# Patient Record
Sex: MALE | Race: OTHER | NOT HISPANIC OR LATINO | URBAN - METROPOLITAN AREA
[De-identification: names, ages, dates, MRNs, and addresses within clinical notes are randomized per-mention and may not be internally consistent; named-entity substitution may affect disease eponyms.]

---

## 2017-10-04 ENCOUNTER — INPATIENT (INPATIENT)
Facility: HOSPITAL | Age: 72
LOS: 29 days | Discharge: NURSING, REHAB OR LONG-TERM CARE | DRG: 853 | End: 2017-11-03
Attending: INTERNAL MEDICINE | Admitting: INTERNAL MEDICINE
Payer: COMMERCIAL

## 2017-10-04 VITALS
RESPIRATION RATE: 24 BRPM | OXYGEN SATURATION: 96 % | TEMPERATURE: 99 F | SYSTOLIC BLOOD PRESSURE: 99 MMHG | HEART RATE: 73 BPM | DIASTOLIC BLOOD PRESSURE: 63 MMHG

## 2017-10-04 LAB
ALBUMIN SERPL ELPH-MCNC: 3.6 G/DL — SIGNIFICANT CHANGE UP (ref 3.3–5)
ALP SERPL-CCNC: 231 U/L — HIGH (ref 40–120)
ALT FLD-CCNC: 11 U/L — SIGNIFICANT CHANGE UP (ref 10–45)
ANION GAP SERPL CALC-SCNC: 16 MMOL/L — SIGNIFICANT CHANGE UP (ref 5–17)
ANISOCYTOSIS BLD QL: SLIGHT — SIGNIFICANT CHANGE UP
APPEARANCE UR: SIGNIFICANT CHANGE UP
APTT BLD: 28.5 SEC — SIGNIFICANT CHANGE UP (ref 27.5–37.4)
AST SERPL-CCNC: 36 U/L — SIGNIFICANT CHANGE UP (ref 10–40)
BACTERIA # UR AUTO: PRESENT /HPF
BASE EXCESS BLDV CALC-SCNC: 1.7 MMOL/L — SIGNIFICANT CHANGE UP
BASO STIPL BLD QL SMEAR: SLIGHT — SIGNIFICANT CHANGE UP
BILIRUB SERPL-MCNC: 1 MG/DL — SIGNIFICANT CHANGE UP (ref 0.2–1.2)
BILIRUB UR-MCNC: (no result)
BUN SERPL-MCNC: 30 MG/DL — HIGH (ref 7–23)
CA-I SERPL-SCNC: 1.15 MMOL/L — SIGNIFICANT CHANGE UP (ref 1.12–1.3)
CALCIUM SERPL-MCNC: 9.3 MG/DL — SIGNIFICANT CHANGE UP (ref 8.4–10.5)
CHLORIDE SERPL-SCNC: 90 MMOL/L — LOW (ref 96–108)
CK MB CFR SERPL CALC: 23.8 NG/ML — HIGH (ref 0–6.7)
CK SERPL-CCNC: 678 U/L — HIGH (ref 30–200)
CO2 SERPL-SCNC: 25 MMOL/L — SIGNIFICANT CHANGE UP (ref 22–31)
COLOR SPEC: YELLOW — SIGNIFICANT CHANGE UP
CREAT SERPL-MCNC: 1.97 MG/DL — HIGH (ref 0.5–1.3)
DIFF PNL FLD: (no result)
EPI CELLS # UR: (no result) /HPF
GAS PNL BLDV: 131 MMOL/L — LOW (ref 138–146)
GAS PNL BLDV: SIGNIFICANT CHANGE UP
GAS PNL BLDV: SIGNIFICANT CHANGE UP
GLUCOSE SERPL-MCNC: 130 MG/DL — HIGH (ref 70–99)
GLUCOSE UR QL: NEGATIVE — SIGNIFICANT CHANGE UP
GRAN CASTS # UR COMP ASSIST: (no result) /LPF
HAPTOGLOB SERPL-MCNC: 212 MG/DL — HIGH (ref 34–200)
HCO3 BLDV-SCNC: 28 MMOL/L — HIGH (ref 20–27)
HCT VFR BLD CALC: 27.2 % — LOW (ref 39–50)
HGB BLD-MCNC: 8.7 G/DL — LOW (ref 13–17)
HYPOCHROMIA BLD QL: SLIGHT — SIGNIFICANT CHANGE UP
INR BLD: 1.34 — HIGH (ref 0.88–1.16)
KETONES UR-MCNC: (no result) MG/DL
LACTATE SERPL-SCNC: 1.4 MMOL/L — SIGNIFICANT CHANGE UP (ref 0.5–2)
LDH SERPL L TO P-CCNC: 365 U/L — HIGH (ref 50–242)
LEUKOCYTE ESTERASE UR-ACNC: NEGATIVE — SIGNIFICANT CHANGE UP
LG PLATELETS BLD QL AUTO: PRESENT — SIGNIFICANT CHANGE UP
MCHC RBC-ENTMCNC: 31.3 PG — SIGNIFICANT CHANGE UP (ref 27–34)
MCHC RBC-ENTMCNC: 32 G/DL — SIGNIFICANT CHANGE UP (ref 32–36)
MCV RBC AUTO: 97.8 FL — SIGNIFICANT CHANGE UP (ref 80–100)
MONOCYTES NFR BLD AUTO: 24 % — HIGH (ref 2–14)
NEUTROPHILS NFR BLD AUTO: 70 % — SIGNIFICANT CHANGE UP (ref 43–77)
NEUTS BAND # BLD: 6 % — SIGNIFICANT CHANGE UP
NITRITE UR-MCNC: NEGATIVE — SIGNIFICANT CHANGE UP
NT-PROBNP SERPL-SCNC: 6341 PG/ML — HIGH (ref 0–300)
PCO2 BLDV: 50 MMHG — SIGNIFICANT CHANGE UP (ref 41–51)
PH BLDV: 7.37 — SIGNIFICANT CHANGE UP (ref 7.32–7.43)
PH UR: 5 — SIGNIFICANT CHANGE UP (ref 5–8)
PLAT MORPH BLD: (no result)
PLATELET # BLD AUTO: 87 K/UL — LOW (ref 150–400)
PO2 BLDV: 24 MMHG — SIGNIFICANT CHANGE UP
POTASSIUM BLDV-SCNC: 4.5 MMOL/L — SIGNIFICANT CHANGE UP (ref 3.5–4.9)
POTASSIUM SERPL-MCNC: 4.4 MMOL/L — SIGNIFICANT CHANGE UP (ref 3.5–5.3)
POTASSIUM SERPL-SCNC: 4.4 MMOL/L — SIGNIFICANT CHANGE UP (ref 3.5–5.3)
PROT SERPL-MCNC: 8.4 G/DL — HIGH (ref 6–8.3)
PROT UR-MCNC: 30 MG/DL
PROTHROM AB SERPL-ACNC: 15 SEC — HIGH (ref 9.8–12.7)
RBC # BLD: 2.78 M/UL — LOW (ref 4.2–5.8)
RBC # FLD: 15.4 % — SIGNIFICANT CHANGE UP (ref 10.3–16.9)
RBC BLD AUTO: (no result)
RBC CASTS # UR COMP ASSIST: > 10 /HPF
RETICS/RBC NFR: 2.5 % — SIGNIFICANT CHANGE UP (ref 0.5–2.5)
SAO2 % BLDV: 39 % — SIGNIFICANT CHANGE UP
SODIUM SERPL-SCNC: 131 MMOL/L — LOW (ref 135–145)
SP GR SPEC: 1.02 — SIGNIFICANT CHANGE UP (ref 1–1.03)
TROPONIN T SERPL-MCNC: 0.06 NG/ML — CRITICAL HIGH (ref 0–0.01)
UROBILINOGEN FLD QL: 1 E.U./DL — SIGNIFICANT CHANGE UP
WBC # BLD: 46.3 K/UL — CRITICAL HIGH (ref 3.8–10.5)
WBC # FLD AUTO: 46.3 K/UL — CRITICAL HIGH (ref 3.8–10.5)
WBC UR QL: (no result) /HPF

## 2017-10-04 PROCEDURE — 71010: CPT | Mod: 26

## 2017-10-04 PROCEDURE — 93010 ELECTROCARDIOGRAM REPORT: CPT

## 2017-10-04 PROCEDURE — 99291 CRITICAL CARE FIRST HOUR: CPT | Mod: 25

## 2017-10-04 PROCEDURE — 70450 CT HEAD/BRAIN W/O DYE: CPT | Mod: 26

## 2017-10-04 PROCEDURE — 71250 CT THORAX DX C-: CPT | Mod: 26

## 2017-10-04 PROCEDURE — 99233 SBSQ HOSP IP/OBS HIGH 50: CPT | Mod: GC

## 2017-10-04 RX ORDER — PIPERACILLIN AND TAZOBACTAM 4; .5 G/20ML; G/20ML
3.38 INJECTION, POWDER, LYOPHILIZED, FOR SOLUTION INTRAVENOUS ONCE
Qty: 0 | Refills: 0 | Status: COMPLETED | OUTPATIENT
Start: 2017-10-04 | End: 2017-10-04

## 2017-10-04 RX ORDER — SODIUM CHLORIDE 9 MG/ML
3 INJECTION INTRAMUSCULAR; INTRAVENOUS; SUBCUTANEOUS ONCE
Qty: 0 | Refills: 0 | Status: COMPLETED | OUTPATIENT
Start: 2017-10-04 | End: 2017-10-04

## 2017-10-04 RX ORDER — IPRATROPIUM/ALBUTEROL SULFATE 18-103MCG
3 AEROSOL WITH ADAPTER (GRAM) INHALATION ONCE
Qty: 0 | Refills: 0 | Status: COMPLETED | OUTPATIENT
Start: 2017-10-04 | End: 2017-10-04

## 2017-10-04 RX ORDER — SODIUM CHLORIDE 9 MG/ML
1000 INJECTION INTRAMUSCULAR; INTRAVENOUS; SUBCUTANEOUS
Qty: 0 | Refills: 0 | Status: DISCONTINUED | OUTPATIENT
Start: 2017-10-04 | End: 2017-10-05

## 2017-10-04 RX ORDER — ACETAMINOPHEN 500 MG
650 TABLET ORAL ONCE
Qty: 0 | Refills: 0 | Status: COMPLETED | OUTPATIENT
Start: 2017-10-04 | End: 2017-10-04

## 2017-10-04 RX ORDER — SODIUM CHLORIDE 9 MG/ML
1000 INJECTION INTRAMUSCULAR; INTRAVENOUS; SUBCUTANEOUS ONCE
Qty: 0 | Refills: 0 | Status: COMPLETED | OUTPATIENT
Start: 2017-10-04 | End: 2017-10-04

## 2017-10-04 RX ORDER — VANCOMYCIN HCL 1 G
1250 VIAL (EA) INTRAVENOUS EVERY 24 HOURS
Qty: 0 | Refills: 0 | Status: DISCONTINUED | OUTPATIENT
Start: 2017-10-05 | End: 2017-10-05

## 2017-10-04 RX ORDER — SODIUM CHLORIDE 9 MG/ML
500 INJECTION INTRAMUSCULAR; INTRAVENOUS; SUBCUTANEOUS ONCE
Qty: 0 | Refills: 0 | Status: COMPLETED | OUTPATIENT
Start: 2017-10-04 | End: 2017-10-04

## 2017-10-04 RX ORDER — VANCOMYCIN HCL 1 G
1500 VIAL (EA) INTRAVENOUS ONCE
Qty: 0 | Refills: 0 | Status: COMPLETED | OUTPATIENT
Start: 2017-10-04 | End: 2017-10-04

## 2017-10-04 RX ORDER — ASPIRIN/CALCIUM CARB/MAGNESIUM 324 MG
81 TABLET ORAL DAILY
Qty: 0 | Refills: 0 | Status: DISCONTINUED | OUTPATIENT
Start: 2017-10-04 | End: 2017-10-06

## 2017-10-04 RX ORDER — IPRATROPIUM/ALBUTEROL SULFATE 18-103MCG
3 AEROSOL WITH ADAPTER (GRAM) INHALATION EVERY 6 HOURS
Qty: 0 | Refills: 0 | Status: COMPLETED | OUTPATIENT
Start: 2017-10-04 | End: 2017-10-05

## 2017-10-04 RX ORDER — AZITHROMYCIN 500 MG/1
500 TABLET, FILM COATED ORAL EVERY 24 HOURS
Qty: 0 | Refills: 0 | Status: DISCONTINUED | OUTPATIENT
Start: 2017-10-05 | End: 2017-10-05

## 2017-10-04 RX ORDER — ENOXAPARIN SODIUM 100 MG/ML
40 INJECTION SUBCUTANEOUS DAILY
Qty: 0 | Refills: 0 | Status: DISCONTINUED | OUTPATIENT
Start: 2017-10-04 | End: 2017-10-05

## 2017-10-04 RX ORDER — CEFTRIAXONE 500 MG/1
1 INJECTION, POWDER, FOR SOLUTION INTRAMUSCULAR; INTRAVENOUS EVERY 24 HOURS
Qty: 0 | Refills: 0 | Status: DISCONTINUED | OUTPATIENT
Start: 2017-10-05 | End: 2017-10-05

## 2017-10-04 RX ADMIN — SODIUM CHLORIDE 1000 MILLILITER(S): 9 INJECTION INTRAMUSCULAR; INTRAVENOUS; SUBCUTANEOUS at 18:38

## 2017-10-04 RX ADMIN — SODIUM CHLORIDE 3 MILLILITER(S): 9 INJECTION INTRAMUSCULAR; INTRAVENOUS; SUBCUTANEOUS at 17:41

## 2017-10-04 RX ADMIN — PIPERACILLIN AND TAZOBACTAM 25 GRAM(S): 4; .5 INJECTION, POWDER, LYOPHILIZED, FOR SOLUTION INTRAVENOUS at 18:38

## 2017-10-04 RX ADMIN — SODIUM CHLORIDE 2000 MILLILITER(S): 9 INJECTION INTRAMUSCULAR; INTRAVENOUS; SUBCUTANEOUS at 17:41

## 2017-10-04 RX ADMIN — SODIUM CHLORIDE 1000 MILLILITER(S): 9 INJECTION INTRAMUSCULAR; INTRAVENOUS; SUBCUTANEOUS at 23:09

## 2017-10-04 RX ADMIN — Medication 3 MILLILITER(S): at 17:41

## 2017-10-04 RX ADMIN — Medication 300 MILLIGRAM(S): at 19:17

## 2017-10-04 RX ADMIN — Medication 650 MILLIGRAM(S): at 23:11

## 2017-10-04 NOTE — ED PROVIDER NOTE - MUSCULOSKELETAL, MLM
Spine appears normal, range of motion is not limited, no muscle or joint tenderness.  2+ edema both legs

## 2017-10-04 NOTE — ED PROVIDER NOTE - CONSTITUTIONAL, MLM
normal... weak appearing, well nourished, awake, alert, oriented to person, place, time/situation and in no apparent distress.

## 2017-10-04 NOTE — ED PROVIDER NOTE - MEDICAL DECISION MAKING DETAILS
presents with cough/sob/syncope.  hypotensive/ hypoxic on arrival.  initial presentation concerning for pneumonia/sepsis from respiratory source.  started vanc/zosyn/ivf resuscitations.  labs with leukocytosis, but also elevated troponin / bnp.  started bipap due to respiratory distress.  icu consulted, admit to [facility]

## 2017-10-04 NOTE — CONSULT NOTE ADULT - SUBJECTIVE AND OBJECTIVE BOX
ICU Consult Medicine Resident Note    Patient is a _____ _____ with past medical history of _____ presenting with       Past Medical History:  Past Surgical History:  Medications:  Allergies:  Social History:  Family History:    Physical Examination:   Vital Signs Last 24 Hrs  T(C): 37.1 (04 Oct 2017 19:18), Max: 37.2 (04 Oct 2017 16:50)  T(F): 98.8 (04 Oct 2017 19:18), Max: 98.9 (04 Oct 2017 16:50)  HR: 74 (04 Oct 2017 20:06) (70 - 85)  BP: 122/57 (04 Oct 2017 19:18) (80/45 - 122/57)  BP(mean): --  RR: 26 (04 Oct 2017 20:06) (22 - 26)  SpO2: 98% (04 Oct 2017 20:06) (94% - 100%)  I&O's Detail    CAPILLARY BLOOD GLUCOSE        General:  HEENT:  Neck:  Heart:  Lungs:  Abdomen:  Rectal:  Back:  Genitourinary:  Extremities:  Neurological:  Skin:     Labs/Imaging:       CARDIAC MARKERS ( 04 Oct 2017 17:38 )  x     / 0.06 ng/mL / 678 U/L / x     / 23.8 ng/mL      CBC Full  -  ( 04 Oct 2017 17:38 )  WBC Count : 46.3 K/uL  Hemoglobin : 8.7 g/dL  Hematocrit : 27.2 %  Platelet Count - Automated : 87 K/uL  Mean Cell Volume : 97.8 fL  Mean Cell Hemoglobin : 31.3 pg  Mean Cell Hemoglobin Concentration : 32.0 g/dL  Auto Neutrophil # : x  Auto Lymphocyte # : x  Auto Monocyte # : x  Auto Eosinophil # : x  Auto Basophil # : x  Auto Neutrophil % : 70.0 %  Auto Lymphocyte % : x  Auto Monocyte % : 24.0 %  Auto Eosinophil % : x  Auto Basophil % : x    10-04    131<L>  |  90<L>  |  30<H>  ----------------------------<  130<H>  4.4   |  25  |  1.97<H>    Ca    9.3      04 Oct 2017 17:38    TPro  8.4<H>  /  Alb  3.6  /  TBili  1.0  /  DBili  x   /  AST  36  /  ALT  11  /  AlkPhos  231<H>  10-04    LIVER FUNCTIONS - ( 04 Oct 2017 17:38 )  Alb: 3.6 g/dL / Pro: 8.4 g/dL / ALK PHOS: 231 U/L / ALT: 11 U/L / AST: 36 U/L / GGT: x           PT/INR - ( 04 Oct 2017 17:38 )   PT: 15.0 sec;   INR: 1.34          PTT - ( 04 Oct 2017 17:38 )  PTT:28.5 sec ICU Consult Medicine Resident Note    Patient is a 72 year old male with past medical history of Hypertension, COPD, Prostate Cancer (Diagnosed 2013, S/P Radiation and Seed Placement, Also on Leuprolide, with Recent Elevated in PSA Level and Outpatient Imaging Demonstrating Bone Metastasis) presenting from Hematology/Oncology Office with Worsening Respiratory Status and Unwitnessed Syncopal Episode Earlier in Day.     Patient reports in Early Afternoon, Awoke from Nap, and On Standing, Became "Dizzy" "Lightheaded", and reports Losing Consciousness. Patient reports Down For "Few Seconds", and Report Hitting Head. However, was Able To Stand Up, and Got Ready for Doctor's Appointment and Took Cab. Besides Dizziness/Lightheadedness, Patient denies Associated Chest Pain, Palpitations, Nausea/Vomiting, Diaphoresis Prior to Fall. Patient denies Associated Tongue Biting, Urinary/Fecal Incontinence Associated with Fall. Patient reports Similar Episodes "Few Times", reports One Episode Passed Out in Past. However, Usually When Gets Up From Bed, has Dizziness/Lightheadedness, but if Goes Slowly Is Okay.    Patient reports Went to Hematology/Oncology Office, as had been told about Elevated PSA and had Imaging Outpatient Concerning for Bone Metastasis. However, on Arrival, Oncology noticed Difficulty Breathing, and Sent to ED. Patient reports For Last 1 Month has had "Bronchitis". Patient reports he was seen by PCP, Dr Glass, who prescribed 1 Week of Antibiotics in September, which Did Not Help, Patient denies Associated Fever, Chills, however, reports Cough, Productive of Yellow Sputum. Patient reports Feels Like Breathing and Cough have Improved Since September, and Did Not Notice Worsening in Respiratory Status Today. Patient reports had Fall in June 2017, and Since Has Not Been As Active, and has Noted Overall Decompensation in Functional Status, but Denies Any Acute Changes Today or Over Last Few Days.     Patient denies Abdominal Pain, Diarrhea, Constipation, Melena, Hematochezia, Dysuria, Hematuria, Change In Urinary Frequency, Urgency. Patient reports Nocturia, however, Unchanged. Patient reports Worsening Lower Extremity Edema Over Last 1 Month. Patient denies Rashes, Joint Pains.     Past Medical History: Hypertension, COPD, Prostate Cancer   Past Surgical History: Prostate Seed Placement  Medications: Losartan, HCTZ, Triamterene, Flomax, and Aspirin   Allergies: None  Social History: Former Smoker, Denies ETOH, Drugs. Lives Alone.   Family History: Non-Contributory     Physical Examination:   Vital Signs Last 24 Hrs  T(C): 37.1 (04 Oct 2017 19:18), Max: 37.2 (04 Oct 2017 16:50)  T(F): 98.8 (04 Oct 2017 19:18), Max: 98.9 (04 Oct 2017 16:50)  HR: 74 (04 Oct 2017 20:06) (70 - 85)  BP: 122/57 (04 Oct 2017 19:18) (80/45 - 122/57)  BP(mean): --  RR: 26 (04 Oct 2017 20:06) (22 - 26)  SpO2: 98% (04 Oct 2017 20:06) (94% - 100%)  I&O's Detail    CAPILLARY BLOOD GLUCOSE    General: Mild Respiratory Distress, on BIPAP, However, Speaking Full Sentences   HEENT: NCAT, PERRLA B/L, EOMI B/L, Dry MM  Neck: Supple, No JVD Appreciated, However, Given Body Habitus Difficult to Assess  Heart: Normal S1+S2, RRR, No M/R/G  Lungs: + Coarse Rales Lung Bases Bilaterally, No Egophony, No Wheezes, No Rhonchi, No Accessory Muscle Use Noted  Chest: Crusted Lesions on Left Chest Wall  Abdomen: Soft, Moderately Distended, Tympanic to Percussion, Non-Tender, No Guarding, No Rebound Tenderness, No Rigidity  Rectal: Deferred  Back: No CVA Tenderness  Genitourinary: Normal External Male Genitalia, Saldana Catheter in Place  Extremities: Warm, Well Perfused, 2+ Radial and DP Pulses Bilaterally, 1+ Pitting Edema Knees Bilaterally, + Hyperpigmentation, Skin Thickening of Shins Bilaterally  Neurological: AAOx3  Skin: Warm, Dry     Labs/Imaging:       CARDIAC MARKERS ( 04 Oct 2017 17:38 )  x     / 0.06 ng/mL / 678 U/L / x     / 23.8 ng/mL      CBC Full  -  ( 04 Oct 2017 17:38 )  WBC Count : 46.3 K/uL  Hemoglobin : 8.7 g/dL  Hematocrit : 27.2 %  Platelet Count - Automated : 87 K/uL  Mean Cell Volume : 97.8 fL  Mean Cell Hemoglobin : 31.3 pg  Mean Cell Hemoglobin Concentration : 32.0 g/dL  Auto Neutrophil # : x  Auto Lymphocyte # : x  Auto Monocyte # : x  Auto Eosinophil # : x  Auto Basophil # : x  Auto Neutrophil % : 70.0 %  Auto Lymphocyte % : x  Auto Monocyte % : 24.0 %  Auto Eosinophil % : x  Auto Basophil % : x    10-04    131<L>  |  90<L>  |  30<H>  ----------------------------<  130<H>  4.4   |  25  |  1.97<H>    Ca    9.3      04 Oct 2017 17:38    TPro  8.4<H>  /  Alb  3.6  /  TBili  1.0  /  DBili  x   /  AST  36  /  ALT  11  /  AlkPhos  231<H>  10-04    LIVER FUNCTIONS - ( 04 Oct 2017 17:38 )  Alb: 3.6 g/dL / Pro: 8.4 g/dL / ALK PHOS: 231 U/L / ALT: 11 U/L / AST: 36 U/L / GGT: x           PT/INR - ( 04 Oct 2017 17:38 )   PT: 15.0 sec;   INR: 1.34          PTT - ( 04 Oct 2017 17:38 )  PTT:28.5 sec    Lactic Acid 1.4   BNP 6341     Blood Gas Profile - Venous (10.04.17 @ 20:42)    pH, Venous: 7.37    pCO2, Venous: 50 mmHg    pO2, Venous: 24 mmHg    HCO3, Venous: 28 mmol/L    Base Excess, Venous: 1.7 mmol/L    Oxygen Saturation, Venous: 39 %    Blood Gas Source Venous: BLDV      EXAM:  CT BRAIN                          PROCEDURE DATE:  10/04/2017            INTERPRETATION:  CT BRAIN WITHOUT CONTRAST    INDICATIONS: Shortness of breath. Trauma.    TECHNIQUE:  Serial axial images were obtained from the skull base tothe   vertex without the use of intravenous contrast. Sagittal and coronal   reformatted images were also obtained.    COMPARISON EXAMINATION: None.    FINDINGS:    VENTRICLES AND SULCI: No hydrocephalus.  INTRA-AXIAL: Possible small lacunar infarct in the left thalamus. No   intracranial mass effect, acute hemorrhage, or midline shift is present.   Gray-white differentiation is maintained.  EXTRA-AXIAL: No extra-axial fluid collection is present.   VISUALIZED SINUSES: No air-fluid levels are identified.   VISUALIZED MASTOIDS:  Clear.  CALVARIUM:  No fracture.  MISCELLANEOUS:  The right ocular lens is absent consistent with prior   cataract surgery. There is slight rightward deviation of the nasal septum.    IMPRESSION:    No acute intracranial hemorrhage or CT evidence of acute transcortical   infarction. No displaced calvarial fracture.    "Thank you for the opportunity to participate in the care of this   patient."    ALFREDO COLBY M.D., RADIOLOGY RESIDENT  This documenthas been electronically signed.  TEDDY BAILEY M.D., ATTENDING RADIOLOGIST  This document has been electronically signed. Oct  4 2017  8:43PM

## 2017-10-04 NOTE — ED CLERICAL - NS ED CLERK NOTE PRE-ARRIVAL INFORMATION; ADDITIONAL PRE-ARRIVAL INFORMATION
72/M/TEDDY GUZMAN PROSTATIC CANCER SOB DIFFICULT BREATHING COUGHING YELLOW SPUTUM FELL HIT HEAD CONCUSSION ON LEFT HEAD AND EYE

## 2017-10-04 NOTE — CONSULT NOTE ADULT - ASSESSMENT
Patient is a 72 year old male with past medical history of Hypertension, COPD, Prostate Cancer (Diagnosed 2013, S/P Radiation and Seed Placement, Also on Leuprolide, with Recent Elevated in PSA Level and Outpatient Imaging Demonstrating Bone Metastasis) presenting from Hematology/Oncology Office with Worsening Respiratory Status and Unwitnessed Syncopal Episode Earlier in Day.     1. Sepsis Likely Secondary to Community Acquired Pneumonia: Patient Meeting SIRS Criteria, patient with Leukocytosis and Tachypnea, and Based on History and Examination, Concerning for Possible Pneumonia. CXR Unable to Determine if Infiltrate Present. In Addition, patient with Hypotension.   -Continue BIPAP for Now, Consider Weaning Off to High Flow Nasal Canula.   -Would Obtain CT Chest for Further Evaluation.   -Would Continue Vancomycin, Based on GFR and Weight, Can Likely Dose Vancomycin 1,250mg IV Q24 Hours, or Dose By Level.   -Would Dose for Ceftriaxone and Azithromycin for Community Acquired Pneumonia Coverage.   -If Possible, Attempt to Obtain Sputum Culture.   -Check RVP.   -Check VBG.   -Follow-Up Blood Cultures.   -Would Repeat CXR in AM after IV Hydration for Interval Change.     2. Acute Kidney Injury: Baseline Creatinine Unknown, Will Need to Obtain Collateral for PCP and Hematologist/Oncologist in AM. However, Denies Kidney Problems.   -Patient Received Approximately 1L Normal Saline in ED.   -Saldana Catheter Placed, However, Unlikely Urinary Retention.   -Would Check Urine Sodium, Creatinine, Urea Nitrogen, Osmolality.   -Would Check Renal Ultrasound.     3. Leukocytosis: Etiology Unclear, Possibly Infection, Concern for Possible Pneumonia. Also, Possibly Reactive. WBC 46.3 with 24% Monocytes.   -Continue Antibiotics As Above.   -Trend CBC with Differential.   -Follow-Up with Hematology/Oncology in AM (Dr. Morgan).     3. Anemia: Etiology Unclear. Possibly Related to Chronic Disease, or Underlying Malignancy. No Signs or Symptoms of Active Bleeding. No Melena, Hematochezia. Baseline Hemoglobin Unknown. Obtain Collateral in AM.   -Trend CBC.   -Would Check LDH, Haptoglobin, Reticulocyte Count.   -Maintain 2 Active Type and Screens.     4. Thrombocytopenia: Etiology Unclear. Possibly Related to Chronic Disease or Underlying Malignancy.   -Trend CBC. Would Transfuse for <10 if Not Bleeding and <50 if Actively Bleeding.     5. Elevated Troponin: Patient with SOB, Lower Extremity Edema, However, No Other Signs of ACS. Troponin 0.06, EKG with ST Depressions in V4-V5. Likely Demand Ischemia in Setting of Hypotension, Possible Infection.   -Trend Troponin to Peak.   -Repeat EKG in AM.   -Check Echocardiogram.     6. Possible Herpetic Lesion Left Chest Wall: Etiology Unclear, However, Appears Like Crusted Over Old Herpetic Lesions.   -Would Consider Topical Acyclovir for Symptomatic Treatment.     Disposition: Discussed with Critical Care Attending, patient to be Admitted to [facility] for Telemetry Monitoring and Continuous Oxygen Saturation Monitoring in Setting of Respiratory Distress requiring BIPAP. Endorsed to [facility] Night Team.

## 2017-10-04 NOTE — ED PROVIDER NOTE - CRITICAL CARE PROVIDED
documentation/direct patient care (not related to procedure)/interpretation of diagnostic studies/additional history taking/consultation with other physicians/consult w/ pt's family directly relating to pts condition/telephone consultation with the patient's family

## 2017-10-04 NOTE — ED ADULT NURSE NOTE - CHPI ED SYMPTOMS NEG
no numbness/no vomiting/no chills/no nausea/no decreased eating/drinking/no tingling/no fever/no pain

## 2017-10-04 NOTE — ED ADULT TRIAGE NOTE - CHIEF COMPLAINT QUOTE
pt feeling SOBOE with productive cough x a few weeks, hx prostate CA ,was at oncologist today and referred here ,also having some edema of legs pt feeling SOBOE with productive cough x a few weeks, hx prostate CA ,was at oncologist today and referred here ,also having some edema of legs, pt also had a syncopal episode today and fell and hit left forehead

## 2017-10-04 NOTE — ED ADULT NURSE NOTE - OBJECTIVE STATEMENT
Patient w/ syncope episode and weakness while at MD office, fell and hit head.  Also c/o of SOB , no chest pain, no neuro deficits.

## 2017-10-04 NOTE — ED PROVIDER NOTE - OBJECTIVE STATEMENT
here with syncopal episode and shortness of breath.  Reports about 6 weeks of cough and trouble breathing that he calls bronchitis.  Says it has gotten a little better but not resolved.  Today he was taking a nap before his appointment with his oncologist and got up pretty quickly, felt lightheaded, then passed out.  Hit his head.  Denies current headache, dizziness, chest pain, fever/chills.  History of prostate cancer with mets to spine.

## 2017-10-04 NOTE — ED PROVIDER NOTE - CARE PLAN
Principal Discharge DX:	Pneumonia  Secondary Diagnosis:	Leukocytosis  Secondary Diagnosis:	Acute CHF

## 2017-10-04 NOTE — ED PROVIDER NOTE - PROGRESS NOTE DETAILS
labs resulted with significant wbc.  given low bp, concern for sepsis.  afebrile.  lactate and cultures ordered along with broad spectrum antibiotics, 2L fluid additional labs resulted with mild troponin elevation, marked bnp elevation.  concern for acute chf.  lactate normal.  will stop aggressive fluid resuscitation as bp trending up and lactate normal out of concern for volume overload/ worsening respiratory status. case discussed with son Camilo in the ED.  phone [phone]

## 2017-10-05 DIAGNOSIS — D69.6 THROMBOCYTOPENIA, UNSPECIFIED: ICD-10-CM

## 2017-10-05 DIAGNOSIS — R63.8 OTHER SYMPTOMS AND SIGNS CONCERNING FOOD AND FLUID INTAKE: ICD-10-CM

## 2017-10-05 DIAGNOSIS — J18.9 PNEUMONIA, UNSPECIFIED ORGANISM: ICD-10-CM

## 2017-10-05 DIAGNOSIS — J44.9 CHRONIC OBSTRUCTIVE PULMONARY DISEASE, UNSPECIFIED: ICD-10-CM

## 2017-10-05 DIAGNOSIS — C61 MALIGNANT NEOPLASM OF PROSTATE: ICD-10-CM

## 2017-10-05 DIAGNOSIS — R74.8 ABNORMAL LEVELS OF OTHER SERUM ENZYMES: ICD-10-CM

## 2017-10-05 DIAGNOSIS — C61 MALIGNANT NEOPLASM OF PROSTATE: Chronic | ICD-10-CM

## 2017-10-05 DIAGNOSIS — I10 ESSENTIAL (PRIMARY) HYPERTENSION: ICD-10-CM

## 2017-10-05 DIAGNOSIS — N17.9 ACUTE KIDNEY FAILURE, UNSPECIFIED: ICD-10-CM

## 2017-10-05 DIAGNOSIS — D72.829 ELEVATED WHITE BLOOD CELL COUNT, UNSPECIFIED: ICD-10-CM

## 2017-10-05 DIAGNOSIS — Z29.9 ENCOUNTER FOR PROPHYLACTIC MEASURES, UNSPECIFIED: ICD-10-CM

## 2017-10-05 DIAGNOSIS — G47.33 OBSTRUCTIVE SLEEP APNEA (ADULT) (PEDIATRIC): ICD-10-CM

## 2017-10-05 DIAGNOSIS — D64.9 ANEMIA, UNSPECIFIED: ICD-10-CM

## 2017-10-05 LAB
ALBUMIN SERPL ELPH-MCNC: 3.3 G/DL — SIGNIFICANT CHANGE UP (ref 3.3–5)
ALP SERPL-CCNC: 198 U/L — HIGH (ref 40–120)
ALT FLD-CCNC: 13 U/L — SIGNIFICANT CHANGE UP (ref 10–45)
ANION GAP SERPL CALC-SCNC: 17 MMOL/L — SIGNIFICANT CHANGE UP (ref 5–17)
ANION GAP SERPL CALC-SCNC: 21 MMOL/L — HIGH (ref 5–17)
AST SERPL-CCNC: 54 U/L — HIGH (ref 10–40)
BASE EXCESS BLDA CALC-SCNC: -0.5 MMOL/L — SIGNIFICANT CHANGE UP (ref -2–3)
BASE EXCESS BLDA CALC-SCNC: -3.7 MMOL/L — LOW (ref -2–3)
BASE EXCESS BLDA CALC-SCNC: -3.9 MMOL/L — LOW (ref -2–3)
BILIRUB SERPL-MCNC: 0.4 MG/DL — SIGNIFICANT CHANGE UP (ref 0.2–1.2)
BLD GP AB SCN SERPL QL: NEGATIVE — SIGNIFICANT CHANGE UP
BUN SERPL-MCNC: 35 MG/DL — HIGH (ref 7–23)
BUN SERPL-MCNC: 41 MG/DL — HIGH (ref 7–23)
CALCIUM SERPL-MCNC: 8.9 MG/DL — SIGNIFICANT CHANGE UP (ref 8.4–10.5)
CALCIUM SERPL-MCNC: 9.1 MG/DL — SIGNIFICANT CHANGE UP (ref 8.4–10.5)
CHLORIDE SERPL-SCNC: 91 MMOL/L — LOW (ref 96–108)
CHLORIDE SERPL-SCNC: 94 MMOL/L — LOW (ref 96–108)
CK MB CFR SERPL CALC: 10.3 NG/ML — HIGH (ref 0–6.7)
CK SERPL-CCNC: 848 U/L — HIGH (ref 30–200)
CO2 SERPL-SCNC: 19 MMOL/L — LOW (ref 22–31)
CO2 SERPL-SCNC: 23 MMOL/L — SIGNIFICANT CHANGE UP (ref 22–31)
CREAT ?TM UR-MCNC: 253 MG/DL — SIGNIFICANT CHANGE UP
CREAT SERPL-MCNC: 2.07 MG/DL — HIGH (ref 0.5–1.3)
CREAT SERPL-MCNC: 2.33 MG/DL — HIGH (ref 0.5–1.3)
GAS PNL BLDA: SIGNIFICANT CHANGE UP
GLUCOSE SERPL-MCNC: 111 MG/DL — HIGH (ref 70–99)
GLUCOSE SERPL-MCNC: 136 MG/DL — HIGH (ref 70–99)
GRAM STN FLD: SIGNIFICANT CHANGE UP
GRAM STN FLD: SIGNIFICANT CHANGE UP
HCO3 BLDA-SCNC: 23 MMOL/L — SIGNIFICANT CHANGE UP (ref 21–28)
HCO3 BLDA-SCNC: 23 MMOL/L — SIGNIFICANT CHANGE UP (ref 21–28)
HCO3 BLDA-SCNC: 26 MMOL/L — SIGNIFICANT CHANGE UP (ref 21–28)
HCT VFR BLD CALC: 24.3 % — LOW (ref 39–50)
HCT VFR BLD CALC: 26.1 % — LOW (ref 39–50)
HCT VFR BLD CALC: 26.9 % — LOW (ref 39–50)
HGB BLD-MCNC: 8 G/DL — LOW (ref 13–17)
HGB BLD-MCNC: 8.4 G/DL — LOW (ref 13–17)
HGB BLD-MCNC: 8.5 G/DL — LOW (ref 13–17)
LACTATE SERPL-SCNC: 0.7 MMOL/L — SIGNIFICANT CHANGE UP (ref 0.5–2)
LACTATE SERPL-SCNC: 2.6 MMOL/L — HIGH (ref 0.5–2)
LYMPHOCYTES # BLD AUTO: 1 % — LOW (ref 13–44)
MCHC RBC-ENTMCNC: 30.7 PG — SIGNIFICANT CHANGE UP (ref 27–34)
MCHC RBC-ENTMCNC: 31.2 PG — SIGNIFICANT CHANGE UP (ref 27–34)
MCHC RBC-ENTMCNC: 31.6 G/DL — LOW (ref 32–36)
MCHC RBC-ENTMCNC: 31.6 PG — SIGNIFICANT CHANGE UP (ref 27–34)
MCHC RBC-ENTMCNC: 32.2 G/DL — SIGNIFICANT CHANGE UP (ref 32–36)
MCHC RBC-ENTMCNC: 32.9 G/DL — SIGNIFICANT CHANGE UP (ref 32–36)
MCV RBC AUTO: 96 FL — SIGNIFICANT CHANGE UP (ref 80–100)
MCV RBC AUTO: 97 FL — SIGNIFICANT CHANGE UP (ref 80–100)
MCV RBC AUTO: 97.1 FL — SIGNIFICANT CHANGE UP (ref 80–100)
METHOD TYPE: SIGNIFICANT CHANGE UP
MONOCYTES NFR BLD AUTO: 16 % — HIGH (ref 2–14)
MSSA DNA SPEC QL NAA+PROBE: SIGNIFICANT CHANGE UP
NEUTROPHILS NFR BLD AUTO: 75 % — SIGNIFICANT CHANGE UP (ref 43–77)
OSMOLALITY UR: 378 MOSMOL/KG — SIGNIFICANT CHANGE UP (ref 100–650)
PCO2 BLDA: 49 MMHG — HIGH (ref 35–48)
PCO2 BLDA: 49 MMHG — HIGH (ref 35–48)
PCO2 BLDA: 55 MMHG — HIGH (ref 35–48)
PH BLDA: 7.25 — LOW (ref 7.35–7.45)
PH BLDA: 7.3 — LOW (ref 7.35–7.45)
PH BLDA: 7.33 — LOW (ref 7.35–7.45)
PLATELET # BLD AUTO: 85 K/UL — LOW (ref 150–400)
PLATELET # BLD AUTO: 90 K/UL — LOW (ref 150–400)
PLATELET # BLD AUTO: 98 K/UL — LOW (ref 150–400)
PO2 BLDA: 104 MMHG — SIGNIFICANT CHANGE UP (ref 83–108)
PO2 BLDA: 237 MMHG — HIGH (ref 83–108)
PO2 BLDA: 334 MMHG — HIGH (ref 83–108)
POTASSIUM SERPL-MCNC: 4 MMOL/L — SIGNIFICANT CHANGE UP (ref 3.5–5.3)
POTASSIUM SERPL-MCNC: 4.3 MMOL/L — SIGNIFICANT CHANGE UP (ref 3.5–5.3)
POTASSIUM SERPL-SCNC: 4 MMOL/L — SIGNIFICANT CHANGE UP (ref 3.5–5.3)
POTASSIUM SERPL-SCNC: 4.3 MMOL/L — SIGNIFICANT CHANGE UP (ref 3.5–5.3)
PROT SERPL-MCNC: 8.3 G/DL — SIGNIFICANT CHANGE UP (ref 6–8.3)
RAPID RVP RESULT: SIGNIFICANT CHANGE UP
RBC # BLD: 2.53 M/UL — LOW (ref 4.2–5.8)
RBC # BLD: 2.69 M/UL — LOW (ref 4.2–5.8)
RBC # BLD: 2.77 M/UL — LOW (ref 4.2–5.8)
RBC # FLD: 16 % — SIGNIFICANT CHANGE UP (ref 10.3–16.9)
RBC # FLD: 16.1 % — SIGNIFICANT CHANGE UP (ref 10.3–16.9)
RBC # FLD: 16.2 % — SIGNIFICANT CHANGE UP (ref 10.3–16.9)
RH IG SCN BLD-IMP: POSITIVE — SIGNIFICANT CHANGE UP
SAO2 % BLDA: 100 % — SIGNIFICANT CHANGE UP (ref 95–100)
SAO2 % BLDA: 98 % — SIGNIFICANT CHANGE UP (ref 95–100)
SAO2 % BLDA: 99 % — SIGNIFICANT CHANGE UP (ref 95–100)
SODIUM SERPL-SCNC: 131 MMOL/L — LOW (ref 135–145)
SODIUM SERPL-SCNC: 134 MMOL/L — LOW (ref 135–145)
SODIUM UR-SCNC: <20 MMOL/L — SIGNIFICANT CHANGE UP
TROPONIN T SERPL-MCNC: 0.06 NG/ML — CRITICAL HIGH (ref 0–0.01)
TROPONIN T SERPL-MCNC: 0.1 NG/ML — CRITICAL HIGH (ref 0–0.01)
UUN UR-MCNC: 325 MG/DL — SIGNIFICANT CHANGE UP
VANCOMYCIN TROUGH SERPL-MCNC: 9.3 UG/ML — LOW (ref 10–20)
WBC # BLD: 41 K/UL — CRITICAL HIGH (ref 3.8–10.5)
WBC # BLD: 41.3 K/UL — CRITICAL HIGH (ref 3.8–10.5)
WBC # BLD: 42.4 K/UL — CRITICAL HIGH (ref 3.8–10.5)
WBC # FLD AUTO: 41 K/UL — CRITICAL HIGH (ref 3.8–10.5)
WBC # FLD AUTO: 41.3 K/UL — CRITICAL HIGH (ref 3.8–10.5)
WBC # FLD AUTO: 42.4 K/UL — CRITICAL HIGH (ref 3.8–10.5)

## 2017-10-05 PROCEDURE — 71010: CPT | Mod: 26

## 2017-10-05 PROCEDURE — 99291 CRITICAL CARE FIRST HOUR: CPT

## 2017-10-05 PROCEDURE — 93010 ELECTROCARDIOGRAM REPORT: CPT

## 2017-10-05 PROCEDURE — 74000: CPT | Mod: 26,76

## 2017-10-05 PROCEDURE — 36620 INSERTION CATHETER ARTERY: CPT

## 2017-10-05 PROCEDURE — 93010 ELECTROCARDIOGRAM REPORT: CPT | Mod: 77

## 2017-10-05 PROCEDURE — 71010: CPT | Mod: 26,77

## 2017-10-05 PROCEDURE — 99233 SBSQ HOSP IP/OBS HIGH 50: CPT | Mod: GC

## 2017-10-05 PROCEDURE — 93306 TTE W/DOPPLER COMPLETE: CPT | Mod: 26

## 2017-10-05 RX ORDER — ASPIRIN/CALCIUM CARB/MAGNESIUM 324 MG
1 TABLET ORAL
Qty: 0 | Refills: 0 | COMMUNITY

## 2017-10-05 RX ORDER — SODIUM CHLORIDE 9 MG/ML
1000 INJECTION INTRAMUSCULAR; INTRAVENOUS; SUBCUTANEOUS ONCE
Qty: 0 | Refills: 0 | Status: COMPLETED | OUTPATIENT
Start: 2017-10-05 | End: 2017-10-05

## 2017-10-05 RX ORDER — VANCOMYCIN HCL 1 G
1250 VIAL (EA) INTRAVENOUS ONCE
Qty: 0 | Refills: 0 | Status: COMPLETED | OUTPATIENT
Start: 2017-10-05 | End: 2017-10-05

## 2017-10-05 RX ORDER — PIPERACILLIN AND TAZOBACTAM 4; .5 G/20ML; G/20ML
INJECTION, POWDER, LYOPHILIZED, FOR SOLUTION INTRAVENOUS
Qty: 0 | Refills: 0 | Status: DISCONTINUED | OUTPATIENT
Start: 2017-10-05 | End: 2017-10-06

## 2017-10-05 RX ORDER — NOREPINEPHRINE BITARTRATE/D5W 8 MG/250ML
0.01 PLASTIC BAG, INJECTION (ML) INTRAVENOUS
Qty: 8 | Refills: 0 | Status: DISCONTINUED | OUTPATIENT
Start: 2017-10-05 | End: 2017-10-06

## 2017-10-05 RX ORDER — PIPERACILLIN AND TAZOBACTAM 4; .5 G/20ML; G/20ML
3.38 INJECTION, POWDER, LYOPHILIZED, FOR SOLUTION INTRAVENOUS ONCE
Qty: 0 | Refills: 0 | Status: COMPLETED | OUTPATIENT
Start: 2017-10-05 | End: 2017-10-05

## 2017-10-05 RX ORDER — ACETAMINOPHEN 500 MG
650 TABLET ORAL EVERY 6 HOURS
Qty: 0 | Refills: 0 | Status: DISCONTINUED | OUTPATIENT
Start: 2017-10-05 | End: 2017-10-20

## 2017-10-05 RX ORDER — HEPARIN SODIUM 5000 [USP'U]/ML
7500 INJECTION INTRAVENOUS; SUBCUTANEOUS EVERY 8 HOURS
Qty: 0 | Refills: 0 | Status: DISCONTINUED | OUTPATIENT
Start: 2017-10-05 | End: 2017-10-13

## 2017-10-05 RX ORDER — PIPERACILLIN AND TAZOBACTAM 4; .5 G/20ML; G/20ML
3.38 INJECTION, POWDER, LYOPHILIZED, FOR SOLUTION INTRAVENOUS EVERY 6 HOURS
Qty: 0 | Refills: 0 | Status: DISCONTINUED | OUTPATIENT
Start: 2017-10-06 | End: 2017-10-06

## 2017-10-05 RX ORDER — PROPOFOL 10 MG/ML
5 INJECTION, EMULSION INTRAVENOUS
Qty: 1000 | Refills: 0 | Status: DISCONTINUED | OUTPATIENT
Start: 2017-10-05 | End: 2017-10-06

## 2017-10-05 RX ORDER — ATORVASTATIN CALCIUM 80 MG/1
20 TABLET, FILM COATED ORAL AT BEDTIME
Qty: 0 | Refills: 0 | Status: DISCONTINUED | OUTPATIENT
Start: 2017-10-05 | End: 2017-10-20

## 2017-10-05 RX ORDER — SODIUM CHLORIDE 9 MG/ML
500 INJECTION INTRAMUSCULAR; INTRAVENOUS; SUBCUTANEOUS ONCE
Qty: 0 | Refills: 0 | Status: COMPLETED | OUTPATIENT
Start: 2017-10-05 | End: 2017-10-05

## 2017-10-05 RX ORDER — PROPOFOL 10 MG/ML
5 INJECTION, EMULSION INTRAVENOUS
Qty: 1000 | Refills: 0 | Status: DISCONTINUED | OUTPATIENT
Start: 2017-10-05 | End: 2017-10-05

## 2017-10-05 RX ADMIN — ATORVASTATIN CALCIUM 20 MILLIGRAM(S): 80 TABLET, FILM COATED ORAL at 21:55

## 2017-10-05 RX ADMIN — Medication 3 MILLILITER(S): at 22:05

## 2017-10-05 RX ADMIN — Medication 166.67 MILLIGRAM(S): at 21:28

## 2017-10-05 RX ADMIN — SODIUM CHLORIDE 60 MILLILITER(S): 9 INJECTION INTRAMUSCULAR; INTRAVENOUS; SUBCUTANEOUS at 05:18

## 2017-10-05 RX ADMIN — PROPOFOL 3.27 MICROGRAM(S)/KG/MIN: 10 INJECTION, EMULSION INTRAVENOUS at 20:48

## 2017-10-05 RX ADMIN — Medication 2.04 MICROGRAM(S)/KG/MIN: at 16:55

## 2017-10-05 RX ADMIN — SODIUM CHLORIDE 4000 MILLILITER(S): 9 INJECTION INTRAMUSCULAR; INTRAVENOUS; SUBCUTANEOUS at 20:20

## 2017-10-05 RX ADMIN — PIPERACILLIN AND TAZOBACTAM 200 GRAM(S): 4; .5 INJECTION, POWDER, LYOPHILIZED, FOR SOLUTION INTRAVENOUS at 23:52

## 2017-10-05 RX ADMIN — SODIUM CHLORIDE 2000 MILLILITER(S): 9 INJECTION INTRAMUSCULAR; INTRAVENOUS; SUBCUTANEOUS at 16:51

## 2017-10-05 RX ADMIN — Medication 3 MILLILITER(S): at 09:51

## 2017-10-05 RX ADMIN — HEPARIN SODIUM 7500 UNIT(S): 5000 INJECTION INTRAVENOUS; SUBCUTANEOUS at 18:06

## 2017-10-05 RX ADMIN — Medication 81 MILLIGRAM(S): at 21:55

## 2017-10-05 RX ADMIN — SODIUM CHLORIDE 2000 MILLILITER(S): 9 INJECTION INTRAMUSCULAR; INTRAVENOUS; SUBCUTANEOUS at 03:30

## 2017-10-05 RX ADMIN — Medication 3 MILLILITER(S): at 17:39

## 2017-10-05 RX ADMIN — Medication 3 MILLILITER(S): at 04:15

## 2017-10-05 RX ADMIN — AZITHROMYCIN 255 MILLIGRAM(S): 500 TABLET, FILM COATED ORAL at 06:54

## 2017-10-05 RX ADMIN — Medication 650 MILLIGRAM(S): at 06:15

## 2017-10-05 RX ADMIN — CEFTRIAXONE 100 GRAM(S): 500 INJECTION, POWDER, FOR SOLUTION INTRAMUSCULAR; INTRAVENOUS at 05:18

## 2017-10-05 RX ADMIN — SODIUM CHLORIDE 60 MILLILITER(S): 9 INJECTION INTRAMUSCULAR; INTRAVENOUS; SUBCUTANEOUS at 01:32

## 2017-10-05 RX ADMIN — SODIUM CHLORIDE 4000 MILLILITER(S): 9 INJECTION INTRAMUSCULAR; INTRAVENOUS; SUBCUTANEOUS at 04:31

## 2017-10-05 NOTE — PROGRESS NOTE ADULT - SUBJECTIVE AND OBJECTIVE BOX
INTERVAL HPI/OVERNIGHT EVENTS:    OVERNIGHT: No overnight events.  SUBJECTIVE: Patient seen and examined at bedside.     ROS:  CV: Denies chest pain  Resp: Denies SOB  GI: Denies abdominal pain, constipation, diarrhea, nausea, vomiting  : Denies dysuria  ID: Denies fevers, chills  MSK: Denies joint pain     OBJECTIVE:    VITAL SIGNS:  ICU Vital Signs Last 24 Hrs  T(C): 36.8 (05 Oct 2017 16:19), Max: 38.3 (04 Oct 2017 22:26)  T(F): 98.2 (05 Oct 2017 16:19), Max: 101 (04 Oct 2017 22:26)  HR: 80 (05 Oct 2017 19:00) (64 - 116)  BP: 82/42 (05 Oct 2017 19:00) (67/42 - 133/75)  BP(mean): 54 (05 Oct 2017 19:00) (46 - 80)  ABP: --  ABP(mean): --  RR: 23 (05 Oct 2017 19:00) (17 - 27)  SpO2: 100% (05 Oct 2017 19:00) (93% - 100%)        10-04 @ :01  -  10-05 @ 07:00  --------------------------------------------------------  IN: 2660 mL / OUT: 375 mL / NET: 2285 mL    10-05 @ 07:01  -  10-05 @ 19:42  --------------------------------------------------------  IN: 1237 mL / OUT: 350 mL / NET: 887 mL      CAPILLARY BLOOD GLUCOSE  124 (05 Oct 2017 15:50)          PHYSICAL EXAM:    General: NAD, comfortable  HEENT: NCAT, PERRL, clear conjunctiva, no scleral icterus  Neck: supple, no JVD  Respiratory: CTA b/l, no wheezing, rhonchi, rales  Cardiovascular: RRR, normal S1S2, no M/R/G  Abdomen: soft, NT/ND, bowel sounds in all four quadrants, no palpable masses  Extremities: WWP, no clubbing, cyanosis, or edema  Neuro:     MEDICATIONS:  MEDICATIONS  (STANDING):  ALBUTerol/ipratropium for Nebulization 3 milliLiter(s) Nebulizer every 6 hours  aspirin enteric coated 81 milliGRAM(s) Oral daily  atorvastatin 20 milliGRAM(s) Oral at bedtime  azithromycin  IVPB 500 milliGRAM(s) IV Intermittent every 24 hours  cefTRIAXone   IVPB 1 Gram(s) IV Intermittent every 24 hours  heparin  Injectable 7500 Unit(s) SubCutaneous every 8 hours  norepinephrine Infusion 0.01 MICROgram(s)/kG/Min (2.044 mL/Hr) IV Continuous <Continuous>  sodium chloride 0.9%. 1000 milliLiter(s) (60 mL/Hr) IV Continuous <Continuous>  vancomycin  IVPB 1250 milliGRAM(s) IV Intermittent once    MEDICATIONS  (PRN):  acetaminophen  Suppository 650 milliGRAM(s) Rectal every 6 hours PRN For Temp greater than 38 C (100.4 F)      ALLERGIES:  Allergies    No Known Allergies    Intolerances    LABS:                        8.4    41.0  )-----------( 98       ( 05 Oct 2017 15:53 )             26.1     10-05    134<L>  |  94<L>  |  41<H>  ----------------------------<  136<H>  4.0   |  19<L>  |  2.07<H>    Ca    9.1      05 Oct 2017 15:53    TPro  8.3  /  Alb  3.3  /  TBili  0.4  /  DBili  x   /  AST  54<H>  /  ALT  13  /  AlkPhos  198<H>  10-05    PT/INR - ( 04 Oct 2017 17:38 )   PT: 15.0 sec;   INR: 1.34          PTT - ( 04 Oct 2017 17:38 )  PTT:28.5 sec  Urinalysis Basic - ( 04 Oct 2017 20:46 )    Color: Yellow / Appearance: SL CLOUDY / S.025 / pH: x  Gluc: x / Ketone: Trace mg/dL  / Bili: Small / Urobili: 1.0 E.U./dL   Blood: x / Protein: 30 mg/dL / Nitrite: NEGATIVE   Leuk Esterase: NEGATIVE / RBC: > 10 /HPF / WBC 5-10 /HPF   Sq Epi: x / Non Sq Epi: Moderate /HPF / Bacteria: Present /HPF        RADIOLOGY & ADDITIONAL TESTS: Reviewed. TRANSFER NOTE  HOSPITAL COURSE  Patient is a 72 year old male with a PMHx of HTN, COPD, Prostate CA (2013, s/p radiation and seed placement, on leuprolide with a recent elevation of PSA and imaging showing bone mets) presents from his heme/on office with worsening of SOB and unwitnessed episode of syncope in the morning. The patient states that he was waking up from a nap and upon getting up from his bed, he felt unwell, dizzy, and lightheaded and then woke up on the ground. He states that he lost consciousness for about 5 seconds. He hit the left side of his head on the ground. He denies any chest pain, palpitations, diaphoresis or changes in vision prior or after the event. Non contrast CT head was unremarkable for any acute bleed or masses. Patient arrived to ED with severe sepsis unknown source and received 2.75 L bolus and vanc/zosyn. Patient found to have CATALINA with granular casts on UA concerning for ATN. Upon arrival to [facility] patient was placed on CPAP and maintenance fluid 60 cc/hr. Blood cultures drawn in ED significant for S. aureus. Patient had good urine output and MAPs>60. TTE done showed septal motion abnormalities, hypokinesis of L ventricle, R atrial dilatation and EF 45%. Rapid response was called after patient was found to have agonal breathing, was hypoxic and hypotensive. ABG done at the time showed respiratory acidosis. Patient was placed on CPAP with improvement in his oxygen saturation. He received a 500 cc bolus and started on peripheral levophed. Patient transferred to MICU service for closer monitoring.     SUBJECTIVE: Patient seen and examined at bedside, patient on BIPAP and complaining of mild discomfort associated with BIPAP mask. Reports he is breathing well. Reports his abdomen is distended and has mild abdominal discomfort. Reports passing gas. No other complaints.    ROS:  CV: Denies chest pain  Resp: Breathing comfortably with BIPAP  GI: Denies abdominal pain, constipation, diarrhea, nausea, vomiting  : Denies dysuria  ID: Denies fevers, chills  MSK: Denies joint pain     OBJECTIVE:    VITAL SIGNS:  ICU Vital Signs Last 24 Hrs  T(C): 36.8 (05 Oct 2017 16:19), Max: 38.3 (04 Oct 2017 22:26)  T(F): 98.2 (05 Oct 2017 16:19), Max: 101 (04 Oct 2017 22:26)  HR: 80 (05 Oct 2017 19:00) (64 - 116)  BP: 82/42 (05 Oct 2017 19:00) (67/42 - 133/75)  BP(mean): 54 (05 Oct 2017 19:00) (46 - 80)  ABP: --  ABP(mean): --  RR: 23 (05 Oct 2017 19:00) (17 - 27)  SpO2: 100% (05 Oct 2017 19:00) (93% - 100%)    10-04 @ :  -  10-05 @ 07:00  --------------------------------------------------------  IN: 2660 mL / OUT: 375 mL / NET: 2285 mL    10-05 @ 07:  -  10-05 @ 19:42  --------------------------------------------------------  IN: 1237 mL / OUT: 350 mL / NET: 887 mL    CAPILLARY BLOOD GLUCOSE  124 (05 Oct 2017 15:50)    PHYSICAL EXAM:  General: NAD, tachypneic on BIPAP  HEENT: NCAT, PERRL, clear conjunctiva, no scleral icterus, contusion over L orbit from fall prior to admission   Neck: supple, JVD difficult to assess d/t body habitus  Respiratory: tachypneic on BIPAP, no accessory muscle use, decreased breath sounds at bases b/l, expiratory wheezes present  Cardiovascular: RRR, normal S1S2, no M/R/G  Abdomen: soft, distended, non-tender to palpation, bowel sounds +, tympanic to palpation, no palpable masses  Extremities: WWP, 2+ pitting edema to thighs, 2 + radial pulses b/l, 2+ PT/DP b/l  Neuro: AAOx3, no cranial nerve deficits, strength and sensation intact thoughout    MEDICATIONS:  MEDICATIONS  (STANDING):  ALBUTerol/ipratropium for Nebulization 3 milliLiter(s) Nebulizer every 6 hours  aspirin enteric coated 81 milliGRAM(s) Oral daily  atorvastatin 20 milliGRAM(s) Oral at bedtime  azithromycin  IVPB 500 milliGRAM(s) IV Intermittent every 24 hours  cefTRIAXone   IVPB 1 Gram(s) IV Intermittent every 24 hours  heparin  Injectable 7500 Unit(s) SubCutaneous every 8 hours  norepinephrine Infusion 0.01 MICROgram(s)/kG/Min (2.044 mL/Hr) IV Continuous <Continuous>  sodium chloride 0.9%. 1000 milliLiter(s) (60 mL/Hr) IV Continuous <Continuous>  vancomycin  IVPB 1250 milliGRAM(s) IV Intermittent once    MEDICATIONS  (PRN):  acetaminophen  Suppository 650 milliGRAM(s) Rectal every 6 hours PRN For Temp greater than 38 C (100.4 F)      ALLERGIES:  Allergies    No Known Allergies    Intolerances    LABS:                        8.4    41.0  )-----------( 98       ( 05 Oct 2017 15:53 )             26.1     10-05    134<L>  |  94<L>  |  41<H>  ----------------------------<  136<H>  4.0   |  19<L>  |  2.07<H>    Ca    9.1      05 Oct 2017 15:53    TPro  8.3  /  Alb  3.3  /  TBili  0.4  /  DBili  x   /  AST  54<H>  /  ALT  13  /  AlkPhos  198<H>  10-05  PT/INR - ( 04 Oct 2017 17:38 )   PT: 15.0 sec;   INR: 1.34     PTT - ( 04 Oct 2017 17:38 )  PTT:28.5 sec    Urinalysis Basic - ( 04 Oct 2017 20:46 )  Color: Yellow / Appearance: SL CLOUDY / S.025 / pH: x  Gluc: x / Ketone: Trace mg/dL  / Bili: Small / Urobili: 1.0 E.U./dL   Blood: x / Protein: 30 mg/dL / Nitrite: NEGATIVE   Leuk Esterase: NEGATIVE / RBC: > 10 /HPF / WBC 5-10 /HPF   Sq Epi: x / Non Sq Epi: Moderate /HPF / Bacteria: Present /HPF    RADIOLOGY & ADDITIONAL TESTS: Reviewed.

## 2017-10-05 NOTE — H&P ADULT - PROBLEM SELECTOR PLAN 4
WBC 46.3 with 24% monocytes  Unclear of etiology and baseline   May be 2/2 to infection, PNA however follow-Up with Hematology/Oncology in AM (Dr. Morgan).   -f/u AM CBC with differential   -c/w CTX, vanc, and azithromycin    #prostate CA  -obtain collateral from heme/onc attending

## 2017-10-05 NOTE — PROGRESS NOTE ADULT - ASSESSMENT
73 yo male with a Hx. of metastatic prostate cancer now admitted for evaluation of fainting episodes, productive cough, SOB and COPD.

## 2017-10-05 NOTE — PROGRESS NOTE ADULT - PROBLEM SELECTOR PLAN 1
The patient has progressive disease in the thoracic spine. He remains on lupron therapy. Bicalutimide has recently been stopped by his urologist.

## 2017-10-05 NOTE — PROGRESS NOTE ADULT - PROBLEM SELECTOR PLAN 2
In the setting of COPD. Now on a CPAP mask. Antibiotic therapy is being give. Must be careful with vancomycin given his renal status.

## 2017-10-05 NOTE — H&P ADULT - NSHPPHYSICALEXAM_GEN_ALL_CORE
.  VITAL SIGNS:  T(F): 100.4 (10-05-17 @ 01:27), Max: 101 (10-04-17 @ 22:26)  HR: 84 (10-05-17 @ 01:27) (70 - 85)  BP: 93/53 (10-05-17 @ 01:27) (80/45 - 122/57)  BP(mean): 63 (10-05-17 @ 01:27) (63 - 63)  RR: 20 (10-05-17 @ 01:27) (20 - 26)  SpO2: 96% (10-05-17 @ 01:27) (94% - 100%)    PHYSICAL EXAM:    Constitutional: WDWN resting comfortably in bed; on BiPAP  HEENT: NC/AT, PERRL, EOMI, anicteric sclera, no nasal discharge; uvula midline, no oropharyngeal erythema or exudates; dry MM  Neck: supple; no JVD or thyromegaly  Respiratory: Difficult to assess due to body habitus however rales in b/l bases; no wheezing or crackles appreciated, no retractions  Cardiac: +S1/S2; RRR; no M/R/G, crusted lesions on L chest  Gastrointestinal: obese, soft, nontender, mildly distended; no rebound or guarding; +BSx4  Extremities: WWP, no clubbing or cyanosis; no peripheral edema  Musculoskeletal: NROM x4; no joint swelling, tenderness or erythema  Vascular: 2+ radial, femoral, DP/PT pulses B/L  Dermatologic: skin warm, dry and intact; no rashes, wounds, or scars  Lymphatic: no submandibular or cervical LAD  Neurologic: AAOx3; CNII-XII grossly intact; no focal deficits  Psychiatric: affect and characteristics of appearance, verbalizations, behaviors are appropriate, denies SI/HI/AH/VH .  VITAL SIGNS:  T(F): 100.4 (10-05-17 @ 01:27), Max: 101 (10-04-17 @ 22:26)  HR: 84 (10-05-17 @ 01:27) (70 - 85)  BP: 93/53 (10-05-17 @ 01:27) (80/45 - 122/57)  BP(mean): 63 (10-05-17 @ 01:27) (63 - 63)  RR: 20 (10-05-17 @ 01:27) (20 - 26)  SpO2: 96% (10-05-17 @ 01:27) (94% - 100%)    PHYSICAL EXAM:    Constitutional: WDWN resting comfortably in bed; on BiPAP  HEENT: NC/AT, PERRL, EOMI, anicteric sclera, no nasal discharge; uvula midline, no oropharyngeal erythema or exudates; dry MM  Neck: supple; no JVD or thyromegaly  Respiratory: Difficult to assess due to body habitus however rales in b/l bases; no wheezing or crackles appreciated, no retractions  Cardiac: +S1/S2; RRR; no M/R/G, crusted lesions on L chest  Gastrointestinal: obese, soft, nontender, mildly distended; no rebound or guarding; +BSx4  Back: No lesions noted, no CVA tenderness  : glaser in place  Extremities: WWP, no clubbing or cyanosis; 1+ pitting edema up to knees, hyperpigmentation of b/l shins  Musculoskeletal: NROM x4; no joint swelling, tenderness or erythema  Vascular: 2+ radial, DP/PT pulses B/L  Neurologic: AAOx3  Psychiatric: affect and characteristics of appearance, verbalizations, behaviors are appropriate .  VITAL SIGNS:  T(F): 100.4 (10-05-17 @ 01:27), Max: 101 (10-04-17 @ 22:26)  HR: 84 (10-05-17 @ 01:27) (70 - 85)  BP: 93/53 (10-05-17 @ 01:27) (80/45 - 122/57)  BP(mean): 63 (10-05-17 @ 01:27) (63 - 63)  RR: 20 (10-05-17 @ 01:27) (20 - 26)  SpO2: 96% (10-05-17 @ 01:27) (94% - 100%)    PHYSICAL EXAM:    Constitutional: WDWN resting comfortably in bed; on BiPAP  HEENT: NC/AT, PERRL, EOMI, anicteric sclera, no nasal discharge; uvula midline, no oropharyngeal erythema or exudates; dry MM; bruise above L eye  Neck: supple; no JVD or thyromegaly  Respiratory: Difficult to assess due to body habitus however rales in b/l bases; no wheezing or crackles appreciated, no retractions  Cardiac: +S1/S2; RRR; no M/R/G, crusted lesions on L chest  Gastrointestinal: obese, soft, nontender, mildly distended; no rebound or guarding; +BSx4  Back: No lesions noted, no CVA tenderness  : glaser in place  Extremities: WWP, no clubbing or cyanosis; 1+ pitting edema up to knees, hyperpigmentation of b/l shins  Musculoskeletal: NROM x4; no joint swelling, tenderness or erythema  Vascular: 2+ radial, DP/PT pulses B/L  Neurologic: AAOx3  Psychiatric: affect and characteristics of appearance, verbalizations, behaviors are appropriate

## 2017-10-05 NOTE — H&P ADULT - PROBLEM SELECTOR PLAN 6
Unsure of etiology  Obtain Collateral  -Trend CBC. Transfuse if platelets <10 and no active bleeding or <50 with active bleeding

## 2017-10-05 NOTE — CHART NOTE - NSCHARTNOTEFT_GEN_A_CORE
PGY-1 paged by nurse at 3:30AM to inform about low blood pressures. Patient seen and evaluated by PGY-2. Patient without complaints, breathing comfortably on BIPAP. VSS: T 99.3F, HR 62, BP 78/52 (MAP 61), RR 16, SpO2 97% on BIPAP (FiO2 40%, 12/5). On exam, patient mentating appropriately, answering questions and compliant with physical exam. Lung exam notable for bibasilar crackles. Lactate drawn 0.7. Troponins 0.06 (lateral compared to previous, likely demand and poor excretion due to CATALINA). BMP drawn, pending. Patient resuscitated with 1L NS bolus. In the meantime, BMP returned and creatinine elevated to 2.33 (previously 1.97). FeUrea calculated at 8.9%, indicated pre-renal etiology. Patient given additional 1L NS bolus. BPs improved to 90s/60s.     Dr. Lima made aware. PGY-1 paged by nurse at 3:30AM to inform about low blood pressures. Patient seen and evaluated by PGY-2. Patient without complaints, breathing comfortably on BIPAP. VSS: T 99.3F, HR 62, BP 78/52 (MAP 61), RR 16, SpO2 97% on BIPAP (FiO2 40%, 12/5). On exam, patient mentating appropriately, answering questions and compliant with physical exam. Lung exam notable for bibasilar crackles. U/O ~100cc/hr. Lactate 0.7. Troponins 0.06 (lateral compared to previous, likely demand and poor excretion due to CATALINA). BMP drawn, pending. Patient resuscitated with 1L NS bolus. In the meantime, BMP returned and creatinine elevated to 2.33 (previously 1.97). FeUrea calculated at 8.9%, indicated pre-renal etiology. Patient given additional 1L NS bolus. BPs improved to 90s/60s.     Dr. Lima made aware. PGY-1 paged by nurse at 3:30AM to inform about low blood pressures. Patient seen and evaluated by PGY-2. Patient without complaints, breathing comfortably on BIPAP. VSS: T 99.3F, HR 62, BP 78/52 (MAP 61), RR 16, SpO2 97% on BIPAP (FiO2 40%, 12/5). On exam, patient mentating appropriately, answering questions and compliant with physical exam. Lung exam notable for bibasilar crackles. U/O ~100cc/hr. Lactate 0.7. Troponins 0.06 (lateral compared to previous, likely demand and poor excretion due to CATALINA). BMP drawn, pending. Patient resuscitated with 1L NS bolus. In the meantime, BMP returned and creatinine elevated to 2.33 (previously 1.97). FeUrea calculated at 8.9%, indicated pre-renal etiology. Patient given additional 1L NS bolus. BPs improved to 90s/60s.     Dr. Lima made aware.    attending: pt with bronchitis/pna, ho COPD, low BP but improved respiratory status and much less dyspnea that when seen in ED. will treat with further IVF crystalloid in view of acute/chronic renal dysfunction and clinically volume depletion

## 2017-10-05 NOTE — CHART NOTE - NSCHARTNOTEFT_GEN_A_CORE
PGY 3 event note      Around 6:30pm, nurse called to say patient was having new abdominal pain.  Intern went bedside to see the patient.  Exam at that time showed distended, soft, tympanic abdomen with some suprapubic tenderness.  Xray was ordered to evaluate for ileus vs. abdominal distention from air after wearing Bipap.  Saldana in place draining urine, concern he may have been having bladder spasm.  Abd xray showed a nonspecific gas pattern more distended than previous. Per intern pt was awake and alert.  Around 7pm recalled by nurse for AMS and hypotension.  I went to bedside to evaluate the patient and found him to be minimally responsive event to sternal rub.  He was on Bipap.   HR 74  BP 74/48  RR 21 on Bipap 100% sat 60% Fio2  Concern for Co2 retention vs. mucus plugging vs. aspiration.  Called fellow and attending and anesthesia/respiratory overhead to intubate.  He was intubated without complication.  ABG at that time was drawn 7.3/49/237/23/  CXR showed worsening atelectasis vs. aspiration in lower lobe.  Dosed Vancomycin for Staph bacteremia and broadened to zosyn to cover for possible aspiration and epimeric gram negative PNA coverage given antibiotic use within last 3 months.     Pt now intubated, sedated on levophed 20mcgs/hr  OG below diaphragm, appropriate placement  ET tube 5.2cm above allyssa, Resp to move in 1 cm and night team follow up xray.

## 2017-10-05 NOTE — CONSULT NOTE ADULT - SUBJECTIVE AND OBJECTIVE BOX
Medicine requested a central venous catheter to accommodate therapy for the septic shock. The history of staph bacteremia and sepsis, COPD, respiratory failure and prostate carcinoma is noted along   with the cxr and data. On ultrasound exam, the right int jugular vein is normal. Consent was obtained through medicine.

## 2017-10-05 NOTE — H&P ADULT - PROBLEM SELECTOR PLAN 1
Sepsis is likely 2/2 to pneumonia   Pt met SIRS criteria with leukocytosis and tachypnea. CXR was unclear for an infiltrate present and cannot r/o PNA.   -c/w BiPAP for SOB and wean to HFNC when tolerated  -f/u chest CT for further evaluation   -c/w vancomycin, dose Vancomycin 1,250mg IV Q24 or by level  -also dose with CTX and azithromycin for CAP coverage  -obtain RVP  -obtain sputum culture if possible  -f/u blood cx  -f/u AM XR

## 2017-10-05 NOTE — H&P ADULT - NSHPLABSRESULTS_GEN_ALL_CORE
.  LABS:                         8.7    46.3  )-----------( 87       ( 04 Oct 2017 17:38 )             27.2     10-    131<L>  |  90<L>  |  30<H>  ----------------------------<  130<H>  4.4   |  25  |  1.97<H>    Ca    9.3      04 Oct 2017 17:38    TPro  8.4<H>  /  Alb  3.6  /  TBili  1.0  /  DBili  x   /  AST  36  /  ALT  11  /  AlkPhos  231<H>  10-04    PT/INR - ( 04 Oct 2017 17:38 )   PT: 15.0 sec;   INR: 1.34          PTT - ( 04 Oct 2017 17:38 )  PTT:28.5 sec  Urinalysis Basic - ( 04 Oct 2017 20:46 )    Color: Yellow / Appearance: SL CLOUDY / S.025 / pH: x  Gluc: x / Ketone: Trace mg/dL  / Bili: Small / Urobili: 1.0 E.U./dL   Blood: x / Protein: 30 mg/dL / Nitrite: NEGATIVE   Leuk Esterase: NEGATIVE / RBC: > 10 /HPF / WBC 5-10 /HPF   Sq Epi: x / Non Sq Epi: Moderate /HPF / Bacteria: Present /HPF      CARDIAC MARKERS ( 04 Oct 2017 17:38 )  x     / 0.06 ng/mL / 678 U/L / x     / 23.8 ng/mL      Serum Pro-Brain Natriuretic Peptide: 6341 pg/mL (10-04 @ 17:38)    Lactate, Blood: 1.4 mmoL/L (10-04 @ 18:32)      RADIOLOGY, EKG & ADDITIONAL TESTS: Reviewed.   PROCEDURE DATE:  10/04/2017            INTERPRETATION:  CT BRAIN WITHOUT CONTRAST    INDICATIONS: Shortness of breath. Trauma.    TECHNIQUE:  Serial axial images were obtained from the skull base tothe   vertex without the use of intravenous contrast. Sagittal and coronal   reformatted images were also obtained.    COMPARISON EXAMINATION: None.    FINDINGS:    VENTRICLES AND SULCI: No hydrocephalus.  INTRA-AXIAL: Possible small lacunar infarct in the left thalamus. No   intracranial mass effect, acute hemorrhage, or midline shift is present.   Gray-white differentiation is maintained.  EXTRA-AXIAL: No extra-axial fluid collection is present.   VISUALIZED SINUSES: No air-fluid levels are identified.   VISUALIZED MASTOIDS:  Clear.  CALVARIUM:  No fracture.  MISCELLANEOUS:  The right ocular lens is absent consistent with prior   cataract surgery. There is slight rightward deviation of the nasal septum.    IMPRESSION:    No acute intracranial hemorrhage or CT evidence of acute transcortical   infarction. No displaced calvarial fracture. .  LABS:                         8.7    46.3  )-----------( 87       ( 04 Oct 2017 17:38 )             27.2     10    131<L>  |  90<L>  |  30<H>  ----------------------------<  130<H>  4.4   |  25  |  1.97<H>    Ca    9.3      04 Oct 2017 17:38    TPro  8.4<H>  /  Alb  3.6  /  TBili  1.0  /  DBili  x   /  AST  36  /  ALT  11  /  AlkPhos  231<H>  10-    PT/INR - ( 04 Oct 2017 17:38 )   PT: 15.0 sec;   INR: 1.34          PTT - ( 04 Oct 2017 17:38 )  PTT:28.5 sec  Urinalysis Basic - ( 04 Oct 2017 20:46 )    Color: Yellow / Appearance: SL CLOUDY / S.025 / pH: x  Gluc: x / Ketone: Trace mg/dL  / Bili: Small / Urobili: 1.0 E.U./dL   Blood: x / Protein: 30 mg/dL / Nitrite: NEGATIVE   Leuk Esterase: NEGATIVE / RBC: > 10 /HPF / WBC 5-10 /HPF   Sq Epi: x / Non Sq Epi: Moderate /HPF / Bacteria: Present /HPF      CARDIAC MARKERS ( 04 Oct 2017 17:38 )  x     / 0.06 ng/mL / 678 U/L / x     / 23.8 ng/mL      Serum Pro-Brain Natriuretic Peptide: 6341 pg/mL (10-04 @ 17:38)    Lactate, Blood: 1.4 mmoL/L (10-04 @ 18:32)      RADIOLOGY, EKG & ADDITIONAL TESTS: Reviewed.   PROCEDURE DATE:  10/04/2017            INTERPRETATION:  CT BRAIN WITHOUT CONTRAST    INDICATIONS: Shortness of breath. Trauma.    IMPRESSION:    No acute intracranial hemorrhage or CT evidence of acute transcortical   infarction. No displaced calvarial fracture.

## 2017-10-05 NOTE — H&P ADULT - ASSESSMENT
This is a 72 year old male with a PMHx of HTN, COPD, Prostate CA (2013, s/p radiation and seed placement, on leuprolide with a recent elevation of PSA and imaging showing bone mets) presents from his heme/on office with worsening of SOB and and episode syncope in the morning. Admitted for

## 2017-10-05 NOTE — PROGRESS NOTE ADULT - PROBLEM SELECTOR PLAN 4
May be due to infection or marrow involvement by his metastatic prostate cancer. Would review a peripheral blood smear to look for evidence of marrow invasion by tumor.

## 2017-10-05 NOTE — PROGRESS NOTE ADULT - ATTENDING COMMENTS
Continue 02 therapy. Await culture results and adjust antibiotic therapy accordingly. Monitor labs re. blood counts and chems.

## 2017-10-05 NOTE — H&P ADULT - HISTORY OF PRESENT ILLNESS
This is a 72 year old male with a PMHx of HTN, COPD, Prostate CA (2013, s/p radiation and seed placement, on leuprolide with a recent elevation of PSA and imaging showing bone mets) presents from his heme/on office with worsening of SOB and and episode syncope in the morning. The patient states that this morning, he was waking up from a nap and upon getting up from his bed, he felt unwell, dizzy, and lightheaded and then woke up on the ground. He states that he lost consciousness for about 5 seconds before he came too. He hit the left side of his head on the ground, but did not feel dizzy, lightheaded, nauseous after the event. He denies any chest pain, palpitations, diaphoresis or changes in vision prior or after the event as well. He got up and went to his doctors office. He notes that he has had several episodes like this in the past and they occur when he is getting up from his bed or up from a chair. He notes that he usually feels dizzy or lightheaded, but has never had any other symptoms. At his doctors office, he was told about the elevated PSA and the bone mets on imaging. It was at the office that his doctor noticed his increased SOB and sent him to the ED.     The pt notes that he has had SOB since early September with associated productive cough with yellow sputum. The pt states that he went to go see his PCP, Dr. Glass who gave him amoxicillin for bronchitis. The pt states that he felt better and that his breathing and cough seemed improved to him over the past month and did not realize the worsening of his respiratory status until today at his doctors office. The patient denies any fever, chills, n/v/d/c, abdominal pain, hematochezia, melena, hematuria, dysuria, urinary frequency, urinary urgency, numbness or tingling in his extremities. The pt notes that he has nocturia, however this has been chronic in nature as well as worsening of LE edema since the SOB began, This is a 72 year old male with a PMHx of HTN, COPD, Prostate CA (2013, s/p radiation and seed placement, on leuprolide with a recent elevation of PSA and imaging showing bone mets) presents from his heme/on office with worsening of SOB and and episode syncope in the morning. The patient states that this morning, he was waking up from a nap and upon getting up from his bed, he felt unwell, dizzy, and lightheaded and then woke up on the ground. He states that he lost consciousness for about 5 seconds before he came too. He hit the left side of his head on the ground, but did not feel dizzy, lightheaded, nauseous after the event. He denies any chest pain, palpitations, diaphoresis or changes in vision prior or after the event as well. He got up and went to his doctors office. He notes that he has had several episodes like this in the past and they occur when he is getting up from his bed or up from a chair. He notes that he usually feels dizzy or lightheaded, but has never had any other symptoms. At his doctors office, he was told about the elevated PSA and the bone mets on imaging. It was at the office that his doctor noticed his increased SOB and sent him to the ED.     The pt notes that he has had SOB since early September with associated productive cough with yellow sputum. The pt states that he went to go see his PCP, Dr. Glass who gave him amoxicillin for bronchitis. The pt states that he felt better and that his breathing and cough seemed improved to him over the past month and did not realize the worsening of his respiratory status until today at his doctors office. The patient denies any fever, chills, n/v/d/c, abdominal pain, hematochezia, melena, hematuria, dysuria, urinary frequency, urinary urgency, numbness or tingling in his extremities. The pt notes that he has nocturia, however this has been chronic in nature as well as worsening of LE edema since the SOB began.    In ED: Vitals: 98.9, HR 73, BP 99/63, RR 24, 96%RA. Presented with leukocytosis and given vanc/zosyn in ED. Pt received about 1L ns( ordered for 2 but second was not given). Pt desaturated in the ED and was placed on BiPAP

## 2017-10-05 NOTE — H&P ADULT - PROBLEM SELECTOR PLAN 3
Pt presenting with hypotension and has SBP in 90s since admission  -continue to hold BP medication in the setting of hypotension Pt presenting with hypotension and has SBP in 90s since admission  -continue to hold BP medication in the setting of hypotension  -Pt SBP in 90s, received 500CC bolus  -Pt SBP remained low, received another 1L bolus  -continue to monitor BP carefully

## 2017-10-05 NOTE — H&P ADULT - PROBLEM SELECTOR PLAN 5
Hgb 8.7, unclear of baseline  Can be related to chronic disease, underlying malignancy, but etiology unclear  no active signs of bleeding at this time  -Obtain collateral in AM  -trend CBC  -f/u LDH, Haptoglobin, Reticulocyte Count.   -maintain active type and screen

## 2017-10-05 NOTE — H&P ADULT - PROBLEM SELECTOR PLAN 2
Unsure of baseline, obtain Collateral from PMD  -s/p 1L NS in ED  -glaser placed in ED, unlikely urinary retention, consider TOV  -f/u urine lytes   -consider renal US due to renal failure

## 2017-10-05 NOTE — H&P ADULT - PROBLEM SELECTOR PLAN 7
Pt presented with troponin 0.06  EKG shows ST depressions in V4-V5, which likely 2/2 demand ischemia   -trend troponin to peak   -repeat EKG in AM  -f/u ECHO    #HLD  -c/w atorvastatin 20 daily

## 2017-10-05 NOTE — PROGRESS NOTE ADULT - SUBJECTIVE AND OBJECTIVE BOX
The patient is a 71yo male known to me because of a diagnosis of metastatic prostate cancer who was admitted from my office yesterday because of a syncopal episode earlier in the day and complaints of SOB, difficulty breathing and a cough productive of yellow sputum.  The patient was found to have prostate cancer in 2013. Biopsy revealed a Odalys score 8 lesion. The patient was treated with EBRT and radioactive seed implants. He was well until 2015 when his PSA increased. Imaging studies revealed RP and pelvic lymphadenopathy and multiple bone metastases. He was treated with ADT consisting of lupron injections and po bicalutamide. He was recently found to have progressive bony disease and a rising PSA level. He had new lesions in T2, T5, T7, and T8. He came to me yesterday to discuss additional treatment options. His urologist recently stopped the bicalutamide. When he arrived at my office in the above state after I evaluated him I had the paramedics bring him to the ER.  CHEMOTHERAPY REGIMEN:        Day:                          Diet:  Protocol:                                    IVF:      MEDICATIONS  (STANDING):  ALBUTerol/ipratropium for Nebulization 3 milliLiter(s) Nebulizer every 6 hours  aspirin enteric coated 81 milliGRAM(s) Oral daily  atorvastatin 20 milliGRAM(s) Oral at bedtime  azithromycin  IVPB 500 milliGRAM(s) IV Intermittent every 24 hours  cefTRIAXone   IVPB 1 Gram(s) IV Intermittent every 24 hours  enoxaparin Injectable 40 milliGRAM(s) SubCutaneous daily  sodium chloride 0.9%. 1000 milliLiter(s) (60 mL/Hr) IV Continuous <Continuous>  vancomycin  IVPB 1250 milliGRAM(s) IV Intermittent every 24 hours    MEDICATIONS  (PRN):  acetaminophen  Suppository 650 milliGRAM(s) Rectal every 6 hours PRN For Temp greater than 38 C (100.4 F)      Allergies    No Known Allergies    Intolerances        DVT Prophylaxis: [x ] YES [ ] NO      Antibiotics: [x ] YES [ ] NO    Pain Scale (1-10):       Location:    Vital Signs Last 24 Hrs  T(C): 38.1 (05 Oct 2017 06:11), Max: 38.3 (04 Oct 2017 22:26)  T(F): 100.5 (05 Oct 2017 06:11), Max: 101 (04 Oct 2017 22:26)  HR: 80 (05 Oct 2017 05:53) (64 - 85)  BP: 118/51 (05 Oct 2017 05:53) (67/42 - 122/57)  BP(mean): 75 (05 Oct 2017 05:53) (46 - 80)  RR: 18 (05 Oct 2017 05:53) (18 - 27)  SpO2: 100% (05 Oct 2017 05:53) (93% - 100%)    Drug Dosing Weight  Height (cm): 172.72 (05 Oct 2017 01:27)  Weight (kg): 109 (05 Oct 2017 01:27)  BMI (kg/m2): 36.5 (05 Oct 2017 01:27)  BSA (m2): 2.21 (05 Oct 2017 01:27)    PHYSICAL EXAM:      Constitutional: feeling better this am.  Eyes: contusions present on his left forehead and above his left eye. Conjunctiva pink.  ENMT: CPAP mask in place.  Neck: no masses.  Breasts: no masses palp.  Back: no SPT on exam here yesterday.  Respiratory: clear.  Cardiovascular: S1>S2 at apex. Regular rhythm. Leg edema is much less this am.  Gastrointestinal: distended, soft, nontender. Bowel sounds present. No palp liver, spleen or masses.  Genitourinary: cath in place.  Extremities: leg edema is less.  Neurological: no gross focal deficits.  Skin: warm and dry.  Lymph Nodes: none palp.  Musculoskeletal: full ROM.  Psychiatric: affect normal.        URINARY CATHETER: [ ] YES [ ] NO     LABS:  CBC Full  -  ( 05 Oct 2017 06:15 )  WBC Count : 42.4 K/uL  Hemoglobin : 8.5 g/dL  Hematocrit : 26.9 %  Platelet Count - Automated : 90 K/uL  Mean Cell Volume : 97.1 fL  Mean Cell Hemoglobin : 30.7 pg  Mean Cell Hemoglobin Concentration : 31.6 g/dL  Auto Neutrophil # : x  Auto Lymphocyte # : x  Auto Monocyte # : x  Auto Eosinophil # : x  Auto Basophil # : x  Auto Neutrophil % : x  Auto Lymphocyte % : x  Auto Monocyte % : x  Auto Eosinophil % : x  Auto Basophil % : x    10-05    131<L>  |  91<L>  |  35<H>  ----------------------------<  111<H>  4.3   |  23  |  2.33<H>    Ca    8.9      05 Oct 2017 03:12    TPro  8.4<H>  /  Alb  3.6  /  TBili  1.0  /  DBili  x   /  AST  36  /  ALT  11  /  AlkPhos  231<H>  10-04    PT/INR - ( 04 Oct 2017 17:38 )   PT: 15.0 sec;   INR: 1.34          PTT - ( 04 Oct 2017 17:38 )  PTT:28.5 sec  Urinalysis Basic - ( 04 Oct 2017 20:46 )    Color: Yellow / Appearance: SL CLOUDY / S.025 / pH: x  Gluc: x / Ketone: Trace mg/dL  / Bili: Small / Urobili: 1.0 E.U./dL   Blood: x / Protein: 30 mg/dL / Nitrite: NEGATIVE   Leuk Esterase: NEGATIVE / RBC: > 10 /HPF / WBC 5-10 /HPF   Sq Epi: x / Non Sq Epi: Moderate /HPF / Bacteria: Present /HPF        CULTURES:    RADIOLOGY & ADDITIONAL STUDIES:

## 2017-10-05 NOTE — H&P ADULT - PMH
COPD (chronic obstructive pulmonary disease)    Hypertension    Obstructive sleep apnea syndrome    Prostate cancer metastatic to bone

## 2017-10-05 NOTE — PROGRESS NOTE ADULT - SUBJECTIVE AND OBJECTIVE BOX
INTERVAL HPI/OVERNIGHT EVENTS: NILDA. Patient admitted from ED with severe sepsis. Received fluid bolus in ED and on floors and started on vancomycin, ceftriaxone and azithromycin for suspected CAP w/ sepsis. Placed on Bipap for increased work of breathing which he tolerated well. Positive troponin w/ EKG showing ST depression in anterior leads.      SUBJECTIVE: Patient seen and examined at bedside. He reports some shortness of breath and difficulty breathing. He remains on CPAP and has increased SOB off BiPaP. Speaks in short sentences. He denies CP, sore throat, rhinorrhea, abdominal pain, N/V, diarrhea, dysuria, frequency, urgency.     OBJECTIVE:    VITAL SIGNS:  ICU Vital Signs Last 24 Hrs  T(C): 36.5 (05 Oct 2017 13:08), Max: 38.3 (04 Oct 2017 22:26)  T(F): 97.7 (05 Oct 2017 13:08), Max: 101 (04 Oct 2017 22:26)  HR: 86 (05 Oct 2017 13:46) (64 - 86)  BP: 94/54 (05 Oct 2017 13:46) (67/42 - 122/57)  BP(mean): 75 (05 Oct 2017 05:53) (46 - 80)  ABP: --  ABP(mean): --  RR: 24 (05 Oct 2017 13:46) (18 - 27)  SpO2: 94% (05 Oct 2017 13:46) (93% - 100%)        10-04 @ :01  -  10-05 @ 07:00  --------------------------------------------------------  IN: 2660 mL / OUT: 375 mL / NET: 2285 mL    10-05 @ :01  -  10-05 @ 14:59  --------------------------------------------------------  IN: 480 mL / OUT: 0 mL / NET: 480 mL      CAPILLARY BLOOD GLUCOSE          PHYSICAL EXAM:    General: tachypneic pt, no accessory muscle use. Able to speak in short sentences.    HEENT: NC/AT; PERRL, pale conjunctiva, dry mucous membranes, contusion over L orbit from fall prior to admission   Neck: supple, no tracheal deviation, no lymphadenopathy, no JVD  Respiratory: decreased breath sounds at bases, wheezing w/ prolonged expiratory phase. No egophony. Exam minimally limited by body habitus.  Cardiovascular: +S1/S2; RRR, no murmurs/rubs/gallops. Crusted over skin lesions on left chest.   Abdomen: BS+, abdomen distended, non tender, tympanic, no rebound or guarding. No palpable masses. Obese.   Extremities: WWP, 2+ peripheral pulses b/l; 3+ pitting edema to mid thigh  Vascular: 2 + radial pulses b/l, 2+ PT/DP b/l  Skin: normal color and turgor  Neurological: A & O x3. Strength 5/5 b/l in UE and 5/5 b/l LE. CN 2-12 grossly intact.     MEDICATIONS:  MEDICATIONS  (STANDING):  ALBUTerol/ipratropium for Nebulization 3 milliLiter(s) Nebulizer every 6 hours  aspirin enteric coated 81 milliGRAM(s) Oral daily  atorvastatin 20 milliGRAM(s) Oral at bedtime  azithromycin  IVPB 500 milliGRAM(s) IV Intermittent every 24 hours  cefTRIAXone   IVPB 1 Gram(s) IV Intermittent every 24 hours  heparin  Injectable 7500 Unit(s) SubCutaneous every 8 hours  sodium chloride 0.9%. 1000 milliLiter(s) (60 mL/Hr) IV Continuous <Continuous>    MEDICATIONS  (PRN):  acetaminophen  Suppository 650 milliGRAM(s) Rectal every 6 hours PRN For Temp greater than 38 C (100.4 F)      ALLERGIES:  Allergies    No Known Allergies    Intolerances        LABS:                        8.5    42.4  )-----------( 90       ( 05 Oct 2017 06:15 )             26.9     10-05    131<L>  |  91<L>  |  35<H>  ----------------------------<  111<H>  4.3   |  23  |  2.33<H>    Ca    8.9      05 Oct 2017 03:12    TPro  8.4<H>  /  Alb  3.6  /  TBili  1.0  /  DBili  x   /  AST  36  /  ALT  11  /  AlkPhos  231<H>  10-04    PT/INR - ( 04 Oct 2017 17:38 )   PT: 15.0 sec;   INR: 1.34          PTT - ( 04 Oct 2017 17:38 )  PTT:28.5 sec  Urinalysis Basic - ( 04 Oct 2017 20:46 )    Color: Yellow / Appearance: SL CLOUDY / S.025 / pH: x  Gluc: x / Ketone: Trace mg/dL  / Bili: Small / Urobili: 1.0 E.U./dL   Blood: x / Protein: 30 mg/dL / Nitrite: NEGATIVE   Leuk Esterase: NEGATIVE / RBC: > 10 /HPF / WBC 5-10 /HPF   Sq Epi: x / Non Sq Epi: Moderate /HPF / Bacteria: Present /HPF        RADIOLOGY & ADDITIONAL TESTS: Reviewed.    ASSESSMENT:     71 yo male with hx of HTN, COPD, Prostate CA (s/p radiation and seed  on leuprolide outpatient) who presents from heme onc with worsening of SOB and episode of unwitnessed syncope at home found to have severe sepsis with gram + cocci in clusters and acute kidney injury.     Respiratory     Dyspnea  No obvious pathology on xray or CT apart from extensive COPD known prior to admission. Patient to continue with CPAP to be weaned as tolerated.   Echo to assess cardiovascular fx and possible pulmonary HTN.   - LE u/s to r/o DVT  - CT chest w/o contrast consistent with known COPD     COPD  Patient with hx of COPD outpatient w/o home O2 requirements. Patient presented w/ difficulty breathing in ED and subsequently placed on CPAP w/ improvement in symptoms. Attempt to wean off CPAP was poorly tolerated by patient.   c/w duonebs Q 4     ID    Gram + cocci bacteremia  Patient blood cx grew gram + cocci in both sets of cultures w/o obvious source of infection. Patient given broad spectrum coverage with vancomycin+ceftriaxone+azithromycin.   - repeat blood cx. Await speciation and sensitivities of original cultures.   - TTE to assess for possible vegetations  - RVP negative    Renal    CATALINA  Patient had significant increase in Cr from baseline. UA significant for casts concerning for ATN possibly 2/2 excessive diuresis coupled with poor PO intake at home vs due to severe sepsis on admission. FeUrea 8.9% suggesting possible pre renal contribution to CATALINA.   - glaser placed w/ good UOP. Continue to monitor UOP.   - Renal U/s w/ doppler to r/o renal a thrombosis    Heme/onc    Prostate ca w/ mets to spine  Being followed outpatient by heme onc. Extensive involvement of spine on outpatient lupron therapy.     Leukocytosis  Possibly secondary to acute infection w/ underlying component related to malignant infiltration of bone marrow.        PLAN:    FEN - no IVF; replete lytes prn; NPO    PPx - HSQ    CODE - FULL.    DISPO - continue telemetry monitoring in ICU-step down level of care on [facility]. INTERVAL HPI/OVERNIGHT EVENTS: NILDA. Patient admitted from ED with severe sepsis. Received fluid bolus in ED and on floors and started on vancomycin, ceftriaxone and azithromycin for suspected CAP w/ sepsis. Placed on Bipap for increased work of breathing which he tolerated well. Positive troponin w/ EKG showing ST depression in anterior leads.      SUBJECTIVE: Patient seen and examined at bedside. He reports some shortness of breath and difficulty breathing. He remains on CPAP and has increased SOB off BiPaP. Speaks in short sentences. He denies CP, sore throat, rhinorrhea, abdominal pain, N/V, diarrhea, dysuria, frequency, urgency.     OBJECTIVE:    VITAL SIGNS:  ICU Vital Signs Last 24 Hrs  T(C): 36.5 (05 Oct 2017 13:08), Max: 38.3 (04 Oct 2017 22:26)  T(F): 97.7 (05 Oct 2017 13:08), Max: 101 (04 Oct 2017 22:26)  HR: 86 (05 Oct 2017 13:46) (64 - 86)  BP: 94/54 (05 Oct 2017 13:46) (67/42 - 122/57)  BP(mean): 75 (05 Oct 2017 05:53) (46 - 80)  ABP: --  ABP(mean): --  RR: 24 (05 Oct 2017 13:46) (18 - 27)  SpO2: 94% (05 Oct 2017 13:46) (93% - 100%)        10-04 @ :01  -  10-05 @ 07:00  --------------------------------------------------------  IN: 2660 mL / OUT: 375 mL / NET: 2285 mL    10-05 @ :01  -  10-05 @ 14:59  --------------------------------------------------------  IN: 480 mL / OUT: 0 mL / NET: 480 mL      CAPILLARY BLOOD GLUCOSE          PHYSICAL EXAM:    General: tachypneic pt, no accessory muscle use. Able to speak in short sentences.    HEENT: NC/AT; PERRL, pale conjunctiva, dry mucous membranes, contusion over L orbit from fall prior to admission   Neck: supple, no tracheal deviation, no lymphadenopathy, no JVD  Respiratory: decreased breath sounds at bases, wheezing w/ prolonged expiratory phase. No egophony. Exam minimally limited by body habitus.  Cardiovascular: +S1/S2; RRR, no murmurs/rubs/gallops. Crusted over skin lesions on left chest.   Abdomen: BS+, abdomen distended, non tender, tympanic, no rebound or guarding. No palpable masses. Obese.   Extremities: WWP, 2+ peripheral pulses b/l; 3+ pitting edema to mid thigh  Vascular: 2 + radial pulses b/l, 2+ PT/DP b/l  Skin: normal color and turgor  Neurological: A & O x3. Strength 5/5 b/l in UE and 5/5 b/l LE. CN 2-12 grossly intact.     MEDICATIONS:  MEDICATIONS  (STANDING):  ALBUTerol/ipratropium for Nebulization 3 milliLiter(s) Nebulizer every 6 hours  aspirin enteric coated 81 milliGRAM(s) Oral daily  atorvastatin 20 milliGRAM(s) Oral at bedtime  azithromycin  IVPB 500 milliGRAM(s) IV Intermittent every 24 hours  cefTRIAXone   IVPB 1 Gram(s) IV Intermittent every 24 hours  heparin  Injectable 7500 Unit(s) SubCutaneous every 8 hours  sodium chloride 0.9%. 1000 milliLiter(s) (60 mL/Hr) IV Continuous <Continuous>    MEDICATIONS  (PRN):  acetaminophen  Suppository 650 milliGRAM(s) Rectal every 6 hours PRN For Temp greater than 38 C (100.4 F)      ALLERGIES:  Allergies    No Known Allergies    Intolerances        LABS:                        8.5    42.4  )-----------( 90       ( 05 Oct 2017 06:15 )             26.9     10-05    131<L>  |  91<L>  |  35<H>  ----------------------------<  111<H>  4.3   |  23  |  2.33<H>    Ca    8.9      05 Oct 2017 03:12    TPro  8.4<H>  /  Alb  3.6  /  TBili  1.0  /  DBili  x   /  AST  36  /  ALT  11  /  AlkPhos  231<H>  10-04    PT/INR - ( 04 Oct 2017 17:38 )   PT: 15.0 sec;   INR: 1.34          PTT - ( 04 Oct 2017 17:38 )  PTT:28.5 sec  Urinalysis Basic - ( 04 Oct 2017 20:46 )    Color: Yellow / Appearance: SL CLOUDY / S.025 / pH: x  Gluc: x / Ketone: Trace mg/dL  / Bili: Small / Urobili: 1.0 E.U./dL   Blood: x / Protein: 30 mg/dL / Nitrite: NEGATIVE   Leuk Esterase: NEGATIVE / RBC: > 10 /HPF / WBC 5-10 /HPF   Sq Epi: x / Non Sq Epi: Moderate /HPF / Bacteria: Present /HPF        RADIOLOGY & ADDITIONAL TESTS: Reviewed.    ASSESSMENT:     71 yo male with hx of HTN, COPD, Prostate CA (s/p radiation and seed  on leuprolide outpatient) who presents from [facility] onc with worsening of SOB and episode of unwitnessed syncope at home found to have severe sepsis with gram + cocci in clusters and acute kidney injury. Hospital course complicated by rapid response after patient removed his CPAP now being stepped up to MICU service for closer monitoring.      Respiratory     Dyspnea  No obvious pathology on xray or CT apart from extensive COPD known prior to admission. Dyspnea likely due to underlying lung disease coupled with severe sepsis.   Echo to assess cardiovascular fx and possible pulmonary HTN.   - LE u/s to r/o DVT  - CT chest w/o contrast consistent with known COPD.    COPD  Patient with hx of COPD outpatient w/o home O2 requirements. Patient presented w/ difficulty breathing in ED and subsequently placed on CPAP w/ improvement in symptoms. Attempt to wean off CPAP was poorly tolerated by patient. Patient had rapid response after removing his CPAP mask with ABG showing 7.25/55/334/23 off CPAP.   Repeat ABG on CPAP to assess response.   c/w duonebs Q 4     ID    Gram + cocci bacteremia  Patient blood cx grew gram + cocci in both sets of cultures w/o obvious source of infection. Patient given broad spectrum coverage with vancomycin+ceftriaxone+azithromycin.   - repeat blood cx. Await speciation and sensitivities of original cultures.   - TTE to assess for possible vegetations  - RVP negative    Renal    CATALINA  Patient had significant increase in Cr from baseline. UA significant for casts concerning for ATN possibly 2/2 excessive diuresis coupled with poor PO intake at home vs due to severe sepsis on admission. FeUrea 8.9% suggesting possible pre renal contribution to CATALINA.   - UOP following rapid response was 0 ml output over one hour.  - glaser placed w/ good UOP. Continue to monitor UOP.   - Renal U/s w/ doppler to r/o renal a thrombosis    Cardiovascular    Severe sepsis  Patient admitted with severe sepsis requiring fluid bolus. Patient on maintenance fluid of 60 cc/hr NS. Patient hypotensive during rapid response requiring 500 cc bolus and peripheral levophed to be titrated to MAP of 60 mmHg. Patient w/o good urine output following rapid response.     HFrEF  TTE today shows HFrEF with EF 45%,     Heme/onc    Prostate ca w/ mets to spine  Being followed outpatient by heme onc. Extensive involvement of spine on outpatient lupron therapy.     Leukocytosis  Possibly secondary to acute infection w/ underlying component related to malignant infiltration of bone marrow.        PLAN:    FEN - no IVF; replete lytes prn; NPO    PPx - HSQ    CODE - FULL.    DISPO - continue telemetry monitoring in ICU-step down level of care on [facility]. INTERVAL HPI/OVERNIGHT EVENTS: NILDA. Patient admitted from ED with severe sepsis. Received fluid bolus in ED and on floors and started on vancomycin, ceftriaxone and azithromycin for suspected CAP w/ sepsis. Placed on Bipap for increased work of breathing which he tolerated well. Positive troponin w/ EKG showing ST depression in anterior leads.      SUBJECTIVE: Patient seen and examined at bedside. He reports some shortness of breath and difficulty breathing. He remains on CPAP and has increased SOB off BiPaP. Speaks in short sentences. He denies CP, sore throat, rhinorrhea, abdominal pain, N/V, diarrhea, dysuria, frequency, urgency.     OBJECTIVE:    VITAL SIGNS:  ICU Vital Signs Last 24 Hrs  T(C): 36.5 (05 Oct 2017 13:08), Max: 38.3 (04 Oct 2017 22:26)  T(F): 97.7 (05 Oct 2017 13:08), Max: 101 (04 Oct 2017 22:26)  HR: 86 (05 Oct 2017 13:46) (64 - 86)  BP: 94/54 (05 Oct 2017 13:46) (67/42 - 122/57)  BP(mean): 75 (05 Oct 2017 05:53) (46 - 80)  ABP: --  ABP(mean): --  RR: 24 (05 Oct 2017 13:46) (18 - 27)  SpO2: 94% (05 Oct 2017 13:46) (93% - 100%)        10-04 @ :01  -  10-05 @ 07:00  --------------------------------------------------------  IN: 2660 mL / OUT: 375 mL / NET: 2285 mL    10-05 @ :01  -  10-05 @ 14:59  --------------------------------------------------------  IN: 480 mL / OUT: 0 mL / NET: 480 mL      CAPILLARY BLOOD GLUCOSE          PHYSICAL EXAM:    General: tachypneic pt, no accessory muscle use. Able to speak in short sentences.    HEENT: NC/AT; PERRL, pale conjunctiva, dry mucous membranes, contusion over L orbit from fall prior to admission   Neck: supple, no tracheal deviation, no lymphadenopathy, no JVD  Respiratory: decreased breath sounds at bases, wheezing w/ prolonged expiratory phase. No egophony. Exam minimally limited by body habitus.  Cardiovascular: +S1/S2; RRR, no murmurs/rubs/gallops. Crusted over skin lesions on left chest.   Abdomen: BS+, abdomen distended, non tender, tympanic, no rebound or guarding. No palpable masses. Obese.   Extremities: WWP, 2+ peripheral pulses b/l; 3+ pitting edema to mid thigh  Vascular: 2 + radial pulses b/l, 2+ PT/DP b/l  Skin: normal color and turgor. Crusted zoster lesions left chest  MSK: no joint swelling  Neurological: A & O x3. Strength 5/5 b/l in UE and 5/5 b/l LE. CN 2-12 grossly intact.     MEDICATIONS:  MEDICATIONS  (STANDING):  ALBUTerol/ipratropium for Nebulization 3 milliLiter(s) Nebulizer every 6 hours  aspirin enteric coated 81 milliGRAM(s) Oral daily  atorvastatin 20 milliGRAM(s) Oral at bedtime  azithromycin  IVPB 500 milliGRAM(s) IV Intermittent every 24 hours  cefTRIAXone   IVPB 1 Gram(s) IV Intermittent every 24 hours  heparin  Injectable 7500 Unit(s) SubCutaneous every 8 hours  sodium chloride 0.9%. 1000 milliLiter(s) (60 mL/Hr) IV Continuous <Continuous>    MEDICATIONS  (PRN):  acetaminophen  Suppository 650 milliGRAM(s) Rectal every 6 hours PRN For Temp greater than 38 C (100.4 F)      ALLERGIES:  Allergies    No Known Allergies    Intolerances        LABS:                        8.5    42.4  )-----------( 90       ( 05 Oct 2017 06:15 )             26.9     10-05    131<L>  |  91<L>  |  35<H>  ----------------------------<  111<H>  4.3   |  23  |  2.33<H>    Ca    8.9      05 Oct 2017 03:12    TPro  8.4<H>  /  Alb  3.6  /  TBili  1.0  /  DBili  x   /  AST  36  /  ALT  11  /  AlkPhos  231<H>  10-04    PT/INR - ( 04 Oct 2017 17:38 )   PT: 15.0 sec;   INR: 1.34          PTT - ( 04 Oct 2017 17:38 )  PTT:28.5 sec  Urinalysis Basic - ( 04 Oct 2017 20:46 )    Color: Yellow / Appearance: SL CLOUDY / S.025 / pH: x  Gluc: x / Ketone: Trace mg/dL  / Bili: Small / Urobili: 1.0 E.U./dL   Blood: x / Protein: 30 mg/dL / Nitrite: NEGATIVE   Leuk Esterase: NEGATIVE / RBC: > 10 /HPF / WBC 5-10 /HPF   Sq Epi: x / Non Sq Epi: Moderate /HPF / Bacteria: Present /HPF        RADIOLOGY & ADDITIONAL TESTS: Reviewed.    ASSESSMENT:     73 yo male with hx of HTN, COPD, Prostate CA (s/p radiation and seed  on leuprolide outpatient) who presents from [facility] onc with worsening of SOB and episode of unwitnessed syncope at home found to have severe sepsis with gram + cocci in clusters identified as S. aureus and ATN. Hospital course complicated by rapid response after patient removed his CPAP now being stepped up to MICU service for closer monitoring with severe sepsis and possible impending septic shock.    Respiratory     Dyspnea  No obvious pathology on xray or CT apart from extensive COPD known prior to admission. Dyspnea likely due to underlying lung disease coupled with severe sepsis. Follow for need for intubation.  Echo to assess cardiovascular fx and possible pulmonary HTN.   - LE u/s to r/o DVT  - CT chest w/o contrast consistent with known COPD.    COPD  Patient with hx of COPD outpatient w/o home O2 requirements. Patient presented w/ difficulty breathing in ED and subsequently placed on CPAP w/ improvement in symptoms. Attempt to wean off CPAP was poorly tolerated by patient. Patient had rapid response after removing his CPAP mask with ABG showing 7.25/55/334/23 off CPAP.   Repeat ABG on CPAP to assess response.   c/w duonebs Q 4     ID    Gram + cocci bacteremia  Patient blood cx grew gram + cocci in both sets of cultures w/o obvious source of infection. Patient given broad spectrum coverage with vancomycin+ceftriaxone+azithromycin.   - repeat blood cx. Await speciation and sensitivities of original cultures.   - TTE to assess for possible vegetations  - RVP negative    Renal    ATN  Patient had significant increase in Cr from baseline. UA significant for casts concerning for ATN possibly 2/2 excessive diuresis coupled with poor PO intake at home vs due to severe sepsis on admission. FeUrea 8.9% suggesting possible pre renal contribution to CATALINA.   - UOP following rapid response was 0 ml output over one hour.  - glaser placed w/ good UOP. Continue to monitor UOP.   - Renal U/s w/ doppler to r/o renal a thrombosis    Cardiovascular    Severe sepsis  Patient admitted with severe sepsis requiring fluid bolus. Patient on maintenance fluid of 60 cc/hr NS. Patient hypotensive during rapid response requiring 500 cc bolus and peripheral levophed to be titrated to MAP of 60 mmHg. Patient w/o good urine output following rapid response.     HFrEF  TTE today shows HFrEF with EF 45%,     Heme/onc    Prostate ca w/ mets to spine  Being followed outpatient by heme onc. Extensive involvement of spine on outpatient lupron therapy.     Leukocytosis  Possibly secondary to acute infection w/ underlying component related to malignant infiltration of bone marrow.     GI     AXR with air filled stomach and likely ileus from aerophagia and infection. To place NGT.      FEN - no IVF; replete lytes prn; NPO    PPx - HSQ    CODE - FULL.    Critical care time rendered 70 minutes. Reviewed with patient's son. This is a 72 year old male with a PMHx of HTN, COPD, Prostate CA (2013, s/p radiation and seed placement, on leuprolide with a recent elevation of PSA and imaging showing bone mets) presents from his heme/on office with worsening of SOB and unwitnessed episode of syncope in the morning. The patient states that he was waking up from a nap and upon getting up from his bed, he felt unwell, dizzy, and lightheaded and then woke up on the ground. He states that he lost consciousness for about 5 seconds. He hit the left side of his head on the ground. He denies any chest pain, palpitations, diaphoresis or changes in vision prior or after the event. Non contrast CT head was unremarkable for any acute bleed or masses. Patient arrived to ED with severe sepsis unknown source and received 2.75 L bolus and vanc/zosyn. Patient found to have CATALINA with granular casts on UA concerning for ATN. Upon arrival to [facility] patient was placed on CPAP and maintenance fluid 60 cc/hr. Blood cultures drawn in ED significant for S. aureus. Patient had good urine output and MAPs>60. TTE done showed septal motion abnormalities, hypokinesis of L ventricle, R atrial dilatation and EF 45%. Rapid response was called after patient was found to have agonal breathing, was hypoxic and hypotensive. ABG done at the time showed respiratory acidosis. Patient was placed on CPAP with improvement in his oxygen saturation. He received a 500 cc bolus and started on peripheral levophed. Patient transferred to MICU service for closer monitoring.     INTERVAL HPI/OVERNIGHT EVENTS: NILDA. Patient admitted from ED with severe sepsis. Received fluid bolus in ED and on floors and started on vancomycin, ceftriaxone and azithromycin for suspected CAP w/ sepsis. Placed on Bipap for increased work of breathing which he tolerated well. Positive troponin w/ EKG showing ST depression in anterior leads.      SUBJECTIVE: Patient seen and examined at bedside. He reports some shortness of breath and difficulty breathing. He remains on CPAP and has increased SOB off BiPaP. Speaks in short sentences. He denies CP, sore throat, rhinorrhea, abdominal pain, N/V, diarrhea, dysuria, frequency, urgency.     OBJECTIVE:    VITAL SIGNS:  ICU Vital Signs Last 24 Hrs  T(C): 36.5 (05 Oct 2017 13:08), Max: 38.3 (04 Oct 2017 22:26)  T(F): 97.7 (05 Oct 2017 13:08), Max: 101 (04 Oct 2017 22:26)  HR: 86 (05 Oct 2017 13:46) (64 - 86)  BP: 94/54 (05 Oct 2017 13:46) (67/42 - 122/57)  BP(mean): 75 (05 Oct 2017 05:53) (46 - 80)  ABP: --  ABP(mean): --  RR: 24 (05 Oct 2017 13:46) (18 - 27)  SpO2: 94% (05 Oct 2017 13:46) (93% - 100%)        10-04 @ 07:  -  10-05 @ 07:00  --------------------------------------------------------  IN: 2660 mL / OUT: 375 mL / NET: 2285 mL    10-05 @ 07:  -  10-05 @ 14:59  --------------------------------------------------------  IN: 480 mL / OUT: 0 mL / NET: 480 mL      CAPILLARY BLOOD GLUCOSE          PHYSICAL EXAM:    General: tachypneic pt, no accessory muscle use. Able to speak in short sentences.    HEENT: NC/AT; PERRL, pale conjunctiva, dry mucous membranes, contusion over L orbit from fall prior to admission   Neck: supple, no tracheal deviation, no lymphadenopathy, no JVD  Respiratory: decreased breath sounds at bases, wheezing w/ prolonged expiratory phase. No egophony. Exam minimally limited by body habitus.  Cardiovascular: +S1/S2; RRR, no murmurs/rubs/gallops. Crusted over skin lesions on left chest.   Abdomen: BS+, abdomen distended, non tender, tympanic, no rebound or guarding. No palpable masses. Obese.   Extremities: WWP, 2+ peripheral pulses b/l; 3+ pitting edema to mid thigh  Vascular: 2 + radial pulses b/l, 2+ PT/DP b/l  Skin: normal color and turgor. Crusted zoster lesions left chest  MSK: no joint swelling  Neurological: A & O x3. Strength 5/5 b/l in UE and 5/5 b/l LE. CN 2-12 grossly intact.     MEDICATIONS:  MEDICATIONS  (STANDING):  ALBUTerol/ipratropium for Nebulization 3 milliLiter(s) Nebulizer every 6 hours  aspirin enteric coated 81 milliGRAM(s) Oral daily  atorvastatin 20 milliGRAM(s) Oral at bedtime  azithromycin  IVPB 500 milliGRAM(s) IV Intermittent every 24 hours  cefTRIAXone   IVPB 1 Gram(s) IV Intermittent every 24 hours  heparin  Injectable 7500 Unit(s) SubCutaneous every 8 hours  sodium chloride 0.9%. 1000 milliLiter(s) (60 mL/Hr) IV Continuous <Continuous>    MEDICATIONS  (PRN):  acetaminophen  Suppository 650 milliGRAM(s) Rectal every 6 hours PRN For Temp greater than 38 C (100.4 F)      ALLERGIES:  Allergies    No Known Allergies    Intolerances        LABS:                        8.5    42.4  )-----------( 90       ( 05 Oct 2017 06:15 )             26.9     10-05    131<L>  |  91<L>  |  35<H>  ----------------------------<  111<H>  4.3   |  23  |  2.33<H>    Ca    8.9      05 Oct 2017 03:12    TPro  8.4<H>  /  Alb  3.6  /  TBili  1.0  /  DBili  x   /  AST  36  /  ALT  11  /  AlkPhos  231<H>  10-04    PT/INR - ( 04 Oct 2017 17:38 )   PT: 15.0 sec;   INR: 1.34          PTT - ( 04 Oct 2017 17:38 )  PTT:28.5 sec  Urinalysis Basic - ( 04 Oct 2017 20:46 )    Color: Yellow / Appearance: SL CLOUDY / S.025 / pH: x  Gluc: x / Ketone: Trace mg/dL  / Bili: Small / Urobili: 1.0 E.U./dL   Blood: x / Protein: 30 mg/dL / Nitrite: NEGATIVE   Leuk Esterase: NEGATIVE / RBC: > 10 /HPF / WBC 5-10 /HPF   Sq Epi: x / Non Sq Epi: Moderate /HPF / Bacteria: Present /HPF        RADIOLOGY & ADDITIONAL TESTS: Reviewed.    ASSESSMENT:     71 yo male with hx of HTN, COPD, Prostate CA (s/p radiation and seed  on leuprolide outpatient) who presents from [facility] onc with worsening of SOB and episode of unwitnessed syncope at home found to have severe sepsis with gram + cocci in clusters identified as S. aureus and ATN. Hospital course complicated by rapid response after patient removed his CPAP now being stepped up to MICU service for closer monitoring with severe sepsis and possible impending septic shock.    Respiratory     Dyspnea  No obvious pathology on xray or CT apart from extensive COPD known prior to admission. Dyspnea likely due to underlying lung disease coupled with severe sepsis. Follow for need for intubation.  Echo to assess cardiovascular fx and possible pulmonary HTN.   - LE u/s to r/o DVT  - CT chest w/o contrast consistent with known COPD.    COPD  Patient with hx of COPD outpatient w/o home O2 requirements. Patient presented w/ difficulty breathing in ED and subsequently placed on CPAP w/ improvement in symptoms. Attempt to wean off CPAP was poorly tolerated by patient. Patient had rapid response after removing his CPAP mask with ABG showing 7.25/55/334/23 off CPAP.   Repeat ABG on CPAP to assess response.   c/w duonebs Q 4     ID    Gram + cocci bacteremia  Patient blood cx grew gram + cocci in both sets of cultures w/o obvious source of infection. Patient given broad spectrum coverage with vancomycin+ceftriaxone+azithromycin.   - repeat blood cx. Await speciation and sensitivities of original cultures.   - TTE to assess for possible vegetations  - RVP negative    Renal    ATN  Patient had significant increase in Cr from baseline. UA significant for casts concerning for ATN possibly 2/2 excessive diuresis coupled with poor PO intake at home vs due to severe sepsis on admission. FeUrea 8.9% suggesting possible pre renal contribution to CATALINA.   - UOP following rapid response was 0 ml output over one hour.  - glaser placed w/ good UOP. Continue to monitor UOP.   - Renal U/s w/ doppler to r/o renal a thrombosis    Cardiovascular    Severe sepsis  Patient admitted with severe sepsis requiring fluid bolus. Patient on maintenance fluid of 60 cc/hr NS. Patient hypotensive during rapid response requiring 500 cc bolus and peripheral levophed to be titrated to MAP of 60 mmHg. Patient w/o good urine output following rapid response.     HFrEF  TTE today shows HFrEF with EF 45%, right atrial dilatation, septal wall motion abnormalities and mild hypokinesis of left ventricle. Unable to visualize any vegetations or R heart structures. Unable to calculate pulmonary a pressures. Patient may require KELLY once he is stablized in MICU.      Heme/onc    Prostate ca w/ mets to spine  Being followed outpatient by heme onc. Extensive involvement of spine on outpatient lupron therapy.     Leukocytosis  Possibly secondary to acute infection w/ underlying component related to malignant infiltration of bone marrow.     GI     AXR with air filled stomach and likely ileus from aerophagia and infection. To place NGT.      FEN - no IVF; replete lytes prn; NPO    PPx - HSQ    CODE - FULL.    Critical care time rendered 70 minutes. Reviewed with patient's son.

## 2017-10-05 NOTE — PROGRESS NOTE ADULT - ASSESSMENT
ASSESSMENT:   73 yo male with hx of HTN, COPD, Prostate CA (s/p radiation and seed 2013 on leuprolide outpatient) who presents from [facility] onc with worsening of SOB and episode of unwitnessed syncope at home found to have severe sepsis with gram + cocci in clusters identified as S. aureus and ATN. Hospital course complicated by rapid response after patient removed his CPAP now in MICU for closer monitoring with severe sepsis and possible impending septic shock.    Respiratory   Respiratory Failure  10/5 Xray showing possible increasing right sided consolidation. CT positive for COPD known prior to admission. Dyspnea likely due to mucus secretions 2/2 pneumonia with possible aspiration on BIPAP coupled with severe sepsis. Patient found to be unresponsive while on BIPAP was intubated for airway protection.  -Echo shows EF 45% with septal hypokinesis of LV and dilated RV and RA  -LE u/s to r/o DVT  -s/p intubation On propofol for sedation    COPD  Patient with hx of COPD outpatient w/o home O2 requirements. Patient presented w/ difficulty breathing in ED and subsequently placed on CPAP w/ improvement in symptoms. Attempt to wean off CPAP was poorly tolerated by patient. Patient had rapid response after removing his CPAP mask with ABG showing 7.25/55/334/23 off CPAP.   -Repeat ABG following intubation shows 7.3/49/237/23   -c/w duonebs Q 4     ID  Gram + cocci bacteremia  Patient blood cx grew gram + cocci in both sets of cultures likely 2/2 PNA. Patient prescribed Amoxacillin for bronchitis in OP w/o improvement of SOB. Patient given broad spectrum coverage with vancomycin+ceftriaxone+azithromycin.   - TTE negative for vegetations  - RVP negative  -DC ceftriaxone+azythromycin  -c/w vanc, start zosyn D1  - f/u repeat blood cx. Await speciation and sensitivities of original cultures.     Renal  ATN  Patient had significant increase in Cr from baseline. UA significant for casts concerning for ATN possibly 2/2 excessive diuresis coupled with poor PO intake at home vs due to severe sepsis on admission. FeUrea 8.9% suggesting possible pre renal contribution to CATALINA.   - UOP following rapid response was 0 ml output over one hour, now producing 25cc/hr  - glaser placed, Continue to monitor UOP.   - Renal U/s w/ doppler to r/o renal a thrombosis    Cardiovascular  Severe sepsis  Patient admitted with severe sepsis requiring fluid bolus. Patient on maintenance fluid of 60 cc/hr NS. Patient hypotensive during rapid response requiring 500 cc bolus and peripheral levophed to be titrated to MAP of 60 mmHg. Patient w/o good urine output following rapid response.   -started on levophed to be titrated to MAP of 60 mmHg.    HFrEF  -TTE today shows HFrEF with EF 45%, right atrial dilatation, septal wall motion abnormalities and mild hypokinesis of left ventricle. Unable to visualize any vegetations or R heart structures. Unable to calculate pulmonary a pressures. Patient may require KELLY once he is stablized in MICU.      Demand Ischemia  -EKG positive for ST depression in anteroseptal leads, Echo positive for septal hypokinesis, Troponin 0.06 -->0.10, likely 2/2 demand ischemia 2/2 Severe sepsis and respiratory failure  -trending trops to peak  -repeat EKG in AM    Heme/onc  Prostate ca w/ mets to spine  Being followed outpatient by heme onc. Extensive involvement of spine on outpatient lupron therapy.     Leukocytosis  Possibly secondary to acute infection w/ underlying component related to malignant infiltration of bone marrow.   -continue to monitor CBC    GI  -AXR with air filled stomach and likely ileus from aerophagia and infection.   -NGT placed, on suction    FEN - no IVF; replete lytes prn; NPO    PPx - HSQ    CODE - FULL.

## 2017-10-06 DIAGNOSIS — R94.31 ABNORMAL ELECTROCARDIOGRAM [ECG] [EKG]: ICD-10-CM

## 2017-10-06 DIAGNOSIS — I10 ESSENTIAL (PRIMARY) HYPERTENSION: ICD-10-CM

## 2017-10-06 LAB
ANION GAP SERPL CALC-SCNC: 19 MMOL/L — HIGH (ref 5–17)
BASE EXCESS BLDA CALC-SCNC: -4.7 MMOL/L — LOW (ref -2–3)
BASE EXCESS BLDA CALC-SCNC: 2.5 MMOL/L — SIGNIFICANT CHANGE UP (ref -2–3)
BLD GP AB SCN SERPL QL: NEGATIVE — SIGNIFICANT CHANGE UP
BUN SERPL-MCNC: 38 MG/DL — HIGH (ref 7–23)
CALCIUM SERPL-MCNC: 8.6 MG/DL — SIGNIFICANT CHANGE UP (ref 8.4–10.5)
CHLORIDE SERPL-SCNC: 97 MMOL/L — SIGNIFICANT CHANGE UP (ref 96–108)
CK MB CFR SERPL CALC: 16.1 NG/ML — HIGH (ref 0–6.7)
CK SERPL-CCNC: 482 U/L — HIGH (ref 30–200)
CO2 SERPL-SCNC: 22 MMOL/L — SIGNIFICANT CHANGE UP (ref 22–31)
CREAT SERPL-MCNC: 1.53 MG/DL — HIGH (ref 0.5–1.3)
CULTURE RESULTS: NO GROWTH — SIGNIFICANT CHANGE UP
GAS PNL BLDA: SIGNIFICANT CHANGE UP
GAS PNL BLDA: SIGNIFICANT CHANGE UP
GAS PNL BLDV: SIGNIFICANT CHANGE UP
GAS PNL BLDV: SIGNIFICANT CHANGE UP
GLUCOSE SERPL-MCNC: 135 MG/DL — HIGH (ref 70–99)
GRAM STN FLD: SIGNIFICANT CHANGE UP
HCO3 BLDA-SCNC: 22 MMOL/L — SIGNIFICANT CHANGE UP (ref 21–28)
HCO3 BLDA-SCNC: 28 MMOL/L — SIGNIFICANT CHANGE UP (ref 21–28)
HCT VFR BLD CALC: 25.6 % — LOW (ref 39–50)
HGB BLD-MCNC: 7.9 G/DL — LOW (ref 13–17)
MAGNESIUM SERPL-MCNC: 2.2 MG/DL — SIGNIFICANT CHANGE UP (ref 1.6–2.6)
MCHC RBC-ENTMCNC: 30.7 PG — SIGNIFICANT CHANGE UP (ref 27–34)
MCHC RBC-ENTMCNC: 30.9 G/DL — LOW (ref 32–36)
MCV RBC AUTO: 99.6 FL — SIGNIFICANT CHANGE UP (ref 80–100)
MONOCYTES NFR BLD AUTO: 25 % — HIGH (ref 2–14)
NEUTROPHILS NFR BLD AUTO: 72 % — SIGNIFICANT CHANGE UP (ref 43–77)
PCO2 BLDA: 49 MMHG — HIGH (ref 35–48)
PCO2 BLDA: 49 MMHG — HIGH (ref 35–48)
PH BLDA: 7.27 — LOW (ref 7.35–7.45)
PH BLDA: 7.38 — SIGNIFICANT CHANGE UP (ref 7.35–7.45)
PHOSPHATE SERPL-MCNC: 4.8 MG/DL — HIGH (ref 2.5–4.5)
PLATELET # BLD AUTO: 94 K/UL — LOW (ref 150–400)
PO2 BLDA: 266 MMHG — HIGH (ref 83–108)
PO2 BLDA: 282 MMHG — HIGH (ref 83–108)
POTASSIUM SERPL-MCNC: 3.8 MMOL/L — SIGNIFICANT CHANGE UP (ref 3.5–5.3)
POTASSIUM SERPL-SCNC: 3.8 MMOL/L — SIGNIFICANT CHANGE UP (ref 3.5–5.3)
RBC # BLD: 2.57 M/UL — LOW (ref 4.2–5.8)
RBC # FLD: 15.6 % — SIGNIFICANT CHANGE UP (ref 10.3–16.9)
RH IG SCN BLD-IMP: POSITIVE — SIGNIFICANT CHANGE UP
SAO2 % BLDA: 100 % — SIGNIFICANT CHANGE UP (ref 95–100)
SAO2 % BLDA: 100 % — SIGNIFICANT CHANGE UP (ref 95–100)
SODIUM SERPL-SCNC: 138 MMOL/L — SIGNIFICANT CHANGE UP (ref 135–145)
SPECIMEN SOURCE: SIGNIFICANT CHANGE UP
TROPONIN I SERPL-MCNC: 0.21 NG/ML — SIGNIFICANT CHANGE UP (ref 0–0)
TROPONIN T SERPL-MCNC: 0.17 NG/ML — CRITICAL HIGH (ref 0–0.01)
TROPONIN T SERPL-MCNC: 0.19 NG/ML — CRITICAL HIGH (ref 0–0.01)
TROPONIN T SERPL-MCNC: 0.23 NG/ML — CRITICAL HIGH (ref 0–0.01)
VANCOMYCIN FLD-MCNC: 18 UG/ML — SIGNIFICANT CHANGE UP
WBC # BLD: 22 K/UL — HIGH (ref 3.8–10.5)
WBC # FLD AUTO: 22 K/UL — HIGH (ref 3.8–10.5)

## 2017-10-06 PROCEDURE — 93010 ELECTROCARDIOGRAM REPORT: CPT

## 2017-10-06 PROCEDURE — 93970 EXTREMITY STUDY: CPT | Mod: 26

## 2017-10-06 PROCEDURE — 71010: CPT | Mod: 26,77

## 2017-10-06 PROCEDURE — 76770 US EXAM ABDO BACK WALL COMP: CPT | Mod: 26

## 2017-10-06 PROCEDURE — 99233 SBSQ HOSP IP/OBS HIGH 50: CPT | Mod: GC

## 2017-10-06 PROCEDURE — 74000: CPT | Mod: 26,77

## 2017-10-06 PROCEDURE — 74000: CPT | Mod: 26

## 2017-10-06 PROCEDURE — 71010: CPT | Mod: 26

## 2017-10-06 RX ORDER — IPRATROPIUM/ALBUTEROL SULFATE 18-103MCG
3 AEROSOL WITH ADAPTER (GRAM) INHALATION EVERY 4 HOURS
Qty: 0 | Refills: 0 | Status: DISCONTINUED | OUTPATIENT
Start: 2017-10-06 | End: 2017-10-28

## 2017-10-06 RX ORDER — ASPIRIN/CALCIUM CARB/MAGNESIUM 324 MG
81 TABLET ORAL DAILY
Qty: 0 | Refills: 0 | Status: DISCONTINUED | OUTPATIENT
Start: 2017-10-06 | End: 2017-10-26

## 2017-10-06 RX ORDER — ACETYLCYSTEINE 200 MG/ML
3 VIAL (ML) MISCELLANEOUS THREE TIMES A DAY
Qty: 0 | Refills: 0 | Status: DISCONTINUED | OUTPATIENT
Start: 2017-10-06 | End: 2017-10-11

## 2017-10-06 RX ORDER — VALACYCLOVIR 500 MG/1
1000 TABLET, FILM COATED ORAL EVERY 8 HOURS
Qty: 0 | Refills: 0 | Status: COMPLETED | OUTPATIENT
Start: 2017-10-06 | End: 2017-10-13

## 2017-10-06 RX ORDER — VANCOMYCIN HCL 1 G
1500 VIAL (EA) INTRAVENOUS ONCE
Qty: 0 | Refills: 0 | Status: COMPLETED | OUTPATIENT
Start: 2017-10-06 | End: 2017-10-06

## 2017-10-06 RX ORDER — IPRATROPIUM/ALBUTEROL SULFATE 18-103MCG
3 AEROSOL WITH ADAPTER (GRAM) INHALATION ONCE
Qty: 0 | Refills: 0 | Status: DISCONTINUED | OUTPATIENT
Start: 2017-10-06 | End: 2017-10-06

## 2017-10-06 RX ORDER — SODIUM BICARBONATE 1 MEQ/ML
50 SYRINGE (ML) INTRAVENOUS ONCE
Qty: 0 | Refills: 0 | Status: COMPLETED | OUTPATIENT
Start: 2017-10-06 | End: 2017-10-06

## 2017-10-06 RX ORDER — CHLORHEXIDINE GLUCONATE 213 G/1000ML
15 SOLUTION TOPICAL
Qty: 0 | Refills: 0 | Status: DISCONTINUED | OUTPATIENT
Start: 2017-10-06 | End: 2017-10-09

## 2017-10-06 RX ORDER — PANTOPRAZOLE SODIUM 20 MG/1
40 TABLET, DELAYED RELEASE ORAL DAILY
Qty: 0 | Refills: 0 | Status: DISCONTINUED | OUTPATIENT
Start: 2017-10-06 | End: 2017-10-08

## 2017-10-06 RX ADMIN — Medication 50 MILLIEQUIVALENT(S): at 06:01

## 2017-10-06 RX ADMIN — PIPERACILLIN AND TAZOBACTAM 200 GRAM(S): 4; .5 INJECTION, POWDER, LYOPHILIZED, FOR SOLUTION INTRAVENOUS at 06:09

## 2017-10-06 RX ADMIN — HEPARIN SODIUM 7500 UNIT(S): 5000 INJECTION INTRAVENOUS; SUBCUTANEOUS at 06:01

## 2017-10-06 RX ADMIN — Medication 300 MILLIGRAM(S): at 08:41

## 2017-10-06 RX ADMIN — HEPARIN SODIUM 7500 UNIT(S): 5000 INJECTION INTRAVENOUS; SUBCUTANEOUS at 14:05

## 2017-10-06 RX ADMIN — VALACYCLOVIR 1000 MILLIGRAM(S): 500 TABLET, FILM COATED ORAL at 16:36

## 2017-10-06 RX ADMIN — Medication 3 MILLILITER(S): at 16:09

## 2017-10-06 RX ADMIN — Medication 3 MILLILITER(S): at 19:15

## 2017-10-06 RX ADMIN — HEPARIN SODIUM 7500 UNIT(S): 5000 INJECTION INTRAVENOUS; SUBCUTANEOUS at 21:36

## 2017-10-06 RX ADMIN — VALACYCLOVIR 1000 MILLIGRAM(S): 500 TABLET, FILM COATED ORAL at 21:37

## 2017-10-06 RX ADMIN — Medication 3 MILLILITER(S): at 21:35

## 2017-10-06 RX ADMIN — Medication 3 MILLILITER(S): at 10:17

## 2017-10-06 RX ADMIN — CHLORHEXIDINE GLUCONATE 15 MILLILITER(S): 213 SOLUTION TOPICAL at 17:11

## 2017-10-06 RX ADMIN — Medication 81 MILLIGRAM(S): at 11:22

## 2017-10-06 RX ADMIN — VALACYCLOVIR 1000 MILLIGRAM(S): 500 TABLET, FILM COATED ORAL at 10:39

## 2017-10-06 RX ADMIN — PANTOPRAZOLE SODIUM 40 MILLIGRAM(S): 20 TABLET, DELAYED RELEASE ORAL at 11:22

## 2017-10-06 RX ADMIN — Medication 40 MILLIGRAM(S): at 10:39

## 2017-10-06 RX ADMIN — ATORVASTATIN CALCIUM 20 MILLIGRAM(S): 80 TABLET, FILM COATED ORAL at 21:37

## 2017-10-06 RX ADMIN — Medication 40 MILLIGRAM(S): at 17:10

## 2017-10-06 NOTE — PROGRESS NOTE ADULT - ASSESSMENT
ASSESSMENT:   73 yo male with hx of HTN, COPD, Prostate CA (s/p radiation and seed 2013 on leuprolide outpatient) who presents from [facility] onc with worsening of SOB and episode of unwitnessed syncope at home found to have severe sepsis with gram + cocci in clusters identified as S. aureus and ATN. Hospital course complicated by rapid response after patient removed his CPAP now in MICU for for septic shock.    ID  #Septic shock: resolving  -Patient admitted with severe sepsis requiring fluid bolus. Patient on maintenance fluid of 60 cc/hr NS. Patient hypotensive during rapid response requiring 500 cc bolus and peripheral levophed to be titrated to MAP of 60 mmHg. Patient w/o good urine output following rapid response.   -Patient blood cx grew gram + cocci in both sets of cultures likely 2/2 PNA vs Skin infection (shingles/right LL lesion). Patient prescribed Amoxacillin for bronchitis in OP w/o improvement of SOB. Patient given broad spectrum coverage with vancomycin+ceftriaxone+azithromycin. which was switched to vanc+zosyn.   -repeat blood cx. sensitivities(grew S.aureues) so DC ceftriaxone/azythromycin/Zosyn and will c/w vanc, F/U vanco level 9 pm  -Pt was started on levophed on 10/5 by 5 pm and was weaned off and stopped 10/6 by 9 am  - TTE negative for vegetations. Will repeat it again after a week  - RVP negative     Respiratory   #Acute hypoxic Respiratory Failure  10/5: Xray showing possible increasing right sided consolidation. CT positive for COPD known prior to admission. Dyspnea likely due to mucus plug 2/2 pneumonia with possible aspiration on BIPAP coupled with severe sepsis. Patient found to be unresponsive while on BIPAP was intubated for airway protection.  -Echo shows EF 45% with septal hypokinesis of LV and dilated RV and RA  -LE Venous duplex to r/o DVT/PE (Pt has syncopal episode, recent flight to [place]-retuned 8/17-Active Tx of prostate Ca- Positive troponin)  -s/p intubation On propofol for sedation. Pt is off propofol    #Possible COPD exacerbation  Patient with hx of COPD outpatient w/o home O2 requirements. Patient presented w/ difficulty breathing in ED and subsequently placed on CPAP w/ improvement in symptoms. Attempt to wean off CPAP was poorly tolerated by patient. Patient had rapid response after removing his CPAP mask with ABG showing 7.25/55/334/23 off CPAP.   -Repeat ABG following intubation shows 7.3/49/237/23   -c/w duonebs Q4   -On exam, pt has inspiratory and expiratory wheezing: will add steroids 40 Q12H      Renal  ATN  Patient had significant increase in Cr from baseline. UA significant for casts concerning for ATN possibly 2/2 excessive diuresis coupled with poor PO intake at home vs due to severe sepsis on admission. FeUrea 8.9% suggesting possible pre renal(hypotension-sepsis) contribution to CATALINA.   - UOP following rapid response was 0 ml output over one hour, now producing 25cc/hr  - Saldana placed, UOP increased to /hr afterwards   - Renal U/s w/ doppler to r/o renal a thrombosis    Cardiovascular    HFrEF  -TTE today shows HFrEF with EF 45%, right atrial dilatation, septal wall motion abnormalities and mild hypokinesis of left ventricle. Unable to visualize any vegetations or R heart structures. Unable to calculate pulmonary a pressures.   -As per cardiolgist: ECHO was nomal last year normal,Would recheck echo next week. BNP was very high on admission there are no other clinical findings for CHF.     Elevated troponins :  -EKG positive for ST depression in anteroseptal leads, Echo positive for septal hypokinesis, Troponin 0.06 -->0.10, likely probably due to ischemia from hypoxia exacerbated by renal insufficiency (cr. 2.07 on admission).   -Continue trending trops to peak  -Repeat EKG in AM    Heme/onc  Prostate ca w/ mets to spine  Being followed outpatient by heme onc. Extensive involvement of spine on outpatient lupron therapy.     Leukocytosis  Possibly secondary to acute infection w/ underlying component related to malignant infiltration of bone marrow.   -continue to monitor CBC    GI  -AXR with air filled stomach and likely ileus from aerophagia and infection.   -NGT placed, on suction    F no IVF (Pt is +ve fluid balance last 24 hours 1547.  E- replete lytes prn;   N-NPO    PPx - HSQ for DVT and protonix for GI    CODE - FULL. ASSESSMENT:   71 yo male with hx of HTN, COPD, Prostate CA (s/p radiation and seed 2013 on leuprolide outpatient) who presents from [facility] onc with worsening of SOB and episode of unwitnessed syncope at home found to have severe sepsis with gram + cocci in clusters identified as S. aureus and ATN. Hospital course complicated by rapid response after patient removed his CPAP now in MICU for for septic shock.    ID  #Septic shock: resolving  -Patient admitted with severe sepsis requiring fluid bolus. Patient on maintenance fluid of 60 cc/hr NS. Patient hypotensive during rapid response requiring 500 cc bolus and peripheral levophed to be titrated to MAP of 60 mmHg. Patient w/o good urine output following rapid response.   -Patient blood cx grew gram + cocci in both sets of cultures likely 2/2 PNA vs Skin infection (shingles/right LL lesion). Patient prescribed Amoxacillin for bronchitis in OP w/o improvement of SOB. Patient given broad spectrum coverage with vancomycin+ceftriaxone+azithromycin. which was switched to vanc+zosyn.   -repeat blood cx. sensitivities(grew S.aureues) so DC ceftriaxone/azythromycin/Zosyn and will c/w vanc, F/U vanco level 9 pm  -Pt was started on levophed on 10/5 by 5 pm and was weaned off and stopped 10/6 by 9 am  - TTE negative for vegetations. Will repeat it again after a week  - RVP negative     Respiratory   #Acute hypoxic Respiratory Failure  10/5: Xray showing possible increasing right sided consolidation. CT positive for COPD known prior to admission. Dyspnea likely due to copious thick secretions 2/2 pneumonia with possible aspiration on BIPAP coupled with severe sepsis/septic shock. Patient found to be unresponsive while on BIPAP was intubated for airway protection.  -Echo shows EF 45% with septal hypokinesis of LV and dilated RV and RA  -LE Venous duplex to r/o DVT/PE (Pt has syncopal episode, recent flight to [place]-retuned 8/17-Active Tx of prostate Ca- Positive troponin)  -s/p intubation On propofol for sedation. Pt is off propofol  -CXR post intubation showed no acute lung pathology    #Possible COPD exacerbation  Patient with hx of COPD outpatient w/o home O2 requirements. Patient presented w/ difficulty breathing in ED and subsequently placed on CPAP w/ improvement in symptoms. Attempt to wean off BiPAP was poorly tolerated by patient . Patient had rapid response after removing his CPAP mask with ABG showing 7.25/55/334/23 off CPAP.   -Repeat ABG following intubation shows 7.38/49/237/23   -Pt has thick secretions- w  -c/w duonebs Q4   -On exam, pt has inspiratory and expiratory wheezing: will add steroids 40 Q12H      Renal  ATN  Patient had significant increase in Cr from baseline. UA significant for casts concerning for ATN possibly 2/2 excessive diuresis coupled with poor PO intake at home vs due to severe sepsis on admission. FeUrea 8.9% suggesting possible pre renal(hypotension-sepsis) contribution to CATALINA.   - UOP following rapid response was 0 ml output over one hour, now producing 25cc/hr  - Saldana placed, UOP increased to /hr afterwards   - Renal U/s w/ doppler to r/o renal a thrombosis    Cardiovascular    HFrEF  -TTE today shows HFrEF with EF 45%, right atrial dilatation, septal wall motion abnormalities and mild hypokinesis of left ventricle. Unable to visualize any vegetations or R heart structures. Unable to calculate pulmonary a pressures.   -As per cardiolgist: ECHO was nomal last year normal,Would recheck echo next week. BNP was very high on admission there are no other clinical findings for CHF.   -Continue Aspirin/lipitor    Elevated troponins :  -EKG positive for ST depression in anteroseptal leads, Echo positive for septal hypokinesis, Troponin 0.06 -->0.10, likely probably due to ischemia from hypoxia exacerbated by renal insufficiency (cr. 2.07 on admission).   -Continue trending trops to peak  -Repeat EKG in AM    Heme/onc  Prostate ca w/ mets to spine  As per oncologist: Bicalutamide has been recently D/Mango. Will observe for a withdrawal effect. Thrombocytopenia is likely due to sepsis or could be related to marrow involvement by prostate cancer.     Skin  Shingles: Will start Valtrex(valcyclovir) through OGT    GI  -AXR with air filled stomach and likely ileus from aerophagia and infection.   -NGT placed, on suction    F no IVF (Pt is +ve fluid balance last 24 hours 1547.  E- replete lytes prn;   N-NPO    PPx - HSQ for DVT and protonix for GI    CODE - FULL. ASSESSMENT:   73 yo male with hx of HTN, COPD, Prostate CA (s/p radiation and seed 2013 on leuprolide outpatient) who presents from [facility] onc with worsening of SOB and episode of unwitnessed syncope at home found to have severe sepsis with gram + cocci in clusters identified as S. aureus and ATN. Hospital course complicated by rapid response after patient removed his CPAP now in MICU for for septic shock.    ID  #Septic shock: resolving  -Patient admitted with severe sepsis requiring fluid bolus. Patient on maintenance fluid of 60 cc/hr NS. Patient hypotensive during rapid response requiring 500 cc bolus and peripheral levophed to be titrated to MAP of 60 mmHg. Patient w/o good urine output following rapid response.   -Patient blood cx grew gram + cocci in both sets of cultures likely 2/2 PNA vs Skin infection (shingles/right LL lesion). Patient prescribed Amoxacillin for bronchitis in OP w/o improvement of SOB. Patient given broad spectrum coverage with vancomycin+ceftriaxone+azithromycin. which was switched to vanc+zosyn.   -repeat blood cx. sensitivities(grew S.aureues) so DC ceftriaxone/azythromycin/Zosyn and will c/w vanc, F/U vanco level 9 pm  -Pt was started on levophed on 10/5 by 5 pm and was weaned off and stopped 10/6 by 9 am  - TTE negative for vegetations. Will repeat it again after a week  - RVP negative     Respiratory   #Acute hypoxic Respiratory Failure  10/5: Xray showing possible increasing right sided consolidation. CT positive for COPD known prior to admission. Dyspnea likely due to copious thick secretions 2/2 pneumonia with possible aspiration on BIPAP coupled with severe sepsis/septic shock. Patient found to be unresponsive while on BIPAP was intubated for airway protection.  -Echo shows EF 45% with septal hypokinesis of LV and dilated RV and RA  -LE Venous duplex to r/o DVT/PE (Pt has syncopal episode, recent flight to [place]-Active Tx of prostate Ca- Positive troponin-RA/RV are dilated on ECHO)  -s/p intubation On propofol for sedation. Pt is off propofol  -CXR post intubation showed no acute lung pathology    #Possible COPD exacerbation  Patient with hx of COPD outpatient w/o home O2 requirements. Patient presented w/ difficulty breathing in ED and subsequently placed on CPAP w/ improvement in symptoms. Attempt to wean off BiPAP was poorly tolerated by patient . Patient had rapid response after removing his CPAP mask with ABG showing 7.25/55/334/23 off CPAP.   -Repeat ABG following intubation shows 7.38/49/237/23   -Pt has thick secretions- w  -c/w duonebs Q4   -On exam, pt has inspiratory and expiratory wheezing: will add steroids 40 Q12H      Renal  ATN  Patient had significant increase in Cr from baseline. UA significant for casts concerning for ATN possibly 2/2 excessive diuresis coupled with poor PO intake at home vs due to severe sepsis on admission. FeUrea 8.9% suggesting possible pre renal(hypotension-sepsis) contribution to CATALINA.   - UOP following rapid response was 0 ml output over one hour, now producing 25cc/hr  - Saldana placed, UOP increased to /hr afterwards   - Renal U/s w/ doppler to r/o renal a thrombosis    Cardiovascular    HFrEF  -TTE today shows HFrEF with EF 45%, right atrial dilatation, septal wall motion abnormalities and mild hypokinesis of left ventricle. Unable to visualize any vegetations or R heart structures. Unable to calculate pulmonary a pressures.   -As per cardiolgist: ECHO was nomal last year normal,Would recheck echo next week. BNP was very high on admission there are no other clinical findings for CHF.   -Elevated BNP might be due to worsening kidney functions.  -Continue Aspirin/lipitor      Elevated troponins :  -EKG positive for ST depression in anteroseptal leads, Echo positive for septal hypokinesis, Troponin 0.06 -->0.10-->0.19-->0.23, likely probably due to ischemia from hypoxia exacerbated by renal insufficiency (cr. 2.07 on admission).   -Continue trending trops to peak  -Repeat EKG in AM    Heme/onc  Prostate ca w/ mets to spine  As per oncologist: Bicalutamide has been recently D/Mango. Will observe for a withdrawal effect. Thrombocytopenia is likely due to sepsis or could be related to marrow involvement by prostate cancer.   -Anemia: monitor Hb and transfuse if Hb<7.     Skin  Shingles: Will start Valtrex(valcyclovir) through OGT    GI  -AXR with air filled stomach and likely ileus from aerophagia and infection.   -NGT placed, on suction    F no IVF (Pt is +ve fluid balance last 24 hours 1547).  E- replete lytes prn;   N-NPO    PPx - HSQ for DVT and protonix for GI    CODE - FULL. ASSESSMENT:   73 yo male with hx of HTN, COPD, Prostate CA (s/p radiation and seed 2013 on leuprolide outpatient) who presents from [facility] onc with worsening of SOB and episode of unwitnessed syncope at home found to have severe sepsis with gram + cocci in clusters identified as S. aureus and ATN. Hospital course complicated by rapid response after patient removed his CPAP now in MICU for for septic shock.    ID  #Septic shock: resolving  -Patient admitted with severe sepsis requiring fluid bolus. Patient on maintenance fluid of 60 cc/hr NS. Patient hypotensive during rapid response requiring 500 cc bolus and peripheral levophed to be titrated to MAP of 60 mmHg. Patient w/o good urine output following rapid response.   -Patient blood cx grew gram + cocci in both sets of cultures(on 10/4 and one set is positive 10/5) likely 2/2 PNA vs Skin infection (shingles/right LL lesion). Patient prescribed Amoxacillin for bronchitis in OP w/o improvement of SOB. Patient given broad spectrum coverage with vancomycin+ceftriaxone+azithromycin. which was switched to vanc+zosyn.   -repeat blood cx. sensitivities(grew S.aureues) so DC ceftriaxone/azythromycin/Zosyn and will c/w vanc, F/U vanco level 9 pm  -Pt was started on levophed on 10/5 by 5 pm and was weaned off and stopped 10/6 by 9 am  - TTE negative for vegetations. Will do KELLY because repeated Bl.Cx on 10/5 still positive for gram positive cocci in clusters, pending organism specification and sensitivities.  - RVP negative     Respiratory   #Acute hypoxic Respiratory Failure  10/5: Xray showing possible increasing right sided consolidation. CT positive for COPD known prior to admission. Dyspnea likely due to copious thick secretions 2/2 pneumonia with possible aspiration on BIPAP coupled with severe sepsis/septic shock. Patient found to be unresponsive while on BIPAP was intubated for airway protection.  -Echo shows EF 45% with septal hypokinesis of LV and dilated RV and RA  -LE Venous duplex to r/o DVT/PE (Pt has syncopal episode, recent flight to [place]-Active Tx of prostate Ca- Positive troponin-RA/RV are dilated on ECHO)  -s/p intubation On propofol for sedation. Pt is off propofol  -CXR post intubation showed no acute lung pathology    #Possible COPD exacerbation  Patient with hx of COPD outpatient w/o home O2 requirements. Patient presented w/ difficulty breathing in ED and subsequently placed on CPAP w/ improvement in symptoms. Attempt to wean off BiPAP was poorly tolerated by patient . Patient had rapid response after removing his CPAP mask with ABG showing 7.25/55/334/23 off CPAP.   -Repeat ABG following intubation shows 7.38/49/237/23   -Pt has thick secretions- w  -c/w duonebs Q4   -On exam, pt has inspiratory and expiratory wheezing: will add steroids 40 Q12H      Renal  ATN  Patient had significant increase in Cr from baseline. UA significant for casts concerning for ATN possibly 2/2 excessive diuresis coupled with poor PO intake at home vs due to severe sepsis on admission. FeUrea 8.9% suggesting possible pre renal(hypotension-sepsis) contribution to CATALINA.   - UOP following rapid response was 0 ml output over one hour, now producing 25cc/hr  - Saldana placed, UOP increased to /hr afterwards   - Renal U/s w/ doppler to r/o renal a thrombosis    Cardiovascular    HFrEF  -TTE today shows HFrEF with EF 45%, right atrial dilatation, septal wall motion abnormalities and mild hypokinesis of left ventricle. Unable to visualize any vegetations or R heart structures. Unable to calculate pulmonary a pressures.   -As per cardiolgist: ECHO was nomal last year normal,Would recheck echo next week. BNP was very high on admission there are no other clinical findings for CHF.   -Elevated BNP might be due to worsening kidney functions.  -Continue Aspirin/lipitor      Elevated troponins :  -EKG positive for ST depression in anteroseptal leads, Echo positive for septal hypokinesis, Troponin 0.06 -->0.10-->0.19-->0.23, likely probably due to ischemia from hypoxia exacerbated by renal insufficiency (cr. 2.07 on admission).   -Continue trending trops to peak  -Repeat EKG in AM    Heme/onc  Prostate ca w/ mets to spine  As per oncologist: Bicalutamide has been recently D/Mango. Will observe for a withdrawal effect. Thrombocytopenia is likely due to sepsis or could be related to marrow involvement by prostate cancer.   -Anemia: monitor Hb and transfuse if Hb<7.     Skin  Shingles: Will start Valtrex(valcyclovir) through OGT    GI  -AXR with air filled stomach and likely ileus from aerophagia and infection.   -NGT placed, on suction    F no IVF (Pt is +ve fluid balance last 24 hours 1547).  E- replete lytes prn;   N-NPO    PPx - HSQ for DVT and protonix for GI    CODE - FULL.

## 2017-10-06 NOTE — DIETITIAN INITIAL EVALUATION ADULT. - PROBLEM SELECTOR PLAN 3
Pt presenting with hypotension and has SBP in 90s since admission  -continue to hold BP medication in the setting of hypotension  -Pt SBP in 90s, received 500CC bolus  -Pt SBP remained low, received another 1L bolus  -continue to monitor BP carefully

## 2017-10-06 NOTE — PROGRESS NOTE ADULT - ATTENDING COMMENTS
Intubated, sedated, on IV antibiotic therapy. The patient is clinically stable at this time. Current therapy to continue.

## 2017-10-06 NOTE — PROGRESS NOTE ADULT - SUBJECTIVE AND OBJECTIVE BOX
INTERVAL HPI/OVERNIGHT EVENTS:    SUBJECTIVE: Patient seen and examined at bedside.    OBJECTIVE:    VITAL SIGNS:  ICU Vital Signs Last 24 Hrs  T(C): 37.6 (06 Oct 2017 11:01), Max: 37.6 (06 Oct 2017 10:00)  T(F): 99.7 (06 Oct 2017 11:01), Max: 99.7 (06 Oct 2017 10:00)  HR: 64 (06 Oct 2017 12:00) (55 - 116)  BP: 100/53 (06 Oct 2017 10:00) (69/41 - 133/75)  BP(mean): 70 (06 Oct 2017 10:00) (47 - 96)  ABP: 112/48 (06 Oct 2017 12:00) (74/64 - 132/70)  ABP(mean): 62 (06 Oct 2017 12:00) (62 - 112)  RR: 16 (06 Oct 2017 12:00) (11 - 26)  SpO2: 95% (06 Oct 2017 12:00) (94% - 100%)    Mode: CPAP with PS, FiO2: 40, PEEP: 8, PS: 12, MAP: 12, PIP: 20    10-05 @ 07:  -  10-06 @ 07:00  --------------------------------------------------------  IN: 2747 mL / OUT: 1200 mL / NET: 1547 mL    10-06 @ 07:01  -  10-06 @ 13:04  --------------------------------------------------------  IN: 17 mL / OUT: 340 mL / NET: -323 mL      CAPILLARY BLOOD GLUCOSE  114 (05 Oct 2017 19:00)          PHYSICAL EXAM:    General: NAD  HEENT: NC/AT; PERRL, clear conjunctiva  Neck: supple  Respiratory: CTA b/l  Cardiovascular: +S1/S2; RRR  Abdomen: soft, NT/ND; +BS x4  Extremities: WWP, 2+ peripheral pulses b/l; no LE edema  Skin: normal color and turgor; no rash  Neurological:     MEDICATIONS:  MEDICATIONS  (STANDING):  ALBUTerol/ipratropium for Nebulization 3 milliLiter(s) Nebulizer every 4 hours  aspirin  chewable 81 milliGRAM(s) Oral daily  atorvastatin 20 milliGRAM(s) Oral at bedtime  chlorhexidine 0.12% Liquid 15 milliLiter(s) Swish and Spit two times a day  heparin  Injectable 7500 Unit(s) SubCutaneous every 8 hours  methylPREDNISolone sodium succinate Injectable 40 milliGRAM(s) IV Push every 12 hours  norepinephrine Infusion 0.01 MICROgram(s)/kG/Min (2.044 mL/Hr) IV Continuous <Continuous>  pantoprazole   Suspension 40 milliGRAM(s) Oral daily  valACYclovir 1000 milliGRAM(s) Oral every 8 hours    MEDICATIONS  (PRN):  acetaminophen  Suppository 650 milliGRAM(s) Rectal every 6 hours PRN For Temp greater than 38 C (100.4 F)      ALLERGIES:  Allergies    No Known Allergies    Intolerances        LABS:                        7.9    22.0  )-----------( 94       ( 06 Oct 2017 06:26 )             25.6     10-    138  |  97  |  38<H>  ----------------------------<  135<H>  3.8   |  22  |  1.53<H>    Ca    8.6      06 Oct 2017 06:26  Phos  4.8     10-  Mg     2.2     10-06    TPro  8.3  /  Alb  3.3  /  TBili  0.4  /  DBili  x   /  AST  54<H>  /  ALT  13  /  AlkPhos  198<H>  10-05    PT/INR - ( 04 Oct 2017 17:38 )   PT: 15.0 sec;   INR: 1.34          PTT - ( 04 Oct 2017 17:38 )  PTT:28.5 sec  Urinalysis Basic - ( 04 Oct 2017 20:46 )    Color: Yellow / Appearance: SL CLOUDY / S.025 / pH: x  Gluc: x / Ketone: Trace mg/dL  / Bili: Small / Urobili: 1.0 E.U./dL   Blood: x / Protein: 30 mg/dL / Nitrite: NEGATIVE   Leuk Esterase: NEGATIVE / RBC: > 10 /HPF / WBC 5-10 /HPF   Sq Epi: x / Non Sq Epi: Moderate /HPF / Bacteria: Present /HPF        RADIOLOGY & ADDITIONAL TESTS: Reviewed. INTERVAL HPI/OVERNIGHT EVENTS:  Pt had Right IJ(TLC) and left radial A-line. remained intubated, sedated(on propofol),ABG shows continued acidosis, 2amps bicarb given. Repeat ABG with resolution of acidemia. Troponin uptrending (0.10-->0.19). leucocytoses are trending down. Prorofol stopped @ 6.00 am    SUBJECTIVE: Patient seen and examined at bedside and he was awake following commands. Pt answers question by nodding and he denied any chest pain, abdominal pain or back pain. He mentioned that he had chest rash for last 2 weeks and he scratched and was burning. Last BM was 2 days ago. Pt had repeated falls (had one after his recent flight from [place] in  and another one recently before coming to the hospital). He denied any blood clots. He stated that his ankles are swollen for almost a year.    OBJECTIVE:    VITAL SIGNS:  ICU Vital Signs Last 24 Hrs  T(C): 37.6 (06 Oct 2017 11:01), Max: 37.6 (06 Oct 2017 10:00)  T(F): 99.7 (06 Oct 2017 11:01), Max: 99.7 (06 Oct 2017 10:00)  HR: 64 (06 Oct 2017 12:00) (55 - 116)  BP: 100/53 (06 Oct 2017 10:00) (69/41 - 133/75)  BP(mean): 70 (06 Oct 2017 10:00) (47 - 96)  ABP: 112/48 (06 Oct 2017 12:00) (74/64 - 132/70)  ABP(mean): 62 (06 Oct 2017 12:00) (62 - 112)  RR: 16 (06 Oct 2017 12:00) (11 - 26)  SpO2: 95% (06 Oct 2017 12:00) (94% - 100%)    Mode: CPAP with PS, FiO2: 40, PEEP: 8, PS: 12, MAP: 12, PIP: 20    10-05 @ 07:  -  10-06 @ 07:00  --------------------------------------------------------  IN: 2747 mL / OUT: 1200 mL / NET: 1547 mL    10-06 @ 07:01  -  10-06 @ 13:04  --------------------------------------------------------  IN: 17 mL / OUT: 340 mL / NET: -323 mL      CAPILLARY BLOOD GLUCOSE  114 (05 Oct 2017 19:00)          PHYSICAL EXAM:    General: intubated. OGT in situ, NAD  HEENT: NC/AT; PERRL, clear conjunctiva, moist mucus membranes,  Neck: supple, no lymphadenopathy  Respiratory: insp/exp wheez,ing, decreased air entry post basal(poor insp effort), no rales.   Cardiovascular: +S1/S2; RRR, no murmurs or rubs appreciated  Abdomen: soft, NT; +BS x4, distended  Extremities: WWP, 2+ peripheral pulses b/l; right shin bruise with point of entry(scab),   Skin: normal color and turgor; dryed rash (erupted vesicles around left nipple area=dermatomal), venous stasis skin discoloration b/l  Neurological: Awake-alert,following commands    MEDICATIONS:  MEDICATIONS  (STANDING):  ALBUTerol/ipratropium for Nebulization 3 milliLiter(s) Nebulizer every 4 hours  aspirin  chewable 81 milliGRAM(s) Oral daily  atorvastatin 20 milliGRAM(s) Oral at bedtime  chlorhexidine 0.12% Liquid 15 milliLiter(s) Swish and Spit two times a day  heparin  Injectable 7500 Unit(s) SubCutaneous every 8 hours  methylPREDNISolone sodium succinate Injectable 40 milliGRAM(s) IV Push every 12 hours  norepinephrine Infusion 0.01 MICROgram(s)/kG/Min (2.044 mL/Hr) IV Continuous <Continuous>  pantoprazole   Suspension 40 milliGRAM(s) Oral daily  valACYclovir 1000 milliGRAM(s) Oral every 8 hours    MEDICATIONS  (PRN):  acetaminophen  Suppository 650 milliGRAM(s) Rectal every 6 hours PRN For Temp greater than 38 C (100.4 F)      ALLERGIES:  Allergies    No Known Allergies    Intolerances        LABS:                        7.9    22.0  )-----------( 94       ( 06 Oct 2017 06:26 )             25.6     10    138  |  97  |  38<H>  ----------------------------<  135<H>  3.8   |  22  |  1.53<H>    Ca    8.6      06 Oct 2017 06:26  Phos  4.8     10  Mg     2.2     10    TPro  8.3  /  Alb  3.3  /  TBili  0.4  /  DBili  x   /  AST  54<H>  /  ALT  13  /  AlkPhos  198<H>  10-05    PT/INR - ( 04 Oct 2017 17:38 )   PT: 15.0 sec;   INR: 1.34          PTT - ( 04 Oct 2017 17:38 )  PTT:28.5 sec  Urinalysis Basic - ( 04 Oct 2017 20:46 )    Color: Yellow / Appearance: SL CLOUDY / S.025 / pH: x  Gluc: x / Ketone: Trace mg/dL  / Bili: Small / Urobili: 1.0 E.U./dL   Blood: x / Protein: 30 mg/dL / Nitrite: NEGATIVE   Leuk Esterase: NEGATIVE / RBC: > 10 /HPF / WBC 5-10 /HPF   Sq Epi: x / Non Sq Epi: Moderate /HPF / Bacteria: Present /HPF        RADIOLOGY & ADDITIONAL TESTS: Reviewed.

## 2017-10-06 NOTE — DIETITIAN INITIAL EVALUATION ADULT. - NS AS NUTRI INTERV ENTERAL NUTRITION
Rate/Route/Composition/With continued intubation as medically feasible, start EN with route per MD; Jevity 1.2 at goal rate of 68ml/hr x 24hr + 1x prostat (1632ml TV, 2058kcal, 105g, 1317ml water).

## 2017-10-06 NOTE — PROGRESS NOTE ADULT - PROBLEM SELECTOR PLAN 5
This is a low priority item at this time. Bicalutamide has been recently D/Mango. Will observe for a withdrawal effect.

## 2017-10-06 NOTE — PROGRESS NOTE ADULT - SUBJECTIVE AND OBJECTIVE BOX
Intubated. Sedated on the MICU.     CHEMOTHERAPY REGIMEN:        Day:                          Diet:  Protocol:                                    IVF:      MEDICATIONS  (STANDING):  aspirin enteric coated 81 milliGRAM(s) Oral daily  atorvastatin 20 milliGRAM(s) Oral at bedtime  heparin  Injectable 7500 Unit(s) SubCutaneous every 8 hours  norepinephrine Infusion 0.01 MICROgram(s)/kG/Min (2.044 mL/Hr) IV Continuous <Continuous>  piperacillin/tazobactam IVPB.      piperacillin/tazobactam IVPB. 3.375 Gram(s) IV Intermittent every 6 hours  propofol Infusion 5 MICROgram(s)/kG/Min (3.27 mL/Hr) IV Continuous <Continuous>    MEDICATIONS  (PRN):  acetaminophen  Suppository 650 milliGRAM(s) Rectal every 6 hours PRN For Temp greater than 38 C (100.4 F)      Allergies    No Known Allergies    Intolerances        DVT Prophylaxis: [x ] YES [ ] NO      Antibiotics: [x ] YES [ ] NO    Pain Scale (1-10):       Location:    Vital Signs Last 24 Hrs  T(C): 37.2 (06 Oct 2017 02:27), Max: 37.2 (05 Oct 2017 09:22)  T(F): 99 (06 Oct 2017 02:27), Max: 99 (05 Oct 2017 09:22)  HR: 55 (06 Oct 2017 05:09) (55 - 116)  BP: 109/56 (06 Oct 2017 04:00) (69/41 - 133/75)  BP(mean): 82 (06 Oct 2017 04:00) (47 - 96)  RR: 22 (06 Oct 2017 04:00) (11 - 26)  SpO2: 100% (06 Oct 2017 05:09) (94% - 100%)    Drug Dosing Weight  Height (cm): 172.72 (05 Oct 2017 01:27)  Weight (kg): 109 (05 Oct 2017 01:27)  BMI (kg/m2): 36.5 (05 Oct 2017 01:)  BSA (m2): 2.21 (05 Oct 2017 01:)    PHYSICAL EXAM:      Constitutional: intubated, sedated.  Eyes: left conjunctiva pink. Right eye stuck .  ENMT: intubted.  Neck: no masses.  Respiratory: clear but with some decreased breath sounds at the left base.  Cardiovascular: S1>S2 at apex. Regular rhythm.  Gastrointestinal: distended, soft, no apparent tenderness.  Genitourinary: glaser cath in place.  Extremities: legs remain less edematous.  Neurological: sedated.  Skin: erythematous lesions noted over the left anterior chest in a cluster. Skin is warm and dry.  Lymph Nodes: none palp.                URINARY CATHETER: [x ] YES [ ] NO     LABS:  CBC Full  -  ( 05 Oct 2017 15:53 )  WBC Count : 41.0 K/uL  Hemoglobin : 8.4 g/dL  Hematocrit : 26.1 %  Platelet Count - Automated : 98 K/uL  Mean Cell Volume : 97.0 fL  Mean Cell Hemoglobin : 31.2 pg  Mean Cell Hemoglobin Concentration : 32.2 g/dL  Auto Neutrophil # : x  Auto Lymphocyte # : x  Auto Monocyte # : x  Auto Eosinophil # : x  Auto Basophil # : x  Auto Neutrophil % : x  Auto Lymphocyte % : x  Auto Monocyte % : x  Auto Eosinophil % : x  Auto Basophil % : x    10-05    134<L>  |  94<L>  |  41<H>  ----------------------------<  136<H>  4.0   |  19<L>  |  2.07<H>    Ca    9.1      05 Oct 2017 15:53    TPro  8.3  /  Alb  3.3  /  TBili  0.4  /  DBili  x   /  AST  54<H>  /  ALT  13  /  AlkPhos  198<H>  10-05    PT/INR - ( 04 Oct 2017 17:38 )   PT: 15.0 sec;   INR: 1.34          PTT - ( 04 Oct 2017 17:38 )  PTT:28.5 sec  Urinalysis Basic - ( 04 Oct 2017 20:46 )    Color: Yellow / Appearance: SL CLOUDY / S.025 / pH: x  Gluc: x / Ketone: Trace mg/dL  / Bili: Small / Urobili: 1.0 E.U./dL   Blood: x / Protein: 30 mg/dL / Nitrite: NEGATIVE   Leuk Esterase: NEGATIVE / RBC: > 10 /HPF / WBC 5-10 /HPF   Sq Epi: x / Non Sq Epi: Moderate /HPF / Bacteria: Present /HPF        CULTURES:    RADIOLOGY & ADDITIONAL STUDIES:

## 2017-10-06 NOTE — DIETITIAN INITIAL EVALUATION ADULT. - OTHER INFO
73y/o M admitted with severe sepsis and PNA. "Hospital course complicated by rapid response after patient removed his CPAP now in MICU for closer monitoring with severe sepsis and possible impending septic shock." Pt remains intubated. Levophed infusing. With continued intubation consider EN initiation as medically feasible. Per provider handoff (10/5), OGT placed for decompression of previous ileus and abd distention. Will follow.

## 2017-10-07 LAB
-  CEFAZOLIN: SIGNIFICANT CHANGE UP
-  CLINDAMYCIN: SIGNIFICANT CHANGE UP
-  ERYTHROMYCIN: SIGNIFICANT CHANGE UP
-  LINEZOLID: SIGNIFICANT CHANGE UP
-  OXACILLIN: SIGNIFICANT CHANGE UP
-  PENICILLIN: SIGNIFICANT CHANGE UP
-  RIFAMPIN: SIGNIFICANT CHANGE UP
-  TRIMETHOPRIM/SULFAMETHOXAZOLE: SIGNIFICANT CHANGE UP
-  VANCOMYCIN: SIGNIFICANT CHANGE UP
ANION GAP SERPL CALC-SCNC: 16 MMOL/L — SIGNIFICANT CHANGE UP (ref 5–17)
APTT BLD: 26.1 SEC — LOW (ref 27.5–37.4)
BUN SERPL-MCNC: 40 MG/DL — HIGH (ref 7–23)
CALCIUM SERPL-MCNC: 9 MG/DL — SIGNIFICANT CHANGE UP (ref 8.4–10.5)
CHLORIDE SERPL-SCNC: 96 MMOL/L — SIGNIFICANT CHANGE UP (ref 96–108)
CK MB CFR SERPL CALC: 6.6 NG/ML — SIGNIFICANT CHANGE UP (ref 0–6.7)
CK SERPL-CCNC: 186 U/L — SIGNIFICANT CHANGE UP (ref 30–200)
CO2 SERPL-SCNC: 24 MMOL/L — SIGNIFICANT CHANGE UP (ref 22–31)
CREAT SERPL-MCNC: 1.11 MG/DL — SIGNIFICANT CHANGE UP (ref 0.5–1.3)
CULTURE RESULTS: SIGNIFICANT CHANGE UP
CULTURE RESULTS: SIGNIFICANT CHANGE UP
GAS PNL BLDA: SIGNIFICANT CHANGE UP
GLUCOSE SERPL-MCNC: 149 MG/DL — HIGH (ref 70–99)
GRAM STN FLD: SIGNIFICANT CHANGE UP
HCT VFR BLD CALC: 24.1 % — LOW (ref 39–50)
HGB BLD-MCNC: 7.8 G/DL — LOW (ref 13–17)
INR BLD: 1.15 — SIGNIFICANT CHANGE UP (ref 0.88–1.16)
MAGNESIUM SERPL-MCNC: 2.3 MG/DL — SIGNIFICANT CHANGE UP (ref 1.6–2.6)
MCHC RBC-ENTMCNC: 30.6 PG — SIGNIFICANT CHANGE UP (ref 27–34)
MCHC RBC-ENTMCNC: 32.4 G/DL — SIGNIFICANT CHANGE UP (ref 32–36)
MCV RBC AUTO: 94.5 FL — SIGNIFICANT CHANGE UP (ref 80–100)
METHOD TYPE: SIGNIFICANT CHANGE UP
ORGANISM # SPEC MICROSCOPIC CNT: SIGNIFICANT CHANGE UP
PHOSPHATE SERPL-MCNC: 3.2 MG/DL — SIGNIFICANT CHANGE UP (ref 2.5–4.5)
PLATELET # BLD AUTO: 106 K/UL — LOW (ref 150–400)
POTASSIUM SERPL-MCNC: 3.4 MMOL/L — LOW (ref 3.5–5.3)
POTASSIUM SERPL-SCNC: 3.4 MMOL/L — LOW (ref 3.5–5.3)
PROTHROM AB SERPL-ACNC: 12.8 SEC — HIGH (ref 9.8–12.7)
RBC # BLD: 2.55 M/UL — LOW (ref 4.2–5.8)
RBC # FLD: 16.2 % — SIGNIFICANT CHANGE UP (ref 10.3–16.9)
SODIUM SERPL-SCNC: 136 MMOL/L — SIGNIFICANT CHANGE UP (ref 135–145)
SPECIMEN SOURCE: SIGNIFICANT CHANGE UP
TROPONIN T SERPL-MCNC: 0.15 NG/ML — CRITICAL HIGH (ref 0–0.01)
VANCOMYCIN FLD-MCNC: 25 UG/ML — SIGNIFICANT CHANGE UP
WBC # BLD: 7 K/UL — SIGNIFICANT CHANGE UP (ref 3.8–10.5)
WBC # FLD AUTO: 7 K/UL — SIGNIFICANT CHANGE UP (ref 3.8–10.5)

## 2017-10-07 PROCEDURE — 74000: CPT | Mod: 26

## 2017-10-07 PROCEDURE — 71010: CPT | Mod: 26

## 2017-10-07 PROCEDURE — 99291 CRITICAL CARE FIRST HOUR: CPT

## 2017-10-07 PROCEDURE — 93010 ELECTROCARDIOGRAM REPORT: CPT

## 2017-10-07 RX ORDER — MINERAL OIL
133 OIL (ML) MISCELLANEOUS ONCE
Qty: 0 | Refills: 0 | Status: COMPLETED | OUTPATIENT
Start: 2017-10-07 | End: 2017-10-07

## 2017-10-07 RX ORDER — MULTIVIT WITH MIN/MFOLATE/K2 340-15/3 G
100 POWDER (GRAM) ORAL ONCE
Qty: 0 | Refills: 0 | Status: COMPLETED | OUTPATIENT
Start: 2017-10-07 | End: 2017-10-07

## 2017-10-07 RX ORDER — METOPROLOL TARTRATE 50 MG
5 TABLET ORAL ONCE
Qty: 0 | Refills: 0 | Status: COMPLETED | OUTPATIENT
Start: 2017-10-07 | End: 2017-10-07

## 2017-10-07 RX ORDER — SIMETHICONE 80 MG/1
80 TABLET, CHEWABLE ORAL
Qty: 0 | Refills: 0 | Status: DISCONTINUED | OUTPATIENT
Start: 2017-10-07 | End: 2017-10-20

## 2017-10-07 RX ORDER — PIPERACILLIN AND TAZOBACTAM 4; .5 G/20ML; G/20ML
3.38 INJECTION, POWDER, LYOPHILIZED, FOR SOLUTION INTRAVENOUS EVERY 6 HOURS
Qty: 0 | Refills: 0 | Status: DISCONTINUED | OUTPATIENT
Start: 2017-10-07 | End: 2017-10-09

## 2017-10-07 RX ORDER — AMIODARONE HYDROCHLORIDE 400 MG/1
150 TABLET ORAL ONCE
Qty: 0 | Refills: 0 | Status: COMPLETED | OUTPATIENT
Start: 2017-10-07 | End: 2017-10-07

## 2017-10-07 RX ORDER — POTASSIUM CHLORIDE 20 MEQ
20 PACKET (EA) ORAL
Qty: 0 | Refills: 0 | Status: COMPLETED | OUTPATIENT
Start: 2017-10-07 | End: 2017-10-07

## 2017-10-07 RX ORDER — PIPERACILLIN AND TAZOBACTAM 4; .5 G/20ML; G/20ML
3.38 INJECTION, POWDER, LYOPHILIZED, FOR SOLUTION INTRAVENOUS EVERY 6 HOURS
Qty: 0 | Refills: 0 | Status: COMPLETED | OUTPATIENT
Start: 2017-10-07 | End: 2017-10-07

## 2017-10-07 RX ORDER — AMIODARONE HYDROCHLORIDE 400 MG/1
1 TABLET ORAL
Qty: 900 | Refills: 0 | Status: DISCONTINUED | OUTPATIENT
Start: 2017-10-07 | End: 2017-10-07

## 2017-10-07 RX ADMIN — Medication 40 MILLIGRAM(S): at 18:24

## 2017-10-07 RX ADMIN — PIPERACILLIN AND TAZOBACTAM 200 GRAM(S): 4; .5 INJECTION, POWDER, LYOPHILIZED, FOR SOLUTION INTRAVENOUS at 11:22

## 2017-10-07 RX ADMIN — Medication 3 MILLILITER(S): at 05:56

## 2017-10-07 RX ADMIN — Medication 100 MILLILITER(S): at 18:23

## 2017-10-07 RX ADMIN — CHLORHEXIDINE GLUCONATE 15 MILLILITER(S): 213 SOLUTION TOPICAL at 06:48

## 2017-10-07 RX ADMIN — Medication 81 MILLIGRAM(S): at 11:24

## 2017-10-07 RX ADMIN — VALACYCLOVIR 1000 MILLIGRAM(S): 500 TABLET, FILM COATED ORAL at 21:24

## 2017-10-07 RX ADMIN — PANTOPRAZOLE SODIUM 40 MILLIGRAM(S): 20 TABLET, DELAYED RELEASE ORAL at 11:24

## 2017-10-07 RX ADMIN — VALACYCLOVIR 1000 MILLIGRAM(S): 500 TABLET, FILM COATED ORAL at 06:47

## 2017-10-07 RX ADMIN — Medication 50 MILLIEQUIVALENT(S): at 06:47

## 2017-10-07 RX ADMIN — Medication 3 MILLILITER(S): at 17:53

## 2017-10-07 RX ADMIN — ATORVASTATIN CALCIUM 20 MILLIGRAM(S): 80 TABLET, FILM COATED ORAL at 21:50

## 2017-10-07 RX ADMIN — AMIODARONE HYDROCHLORIDE 200 MILLIGRAM(S): 400 TABLET ORAL at 06:57

## 2017-10-07 RX ADMIN — SIMETHICONE 80 MILLIGRAM(S): 80 TABLET, CHEWABLE ORAL at 14:19

## 2017-10-07 RX ADMIN — VALACYCLOVIR 1000 MILLIGRAM(S): 500 TABLET, FILM COATED ORAL at 13:30

## 2017-10-07 RX ADMIN — HEPARIN SODIUM 7500 UNIT(S): 5000 INJECTION INTRAVENOUS; SUBCUTANEOUS at 13:30

## 2017-10-07 RX ADMIN — Medication 3 MILLILITER(S): at 10:33

## 2017-10-07 RX ADMIN — PIPERACILLIN AND TAZOBACTAM 200 GRAM(S): 4; .5 INJECTION, POWDER, LYOPHILIZED, FOR SOLUTION INTRAVENOUS at 18:24

## 2017-10-07 RX ADMIN — Medication 3 MILLILITER(S): at 14:53

## 2017-10-07 RX ADMIN — Medication 40 MILLIGRAM(S): at 06:47

## 2017-10-07 RX ADMIN — Medication 300 MILLIGRAM(S): at 01:04

## 2017-10-07 RX ADMIN — HEPARIN SODIUM 7500 UNIT(S): 5000 INJECTION INTRAVENOUS; SUBCUTANEOUS at 21:24

## 2017-10-07 RX ADMIN — CHLORHEXIDINE GLUCONATE 15 MILLILITER(S): 213 SOLUTION TOPICAL at 18:24

## 2017-10-07 RX ADMIN — Medication 5 MILLIGRAM(S): at 06:49

## 2017-10-07 RX ADMIN — Medication 3 MILLILITER(S): at 01:03

## 2017-10-07 RX ADMIN — Medication 50 MILLIEQUIVALENT(S): at 10:50

## 2017-10-07 RX ADMIN — Medication 3 MILLILITER(S): at 21:23

## 2017-10-07 RX ADMIN — Medication 100 MILLILITER(S): at 11:25

## 2017-10-07 RX ADMIN — HEPARIN SODIUM 7500 UNIT(S): 5000 INJECTION INTRAVENOUS; SUBCUTANEOUS at 06:48

## 2017-10-07 RX ADMIN — Medication 3 MILLILITER(S): at 21:24

## 2017-10-07 RX ADMIN — Medication 50 MILLIEQUIVALENT(S): at 09:04

## 2017-10-07 RX ADMIN — PIPERACILLIN AND TAZOBACTAM 200 GRAM(S): 4; .5 INJECTION, POWDER, LYOPHILIZED, FOR SOLUTION INTRAVENOUS at 22:11

## 2017-10-07 NOTE — PROGRESS NOTE ADULT - ASSESSMENT
ASSESSMENT:   73 yo male with hx of HTN, COPD, Prostate CA (s/p radiation and seed 2013 on leuprolide outpatient) who presents from [facility] onc with worsening of SOB and episode of unwitnessed syncope at home found to have severe sepsis with gram + cocci in clusters identified as S. aureus and ATN. Hospital course complicated by rapid response after patient removed his CPAP now in MICU for for septic shock.    ID  #Septic shock: resolving  -Patient admitted with severe sepsis requiring fluid bolus. Patient on maintenance fluid of 60 cc/hr NS. Patient hypotensive during rapid response requiring 500 cc bolus and peripheral levophed to be titrated to MAP of 60 mmHg.   -Patient blood cx grew gram + cocci in both sets of cultures(on 10/4 and one set is positive 10/5) likely 2/2 PNA vs Skin infection (shingles/right LL lesion). Patient prescribed Amoxacillin for bronchitis in OP w/o improvement of SOB. Patient given broad spectrum coverage with vancomycin+ceftriaxone+azithromycin. which was switched to vanc+zosyn.   -Pt was started on levophed on 10/5 by 5 pm and was weaned off and stopped 10/6 by 9 am  -repeat blood cx. sensitivities(grew S.aureues) so DC ceftriaxone/azythromycin/Zosyn  -c/w vanc, F/U vanco level is therapeutic. Will add zosyn for possibility of pneumonia  - TTE negative for vegetations. Will do KELLY because repeated Bl.Cx on 10/5 still positive for gram positive cocci in clusters, pending organism specification and sensitivities.      Respiratory   #Acute hypoxic Respiratory Failure  10/5: Xray showing possible increasing right sided consolidation. CT positive for COPD known prior to admission. Dyspnea likely due to copious thick secretions 2/2 pneumonia with possible aspiration on BIPAP coupled with severe sepsis/septic shock. Patient found to be unresponsive while on BIPAP was intubated for airway protection.  -Echo shows EF 45% with septal hypokinesis of LV and dilated RV and RA  -LE Venous duplex to r/o DVT/PE (Pt has syncopal episode, recent flight to [place]-Active Tx of prostate Ca- Positive troponin-RA/RV are dilated on ECHO)  -s/p intubation On propofol for sedation. Pt is off propofol  -CXR post intubation showed no acute lung pathology    #Possible COPD exacerbation  Patient with hx of COPD outpatient w/o home O2 requirements. Patient presented w/ difficulty breathing in ED and subsequently placed on CPAP w/ improvement in symptoms. Attempt to wean off BiPAP was poorly tolerated by patient . Patient had rapid response after removing his CPAP mask with ABG showing 7.25/55/334/23 off CPAP.   -Repeat ABG following intubation shows 7.38/49/237/23   -Pt has thick secretions- w  -c/w duonebs Q4   -On exam, pt has inspiratory and expiratory wheezing: will add steroids 40 Q12H      Renal  ATN  Patient had significant increase in Cr from baseline. UA significant for casts concerning for ATN possibly 2/2 excessive diuresis coupled with poor PO intake at home vs due to severe sepsis on admission. FeUrea 8.9% suggesting possible pre renal(hypotension-sepsis) contribution to CATALINA.   - UOP following rapid response was 0 ml output over one hour, now producing 25cc/hr  - Saldana placed, UOP increased to /hr afterwards   - Renal U/s w/ doppler to r/o renal a thrombosis    Cardiovascular    HFrEF  -TTE today shows HFrEF with EF 45%, right atrial dilatation, septal wall motion abnormalities and mild hypokinesis of left ventricle. Unable to visualize any vegetations or R heart structures. Unable to calculate pulmonary a pressures.   -As per cardiolgist: ECHO was nomal last year normal,Would recheck echo next week. BNP was very high on admission there are no other clinical findings for CHF.   -Elevated BNP might be due to worsening kidney functions.  -Continue Aspirin/lipitor      Elevated troponins :  -EKG positive for ST depression in anteroseptal leads, Echo positive for septal hypokinesis, Troponin 0.06 -->0.10-->0.19-->0.23, likely probably due to ischemia from hypoxia exacerbated by renal insufficiency (cr. 2.07 on admission).   -Continue trending trops to peak  -Repeat EKG in AM    Heme/onc  Prostate ca w/ mets to spine  As per oncologist: Bicalutamide has been recently D/Mango. Will observe for a withdrawal effect. Thrombocytopenia is likely due to sepsis or could be related to marrow involvement by prostate cancer.   -Anemia: monitor Hb and transfuse if Hb<7.     Skin  Shingles: Will start Valtrex(valcyclovir) through OGT    GI  -AXR with air filled stomach and likely ileus from aerophagia and infection.   -NGT placed, on suction    F no IVF (Pt is +ve fluid balance last 24 hours 1547).  E- replete lytes prn;   N-NPO    PPx - HSQ for DVT and protonix for GI    CODE - FULL. ASSESSMENT:   73 yo male with hx of HTN, COPD, Prostate CA (s/p radiation and seed 2013 on leuprolide outpatient) who presents from [facility] onc with worsening of SOB and episode of unwitnessed syncope at home found to have severe sepsis with gram + cocci in clusters identified as S. aureus and ATN. Hospital course complicated by rapid response after patient removed his CPAP now in MICU for for septic shock.    ID  #Septic shock: resolved  -Patient admitted with severe sepsis requiring fluid bolus. Patient on maintenance fluid of 60 cc/hr NS. Patient hypotensive during rapid response requiring 500 cc bolus and peripheral levophed to be titrated to MAP of 60 mmHg (Septic shock)  -Pt was started on levophed on 10/5 by 5 pm and was weaned off and stopped 10/6 by 9 am  -Patient prescribed Amoxacillin for bronchitis in OP w/o improvement of SOB. Patient given broad spectrum coverage with vancomycin+ceftriaxone+azithromycin. which was switched to vanc+zosyn.   -Patient blood cx grew (gram positive cocci in clusters) (on 10/4"both sets" and one set is positive 10/5 and '3 bottles-4th is in progress" on 10/6 ) likely 2/2 PNA vs Skin infection (shingles/right LL lesion). F/U final report for sensitivity  -c/w vanc, F/U vanco level is therapeutic but because of labile Cr levels, another level was sent and came back 28. Will hold the next dose. Will add zosyn for possibility of pneumonia  - TTE negative for vegetations. Will do KELLY because three Bl.Cx are positive for gram positive cocci in clusters, pending organism specification and sensitivities.    Respiratory   #Acute hypoxic Respiratory Failure  10/5: Xray showing possible increasing right sided consolidation. CT positive for COPD known prior to admission. Dyspnea likely due to copious thick secretions 2/2 pneumonia with possible aspiration on BIPAP coupled with severe sepsis/septic shock. Patient found to be unresponsive while on BIPAP was intubated for airway protection.  -s/p intubation On propofol for sedation. Pt is off propofol 10/5  -Pt is tolerating CPAP well  -ABG today 10/7 is NORMAL 7.40/42/147/26/99  -LE Venous duplex is negative for DVT  Cardiologist recommend to r/o PE (Pt has syncopal episode, recent flight to [place]-Active Tx of prostate Ca- Positive troponin-RA/RV are dilated on ECHO)      #Possible COPD exacerbation  Patient with hx of COPD outpatient w/o home O2 requirements. Patient presented w/ difficulty breathing in ED and subsequently placed on CPAP w/ improvement in symptoms. Attempt to wean off BiPAP was poorly tolerated by patient . Patient had rapid response after removing his CPAP mask with ABG showing 7.25/55/334/23 off CPAP.   -Repeat ABG following intubation shows 7.38/49/237/23   -Pt has thick copious secretions-was started yesterday on Mucomyst  -c/w duonebs Q4   -On exam, pt has  expiratory wheezing: Continue steroids 40 Q12H    Cardiovascular  #New onset paroxysmal A.fib Vs MAT:  -s/p lopressor and Amiodarone bolus-->back to NSR  -As per cardiologist recs: Check TSH-Continue telemetry-consider anticoagulation if additional a.fib.  -Hematologist recommend to consider anticoagulation as his thrombocytopenia is stable    #HFrEF  -TTE today shows HFrEF with EF 45%, right atrial dilatation, septal wall motion abnormalities and mild hypokinesis of left ventricle. Unable to visualize any vegetations or R heart structures. Unable to calculate pulmonary a pressures.   -As per cardiolgist: ECHO was nomal last year normal,Would recheck echo next week. BNP was very high on admission there are no other clinical findings for CHF.   -Continue Aspirin/lipitor  - Recommend v/q to exclude pe - recent flight and h/o prostate ca with rv dilatation on tte  - Consider ischemia eval when acute issues resolve    #Elevated troponins :  -EKG positive for ST depression in anteroseptal leads, Echo positive for septal hypokinesis, Troponin 0.06 -->0.10-->0.19-->0.23-->0.17-->0.15, likely probably due to ischemia from hypoxia exacerbated by renal insufficiency (cr. 2.07 on admission).   -Continue trending trops to peak    Renal  ATN-resolving  Patient had significant increase in Cr from baseline. UA significant for casts concerning for ATN possibly 2/2 excessive diuresis coupled with poor PO intake at home vs due to severe sepsis on admission. FeUrea 8.9% suggesting possible pre renal(hypotension-sepsis) contribution to CATALINA.   - UOP following rapid response was 0 ml output over one hour, then 25cc/hr  -Saldana placed, UOP increased to /hr on 10/5. On 10/6: 50-75 (Over last 24 hrs: Pt is negative fluid balance 298  -Creatinine is back to normal 1.11 today    Heme/onc  #Prostate ca w/ mets to spine  As per oncologist: Bicalutamide has been recently D/Mango. Will observe for a withdrawal effect. Thrombocytopenia is likely due to sepsis or could be related to marrow involvement by prostate cancer.   -Anemia: monitor Hb and transfuse if Hb<7.   -Thrombocytopenia is stable    Skin  Shingles: Continue Valtrex(valcyclovir) through OGT    GI  -AXR with air filled stomach and likely ileus from aerophagia and infection.   -repeated AXR 10/5 and 10/6 showed persistent dilated loops of bowel, no free air.  -NGT placed, on suction  -Pt had enema yesterday 10/6 and had 3 BM(liquid)    F no IVF  E- replete lytes prn;   N-NPO    PPx - Heparin SC for DVT and protonix for GI    CODE - FULL.

## 2017-10-07 NOTE — PROCEDURE NOTE - SUPERVISORY STATEMENT
luis antonio by myself for omonitoring of ABG and BP in patient with acute hypercarbic and hypoxic  respiratory failure with LLL pna, COPD

## 2017-10-07 NOTE — PROGRESS NOTE ADULT - SUBJECTIVE AND OBJECTIVE BOX
The patient remains on assisted ventilation but he is awake, alert and communicative. He feels that he is breathing comfortably. He is not in pain.    CHEMOTHERAPY REGIMEN:        Day:                          Diet:  Protocol:                                    IVF:      MEDICATIONS  (STANDING):  acetylcysteine 20% Inhalation 3 milliLiter(s) Inhalation three times a day  ALBUTerol/ipratropium for Nebulization 3 milliLiter(s) Nebulizer every 4 hours  amiodarone Infusion 1 mG/Min (33.333 mL/Hr) IV Continuous <Continuous>  aspirin  chewable 81 milliGRAM(s) Oral daily  atorvastatin 20 milliGRAM(s) Oral at bedtime  chlorhexidine 0.12% Liquid 15 milliLiter(s) Swish and Spit two times a day  heparin  Injectable 7500 Unit(s) SubCutaneous every 8 hours  methylPREDNISolone sodium succinate Injectable 40 milliGRAM(s) IV Push every 12 hours  mineral oil enema 133 milliLiter(s) Rectal once  pantoprazole   Suspension 40 milliGRAM(s) Oral daily  potassium chloride  20 mEq/100 mL IVPB 20 milliEquivalent(s) IV Intermittent every 2 hours  valACYclovir 1000 milliGRAM(s) Oral every 8 hours    MEDICATIONS  (PRN):  acetaminophen  Suppository 650 milliGRAM(s) Rectal every 6 hours PRN For Temp greater than 38 C (100.4 F)  bisacodyl Suppository 10 milliGRAM(s) Rectal daily PRN Constipation      Allergies    No Known Allergies    Intolerances        DVT Prophylaxis: [x ] YES [ ] NO      Antibiotics: [x ] YES [ ] NO    Pain Scale (1-10):       Location:    Vital Signs Last 24 Hrs  T(C): 37.2 (07 Oct 2017 01:39), Max: 37.9 (06 Oct 2017 18:00)  T(F): 99 (07 Oct 2017 01:39), Max: 100.2 (06 Oct 2017 18:00)  HR: 142 (07 Oct 2017 05:50) (54 - 142)  BP: 100/53 (06 Oct 2017 10:00) (100/53 - 104/52)  BP(mean): 70 (06 Oct 2017 10:00) (70 - 73)  RR: 20 (07 Oct 2017 04:00) (12 - 35)  SpO2: 98% (07 Oct 2017 05:50) (89% - 98%)    Drug Dosing Weight  Height (cm): 172.72 (05 Oct 2017 01:27)  Weight (kg): 109 (05 Oct 2017 01:27)  BMI (kg/m2): 36.5 (05 Oct 2017 01:27)  BSA (m2): 2.21 (05 Oct 2017 01:27)    PHYSICAL EXAM:      Constitutional: awake and alert.    Eyes: conjunctiva pink.    ENMT:     Neck:    Breasts:    Back:    Respiratory:    Cardiovascular:    Gastrointestinal:    Genitourinary:    Rectal:    Extremities:    Vascular:    Neurological:    Skin:    Lymph Nodes:    Musculoskeletal:    Psychiatric:        URINARY CATHETER: [ ] YES [ ] NO     LABS:  CBC Full  -  ( 07 Oct 2017 06:01 )  WBC Count : 7.0 K/uL  Hemoglobin : 7.8 g/dL  Hematocrit : 24.1 %  Platelet Count - Automated : 106 K/uL  Mean Cell Volume : 94.5 fL  Mean Cell Hemoglobin : 30.6 pg  Mean Cell Hemoglobin Concentration : 32.4 g/dL  Auto Neutrophil # : x  Auto Lymphocyte # : x  Auto Monocyte # : x  Auto Eosinophil # : x  Auto Basophil # : x  Auto Neutrophil % : x  Auto Lymphocyte % : x  Auto Monocyte % : x  Auto Eosinophil % : x  Auto Basophil % : x    10-07    136  |  96  |  40<H>  ----------------------------<  149<H>  3.4<L>   |  24  |  1.11    Ca    9.0      07 Oct 2017 06:01  Phos  3.2     10-07  Mg     2.3     10-07    TPro  8.3  /  Alb  3.3  /  TBili  0.4  /  DBili  x   /  AST  54<H>  /  ALT  13  /  AlkPhos  198<H>  10-05    PT/INR - ( 07 Oct 2017 06:01 )   PT: 12.8 sec;   INR: 1.15          PTT - ( 07 Oct 2017 06:01 )  PTT:26.1 sec      CULTURES:    RADIOLOGY & ADDITIONAL STUDIES: The patient remains on assisted ventilation but he is awake, alert and communicative. He feels that he is breathing comfortably. He is not in pain.    CHEMOTHERAPY REGIMEN:        Day:                          Diet:  Protocol:                                    IVF:      MEDICATIONS  (STANDING):  acetylcysteine 20% Inhalation 3 milliLiter(s) Inhalation three times a day  ALBUTerol/ipratropium for Nebulization 3 milliLiter(s) Nebulizer every 4 hours  amiodarone Infusion 1 mG/Min (33.333 mL/Hr) IV Continuous <Continuous>  aspirin  chewable 81 milliGRAM(s) Oral daily  atorvastatin 20 milliGRAM(s) Oral at bedtime  chlorhexidine 0.12% Liquid 15 milliLiter(s) Swish and Spit two times a day  heparin  Injectable 7500 Unit(s) SubCutaneous every 8 hours  methylPREDNISolone sodium succinate Injectable 40 milliGRAM(s) IV Push every 12 hours  mineral oil enema 133 milliLiter(s) Rectal once  pantoprazole   Suspension 40 milliGRAM(s) Oral daily  potassium chloride  20 mEq/100 mL IVPB 20 milliEquivalent(s) IV Intermittent every 2 hours  valACYclovir 1000 milliGRAM(s) Oral every 8 hours    MEDICATIONS  (PRN):  acetaminophen  Suppository 650 milliGRAM(s) Rectal every 6 hours PRN For Temp greater than 38 C (100.4 F)  bisacodyl Suppository 10 milliGRAM(s) Rectal daily PRN Constipation      Allergies    No Known Allergies    Intolerances        DVT Prophylaxis: [x ] YES [ ] NO      Antibiotics: [x ] YES [ ] NO    Pain Scale (1-10):       Location:    Vital Signs Last 24 Hrs  T(C): 37.2 (07 Oct 2017 01:39), Max: 37.9 (06 Oct 2017 18:00)  T(F): 99 (07 Oct 2017 01:39), Max: 100.2 (06 Oct 2017 18:00)  HR: 142 (07 Oct 2017 05:50) (54 - 142)  BP: 100/53 (06 Oct 2017 10:00) (100/53 - 104/52)  BP(mean): 70 (06 Oct 2017 10:00) (70 - 73)  RR: 20 (07 Oct 2017 04:00) (12 - 35)  SpO2: 98% (07 Oct 2017 05:50) (89% - 98%)    Drug Dosing Weight  Height (cm): 172.72 (05 Oct 2017 01:27)  Weight (kg): 109 (05 Oct 2017 01:27)  BMI (kg/m2): 36.5 (05 Oct 2017 01:27)  BSA (m2): 2.21 (05 Oct 2017 01:27)    PHYSICAL EXAM:      Constitutional: awake and alert.  Eyes: conjunctiva pink.  ENMT: intubated, NGT also in place.  Neck: no masses.  Respiratory: some wheezing noted bilaterally.  Cardiovascular: S1>S2 at apex. Rapid irregular rhythm.  Gastrointestinal: remains distended, soft, nontender, active bowel sounds.  Genitourinary: voiding without difficulty.  Extremities: leg edema remains much less.  Neurological: no gross focal deficits.  Skin: warm and dry.  Lymph Nodes: none palp.  Musculoskeletal: full ROM.  Psychiatric: affect normal.        URINARY CATHETER: [ ] YES [ ] NO     LABS:  CBC Full  -  ( 07 Oct 2017 06:01 )  WBC Count : 7.0 K/uL  Hemoglobin : 7.8 g/dL  Hematocrit : 24.1 %  Platelet Count - Automated : 106 K/uL  Mean Cell Volume : 94.5 fL  Mean Cell Hemoglobin : 30.6 pg  Mean Cell Hemoglobin Concentration : 32.4 g/dL  Auto Neutrophil # : x  Auto Lymphocyte # : x  Auto Monocyte # : x  Auto Eosinophil # : x  Auto Basophil # : x  Auto Neutrophil % : x  Auto Lymphocyte % : x  Auto Monocyte % : x  Auto Eosinophil % : x  Auto Basophil % : x    10-07    136  |  96  |  40<H>  ----------------------------<  149<H>  3.4<L>   |  24  |  1.11    Ca    9.0      07 Oct 2017 06:01  Phos  3.2     10-07  Mg     2.3     10-07    TPro  8.3  /  Alb  3.3  /  TBili  0.4  /  DBili  x   /  AST  54<H>  /  ALT  13  /  AlkPhos  198<H>  10-05    PT/INR - ( 07 Oct 2017 06:01 )   PT: 12.8 sec;   INR: 1.15          PTT - ( 07 Oct 2017 06:01 )  PTT:26.1 sec      CULTURES:    RADIOLOGY & ADDITIONAL STUDIES:

## 2017-10-07 NOTE — PROGRESS NOTE ADULT - SUBJECTIVE AND OBJECTIVE BOX
Cardiology for Preister    cc:   interval - patient denied chest pain; son at bedside; note afib to sr with amio    PAST MEDICAL & SURGICAL HISTORY:  COPD (chronic obstructive pulmonary disease)  Hypertension  Obstructive sleep apnea syndrome  Prostate cancer metastatic to bone  PC (prostate cancer): with seed placement    MEDICATIONS  (STANDING):  acetylcysteine 20% Inhalation 3 milliLiter(s) Inhalation three times a day  ALBUTerol/ipratropium for Nebulization 3 milliLiter(s) Nebulizer every 4 hours  aspirin  chewable 81 milliGRAM(s) Oral daily  atorvastatin 20 milliGRAM(s) Oral at bedtime  chlorhexidine 0.12% Liquid 15 milliLiter(s) Swish and Spit two times a day  heparin  Injectable 7500 Unit(s) SubCutaneous every 8 hours  methylPREDNISolone sodium succinate Injectable 40 milliGRAM(s) IV Push every 12 hours  pantoprazole   Suspension 40 milliGRAM(s) Oral daily  piperacillin/tazobactam IVPB. 3.375 Gram(s) IV Intermittent every 6 hours  valACYclovir 1000 milliGRAM(s) Oral every 8 hours    MEDICATIONS  (PRN):  acetaminophen  Suppository 650 milliGRAM(s) Rectal every 6 hours PRN For Temp greater than 38 C (100.4 F)  bisacodyl Suppository 10 milliGRAM(s) Rectal daily PRN Constipation    ICU Vital Signs Last 24 Hrs  T(C): 37.3 (07 Oct 2017 09:15), Max: 37.9 (06 Oct 2017 18:00)  T(F): 99.2 (07 Oct 2017 09:15), Max: 100.2 (06 Oct 2017 18:00)  HR: 60 (07 Oct 2017 10:00) (54 - 142)  BP: --  BP(mean): --  ABP: 104/50 (07 Oct 2017 10:00) (76/66 - 132/58)  ABP(mean): 66 (07 Oct 2017 10:00) (60 - 156)  RR: 14 (07 Oct 2017 10:00) (12 - 35)  SpO2: 99% (07 Oct 2017 10:40) (89% - 99%)    physical exam  intubated  alert  nad  anicteric  no jvd  no carotid bruit  decreased breath sounds  rr; s1/s2 present; no harsh murmur  soft abd  no edema  le pulses present  crusted shingles lesions at left chest     I&O's Detail    06 Oct 2017 07:01  -  07 Oct 2017 07:00  --------------------------------------------------------  IN:    IV PiggyBack: 1100 mL    norepinephrine Infusion: 22 mL  Total IN: 1122 mL    OUT:    Indwelling Catheter - Urethral: 1420 mL  Total OUT: 1420 mL    Total NET: -298 mL      07 Oct 2017 07:  -  07 Oct 2017 12:13  --------------------------------------------------------  IN:    IV PiggyBack: 100 mL  Total IN: 100 mL    OUT:    Indwelling Catheter - Urethral: 120 mL  Total OUT: 120 mL    Total NET: -20 mL                                7.8    7.0   )-----------( 106      ( 07 Oct 2017 06:01 )             24.1   1007    136  |  96  |  40<H>  ----------------------------<  149<H>  3.4<L>   |  24  |  1.11    Ca    9.0      07 Oct 2017 06:01  Phos  3.2     1007  Mg     2.3     10-07    TPro  8.3  /  Alb  3.3  /  TBili  0.4  /  DBili  x   /  AST  54<H>  /  ALT  13  /  AlkPhos  198<H>  10-05    I&O's Summary    06 Oct 2017 07:  -  07 Oct 2017 07:00  --------------------------------------------------------  IN: 1122 mL / OUT: 1420 mL / NET: -298 mL    07 Oct 2017 07:  -  07 Oct 2017 12:14  --------------------------------------------------------  IN: 100 mL / OUT: 120 mL / NET: -20 mL      < from: TTE Echo w/Cont Complete (10.05.17 @ 11:51) >  EXAM:  ECHOCARDIOGRAM W CONTRAST                          PROCEDURE DATE:  10/05/2017                        INTERPRETATION:  Heart Rate: 82 bpm  Systolic Pressure: 84 mmHg  Diastolic Pressure: 51 mmHg  Interpretation Summary  Normal left ventricular size and wall thickness.There is moderate   hypokinesis   with abnormal septal motion noted.The left ventricular ejection fraction   is   45%.The left atrial size is normal.The right atrium is dilated.The right   ventricle is dilated.The right ventricular systolic function is probably   normal.There is mild aortic valve thickening.There is trace mitral   regurgitation.There was insufficient TR detected from which to calculate   pulmonary artery systolic pressure.  The pulmonic valve is not well   visualized.No aortic root dilatation.The inferior vena cava was not well   visualized.There is no pericardial effusion.    < end of copied text >    < from: CT Chest No Cont (10.04.17 @ 23:14) >  EXAM:  CT CHEST                          PROCEDURE DATE:  10/04/2017                     INTERPRETATION:    Attending over read. Agree with the below report with following   modifications. Patchy consolidation versus subsegmental atelectasis   lingula and left lower lobe. Centrilobular emphysema. Cardiomegaly. Low   density of cardiac chambers related to the myocardium suggesting anemia.   The main pulmonary artery measures 3.6 cm consistent with pulmonary   arterial hypertension. Dense coronary artery calcification versus stents.   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 Wedge compression of T5, T6, T7, T8, T10, T11 vertebral   bodies. The abnormality at T10, T11 appears acute. Comminuted fracture of   the medial aspect of the left clavicle. Osteolytic process right fourth   rib. In the upper abdomen a partially visualized gallbladder is   distended. 1.8 x 1.5 cm right adrenal nodule. This measures 26 Hounsfield   units.        [facility]  Preliminary Radiology Report  Call: [phone]  assistance Online chat: [email or web address]  Patient Name: TEDDY GUZMAN MRN: [id number]   (Age): 1945 72 Gender: M  Date of Exam: 10/04/2017 Accession: [id number]  Referring Physician: TESSA GRIMM # of Images: [id number]  Ordered As: CT CHEST WO  Page 1 of 2  EXAM: CT Chest Without Intravenous Contrast    CLINICAL HISTORY: 72 years old, male; Signs and symptoms; Cough; Symptoms   not specified;  Patient HX: Leukocytosis. ; Additional info: Evaluate for infiltrate.  TECHNIQUE: Axial computed tomography images of the chest without   intravenous contrast. MIP  reconstructed images were created and reviewed. Coronal and sagittal   reformatted images were  created and reviewed.  COMPARISON: Chest x-ray dated 10/4/17.    FINDINGS: Motion degrades several images. Irregular linear opacities in   the lung bases and left  upper lobe lingula probably represent atelectasis and or scar. No pleural   effusion, pneumothorax or  organized infiltrate.  No cardiomegaly or pericardial effusion. Dense coronary arteries may   represent stents and/or  calcification. Thoracic aortic calcifications. No thoracic aortic   aneurysm.  No hilar or mediastinal adenopathy.  Gaseous segments of the transverse colon partially seen in the upper   abdomen. The gallbladder is  slightly distended, not completely imaged. Small cyst at the upper pole   of the left kidney. No acute  process in the remainder of the visualized abdominal organs.  No acute fractures seen. Degenerative changes and osteopenia. Compression   deformities of  unknown age at T5, T6, T7, T8, T10 and T11. Subtle sclerotic changes in   T2, T4, T5, T7, T8 and T10  concerning for metastatic disease.      IMPRESSION:  Predominately well aerated lungs. Irregular areas of scar and or   atelectasis in the lung bases.  Subtle sclerotic changes in the vertebrae concerning for metastatic   disease. Multiple compression  deformities of unknown age.  Other findings as above.    Thank you for allowing us to participate in the care of your patient.  Dictated and Authenticated by: Jarocho Alfaro DO  10/05/2017 5:21 AM Eastern Time ([place] & [place])    The above report was submitted by the [facility] attending radiologist and   copiedto PowerScribe by resident Dr. Elaine.    Resident Addendum:  Areas of mucous plugging and cylindrical bronchiectasis in the left lower   lobe consistent with large airway disease.            "Thank you for the opportunity to participate in the care of this   patient."    ALFREDO ELAINE M.D., RADIOLOGY RESIDENT  This document has been electronically signed.  SD COATS M.D., ATTENDING RADIOLOGIST  This document has been electronically signed. Oct  5 2017 11:11AM    < end of copied text >    < from: Xray Abdomen 1 View PORTABLE -Urgent (10.07.17 @ 08:06) >  EXAM:  XR ABDOMEN 1 VIEW PORT URGENT                          PROCEDURE DATE:  10/07/2017                     INTERPRETATION:    Clinical history: Renal free air. AP view of the abdomen demonstrates no   interval change bowel distention in comparison to prior examination of   the abdomen 10/6/2017. Nasoenteric tube noted with tip overlying left   upper abdomen. Sidehole of the catheter overlies esophagogastric junction   advanced.    Impression: No interval change bowel distention. If free air is a   clinical consideration erect film of the abdomen or CT examination   recommended for further evaluation. Nasoenteric tube noted as described   which should be advanced            "Thank you for the opportunity to participate in the care of this  patient."    ALFREDO ELAINE M.D., RADIOLOGY RESIDENT  This document has been electronically signed.  ESTRELLA FUENTES M.D., ATTENDING RADIOLOGIST  This document has been electronically signed. Oct  7 2017 10:26AM        < end of copied text >  < from: US Duplex Venous Lower Ext Complete, Bilateral (10.06.17 @ 17:13) >  ******PRELIMINARY REPORT******    ******PRELIMINARY REPORT******            EXAM:  US DPLX LWR EXT VEINS COMPL BI                          PROCEDURE DATE:  10/06/2017               ******PRELIMINARY REPORT******    ******PRELIMINARY REPORT******          INTERPRETATION:  Resident preliminary report      VENOUS DUPLEX DOPPLER OF BOTH LOWER EXTREMITIES dated 10/6/2017 5:13 PM    INDICATION: Bilateral calf pain and swelling.    TECHNIQUE: Duplex Doppler evaluation including gray-scale ultrasound   imaging, color flow Doppler imaging, and Doppler spectral analysis of the   veins of both lower extremities was performed.     COMPARISON: None    FINDINGS:    Thigh veins: The common femoral, femoral, popliteal, proximal greater   saphenous, andproximal deep femoral veins are patent and free of   thrombus bilaterally. The veins are normally compressible and have normal   phasic flow and augmentation response.    Calf veins: The paired peroneal and posterior tibial calf veins are   patent bilaterally.      IMPRESSION:  No deep vein thrombosis seen.              "Thank you for the opportunity to participate in the care of this   patient."  ******PRELIMINARY REPORT******    ******PRELIMINARY REPORT******          HAI LEUNG M.D., RADIOLOGY RESIDENT    < end of copied text >    < from: US Retroperitoneal Complete (10.06.17 @ 17:13) >  ******PRELIMINARY REPORT******    ******PRELIMINARY REPORT******            EXAM:  US RETROPERITONEAL COMPLETE                          PROCEDURE DATE:  10/06/2017               ******PRELIMINARY REPORT******    ******PRELIMINARY REPORT******              INTERPRETATION:  [facility]  Preliminary Radiology Report  Call: [phone]  assistance Online chat: [email or web address]  Patient Name: TEDDY GUZMAN MRN: [id number]   (Age): 1945 Gender: M  Date of Exam: 10/06/2017 Accession: [id number]  Referring Physician: Nathen Rodriguez Md # of Images: [id number]  Ordered As: US RETROPERITONEAL COMPLETE   (QA) DISCREPANCY?  If there is a discrepancy between the preliminary and final   interpretation, please notify [employer] via [email or web address].  If you do not have access to our QA portal, call our QA team at   [phone]  CONFIDENTIALITY STATEMENT  This report is intended only for the use of the referring physician, and   only in accordance with law, If youreceived this in error, call   [phone]  Page 1 of 1  EXAM: US Retroperitoneal Limited, Renal  EXAM DATE/TIME: 10/6/2017 3:29 PM    CLINICAL HISTORY: 72 years. male. Condition or disease; Kidney or ureter   condition; Acute renal  insufficiency. .  TECHNIQUE: Real-time ultrasound of the retroperitoneum (limited) with   image documentation.  COMPARISON: CR - XR ABDOMEN 1 VIEW PORT URGENT 10/5/2017 7:09:03 PM    FINDINGS:  Right kidney: Normal size, contour and echogenicity. No stones, mass or   hydronephrosis.  Left kidney: Normal size, contour and echogenicity. No stones, mass or   hydronephrosis.      IMPRESSION:  Normal kidneys.  No stones or hydronephrosis.    Thank you for allowing us to participate in the care of your patient.  Dictated and Authenticated by: Sacha Wilson MD  10/07/2017 6:51 AM Eastern Time ([place] & [place])    The above report was submitted by the [facility] attending radiologist and   copied to PowerScribe by resident Dr. Elaine.            "Thank you for the opportunity to participate in the care of this   patient."  ******PRELIMINARY REPORT******    ******PRELIMINARY REPORT******          ALFREDO ELAINE M.D., RADIOLOGY RESIDENT      < end of copied text >

## 2017-10-07 NOTE — PROGRESS NOTE ADULT - SUBJECTIVE AND OBJECTIVE BOX
INTERVAL HPI/OVERNIGHT EVENTS:  Troponin T peaked at 10pm (0.17). Vanc 1500mg given, next Vanc level at 12pm. Pt w/ copious secretions all night. Liquid BM x2 overnight. Went into Irregular tachyarrythmia up to 160s (mostly MAT vs A.fib with RVR)  ~6 am. IV lopressor x2 unsuccessful. Started on amiodarone bolus. No further amiodarone given because pt went back to sinus rhythm and rate was 50-60    SUBJECTIVE: Patient seen and examined at bedside and he denied any complaints at all. No chest pain, belly pain,vomiting, leg pain. He writes what he wants on a piece of paper.    OBJECTIVE:    VITAL SIGNS:  ICU Vital Signs Last 24 Hrs  T(C): 37.3 (07 Oct 2017 09:15), Max: 37.9 (06 Oct 2017 18:00)  T(F): 99.2 (07 Oct 2017 09:15), Max: 100.2 (06 Oct 2017 18:00)  HR: 60 (07 Oct 2017 10:00) (54 - 142)  BP: --  BP(mean): --  ABP: 104/50 (07 Oct 2017 10:00) (76/66 - 132/58)  ABP(mean): 66 (07 Oct 2017 10:00) (60 - 156)  RR: 14 (07 Oct 2017 10:00) (12 - 35)  SpO2: 99% (07 Oct 2017 10:40) (89% - 99%)    Mode: CPAP with PS, FiO2: 40, PEEP: 5, PS: 5, MAP: 7, PIP: 11    10-06 @ 07:01  -  10-07 @ 07:00  --------------------------------------------------------  IN: 1122 mL / OUT: 1420 mL / NET: -298 mL    10-07 @ 07:01  -  10-07 @ 11:18  --------------------------------------------------------  IN: 100 mL / OUT: 120 mL / NET: -20 mL      CAPILLARY BLOOD GLUCOSE  114 (05 Oct 2017 19:00)          PHYSICAL EXAM:    General: intubated. OGT in situ, NAD  HEENT: NC/AT; PERRL, clear conjunctiva, moist mucus membranes,  Neck: supple, no lymphadenopathy  Respiratory: exp wheezing, decreased air entry post basal(poor insp effort), no rales.   Cardiovascular: Irregular, +S1/S2; tachycardia, no murmurs or rubs appreciated  Abdomen: soft, NT; +BS x4, distended  Extremities: WWP, 2+ peripheral pulses b/l; right shin bruise with point of entry(scab),   Skin: normal color and turgor; dryed rash (erupted vesicles around left nipple area=dermatomal), venous stasis skin discoloration b/l  Neurological: Awake-alert, following commands    MEDICATIONS:  MEDICATIONS  (STANDING):  acetylcysteine 20% Inhalation 3 milliLiter(s) Inhalation three times a day  ALBUTerol/ipratropium for Nebulization 3 milliLiter(s) Nebulizer every 4 hours  aspirin  chewable 81 milliGRAM(s) Oral daily  atorvastatin 20 milliGRAM(s) Oral at bedtime  chlorhexidine 0.12% Liquid 15 milliLiter(s) Swish and Spit two times a day  heparin  Injectable 7500 Unit(s) SubCutaneous every 8 hours  methylPREDNISolone sodium succinate Injectable 40 milliGRAM(s) IV Push every 12 hours  pantoprazole   Suspension 40 milliGRAM(s) Oral daily  piperacillin/tazobactam IVPB. 3.375 Gram(s) IV Intermittent every 6 hours  piperacillin/tazobactam IVPB. 3.375 Gram(s) IV Intermittent every 6 hours  potassium chloride  20 mEq/100 mL IVPB 20 milliEquivalent(s) IV Intermittent every 2 hours  valACYclovir 1000 milliGRAM(s) Oral every 8 hours    MEDICATIONS  (PRN):  acetaminophen  Suppository 650 milliGRAM(s) Rectal every 6 hours PRN For Temp greater than 38 C (100.4 F)  bisacodyl Suppository 10 milliGRAM(s) Rectal daily PRN Constipation      ALLERGIES:  Allergies    No Known Allergies    Intolerances        LABS:                        7.8    7.0   )-----------( 106      ( 07 Oct 2017 06:01 )             24.1     10-07    136  |  96  |  40<H>  ----------------------------<  149<H>  3.4<L>   |  24  |  1.11    Ca    9.0      07 Oct 2017 06:01  Phos  3.2     10-07  Mg     2.3     10-07    TPro  8.3  /  Alb  3.3  /  TBili  0.4  /  DBili  x   /  AST  54<H>  /  ALT  13  /  AlkPhos  198<H>  10-05    PT/INR - ( 07 Oct 2017 06:01 )   PT: 12.8 sec;   INR: 1.15          PTT - ( 07 Oct 2017 06:01 )  PTT:26.1 sec      RADIOLOGY & ADDITIONAL TESTS: Reviewed. INTERVAL HPI/OVERNIGHT EVENTS:  Troponin T peaked at 10pm (0.17). Vanc 1500mg given, next Vanc level at 12pm. Pt w/ copious secretions all night. Liquid BM x2 overnight. Went into Irregular tachyarrythmia up to 160s (mostly MAT vs A.fib with RVR)  ~6 am. IV lopressor x2 unsuccessful. Started on amiodarone bolus. No further amiodarone given because pt went back to sinus rhythm and rate was 50-60    SUBJECTIVE: Patient seen and examined at bedside and he denied any complaints at all. No chest pain, belly pain,vomiting, leg pain. He writes what he wants on a piece of paper.    OBJECTIVE:    VITAL SIGNS:  ICU Vital Signs Last 24 Hrs  T(C): 37.3 (07 Oct 2017 09:15), Max: 37.9 (06 Oct 2017 18:00)  T(F): 99.2 (07 Oct 2017 09:15), Max: 100.2 (06 Oct 2017 18:00)  HR: 60 (07 Oct 2017 10:00) (54 - 142)  BP: --  BP(mean): --  ABP: 104/50 (07 Oct 2017 10:00) (76/66 - 132/58)  ABP(mean): 66 (07 Oct 2017 10:00) (60 - 156)  RR: 14 (07 Oct 2017 10:00) (12 - 35)  SpO2: 99% (07 Oct 2017 10:40) (89% - 99%)    Mode: CPAP with PS, FiO2: 40, PEEP: 5, PS: 5, MAP: 7, PIP: 11    10-06 @ 07:01  -  10-07 @ 07:00  --------------------------------------------------------  IN: 1122 mL / OUT: 1420 mL / NET: -298 mL    10-07 @ 07:01  -  10-07 @ 11:18  --------------------------------------------------------  IN: 100 mL / OUT: 120 mL / NET: -20 mL      CAPILLARY BLOOD GLUCOSE  114 (05 Oct 2017 19:00)          PHYSICAL EXAM:    General: intubated. OGT in situ, NAD  HEENT: NC/AT; PERRL, clear conjunctiva, moist mucus membranes,  Neck: supple, no lymphadenopathy  Respiratory: exp wheezing, decreased air entry post basal(poor insp effort), no rales.   Cardiovascular: Irregular, +S1/S2; tachycardia, no murmurs or rubs appreciated  Abdomen: soft, NT; +BS x4, distended  Extremities: WWP, 2+ peripheral pulses b/l; right shin bruise with point of entry(scab),   Skin: normal color and turgor; dryed rash (erupted vesicles around left nipple area=dermatomal), venous stasis skin discoloration b/l  Neurological: Awake-alert, following commands,communicative by writing    MEDICATIONS:  MEDICATIONS  (STANDING):  acetylcysteine 20% Inhalation 3 milliLiter(s) Inhalation three times a day  ALBUTerol/ipratropium for Nebulization 3 milliLiter(s) Nebulizer every 4 hours  aspirin  chewable 81 milliGRAM(s) Oral daily  atorvastatin 20 milliGRAM(s) Oral at bedtime  chlorhexidine 0.12% Liquid 15 milliLiter(s) Swish and Spit two times a day  heparin  Injectable 7500 Unit(s) SubCutaneous every 8 hours  methylPREDNISolone sodium succinate Injectable 40 milliGRAM(s) IV Push every 12 hours  pantoprazole   Suspension 40 milliGRAM(s) Oral daily  piperacillin/tazobactam IVPB. 3.375 Gram(s) IV Intermittent every 6 hours  piperacillin/tazobactam IVPB. 3.375 Gram(s) IV Intermittent every 6 hours  potassium chloride  20 mEq/100 mL IVPB 20 milliEquivalent(s) IV Intermittent every 2 hours  valACYclovir 1000 milliGRAM(s) Oral every 8 hours    MEDICATIONS  (PRN):  acetaminophen  Suppository 650 milliGRAM(s) Rectal every 6 hours PRN For Temp greater than 38 C (100.4 F)  bisacodyl Suppository 10 milliGRAM(s) Rectal daily PRN Constipation      ALLERGIES:  Allergies    No Known Allergies    Intolerances        LABS:                        7.8    7.0   )-----------( 106      ( 07 Oct 2017 06:01 )             24.1     10-07    136  |  96  |  40<H>  ----------------------------<  149<H>  3.4<L>   |  24  |  1.11    Ca    9.0      07 Oct 2017 06:01  Phos  3.2     10-07  Mg     2.3     10-07    TPro  8.3  /  Alb  3.3  /  TBili  0.4  /  DBili  x   /  AST  54<H>  /  ALT  13  /  AlkPhos  198<H>  10-05    PT/INR - ( 07 Oct 2017 06:01 )   PT: 12.8 sec;   INR: 1.15          PTT - ( 07 Oct 2017 06:01 )  PTT:26.1 sec      RADIOLOGY & ADDITIONAL TESTS: Reviewed.

## 2017-10-07 NOTE — PROGRESS NOTE ADULT - ATTENDING COMMENTS
The patient is clinically improved. WBC is down and the platelet count is stable. Renal function tests also continue to improve. The patient is clinically improved. WBC is down and the platelet count is stable. Renal function tests also continue to improve. The patient is in atrial fibrillation. Would consider anticoagulation. Although the patient is thrombocytopenic, this has been stable.

## 2017-10-07 NOTE — PROGRESS NOTE ADULT - ASSESSMENT
A/recs:  1) acute hypoxic respiratory failure with elevated tn and abnormal septal wall motion on tte with abnormal ecg; left heart failure (ef=45% by tte)  a - nstemi versus demand ischemia - rec confirm downtrend tn following return to sinus rhythm; not currently on therapeutic ac; on asa/statin; iv bb given earlier (for afib)  b - recommend v/q to exclude pe - recent flight and h/o prostate ca with rv dilatation on tte  c - consider ischemia eval when acute issues resolve  d - recommend serial  tte     2) paroxysmal atrial fibrillation  a - now sr after iv lopressor followed by amio  b - check tsh  c - continuous telemetry for now  d - consider ac if additional afib (note anemia)    3) htn - avoid labile bp    4) sepsis due to pna per ccm with bacteremia - note ccm planning for [place]   -check esr and crp    4) possible copd exacerbation - management per ccm    5) acute renal failure - cr now improved    6) normocytic anemia and thrombocytopenia  -check for evidence of hemolysis    7) shingles per primary team    8) maintain K>4, Mg>2

## 2017-10-08 LAB
-  CEFAZOLIN: SIGNIFICANT CHANGE UP
-  CLINDAMYCIN: SIGNIFICANT CHANGE UP
-  ERYTHROMYCIN: SIGNIFICANT CHANGE UP
-  LINEZOLID: SIGNIFICANT CHANGE UP
-  OXACILLIN: SIGNIFICANT CHANGE UP
-  PENICILLIN: SIGNIFICANT CHANGE UP
-  RIFAMPIN: SIGNIFICANT CHANGE UP
-  TRIMETHOPRIM/SULFAMETHOXAZOLE: SIGNIFICANT CHANGE UP
-  VANCOMYCIN: SIGNIFICANT CHANGE UP
ANION GAP SERPL CALC-SCNC: 14 MMOL/L — SIGNIFICANT CHANGE UP (ref 5–17)
BASE EXCESS BLDA CALC-SCNC: 3.1 MMOL/L — HIGH (ref -2–3)
BASE EXCESS BLDA CALC-SCNC: 3.8 MMOL/L — HIGH (ref -2–3)
BUN SERPL-MCNC: 32 MG/DL — HIGH (ref 7–23)
CALCIUM SERPL-MCNC: 8.8 MG/DL — SIGNIFICANT CHANGE UP (ref 8.4–10.5)
CHLORIDE SERPL-SCNC: 101 MMOL/L — SIGNIFICANT CHANGE UP (ref 96–108)
CO2 SERPL-SCNC: 25 MMOL/L — SIGNIFICANT CHANGE UP (ref 22–31)
CREAT SERPL-MCNC: 1.02 MG/DL — SIGNIFICANT CHANGE UP (ref 0.5–1.3)
GAS PNL BLDA: SIGNIFICANT CHANGE UP
GAS PNL BLDA: SIGNIFICANT CHANGE UP
GLUCOSE SERPL-MCNC: 159 MG/DL — HIGH (ref 70–99)
HCO3 BLDA-SCNC: 27 MMOL/L — SIGNIFICANT CHANGE UP (ref 21–28)
HCO3 BLDA-SCNC: 28 MMOL/L — SIGNIFICANT CHANGE UP (ref 21–28)
HCT VFR BLD CALC: 23.2 % — LOW (ref 39–50)
HGB BLD-MCNC: 7.5 G/DL — LOW (ref 13–17)
MAGNESIUM SERPL-MCNC: 2.5 MG/DL — SIGNIFICANT CHANGE UP (ref 1.6–2.6)
MCHC RBC-ENTMCNC: 30.5 PG — SIGNIFICANT CHANGE UP (ref 27–34)
MCHC RBC-ENTMCNC: 32.3 G/DL — SIGNIFICANT CHANGE UP (ref 32–36)
MCV RBC AUTO: 94.3 FL — SIGNIFICANT CHANGE UP (ref 80–100)
METHOD TYPE: SIGNIFICANT CHANGE UP
PCO2 BLDA: 39 MMHG — SIGNIFICANT CHANGE UP (ref 35–48)
PCO2 BLDA: 39 MMHG — SIGNIFICANT CHANGE UP (ref 35–48)
PH BLDA: 7.46 — HIGH (ref 7.35–7.45)
PH BLDA: 7.47 — HIGH (ref 7.35–7.45)
PLATELET # BLD AUTO: 125 K/UL — LOW (ref 150–400)
PO2 BLDA: 128 MMHG — HIGH (ref 83–108)
PO2 BLDA: 68 MMHG — LOW (ref 83–108)
POTASSIUM SERPL-MCNC: 4 MMOL/L — SIGNIFICANT CHANGE UP (ref 3.5–5.3)
POTASSIUM SERPL-SCNC: 4 MMOL/L — SIGNIFICANT CHANGE UP (ref 3.5–5.3)
RBC # BLD: 2.46 M/UL — LOW (ref 4.2–5.8)
RBC # FLD: 16.2 % — SIGNIFICANT CHANGE UP (ref 10.3–16.9)
SAO2 % BLDA: 93 % — LOW (ref 95–100)
SAO2 % BLDA: 99 % — SIGNIFICANT CHANGE UP (ref 95–100)
SODIUM SERPL-SCNC: 140 MMOL/L — SIGNIFICANT CHANGE UP (ref 135–145)
VANCOMYCIN TROUGH SERPL-MCNC: 14.8 UG/ML — SIGNIFICANT CHANGE UP (ref 10–20)
VANCOMYCIN TROUGH SERPL-MCNC: 18.1 UG/ML — SIGNIFICANT CHANGE UP (ref 10–20)
WBC # BLD: 8 K/UL — SIGNIFICANT CHANGE UP (ref 3.8–10.5)
WBC # FLD AUTO: 8 K/UL — SIGNIFICANT CHANGE UP (ref 3.8–10.5)

## 2017-10-08 PROCEDURE — 99291 CRITICAL CARE FIRST HOUR: CPT

## 2017-10-08 PROCEDURE — 71010: CPT | Mod: 26

## 2017-10-08 PROCEDURE — 74000: CPT | Mod: 26,76

## 2017-10-08 RX ORDER — DEXTROSE 50 % IN WATER 50 %
1 SYRINGE (ML) INTRAVENOUS ONCE
Qty: 0 | Refills: 0 | Status: DISCONTINUED | OUTPATIENT
Start: 2017-10-08 | End: 2017-10-13

## 2017-10-08 RX ORDER — DEXTROSE 50 % IN WATER 50 %
12.5 SYRINGE (ML) INTRAVENOUS ONCE
Qty: 0 | Refills: 0 | Status: DISCONTINUED | OUTPATIENT
Start: 2017-10-08 | End: 2017-10-13

## 2017-10-08 RX ORDER — VANCOMYCIN HCL 1 G
1500 VIAL (EA) INTRAVENOUS ONCE
Qty: 0 | Refills: 0 | Status: COMPLETED | OUTPATIENT
Start: 2017-10-08 | End: 2017-10-08

## 2017-10-08 RX ORDER — SODIUM CHLORIDE 9 MG/ML
500 INJECTION INTRAMUSCULAR; INTRAVENOUS; SUBCUTANEOUS ONCE
Qty: 0 | Refills: 0 | Status: COMPLETED | OUTPATIENT
Start: 2017-10-08 | End: 2017-10-08

## 2017-10-08 RX ORDER — VANCOMYCIN HCL 1 G
1500 VIAL (EA) INTRAVENOUS EVERY 12 HOURS
Qty: 0 | Refills: 0 | Status: DISCONTINUED | OUTPATIENT
Start: 2017-10-09 | End: 2017-10-09

## 2017-10-08 RX ORDER — GLUCAGON INJECTION, SOLUTION 0.5 MG/.1ML
1 INJECTION, SOLUTION SUBCUTANEOUS ONCE
Qty: 0 | Refills: 0 | Status: DISCONTINUED | OUTPATIENT
Start: 2017-10-08 | End: 2017-10-13

## 2017-10-08 RX ORDER — PANTOPRAZOLE SODIUM 20 MG/1
40 TABLET, DELAYED RELEASE ORAL DAILY
Qty: 0 | Refills: 0 | Status: DISCONTINUED | OUTPATIENT
Start: 2017-10-08 | End: 2017-10-09

## 2017-10-08 RX ORDER — LANOLIN ALCOHOL/MO/W.PET/CERES
3 CREAM (GRAM) TOPICAL AT BEDTIME
Qty: 0 | Refills: 0 | Status: DISCONTINUED | OUTPATIENT
Start: 2017-10-08 | End: 2017-11-03

## 2017-10-08 RX ORDER — SODIUM CHLORIDE 9 MG/ML
1000 INJECTION, SOLUTION INTRAVENOUS
Qty: 0 | Refills: 0 | Status: DISCONTINUED | OUTPATIENT
Start: 2017-10-08 | End: 2017-11-03

## 2017-10-08 RX ORDER — DEXTROSE 50 % IN WATER 50 %
25 SYRINGE (ML) INTRAVENOUS ONCE
Qty: 0 | Refills: 0 | Status: DISCONTINUED | OUTPATIENT
Start: 2017-10-08 | End: 2017-10-13

## 2017-10-08 RX ORDER — LACTULOSE 10 G/15ML
30 SOLUTION ORAL DAILY
Qty: 0 | Refills: 0 | Status: DISCONTINUED | OUTPATIENT
Start: 2017-10-08 | End: 2017-10-10

## 2017-10-08 RX ORDER — POLYETHYLENE GLYCOL 3350 17 G/17G
17 POWDER, FOR SOLUTION ORAL
Qty: 0 | Refills: 0 | Status: DISCONTINUED | OUTPATIENT
Start: 2017-10-08 | End: 2017-10-19

## 2017-10-08 RX ORDER — VANCOMYCIN HCL 1 G
VIAL (EA) INTRAVENOUS
Qty: 0 | Refills: 0 | Status: DISCONTINUED | OUTPATIENT
Start: 2017-10-08 | End: 2017-10-09

## 2017-10-08 RX ORDER — INSULIN LISPRO 100/ML
VIAL (ML) SUBCUTANEOUS
Qty: 0 | Refills: 0 | Status: DISCONTINUED | OUTPATIENT
Start: 2017-10-08 | End: 2017-10-13

## 2017-10-08 RX ORDER — SODIUM CHLORIDE 9 MG/ML
1000 INJECTION INTRAMUSCULAR; INTRAVENOUS; SUBCUTANEOUS
Qty: 0 | Refills: 0 | Status: DISCONTINUED | OUTPATIENT
Start: 2017-10-08 | End: 2017-10-09

## 2017-10-08 RX ORDER — IOHEXOL 300 MG/ML
50 INJECTION, SOLUTION INTRAVENOUS ONCE
Qty: 0 | Refills: 0 | Status: DISCONTINUED | OUTPATIENT
Start: 2017-10-08 | End: 2017-10-08

## 2017-10-08 RX ADMIN — Medication 3 MILLILITER(S): at 13:04

## 2017-10-08 RX ADMIN — PIPERACILLIN AND TAZOBACTAM 200 GRAM(S): 4; .5 INJECTION, POWDER, LYOPHILIZED, FOR SOLUTION INTRAVENOUS at 22:39

## 2017-10-08 RX ADMIN — POLYETHYLENE GLYCOL 3350 17 GRAM(S): 17 POWDER, FOR SOLUTION ORAL at 17:39

## 2017-10-08 RX ADMIN — PIPERACILLIN AND TAZOBACTAM 200 GRAM(S): 4; .5 INJECTION, POWDER, LYOPHILIZED, FOR SOLUTION INTRAVENOUS at 11:39

## 2017-10-08 RX ADMIN — Medication 3 MILLILITER(S): at 01:33

## 2017-10-08 RX ADMIN — Medication 40 MILLIGRAM(S): at 05:49

## 2017-10-08 RX ADMIN — Medication 3 MILLILITER(S): at 22:01

## 2017-10-08 RX ADMIN — Medication 3 MILLILITER(S): at 09:57

## 2017-10-08 RX ADMIN — Medication 3 MILLIGRAM(S): at 22:42

## 2017-10-08 RX ADMIN — PIPERACILLIN AND TAZOBACTAM 200 GRAM(S): 4; .5 INJECTION, POWDER, LYOPHILIZED, FOR SOLUTION INTRAVENOUS at 17:39

## 2017-10-08 RX ADMIN — CHLORHEXIDINE GLUCONATE 15 MILLILITER(S): 213 SOLUTION TOPICAL at 17:39

## 2017-10-08 RX ADMIN — SODIUM CHLORIDE 100 MILLILITER(S): 9 INJECTION INTRAMUSCULAR; INTRAVENOUS; SUBCUTANEOUS at 17:00

## 2017-10-08 RX ADMIN — POLYETHYLENE GLYCOL 3350 17 GRAM(S): 17 POWDER, FOR SOLUTION ORAL at 05:50

## 2017-10-08 RX ADMIN — PIPERACILLIN AND TAZOBACTAM 200 GRAM(S): 4; .5 INJECTION, POWDER, LYOPHILIZED, FOR SOLUTION INTRAVENOUS at 05:49

## 2017-10-08 RX ADMIN — Medication 3 MILLILITER(S): at 16:15

## 2017-10-08 RX ADMIN — Medication 300 MILLIGRAM(S): at 20:56

## 2017-10-08 RX ADMIN — Medication 3 MILLILITER(S): at 13:03

## 2017-10-08 RX ADMIN — SODIUM CHLORIDE 30.03 MILLILITER(S): 9 INJECTION INTRAMUSCULAR; INTRAVENOUS; SUBCUTANEOUS at 01:32

## 2017-10-08 RX ADMIN — Medication 81 MILLIGRAM(S): at 11:39

## 2017-10-08 RX ADMIN — Medication 3 MILLILITER(S): at 06:01

## 2017-10-08 RX ADMIN — HEPARIN SODIUM 7500 UNIT(S): 5000 INJECTION INTRAVENOUS; SUBCUTANEOUS at 13:32

## 2017-10-08 RX ADMIN — VALACYCLOVIR 1000 MILLIGRAM(S): 500 TABLET, FILM COATED ORAL at 22:41

## 2017-10-08 RX ADMIN — VALACYCLOVIR 1000 MILLIGRAM(S): 500 TABLET, FILM COATED ORAL at 13:32

## 2017-10-08 RX ADMIN — Medication: at 23:01

## 2017-10-08 RX ADMIN — CHLORHEXIDINE GLUCONATE 15 MILLILITER(S): 213 SOLUTION TOPICAL at 05:49

## 2017-10-08 RX ADMIN — HEPARIN SODIUM 7500 UNIT(S): 5000 INJECTION INTRAVENOUS; SUBCUTANEOUS at 22:39

## 2017-10-08 RX ADMIN — HEPARIN SODIUM 7500 UNIT(S): 5000 INJECTION INTRAVENOUS; SUBCUTANEOUS at 05:50

## 2017-10-08 RX ADMIN — Medication 1: at 12:13

## 2017-10-08 RX ADMIN — PANTOPRAZOLE SODIUM 40 MILLIGRAM(S): 20 TABLET, DELAYED RELEASE ORAL at 11:39

## 2017-10-08 RX ADMIN — LACTULOSE 30 GRAM(S): 10 SOLUTION ORAL at 11:39

## 2017-10-08 RX ADMIN — VALACYCLOVIR 1000 MILLIGRAM(S): 500 TABLET, FILM COATED ORAL at 05:50

## 2017-10-08 RX ADMIN — Medication 3 MILLILITER(S): at 22:02

## 2017-10-08 RX ADMIN — Medication 300 MILLIGRAM(S): at 04:41

## 2017-10-08 NOTE — PROGRESS NOTE ADULT - ATTENDING COMMENTS
The patient continues to improve. He is anemic. This is likely multifactorial eg. metastatic prostate cancer involving bones, recent bronchitis and current sepsis. Would continue current therapy and observe for now.

## 2017-10-08 NOTE — PROGRESS NOTE ADULT - ASSESSMENT
A/recs:  1) acute hypoxic respiratory failure with elevated tn and abnormal septal wall motion on tte in addition to rv dilatation and abnormal ecg; left heart failure (ef=45% by tte)  a - nstemi versus demand ischemia - rec confirm downtrend tn; on asa/statin; recommend start av-node blockade  b - recommend v/q scan to exclude pe - note h/o recent flight and prostate ca with rv dilatation on tte  c - consider ischemia eval when acute issues resolve   d - [place] soon    2) paroxysmal atrial fibrillation  a - rec av-node blockade  b - check tsh  c - continuous telemetry   d - consider ac if additional afib    3) htn - monitor    4) sepsis due to pna per ccm with bacteremia - note ccm planning for [place]   -check esr and crp  -timing to [place] per ccm    4) possible copd exacerbation - per primary team  -note et tube discomfort - pain control per primary team    5) acute renal failure - monitor cr/uop    6) normocytic anemia and thrombocytopenia  -rec check hemolysis labs  -appreciate hem-onc recs    7) shingles    8) K>4, Mg>2

## 2017-10-08 NOTE — PROGRESS NOTE ADULT - SUBJECTIVE AND OBJECTIVE BOX
Cardiology for [facility]    cc: follow-up cardiology evaluation and management of left heart failure  interval - no cp; discomfort at et tube per patient    PAST MEDICAL & SURGICAL HISTORY:  COPD (chronic obstructive pulmonary disease)  Hypertension  Obstructive sleep apnea syndrome  Prostate cancer metastatic to bone  PC (prostate cancer): with seed placement    MEDICATIONS  (STANDING):  acetylcysteine 20% Inhalation 3 milliLiter(s) Inhalation three times a day  ALBUTerol/ipratropium for Nebulization 3 milliLiter(s) Nebulizer every 4 hours  aspirin  chewable 81 milliGRAM(s) Oral daily  atorvastatin 20 milliGRAM(s) Oral at bedtime  chlorhexidine 0.12% Liquid 15 milliLiter(s) Swish and Spit two times a day  dextrose 5%. 1000 milliLiter(s) (50 mL/Hr) IV Continuous <Continuous>  dextrose 50% Injectable 12.5 Gram(s) IV Push once  dextrose 50% Injectable 25 Gram(s) IV Push once  dextrose 50% Injectable 25 Gram(s) IV Push once  heparin  Injectable 7500 Unit(s) SubCutaneous every 8 hours  insulin lispro (HumaLOG) corrective regimen sliding scale   SubCutaneous Before meals and at bedtime  lactulose Syrup 30 Gram(s) Oral daily  methylPREDNISolone sodium succinate Injectable 40 milliGRAM(s) IV Push every 12 hours  pantoprazole  Injectable 40 milliGRAM(s) IV Push daily  piperacillin/tazobactam IVPB. 3.375 Gram(s) IV Intermittent every 6 hours  polyethylene glycol 3350 17 Gram(s) Oral two times a day  valACYclovir 1000 milliGRAM(s) Oral every 8 hours    MEDICATIONS  (PRN):  acetaminophen  Suppository 650 milliGRAM(s) Rectal every 6 hours PRN For Temp greater than 38 C (100.4 F)  bisacodyl Suppository 10 milliGRAM(s) Rectal daily PRN Constipation  dextrose Gel 1 Dose(s) Oral once PRN Blood Glucose LESS THAN 70 milliGRAM(s)/deciliter  glucagon  Injectable 1 milliGRAM(s) IntraMuscular once PRN Glucose LESS THAN 70 milligrams/deciliter  simethicone 80 milliGRAM(s) Chew two times a day PRN Gas    ICU Vital Signs Last 24 Hrs  T(C): 37.4 (08 Oct 2017 07:01), Max: 37.9 (07 Oct 2017 16:56)  T(F): 99.4 (08 Oct 2017 07:01), Max: 100.2 (07 Oct 2017 16:56)  HR: 60 (08 Oct 2017 09:00) (60 - 96)  BP: 112/54 (08 Oct 2017 04:00) (104/47 - 112/54)  BP(mean): 63 (07 Oct 2017 19:00) (63 - 63)  ABP: 118/54 (08 Oct 2017 09:00) (104/50 - 145/88)  ABP(mean): 74 (08 Oct 2017 09:00) (62 - 206)monitor  RR: 23 (08 Oct 2017 08:00) (14 - 31)  SpO2: 98% (08 Oct 2017 09:00) (86% - 99%)    physical exam  intubated  alert  nad  mmm  clear sclerae  no hjr  breath sounds present bilat  rr; s1/s2 present  soft abd; nd  trace le edema   warm le      I&O's Detail    07 Oct 2017 07:01  -  08 Oct 2017 07:00  --------------------------------------------------------  IN:    0.9% NaCl: 500 mL    Enteral Tube Flush: 250 mL    IV PiggyBack: 1050 mL  Total IN: 1800 mL    OUT:    Indwelling Catheter - Urethral: 1086 mL  Total OUT: 1086 mL    Total NET: 714 mL                            7.5    8.0   )-----------( 125      ( 08 Oct 2017 05:06 )             23.2   10-08    140  |  101  |  32<H>  ----------------------------<  159<H>  4.0   |  25  |  1.02    Ca    8.8      08 Oct 2017 05:06  Phos  3.2     10-07  Mg     2.5     10-08        I&O's Summary    07 Oct 2017 07:01  -  08 Oct 2017 07:00  --------------------------------------------------------  IN: 1800 mL / OUT: 1086 mL / NET: 714 mL    < from: Xray Chest 1 View AP-PORTABLE IMMEDIATE (10.08.17 @ 07:29) >  ******PRELIMINARY REPORT******    ******PRELIMINARY REPORT******            EXAM:  XR CHEST 1 VIEW PORT IMMEDIATE                          PROCEDURE DATE:  10/08/2017               ******PRELIMINARY REPORT******    ******PRELIMINARY REPORT******          INTERPRETATION:  RESIDENT PRELIMINARY REPORT    Portable AP Radiograph dated 10/8/2017 7:29 AM    CLINICAL INFORMATION: 72 years, Male, rhonchi    PRIOR STUDIES: Chest x-ray 10/7/2017 at 6:15 AM    FINDINGS:  Lines, Catheters and Support Devices: No significant interval change. The   sidehole of the enteric tube is noted to be at the GE junction.    Heart Size, Mediastinum and Hilar Contours: Heart size is suboptimally   evaluated on this AP projection. Normal mediastinal and hilar contours.      Lungs: Persistent left pleural effusion. Underlying infiltrate cannot be   excluded. No pneumothorax.     Bones/Soft Tissues: No acute abnormalities of the soft tissues and   osseous structures.     IMPRESSION:  1.  Persistent left pleural effusion. Underlying infiltrate cannot be   excluded.  2.  Sidehole of the enteric tube is noted to be at the GE junction.   Recommend advancing the tube such that the tip and sidehole are within   the stomach.              "Thank you for the opportunity to participate in the care of this   patient."  ******PRELIMINARY REPORT******    ******PRELIMINARY REPORT******          MYRIAM MICHELE M.D., RADIOLOGY RESIDENT    < end of copied text >

## 2017-10-08 NOTE — PROGRESS NOTE ADULT - SUBJECTIVE AND OBJECTIVE BOX
INTERVAL HPI/OVERNIGHT EVENTS:  Patient was administered a 500cc bolus NS o/n in the setting of decreased UOP that has since resolved.   Persistent thick oral secretions that have prevented extubation and even complicate a CPAP trial.   Patient requested CPAP, but not possible given the amount of sputum he produces.     Patient continues to have abdominal distension - though, per him, he notes no difference or even abdominal discomfort.   Abdominal X-ray shows mild to moderate colonic distension.   Patient had an enema today, some watery stool relieved. On manual disimpaction, no stool present in rectal vault.     VITAL SIGNS:  T(F): 98.6 (10-08-17 @ 18:32)  HR: 58 (10-08-17 @ 21:00)  BP: 117/57 (10-08-17 @ 21:00)  RR: 16 (10-08-17 @ 20:32)  SpO2: 99% (10-08-17 @ 21:00)  Wt(kg): --    Input & Output:  10-07-17 @ 07:01  -  10-08-17 @ 07:00  --------------------------------------------------------  IN: 1800 mL / OUT: 1086 mL / NET: 714 mL    10-08-17 @ 07:01  -  10-08-17 @ 21:05  --------------------------------------------------------  IN: 1110 mL / OUT: 588 mL / NET: 522 mL    PHYSICAL EXAM:  General: intubated. OGT in situ, NAD  HEENT: NC/AT; PERRL, clear conjunctiva, moist mucus membranes,  Neck: supple, no lymphadenopathy  Respiratory: exp wheezing, decreased air entry post basal(poor insp effort), no rales.   Cardiovascular: Irregular, +S1/S2; tachycardia, no murmurs or rubs appreciated  Abdomen: soft, NT; +BS x4, distended  Extremities: WWP, 2+ peripheral pulses b/l; right shin bruise with point of entry(scab),   Skin: normal color and turgor; dryed rash (erupted vesicles around left nipple area=dermatomal), venous stasis skin discoloration b/l  Neurological: Awake-alert, following commands,communicative by writing    MEDICATIONS  (STANDING):  acetylcysteine 20% Inhalation 3 milliLiter(s) Inhalation three times a day  ALBUTerol/ipratropium for Nebulization 3 milliLiter(s) Nebulizer every 4 hours  aspirin  chewable 81 milliGRAM(s) Oral daily  atorvastatin 20 milliGRAM(s) Oral at bedtime  chlorhexidine 0.12% Liquid 15 milliLiter(s) Swish and Spit two times a day  dextrose 5%. 1000 milliLiter(s) (50 mL/Hr) IV Continuous <Continuous>  dextrose 50% Injectable 12.5 Gram(s) IV Push once  dextrose 50% Injectable 25 Gram(s) IV Push once  dextrose 50% Injectable 25 Gram(s) IV Push once  heparin  Injectable 7500 Unit(s) SubCutaneous every 8 hours  insulin lispro (HumaLOG) corrective regimen sliding scale   SubCutaneous Before meals and at bedtime  lactulose Syrup 30 Gram(s) Oral daily  melatonin 3 milliGRAM(s) Oral at bedtime  pantoprazole  Injectable 40 milliGRAM(s) IV Push daily  piperacillin/tazobactam IVPB. 3.375 Gram(s) IV Intermittent every 6 hours  polyethylene glycol 3350 17 Gram(s) Oral two times a day  sodium chloride 0.9%. 1000 milliLiter(s) (100 mL/Hr) IV Continuous <Continuous>  valACYclovir 1000 milliGRAM(s) Oral every 8 hours  vancomycin  IVPB        MEDICATIONS  (PRN):  acetaminophen  Suppository 650 milliGRAM(s) Rectal every 6 hours PRN For Temp greater than 38 C (100.4 F)  bisacodyl Suppository 10 milliGRAM(s) Rectal daily PRN Constipation  dextrose Gel 1 Dose(s) Oral once PRN Blood Glucose LESS THAN 70 milliGRAM(s)/deciliter  glucagon  Injectable 1 milliGRAM(s) IntraMuscular once PRN Glucose LESS THAN 70 milligrams/deciliter  simethicone 80 milliGRAM(s) Chew two times a day PRN Gas      Allergies  No Known Allergies  Intolerances    LABS:                        7.5    8.0   )-----------( 125      ( 08 Oct 2017 05:06 )             23.2     10-08    140  |  101  |  32<H>  ----------------------------<  159<H>  4.0   |  25  |  1.02    Ca    8.8      08 Oct 2017 05:06  Phos  3.2     10-07  Mg     2.5     10-08      PT/INR - ( 07 Oct 2017 06:01 )   PT: 12.8 sec;   INR: 1.15          PTT - ( 07 Oct 2017 06:01 )  PTT:26.1 sec

## 2017-10-08 NOTE — PROGRESS NOTE ADULT - ASSESSMENT
73 yo male with hx of HTN, COPD, Prostate CA (s/p radiation and seed 2013 on leuprolide outpatient) who presents from [facility] onc with worsening of SOB and episode of unwitnessed syncope at home found to have severe sepsis with gram + cocci in clusters identified as S. aureus and ATN. Hospital course complicated by rapid response after patient removed his CPAP now in MICU for for septic shock.    ID  #Septic shock: resolved  -Patient admitted with severe sepsis requiring fluid bolus. Patient on maintenance fluid of 60 cc/hr NS. Patient hypotensive during rapid response requiring 500 cc bolus and peripheral levophed to be titrated to MAP of 60 mmHg (Septic shock)  -Patient prescribed Amoxicillin for bronchitis in OP w/o improvement of SOB. Patient given broad spectrum coverage with vancomycin+ceftriaxone+azithromycin. which was switched to vanc+zosyn.   -Patient blood cx grew (gram positive cocci in clusters) (on 10/4"both sets" and one set is positive 10/5 and '3 bottles-4th is in progress" on 10/6 ) likely 2/2 PNA vs Skin infection (shingles/right LL lesion). F/U final report for sensitivity  -c/w vanc, F/U vanco level is therapeutic but because of labile Cr levels, another level was sent and came back 28. Will hold the next dose. Will add zosyn for possibility of pneumonia  - TTE negative for vegetations.   - Plan for KELLY possibly tomorrow because three Bl.Cx are positive for gram positive cocci in clusters, pending organism specification and sensitivities.    Respiratory   #Acute hypoxic Respiratory Failure  10/5: Xray showing possible increasing right sided consolidation. CT positive for COPD known prior to admission. Dyspnea likely due to copious thick secretions 2/2 pneumonia with possible aspiration on BIPAP coupled with severe sepsis/septic shock. Patient found to be unresponsive while on BIPAP was intubated for airway protection.  -s/p intubation w/o sedation.   -LE Venous duplex is negative for DVT  Cardiologist recommend to r/o PE (Pt has syncopal episode, recent flight to [place]-Active Tx of prostate Ca- Positive troponin-RA/RV are dilated on ECHO) wtih V/Q Scan.       #Possible COPD exacerbation  Patient with hx of COPD outpatient w/o home O2 requirements. Patient presented w/ difficulty breathing in ED and subsequently placed on CPAP w/ improvement in symptoms. Attempt to wean off BiPAP was poorly tolerated by patient . Patient had rapid response after removing his CPAP mask with ABG showing 7.25/55/334/23 off CPAP.   -Repeat ABG following intubation shows 7.38/49/237/23   -Pt has thick copious secretions  -c/w duonebs Q4   -On exam, pt has  expiratory wheezing: Continue steroids 40 Q12H    Cardiovascular  #New onset paroxysmal A.fib Vs MAT:  -s/p lopressor and Amiodarone bolus-->back to NSR  -As per cardiologist recs: Check TSH-Continue telemetry-consider anticoagulation if additional a.fib.  -Hematologist recommend to consider anticoagulation as his thrombocytopenia is stable    #HFrEF  -TTE shows HFrEF with EF 45%, right atrial dilatation, septal wall motion abnormalities and mild hypokinesis of left ventricle. Unable to visualize any vegetations or R heart structures. Unable to calculate pulmonary a pressures.   -As per cardiolgist: ECHO was nomal last year normal,Would recheck echo next week. BNP was very high on admission there are no other clinical findings for CHF.   -Continue Aspirin/lipitor  - Consider ischemia eval when acute issues resolve    #Elevated troponins :  -EKG positive for ST depression in anteroseptal leads, Echo positive for septal hypokinesis, Troponin 0.06 -->0.10-->0.19-->0.23-->0.17-->0.15, likely probably due to ischemia from hypoxia exacerbated by renal insufficiency (cr. 2.07 on admission).   -Continue trending trops to peak    Renal  ATN-resolving  Patient had significant increase in Cr from baseline. UA significant for casts concerning for ATN possibly 2/2 excessive diuresis coupled with poor PO intake at home vs due to severe sepsis on admission. FeUrea 8.9% suggesting possible pre renal(hypotension-sepsis) contribution to CATALINA.   - UOP following rapid response was 0 ml output over one hour, then 25cc/hr  - S/p 500cc bolus o/n with increase in UOP  -Creatinine is back to baseline     Heme/onc  #Prostate ca w/ mets to spine  As per oncologist: Bicalutamide has been recently D/[place]. Will observe for a withdrawal effect. Thrombocytopenia is likely due to sepsis or could be related to marrow involvement by prostate cancer.   -Anemia: monitor Hb and transfuse if Hb<7.   -Thrombocytopenia is stable    Skin  Shingles: Continue Valtrex(valcyclovir) through OGT - no req for isolation     GI  -AXR with air filled stomach and likely ileus from aerophagia and infection.   -AXR continues to show evidence of bowel distension  -NGT placed, on suction  - S/p enema today and manual disimpaction with no stool evacuation     F no IVF  E- replete lytes prn;   N-NPO    PPx - Heparin SC for DVT and protonix for GI    CODE - FULL.

## 2017-10-08 NOTE — PROGRESS NOTE ADULT - SUBJECTIVE AND OBJECTIVE BOX
On interval followup, the right int jugular catheter site is clean, dry and non-inflamed. Afebrile. Notes and cultures are followed.

## 2017-10-08 NOTE — PROGRESS NOTE ADULT - SUBJECTIVE AND OBJECTIVE BOX
The patient remains in the MICU. He is still intubated but remains awake and alert. He feels that he is breathing comfortably. He would like to have the breathing tube out.    CHEMOTHERAPY REGIMEN:        Day:                          Diet:  Protocol:                                    IVF:      MEDICATIONS  (STANDING):  acetylcysteine 20% Inhalation 3 milliLiter(s) Inhalation three times a day  ALBUTerol/ipratropium for Nebulization 3 milliLiter(s) Nebulizer every 4 hours  aspirin  chewable 81 milliGRAM(s) Oral daily  atorvastatin 20 milliGRAM(s) Oral at bedtime  chlorhexidine 0.12% Liquid 15 milliLiter(s) Swish and Spit two times a day  heparin  Injectable 7500 Unit(s) SubCutaneous every 8 hours  lactulose Syrup 30 Gram(s) Oral daily  methylPREDNISolone sodium succinate Injectable 40 milliGRAM(s) IV Push every 12 hours  pantoprazole   Suspension 40 milliGRAM(s) Oral daily  piperacillin/tazobactam IVPB. 3.375 Gram(s) IV Intermittent every 6 hours  polyethylene glycol 3350 17 Gram(s) Oral two times a day  valACYclovir 1000 milliGRAM(s) Oral every 8 hours    MEDICATIONS  (PRN):  acetaminophen  Suppository 650 milliGRAM(s) Rectal every 6 hours PRN For Temp greater than 38 C (100.4 F)  bisacodyl Suppository 10 milliGRAM(s) Rectal daily PRN Constipation  simethicone 80 milliGRAM(s) Chew two times a day PRN Gas      Allergies    No Known Allergies    Intolerances        DVT Prophylaxis: [x ] YES [ ] NO      Antibiotics: [x ] YES [ ] NO    Pain Scale (1-10):       Location:    Vital Signs Last 24 Hrs  T(C): 37.2 (08 Oct 2017 01:33), Max: 37.9 (07 Oct 2017 16:56)  T(F): 98.9 (08 Oct 2017 01:33), Max: 100.2 (07 Oct 2017 16:56)  HR: 66 (08 Oct 2017 06:00) (58 - 130)  BP: 112/54 (08 Oct 2017 04:00) (104/47 - 112/54)  BP(mean): 63 (07 Oct 2017 19:00) (63 - 63)  RR: 18 (08 Oct 2017 02:00) (14 - 31)  SpO2: 93% (08 Oct 2017 06:00) (86% - 99%)    Drug Dosing Weight  Height (cm): 172.72 (05 Oct 2017 01:27)  Weight (kg): 109 (05 Oct 2017 01:27)  BMI (kg/m2): 36.5 (05 Oct 2017 01:27)  BSA (m2): 2.21 (05 Oct 2017 01:27)    PHYSICAL EXAM:      Constitutional: remains awake and alert.  Eyes: conjunctiva pale.  ENMT: intubated, OGT remains in place as well.  Neck: no masses palp.  Respiratory: chest more clear, minimal wheezing noted.   Cardiovascular: S1>S2 at apex. Rhythm seems regular with ectopic beats.  Gastrointestinal: abdomen remains distended. Soft, nontender but with quiet bowel sounds.  Genitourinary: cath remains in place.  Extremities: minimal leg edema.  Neurological: no gross focal deficits.  Skin: warm and dry.  Lymph Nodes: none palp.  Musculoskeletal: full ROM.  Psychiatric: affect normal.        URINARY CATHETER: [x ] YES [ ] NO     LABS:  CBC Full  -  ( 08 Oct 2017 05:06 )  WBC Count : 8.0 K/uL  Hemoglobin : 7.5 g/dL  Hematocrit : 23.2 %  Platelet Count - Automated : 125 K/uL  Mean Cell Volume : 94.3 fL  Mean Cell Hemoglobin : 30.5 pg  Mean Cell Hemoglobin Concentration : 32.3 g/dL  Auto Neutrophil # : x  Auto Lymphocyte # : x  Auto Monocyte # : x  Auto Eosinophil # : x  Auto Basophil # : x  Auto Neutrophil % : x  Auto Lymphocyte % : x  Auto Monocyte % : x  Auto Eosinophil % : x  Auto Basophil % : x    10-08    140  |  101  |  32<H>  ----------------------------<  159<H>  4.0   |  25  |  1.02    Ca    8.8      08 Oct 2017 05:06  Phos  3.2     10-07  Mg     2.5     10-08      PT/INR - ( 07 Oct 2017 06:01 )   PT: 12.8 sec;   INR: 1.15          PTT - ( 07 Oct 2017 06:01 )  PTT:26.1 sec      CULTURES:    RADIOLOGY & ADDITIONAL STUDIES:

## 2017-10-09 DIAGNOSIS — Z51.5 ENCOUNTER FOR PALLIATIVE CARE: ICD-10-CM

## 2017-10-09 LAB
-  CEFAZOLIN: SIGNIFICANT CHANGE UP
-  CLINDAMYCIN: SIGNIFICANT CHANGE UP
-  ERYTHROMYCIN: SIGNIFICANT CHANGE UP
-  LINEZOLID: SIGNIFICANT CHANGE UP
-  OXACILLIN: SIGNIFICANT CHANGE UP
-  PENICILLIN: SIGNIFICANT CHANGE UP
-  RIFAMPIN: SIGNIFICANT CHANGE UP
-  TRIMETHOPRIM/SULFAMETHOXAZOLE: SIGNIFICANT CHANGE UP
-  VANCOMYCIN: SIGNIFICANT CHANGE UP
ANION GAP SERPL CALC-SCNC: 12 MMOL/L — SIGNIFICANT CHANGE UP (ref 5–17)
BUN SERPL-MCNC: 27 MG/DL — HIGH (ref 7–23)
CALCIUM SERPL-MCNC: 8.8 MG/DL — SIGNIFICANT CHANGE UP (ref 8.4–10.5)
CHLORIDE SERPL-SCNC: 101 MMOL/L — SIGNIFICANT CHANGE UP (ref 96–108)
CO2 SERPL-SCNC: 27 MMOL/L — SIGNIFICANT CHANGE UP (ref 22–31)
CREAT SERPL-MCNC: 0.95 MG/DL — SIGNIFICANT CHANGE UP (ref 0.5–1.3)
CRP SERPL-MCNC: 6.5 MG/DL — HIGH (ref 0–0.4)
CULTURE RESULTS: SIGNIFICANT CHANGE UP
ERYTHROCYTE [SEDIMENTATION RATE] IN BLOOD: >130 MM/HR — HIGH
GLUCOSE SERPL-MCNC: 111 MG/DL — HIGH (ref 70–99)
GRAM STN FLD: SIGNIFICANT CHANGE UP
HCT VFR BLD CALC: 24.7 % — LOW (ref 39–50)
HGB BLD-MCNC: 7.4 G/DL — LOW (ref 13–17)
MCHC RBC-ENTMCNC: 30 G/DL — LOW (ref 32–36)
MCHC RBC-ENTMCNC: 30 PG — SIGNIFICANT CHANGE UP (ref 27–34)
MCV RBC AUTO: 100 FL — SIGNIFICANT CHANGE UP (ref 80–100)
METHOD TYPE: SIGNIFICANT CHANGE UP
PLATELET # BLD AUTO: 134 K/UL — LOW (ref 150–400)
POTASSIUM SERPL-MCNC: 3.5 MMOL/L — SIGNIFICANT CHANGE UP (ref 3.5–5.3)
POTASSIUM SERPL-SCNC: 3.5 MMOL/L — SIGNIFICANT CHANGE UP (ref 3.5–5.3)
RBC # BLD: 2.47 M/UL — LOW (ref 4.2–5.8)
RBC # FLD: 15.8 % — SIGNIFICANT CHANGE UP (ref 10.3–16.9)
SODIUM SERPL-SCNC: 140 MMOL/L — SIGNIFICANT CHANGE UP (ref 135–145)
SPECIMEN SOURCE: SIGNIFICANT CHANGE UP
VANCOMYCIN TROUGH SERPL-MCNC: 23.4 UG/ML — HIGH (ref 10–20)
WBC # BLD: 9.9 K/UL — SIGNIFICANT CHANGE UP (ref 3.8–10.5)
WBC # FLD AUTO: 9.9 K/UL — SIGNIFICANT CHANGE UP (ref 3.8–10.5)

## 2017-10-09 PROCEDURE — 93325 DOPPLER ECHO COLOR FLOW MAPG: CPT | Mod: 26

## 2017-10-09 PROCEDURE — 93320 DOPPLER ECHO COMPLETE: CPT | Mod: 26

## 2017-10-09 PROCEDURE — 71010: CPT | Mod: 26

## 2017-10-09 PROCEDURE — 99233 SBSQ HOSP IP/OBS HIGH 50: CPT | Mod: GC

## 2017-10-09 PROCEDURE — 93312 ECHO TRANSESOPHAGEAL: CPT | Mod: 26

## 2017-10-09 PROCEDURE — 99223 1ST HOSP IP/OBS HIGH 75: CPT

## 2017-10-09 RX ORDER — NAFCILLIN 10 G/100ML
2 INJECTION, POWDER, FOR SOLUTION INTRAVENOUS EVERY 4 HOURS
Qty: 0 | Refills: 0 | Status: DISCONTINUED | OUTPATIENT
Start: 2017-10-09 | End: 2017-10-19

## 2017-10-09 RX ORDER — FENTANYL CITRATE 50 UG/ML
50 INJECTION INTRAVENOUS ONCE
Qty: 0 | Refills: 0 | Status: DISCONTINUED | OUTPATIENT
Start: 2017-10-09 | End: 2017-10-09

## 2017-10-09 RX ORDER — NAFCILLIN 10 G/100ML
INJECTION, POWDER, FOR SOLUTION INTRAVENOUS
Qty: 0 | Refills: 0 | Status: DISCONTINUED | OUTPATIENT
Start: 2017-10-09 | End: 2017-10-19

## 2017-10-09 RX ORDER — PANTOPRAZOLE SODIUM 20 MG/1
40 TABLET, DELAYED RELEASE ORAL DAILY
Qty: 0 | Refills: 0 | Status: DISCONTINUED | OUTPATIENT
Start: 2017-10-09 | End: 2017-10-10

## 2017-10-09 RX ORDER — MIDAZOLAM HYDROCHLORIDE 1 MG/ML
3 INJECTION, SOLUTION INTRAMUSCULAR; INTRAVENOUS ONCE
Qty: 0 | Refills: 0 | Status: DISCONTINUED | OUTPATIENT
Start: 2017-10-09 | End: 2017-10-09

## 2017-10-09 RX ORDER — NAFCILLIN 10 G/100ML
2 INJECTION, POWDER, FOR SOLUTION INTRAVENOUS ONCE
Qty: 0 | Refills: 0 | Status: COMPLETED | OUTPATIENT
Start: 2017-10-09 | End: 2017-10-09

## 2017-10-09 RX ADMIN — FENTANYL CITRATE 50 MICROGRAM(S): 50 INJECTION INTRAVENOUS at 10:40

## 2017-10-09 RX ADMIN — PIPERACILLIN AND TAZOBACTAM 200 GRAM(S): 4; .5 INJECTION, POWDER, LYOPHILIZED, FOR SOLUTION INTRAVENOUS at 05:30

## 2017-10-09 RX ADMIN — NAFCILLIN 200 GRAM(S): 10 INJECTION, POWDER, FOR SOLUTION INTRAVENOUS at 13:23

## 2017-10-09 RX ADMIN — HEPARIN SODIUM 7500 UNIT(S): 5000 INJECTION INTRAVENOUS; SUBCUTANEOUS at 05:26

## 2017-10-09 RX ADMIN — Medication 3 MILLILITER(S): at 22:59

## 2017-10-09 RX ADMIN — Medication 3 MILLILITER(S): at 13:07

## 2017-10-09 RX ADMIN — NAFCILLIN 200 GRAM(S): 10 INJECTION, POWDER, FOR SOLUTION INTRAVENOUS at 18:13

## 2017-10-09 RX ADMIN — Medication 3 MILLILITER(S): at 23:03

## 2017-10-09 RX ADMIN — VALACYCLOVIR 1000 MILLIGRAM(S): 500 TABLET, FILM COATED ORAL at 05:26

## 2017-10-09 RX ADMIN — HEPARIN SODIUM 7500 UNIT(S): 5000 INJECTION INTRAVENOUS; SUBCUTANEOUS at 22:00

## 2017-10-09 RX ADMIN — NAFCILLIN 200 GRAM(S): 10 INJECTION, POWDER, FOR SOLUTION INTRAVENOUS at 22:00

## 2017-10-09 RX ADMIN — ATORVASTATIN CALCIUM 20 MILLIGRAM(S): 80 TABLET, FILM COATED ORAL at 22:00

## 2017-10-09 RX ADMIN — MIDAZOLAM HYDROCHLORIDE 3 MILLIGRAM(S): 1 INJECTION, SOLUTION INTRAMUSCULAR; INTRAVENOUS at 10:40

## 2017-10-09 RX ADMIN — Medication 3 MILLILITER(S): at 18:44

## 2017-10-09 RX ADMIN — HEPARIN SODIUM 7500 UNIT(S): 5000 INJECTION INTRAVENOUS; SUBCUTANEOUS at 13:55

## 2017-10-09 RX ADMIN — FENTANYL CITRATE 50 MICROGRAM(S): 50 INJECTION INTRAVENOUS at 11:20

## 2017-10-09 RX ADMIN — Medication 3 MILLILITER(S): at 09:22

## 2017-10-09 RX ADMIN — Medication 3 MILLILITER(S): at 01:52

## 2017-10-09 RX ADMIN — CHLORHEXIDINE GLUCONATE 15 MILLILITER(S): 213 SOLUTION TOPICAL at 18:13

## 2017-10-09 RX ADMIN — Medication 3 MILLILITER(S): at 05:31

## 2017-10-09 RX ADMIN — SODIUM CHLORIDE 100 MILLILITER(S): 9 INJECTION INTRAMUSCULAR; INTRAVENOUS; SUBCUTANEOUS at 11:19

## 2017-10-09 RX ADMIN — CHLORHEXIDINE GLUCONATE 15 MILLILITER(S): 213 SOLUTION TOPICAL at 05:39

## 2017-10-09 RX ADMIN — Medication 3 MILLILITER(S): at 13:06

## 2017-10-09 RX ADMIN — Medication: at 08:46

## 2017-10-09 RX ADMIN — Medication 40 MILLIGRAM(S): at 05:40

## 2017-10-09 RX ADMIN — POLYETHYLENE GLYCOL 3350 17 GRAM(S): 17 POWDER, FOR SOLUTION ORAL at 05:40

## 2017-10-09 NOTE — PROGRESS NOTE ADULT - SUBJECTIVE AND OBJECTIVE BOX
INTERVAL HPI/OVERNIGHT EVENTS:    SUBJECTIVE: Patient seen and examined at bedside.    OBJECTIVE:    VITAL SIGNS:  ICU Vital Signs Last 24 Hrs  T(C): 37.1 (08 Oct 2017 22:53), Max: 37.4 (08 Oct 2017 07:01)  T(F): 98.7 (08 Oct 2017 22:53), Max: 99.4 (08 Oct 2017 07:01)  HR: 88 (09 Oct 2017 06:00) (50 - 96)  BP: 111/70 (09 Oct 2017 06:00) (103/58 - 127/49)  BP(mean): 78 (09 Oct 2017 06:00) (72 - 105)  ABP: 130/56 (08 Oct 2017 18:00) (110/71 - 145/88)  ABP(mean): 78 (08 Oct 2017 18:00) (74 - 101)  RR: 17 (09 Oct 2017 05:25) (16 - 23)  SpO2: 96% (09 Oct 2017 06:00) (87% - 100%)    Mode: CPAP with PS, FiO2: 60, PEEP: 5, PS: 10, MAP: 8, PIP: 16    10-07 @ 07:01  -  10-08 @ 07:00  --------------------------------------------------------  IN: 1800 mL / OUT: 1086 mL / NET: 714 mL    10-08 @ 07:01  -  10-09 @ 06:57  --------------------------------------------------------  IN: 2510 mL / OUT: 884 mL / NET: 1626 mL      CAPILLARY BLOOD GLUCOSE  148 (08 Oct 2017 17:00)          PHYSICAL EXAM:    General: NAD  HEENT: NC/AT; PERRL, clear conjunctiva  Neck: supple  Respiratory: CTA b/l  Cardiovascular: +S1/S2; RRR  Abdomen: soft, NT/ND; +BS x4  Extremities: WWP, 2+ peripheral pulses b/l; no LE edema  Skin: normal color and turgor; no rash  Neurological:     MEDICATIONS:  MEDICATIONS  (STANDING):  acetylcysteine 20% Inhalation 3 milliLiter(s) Inhalation three times a day  ALBUTerol/ipratropium for Nebulization 3 milliLiter(s) Nebulizer every 4 hours  aspirin  chewable 81 milliGRAM(s) Oral daily  atorvastatin 20 milliGRAM(s) Oral at bedtime  chlorhexidine 0.12% Liquid 15 milliLiter(s) Swish and Spit two times a day  dextrose 5%. 1000 milliLiter(s) (50 mL/Hr) IV Continuous <Continuous>  dextrose 50% Injectable 12.5 Gram(s) IV Push once  dextrose 50% Injectable 25 Gram(s) IV Push once  dextrose 50% Injectable 25 Gram(s) IV Push once  heparin  Injectable 7500 Unit(s) SubCutaneous every 8 hours  insulin lispro (HumaLOG) corrective regimen sliding scale   SubCutaneous Before meals and at bedtime  lactulose Syrup 30 Gram(s) Oral daily  melatonin 3 milliGRAM(s) Oral at bedtime  methylPREDNISolone sodium succinate Injectable 40 milliGRAM(s) IV Push daily  pantoprazole  Injectable 40 milliGRAM(s) IV Push daily  piperacillin/tazobactam IVPB. 3.375 Gram(s) IV Intermittent every 6 hours  polyethylene glycol 3350 17 Gram(s) Oral two times a day  sodium chloride 0.9%. 1000 milliLiter(s) (100 mL/Hr) IV Continuous <Continuous>  valACYclovir 1000 milliGRAM(s) Oral every 8 hours  vancomycin  IVPB 1500 milliGRAM(s) IV Intermittent every 12 hours  vancomycin  IVPB        MEDICATIONS  (PRN):  acetaminophen  Suppository 650 milliGRAM(s) Rectal every 6 hours PRN For Temp greater than 38 C (100.4 F)  bisacodyl Suppository 10 milliGRAM(s) Rectal daily PRN Constipation  dextrose Gel 1 Dose(s) Oral once PRN Blood Glucose LESS THAN 70 milliGRAM(s)/deciliter  glucagon  Injectable 1 milliGRAM(s) IntraMuscular once PRN Glucose LESS THAN 70 milligrams/deciliter  simethicone 80 milliGRAM(s) Chew two times a day PRN Gas      ALLERGIES:  Allergies    No Known Allergies    Intolerances        LABS:                        7.4    9.9   )-----------( 134      ( 09 Oct 2017 05:50 )             24.7     10-09    140  |  101  |  27<H>  ----------------------------<  111<H>  3.5   |  27  |  0.95    Ca    8.8      09 Oct 2017 05:50  Mg     2.5     10-08            RADIOLOGY & ADDITIONAL TESTS: Reviewed. INTERVAL HPI/OVERNIGHT EVENTS:  Pt didn't have any BM overnight but no abd. discomfort. UOP decreased to 20s-30s. left radial A.line was removed. Right IJ (Day 4). Urinary Cath is in place    SUBJECTIVE: Patient seen and examined at bedside. Pt denied any complaints. No CP,abd. pain, vomiting, leg pain.     OBJECTIVE:    VITAL SIGNS:  ICU Vital Signs Last 24 Hrs  T(C): 37.1 (08 Oct 2017 22:53), Max: 37.4 (08 Oct 2017 07:01)  T(F): 98.7 (08 Oct 2017 22:53), Max: 99.4 (08 Oct 2017 07:01)  HR: 88 (09 Oct 2017 06:00) (50 - 96)  BP: 111/70 (09 Oct 2017 06:00) (103/58 - 127/49)  BP(mean): 78 (09 Oct 2017 06:00) (72 - 105)  ABP: 130/56 (08 Oct 2017 18:00) (110/71 - 145/88)  ABP(mean): 78 (08 Oct 2017 18:00) (74 - 101)  RR: 17 (09 Oct 2017 05:25) (16 - 23)  SpO2: 96% (09 Oct 2017 06:00) (87% - 100%)    Mode: CPAP with PS, FiO2: 60, PEEP: 5, PS: 10, MAP: 8, PIP: 16    10-07 @ 07:01  -  10-08 @ 07:00  --------------------------------------------------------  IN: 1800 mL / OUT: 1086 mL / NET: 714 mL    10-08 @ 07:01  -  10-09 @ 06:57  --------------------------------------------------------  IN: 2510 mL / OUT: 884 mL / NET: 1626 mL      CAPILLARY BLOOD GLUCOSE  148 (08 Oct 2017 17:00)          PHYSICAL EXAM:  General: intubated. OGT in situ, NAD  HEENT: NC/AT; PERRL, clear conjunctiva, moist mucus membranes,  Neck: supple, no lymphadenopathy  Respiratory: exp wheezing, b/l rhonchi-left basal rales.   Cardiovascular: RRR, +S1/S2;, no murmurs or rubs appreciated  Abdomen: soft, NT; +BS x4, mildly distended  Extremities: WWP, 2+ peripheral pulses b/l; right shin bruise with point of entry(scab),   Skin: normal color and turgor; dryed rash (erupted vesicles around left nipple area=dermatomal), venous stasis skin discoloration b/l  Neurological: Awake-alert, following commands,communicative by writing      MEDICATIONS:  MEDICATIONS  (STANDING):  acetylcysteine 20% Inhalation 3 milliLiter(s) Inhalation three times a day  ALBUTerol/ipratropium for Nebulization 3 milliLiter(s) Nebulizer every 4 hours  aspirin  chewable 81 milliGRAM(s) Oral daily  atorvastatin 20 milliGRAM(s) Oral at bedtime  chlorhexidine 0.12% Liquid 15 milliLiter(s) Swish and Spit two times a day  dextrose 5%. 1000 milliLiter(s) (50 mL/Hr) IV Continuous <Continuous>  dextrose 50% Injectable 12.5 Gram(s) IV Push once  dextrose 50% Injectable 25 Gram(s) IV Push once  dextrose 50% Injectable 25 Gram(s) IV Push once  heparin  Injectable 7500 Unit(s) SubCutaneous every 8 hours  insulin lispro (HumaLOG) corrective regimen sliding scale   SubCutaneous Before meals and at bedtime  lactulose Syrup 30 Gram(s) Oral daily  melatonin 3 milliGRAM(s) Oral at bedtime  methylPREDNISolone sodium succinate Injectable 40 milliGRAM(s) IV Push daily  pantoprazole  Injectable 40 milliGRAM(s) IV Push daily  piperacillin/tazobactam IVPB. 3.375 Gram(s) IV Intermittent every 6 hours  polyethylene glycol 3350 17 Gram(s) Oral two times a day  sodium chloride 0.9%. 1000 milliLiter(s) (100 mL/Hr) IV Continuous <Continuous>  valACYclovir 1000 milliGRAM(s) Oral every 8 hours  vancomycin  IVPB 1500 milliGRAM(s) IV Intermittent every 12 hours  vancomycin  IVPB        MEDICATIONS  (PRN):  acetaminophen  Suppository 650 milliGRAM(s) Rectal every 6 hours PRN For Temp greater than 38 C (100.4 F)  bisacodyl Suppository 10 milliGRAM(s) Rectal daily PRN Constipation  dextrose Gel 1 Dose(s) Oral once PRN Blood Glucose LESS THAN 70 milliGRAM(s)/deciliter  glucagon  Injectable 1 milliGRAM(s) IntraMuscular once PRN Glucose LESS THAN 70 milligrams/deciliter  simethicone 80 milliGRAM(s) Chew two times a day PRN Gas      ALLERGIES:  Allergies    No Known Allergies    Intolerances        LABS:                        7.4    9.9   )-----------( 134      ( 09 Oct 2017 05:50 )             24.7     10-09    140  |  101  |  27<H>  ----------------------------<  111<H>  3.5   |  27  |  0.95    Ca    8.8      09 Oct 2017 05:50  Mg     2.5     10-08            RADIOLOGY & ADDITIONAL TESTS: Reviewed. INTERVAL HPI/OVERNIGHT EVENTS:  Pt didn't have any BM overnight but no abd. discomfort. UOP decreased to 20s-30s. left radial A.line was removed. Right IJ/Intubation (Day 4). Urinary Cath is in place.    SUBJECTIVE: Patient seen and examined at bedside. Pt denied any complaints. No CP,abd. pain, vomiting, leg pain.     OBJECTIVE:    VITAL SIGNS:  ICU Vital Signs Last 24 Hrs  T(C): 37.1 (08 Oct 2017 22:53), Max: 37.4 (08 Oct 2017 07:01)  T(F): 98.7 (08 Oct 2017 22:53), Max: 99.4 (08 Oct 2017 07:01)  HR: 88 (09 Oct 2017 06:00) (50 - 96)  BP: 111/70 (09 Oct 2017 06:00) (103/58 - 127/49)  BP(mean): 78 (09 Oct 2017 06:00) (72 - 105)  ABP: 130/56 (08 Oct 2017 18:00) (110/71 - 145/88)  ABP(mean): 78 (08 Oct 2017 18:00) (74 - 101)  RR: 17 (09 Oct 2017 05:25) (16 - 23)  SpO2: 96% (09 Oct 2017 06:00) (87% - 100%)    Mode: CPAP with PS, FiO2: 60, PEEP: 5, PS: 10, MAP: 8, PIP: 16    10-07 @ 07:01  -  10-08 @ 07:00  --------------------------------------------------------  IN: 1800 mL / OUT: 1086 mL / NET: 714 mL    10-08 @ 07:01  -  10-09 @ 06:57  --------------------------------------------------------  IN: 2510 mL / OUT: 884 mL / NET: 1626 mL      CAPILLARY BLOOD GLUCOSE  148 (08 Oct 2017 17:00)          PHYSICAL EXAM:  General: intubated. OGT in situ, NAD  HEENT: NC/AT; PERRL, clear conjunctiva, moist mucus membranes,  Neck: supple, no lymphadenopathy  Respiratory: exp wheezing, b/l rhonchi-left basal rales.   Cardiovascular: RRR, +S1/S2;, no murmurs or rubs appreciated  Abdomen: soft, NT; +BS x4, mildly distended  Extremities: WWP, 2+ peripheral pulses b/l; right shin bruise with point of entry(scab),   Skin: normal color and turgor; dryed rash (erupted vesicles around left nipple area=dermatomal), venous stasis skin discoloration b/l  Neurological: Awake-alert, following commands,communicative by writing      MEDICATIONS:  MEDICATIONS  (STANDING):  acetylcysteine 20% Inhalation 3 milliLiter(s) Inhalation three times a day  ALBUTerol/ipratropium for Nebulization 3 milliLiter(s) Nebulizer every 4 hours  aspirin  chewable 81 milliGRAM(s) Oral daily  atorvastatin 20 milliGRAM(s) Oral at bedtime  chlorhexidine 0.12% Liquid 15 milliLiter(s) Swish and Spit two times a day  dextrose 5%. 1000 milliLiter(s) (50 mL/Hr) IV Continuous <Continuous>  dextrose 50% Injectable 12.5 Gram(s) IV Push once  dextrose 50% Injectable 25 Gram(s) IV Push once  dextrose 50% Injectable 25 Gram(s) IV Push once  heparin  Injectable 7500 Unit(s) SubCutaneous every 8 hours  insulin lispro (HumaLOG) corrective regimen sliding scale   SubCutaneous Before meals and at bedtime  lactulose Syrup 30 Gram(s) Oral daily  melatonin 3 milliGRAM(s) Oral at bedtime  methylPREDNISolone sodium succinate Injectable 40 milliGRAM(s) IV Push daily  pantoprazole  Injectable 40 milliGRAM(s) IV Push daily  piperacillin/tazobactam IVPB. 3.375 Gram(s) IV Intermittent every 6 hours  polyethylene glycol 3350 17 Gram(s) Oral two times a day  sodium chloride 0.9%. 1000 milliLiter(s) (100 mL/Hr) IV Continuous <Continuous>  valACYclovir 1000 milliGRAM(s) Oral every 8 hours  vancomycin  IVPB 1500 milliGRAM(s) IV Intermittent every 12 hours  vancomycin  IVPB        MEDICATIONS  (PRN):  acetaminophen  Suppository 650 milliGRAM(s) Rectal every 6 hours PRN For Temp greater than 38 C (100.4 F)  bisacodyl Suppository 10 milliGRAM(s) Rectal daily PRN Constipation  dextrose Gel 1 Dose(s) Oral once PRN Blood Glucose LESS THAN 70 milliGRAM(s)/deciliter  glucagon  Injectable 1 milliGRAM(s) IntraMuscular once PRN Glucose LESS THAN 70 milligrams/deciliter  simethicone 80 milliGRAM(s) Chew two times a day PRN Gas      ALLERGIES:  Allergies    No Known Allergies    Intolerances        LABS:                        7.4    9.9   )-----------( 134      ( 09 Oct 2017 05:50 )             24.7     10-09    140  |  101  |  27<H>  ----------------------------<  111<H>  3.5   |  27  |  0.95    Ca    8.8      09 Oct 2017 05:50  Mg     2.5     10-08            RADIOLOGY & ADDITIONAL TESTS: Reviewed.

## 2017-10-09 NOTE — PROGRESS NOTE ADULT - ASSESSMENT
· Assessment		  73 yo male with hx of HTN, COPD, Prostate CA (s/p radiation and seed 2013 on leuprolide outpatient) who presents from [facility] onc with worsening of SOB and episode of unwitnessed syncope at home found to have severe sepsis with gram + cocci in clusters identified as S. aureus and ATN. Hospital course complicated by rapid response after patient removed his CPAP now in MICU for for septic shock.    ID  #Septic shock: resolved  -Patient admitted with severe sepsis requiring fluid bolus. Patient on maintenance fluid of 60 cc/hr NS. Patient hypotensive during rapid response requiring 500 cc bolus and peripheral levophed to be titrated to MAP of 60 mmHg (Septic shock)  -Patient prescribed Amoxicillin for bronchitis in OP w/o improvement of SOB. Patient given broad spectrum coverage with vancomycin+ceftriaxone+azithromycin. which was switched to vanc+zosyn.   -Patient blood cx grew (gram positive cocci in clusters) (on 10/4"both sets" and one set is positive 10/5 and '3 bottles-4th is in progress" on 10/6 ) likely 2/2 PNA vs Skin infection (shingles/right LL lesion). F/U final report for sensitivity  -c/w vanc, F/U vanco level is therapeutic twice. Continue zosyn for possibility of pneumonia  - TTE negative for vegetations.   - Plan for KELLY possibly today because three Bl.Cx are positive for gram positive cocci in clusters, pending organism specification and sensitivities.    Respiratory   #Acute hypoxic Respiratory Failure  10/5: Xray showing possible increasing right sided consolidation. CT positive for COPD known prior to admission. Dyspnea likely due to copious thick secretions 2/2 pneumonia with possible aspiration on BIPAP coupled with severe sepsis/septic shock. Patient found to be unresponsive while on BIPAP was intubated for airway protection.  -s/p intubation w/o sedation.   -LE Venous duplex is negative for DVT  Cardiologist recommend to r/o PE (Pt has syncopal episode, recent flight to [place]-Active Tx of prostate Ca- Positive troponin-RA/RV are dilated on ECHO) [facility] V/Q Scan.       #Possible COPD exacerbation  Patient with hx of COPD outpatient w/o home O2 requirements. Patient presented w/ difficulty breathing in ED and subsequently placed on CPAP w/ improvement in symptoms. Attempt to wean off BiPAP was poorly tolerated by patient . Patient had rapid response after removing his CPAP mask with ABG showing 7.25/55/334/23 off CPAP.   -Repeat ABG following intubation shows 7.38/49/237/23   -Pt has mild secretions compared to before  -c/w duonebs Q4   -On exam, pt has  expiratory wheezing: Continue steroids 40 daily    Cardiovascular  #New onset paroxysmal A.fib Vs MAT:  -s/p lopressor and Amiodarone bolus-->back to NSR  -As per cardiologist recs: Check TSH-Continue telemetry-consider anticoagulation if additional a.fib.  -Hematologist recommend to consider anticoagulation as his thrombocytopenia is stable    #HFrEF  -TTE shows HFrEF with EF 45%, right atrial dilatation, septal wall motion abnormalities and mild hypokinesis of left ventricle. Unable to visualize any vegetations or R heart structures. Unable to calculate pulmonary a pressures.   -As per cardiolgist: ECHO was nomal last year normal, Would recheck echo next week. BNP was very high on admission there are no other clinical findings for CHF.   -Continue Aspirin/lipitor  -Consider ischemia eval when acute issues resolve    #Elevated troponins :  -EKG positive for ST depression in anteroseptal leads, Echo positive for septal hypokinesis, Troponin 0.06 -->0.10-->0.19-->0.23-->0.17-->0.15, likely probably due to ischemia from hypoxia exacerbated by renal insufficiency (cr. 2.07 on admission).   -Continue trending trops to peak    Renal  ATN-resolving  Patient had significant increase in Cr from baseline. UA significant for casts concerning for ATN possibly 2/2 excessive diuresis coupled with poor PO intake at home vs due to severe sepsis on admission. FeUrea 8.9% suggesting possible pre renal(hypotension-sepsis) contribution to CATALINA.   - UOP following rapid response was 0 ml output over one hour, then 25cc/hr  - S/p 500cc bolus o/n with increase in UOP. UOP later on UOP decreased to 20s-30s/ hourly  -Creatinine is back to baseline     Heme/onc  #Prostate ca w/ mets to spine  As per oncologist: Bicalutamide has been recently D/Mango. Will observe for a withdrawal effect. Will continue to hold  -Anemia: Hb stable monitor Hb and transfuse if Hb<7.   -Thrombocytopenia is improving. Thrombocytopenia is likely due to sepsis or could be related to marrow involvement by prostate cancer.       Skin  Shingles: Continue Valtrex(valcyclovir) through OGT - no req for isolation     GI  -AXR with air filled stomach and likely ileus from aerophagia and infection.   -AXR continues to show mild colonic distension (10/8)  - S/p enema and manual disimpaction with no stool evacuation (10/8)  -NGT placed, on suction(300 cc to date)      F no IVF as pt is +ve balance +1626 over last 24 hrs  E- replete lytes prn;   N-NPO    PPx - Heparin SC for DVT and protonix for GI    CODE - FULL. · Assessment		  73 yo male with hx of HTN, COPD, Prostate CA (s/p radiation and seed 2013 on leuprolide outpatient) who presents from [facility] onc with worsening of SOB and episode of unwitnessed syncope at home found to have severe sepsis with gram + cocci in clusters identified as S. aureus and ATN. Hospital course complicated by rapid response after patient removed his CPAP now in MICU for for septic shock.    ID  #Septic shock: resolved  -Patient admitted with severe sepsis requiring fluid bolus. Patient was on maintenance fluid of 60 cc/hr NS. Patient hypotensive during rapid response requiring 500 cc bolus and peripheral levophed to be titrated to MAP of 60 mmHg (Septic shock)  -Patient prescribed Amoxicillin for bronchitis in OP w/o improvement of SOB. Patient given broad spectrum coverage with vancomycin+ceftriaxone+azithromycin. which was switched to vanc+zosyn.   -Patient blood cx grew (gram positive cocci in clusters) (on 10/4"both sets" and one set is positive 10/5 and '3 bottles-4th is in progress" on 10/6 ) likely 2/2 PNA vs Skin infection (shingles/right LL lesion). F/U final report for sensitivity. Last Bl.Cx drawn on 10/7 showed no growth till today  -c/w vanc, F/U vanco level is therapeutic twice. Continue zosyn for possibility of pneumonia  -CRP is 6.50 (mildly elevated)  - TTE negative for vegetations.   - Plan for KELLY possibly today because three Bl.Cx are positive for gram positive cocci in clusters, pending organism specification and sensitivities.    Respiratory   #Acute hypoxic Respiratory Failure  10/5: Xray showing possible increasing right sided consolidation. CT positive for COPD known prior to admission. Dyspnea likely due to copious thick secretions 2/2 pneumonia with possible aspiration on BIPAP coupled with severe sepsis/septic shock. Patient found to be unresponsive while on BIPAP was intubated for airway protection.  -s/p intubation w/o sedation(10/6)  -LE Venous duplex is negative for DVT  Cardiologist recommend to r/o PE (Pt has syncopal episode, recent flight to [place]-Active Tx of prostate Ca- Positive troponin-RA/RV are dilated on ECHO) wtih V/Q Scan.  -Pt is tolerating CPAP well since the morning-possible extubation today     #Possible COPD exacerbation  Patient with hx of COPD outpatient w/o home O2 requirements. Patient presented w/ difficulty breathing in ED and subsequently placed on CPAP w/ improvement in symptoms. Attempt to wean off BiPAP was poorly tolerated by patient . Patient had rapid response after removing his CPAP mask with ABG showing 7.25/55/334/23 off CPAP.   -Repeat ABG following intubation shows 7.38/49/237/23   -ABG yesterday: 7.46/39/128/27/99  -Pt has mild secretions compared to yesterday  -c/w duonebs Q4   -On exam, pt has  expiratory wheezing: Continue steroids 40 daily    Cardiovascular  #New onset paroxysmal A.fib Vs MAT:  -s/p lopressor and Amiodarone bolus-->back to NSR  -As per cardiologist recs: Check TSH-Continue telemetry-consider anticoagulation if additional a.fib.  -Hematologist recommend to consider anticoagulation as his thrombocytopenia is stable    #HFrEF  -TTE shows HFrEF with EF 45%, right atrial dilatation, septal wall motion abnormalities and mild hypokinesis of left ventricle. Unable to visualize any vegetations or R heart structures. Unable to calculate pulmonary a pressures.   -As per cardiolgist: ECHO was nomal last year normal, Would recheck echo next week. BNP was very high on admission there are no other clinical findings for CHF.   -Continue Aspirin/lipitor  -Consider ischemia eval when acute issues resolve    #Elevated troponins :  -EKG positive for ST depression in anteroseptal leads, Echo positive for septal hypokinesis, Troponin 0.06 -->0.10-->0.19-->0.23-->0.17-->0.15, likely probably due to ischemia from hypoxia exacerbated by renal insufficiency (cr. 2.07 on admission).   -Continue trending trops to peak    Renal  ATN-resolving  Patient had significant increase in Cr from baseline. UA significant for casts concerning for ATN possibly 2/2 excessive diuresis coupled with poor PO intake at home vs due to severe sepsis on admission. FeUrea 8.9% suggesting possible pre renal(hypotension-sepsis) contribution to CATALINA.   - UOP following rapid response was 0 ml output over one hour, then 25cc/hr  - S/p 500cc bolus o/n with increase in UOP. UOP later on UOP decreased to 20s-30s/ hourly  -Creatinine is back to baseline     Heme/onc  #Prostate ca w/ mets to spine  As per oncologist: Bicalutamide has been recently D/Mango. Will observe for a withdrawal effect. Will continue to hold  -Anemia: Hb stable monitor Hb and transfuse if Hb<7.   -Thrombocytopenia is improving. Thrombocytopenia is likely due to sepsis or could be related to marrow involvement by prostate cancer.       Skin  Shingles: Continue Valtrex(valcyclovir) through OGT - no req for isolation     GI  -AXR with air filled stomach and likely ileus from aerophagia and infection.   -AXR continues to show mild colonic distension (10/8)  - S/p enema and manual disimpaction with no stool evacuation (10/8)  -NGT placed, on suction(300 cc to date)      F no IVF as pt is +ve balance +1626 over last 24 hrs  E- replete lytes prn;   N-NPO    PPx - Heparin SC for DVT and protonix for GI    CODE - FULL. · Assessment		  71 yo male with hx of HTN, COPD, Prostate CA (s/p radiation and seed 2013 on leuprolide outpatient) who presents from [facility] onc with worsening of SOB and episode of unwitnessed syncope at home found to have severe sepsis with gram + cocci in clusters identified as S. aureus and ATN. Hospital course complicated by rapid response after patient removed his CPAP now in MICU for for septic shock.    ID  #Septic shock: resolved  -Patient admitted with severe sepsis requiring fluid bolus. Patient was on maintenance fluid of 60 cc/hr NS. Patient hypotensive during rapid response requiring 500 cc bolus and peripheral levophed to be titrated to MAP of 60 mmHg (Septic shock)  -Patient prescribed Amoxicillin for bronchitis in OP w/o improvement of SOB. Patient given broad spectrum coverage with vancomycin+ceftriaxone+azithromycin. which was switched to vanc+zosyn.   -Patient blood cx grew (gram positive cocci in clusters/Staph aureus) (on 10/4,10/5 and 10/6- "all 4 bottles are positive. One bottle is positive on 10/7 "rest are pending") likely 2/2 PNA vs Skin infection (shingles/right LL lesion). F/U final report for sensitivity.   -CRP is 6.50 (mildly elevated) but ESR is markedly elevated >135  -TTE negative for vegetations.   -KELLY done 10/9 is NEGATIVE for vegetation (pre-liminary: thickening of aortic valve which mostly calcified-Mild MR). F/U official report. Recommend to repeat KELLY in 3-4 days because of continuously positive blood Cx  -Pt has no leucocytoses- Cr is back to baseline"normalized"-  -c/w vanc,.(last vanc trough is supratherapeutic, will hold the next dose and will continue to monitor Vanc trough before each dose because of labile renal functions"decreased UOP"    -Will d/c zosyn as pt responded well to vancomycin alone (WBCs is normal : from 40K to 7K) and also to decrease effect on renal functions (    Respiratory   #Acute hypoxic Respiratory Failure  10/5: Xray showing possible increasing right sided consolidation. CT positive for COPD known prior to admission. Dyspnea likely due to copious thick secretions 2/2 pneumonia with possible aspiration on BIPAP coupled with severe sepsis/septic shock. Patient found to be unresponsive while on BIPAP was intubated for airway protection.  -s/p intubation w/o sedation(10/6)  -LE Venous duplex is negative for DVT  -Cardiologist recommend to r/o PE (Pt has syncopal episode, recent flight to [place]-Active Tx of prostate Ca- Positive troponin-RA/RV are dilated on ECHO) wtih V/Q Scan.  -Pt is tolerating CPAP well since the morning-possible extubation today     #Possible COPD exacerbation  Patient with hx of COPD outpatient w/o home O2 requirements. Patient presented w/ difficulty breathing in ED and subsequently placed on CPAP w/ improvement in symptoms. Attempt to wean off BiPAP was poorly tolerated by patient . Patient had rapid response after removing his CPAP mask with ABG showing 7.25/55/334/23 off CPAP.   -Repeat ABG following intubation shows 7.38/49/237/23   -ABG on 10/8: 7.46/39/128/27/99  -Pt has less secretions compared to previous days  -c/w duonebs Q4   -On exam, pt has  expiratory wheezing: Continue steroids 40 daily    Cardiovascular  #New onset paroxysmal A.fib Vs MAT:  -s/p lopressor and Amiodarone bolus-->back to NSR  -As per cardiologist recs: Check TSH-Continue telemetry-consider anticoagulation if additional a.fib.  -Hematologist recommend to consider anticoagulation as his thrombocytopenia is stable    #HFrEF  -TTE shows HFrEF with EF 45%, right atrial dilatation, septal wall motion abnormalities and mild hypokinesis of left ventricle. Unable to visualize any vegetations or R heart structures. Unable to calculate pulmonary a pressures.   -As per cardiolgist: ECHO was nomal last year normal, Would recheck echo next week. BNP was very high on admission there are no other clinical findings for CHF.   -Continue Aspirin/lipitor  -Consider ischemia eval when acute issues resolve    #Elevated troponins :  -EKG positive for ST depression in anteroseptal leads, Echo positive for septal hypokinesis, Troponin 0.06 -->0.10-->0.19-->0.23-->0.17-->0.15, likely probably due to ischemia from hypoxia exacerbated by renal insufficiency (cr. 2.07 on admission).   -Continue trending trops to peak    Renal  ATN-resolving  Patient had significant increase in Cr from baseline. UA significant for casts concerning for ATN possibly 2/2 excessive diuresis coupled with poor PO intake at home vs due to severe sepsis on admission. FeUrea 8.9% suggesting possible pre renal(hypotension-sepsis) contribution to CATALINA.   - UOP following rapid response was 0 ml output over one hour, then 25cc/hr  - S/p 500cc bolus overnight with increase in UOP but UOP later on UOP decreased to 20s-30s/ hourly. 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 Will observe for a withdrawal effect. Will continue to hold  -Anemia: Hb stable monitor Hb and transfuse if Hb<7.   -Thrombocytopenia is improving. Thrombocytopenia is likely due to sepsis or could be related to marrow involvement by prostate cancer.       Skin  Shingles: Continue Valtrex(valcyclovir) through OGT (Total of 7 days) - no req for isolation     GI  -AXR with air filled stomach and likely ileus from aerophagia and infection.   -AXR continues to show mild colonic distension (10/8)  - S/p enema and manual disimpaction with no stool evacuation (10/8)  -Will add laxatives through OGT  -NGT placed, on suction(300 cc to date in the wall container)      F no IVF as pt is +ve balance +1626 over last 24 hrs  E- replete lytes prn;   N-NPO    PPx - Heparin SC for DVT and protonix for GI    CODE - FULL. · Assessment		  71 yo male with hx of HTN, COPD, Prostate CA (s/p radiation and seed 2013 on leuprolide outpatient) who presents from [facility] onc with worsening of SOB and episode of unwitnessed syncope at home found to have severe sepsis with gram + cocci in clusters identified as S. aureus and ATN. Hospital course complicated by rapid response after patient removed his CPAP now in MICU for for septic shock.    ID  #Septic shock: resolved  -Patient admitted with severe sepsis requiring fluid bolus. Patient was on maintenance fluid of 60 cc/hr NS. Patient hypotensive during rapid response requiring 500 cc bolus and peripheral levophed to be titrated to MAP of 60 mmHg (Septic shock)  -Patient prescribed Amoxicillin for bronchitis in OP w/o improvement of SOB. Patient given broad spectrum coverage with vancomycin+ceftriaxone+azithromycin. which was switched to vanc+zosyn.   -Patient blood cx grew (gram positive cocci in clusters/Staph aureus) (on 10/4,10/5 and 10/6- "all 4 bottles are positive. One bottle is positive on 10/7 "rest are pending") likely 2/2 PNA vs Skin infection (shingles/right LL lesion). F/U final report for sensitivity.   -CRP is 6.50 (mildly elevated) but ESR is markedly elevated >135  -TTE negative for vegetations.   -KELLY done 10/9 is NEGATIVE for vegetation (pre-liminary: thickening of aortic valve which mostly calcified-Mild MR). F/U official report. Recommend to repeat KELLY in 3-4 days because of continuously positive blood Cx  -Pt has no leucocytoses- Cr is back to baseline"normalized"-  -Will d/c zosyn as pt responded well to vancomycin alone (WBCs is normal : from 40K to 7K) and also to decrease effect on renal functions   -Will switch vancomycin to nafcillin based on sensitivity    Respiratory   #Acute hypoxic Respiratory Failure  10/5: Xray showing possible increasing right sided consolidation. CT positive for COPD known prior to admission. Dyspnea likely due to copious thick secretions 2/2 pneumonia with possible aspiration on BIPAP coupled with severe sepsis/septic shock. Patient found to be unresponsive while on BIPAP was intubated for airway protection.  -s/p intubation w/o sedation(10/6)  -LE Venous duplex is negative for DVT  -Cardiologist recommend to r/o PE (Pt has syncopal episode, recent flight to [place]-Active Tx of prostate Ca- Positive troponin-RA/RV are dilated on ECHO) [facility] V/Q Scan.  -Pt is tolerating CPAP well since the morning-possible extubation today     #Possible COPD exacerbation  Patient with hx of COPD outpatient w/o home O2 requirements. Patient presented w/ difficulty breathing in ED and subsequently placed on CPAP w/ improvement in symptoms. Attempt to wean off BiPAP was poorly tolerated by patient . Patient had rapid response after removing his CPAP mask with ABG showing 7.25/55/334/23 off CPAP.   -Repeat ABG following intubation shows 7.38/49/237/23   -ABG on 10/8: 7.46/39/128/27/99  -Pt has less secretions compared to previous days  -c/w duonebs Q4   -On exam, pt has  expiratory wheezing: Continue steroids 40 daily    Cardiovascular  #New onset paroxysmal A.fib Vs MAT:  -s/p lopressor and Amiodarone bolus-->back to NSR  -As per cardiologist recs: Check TSH-Continue telemetry-consider anticoagulation if additional a.fib.  -Hematologist recommend to consider anticoagulation as his thrombocytopenia is stable    #HFrEF  -TTE shows HFrEF with EF 45%, right atrial dilatation, septal wall motion abnormalities and mild hypokinesis of left ventricle. Unable to visualize any vegetations or R heart structures. Unable to calculate pulmonary a pressures.   -As per cardiolgist: ECHO was nomal last year normal, Would recheck echo next week. BNP was very high on admission there are no other clinical findings for CHF.   -Continue Aspirin/lipitor  -Consider ischemia eval when acute issues resolve    #Elevated troponins :  -EKG positive for ST depression in anteroseptal leads, Echo positive for septal hypokinesis, Troponin 0.06 -->0.10-->0.19-->0.23-->0.17-->0.15, likely probably due to ischemia from hypoxia exacerbated by renal insufficiency (cr. 2.07 on admission).   -Continue trending trops to peak    Renal  ATN-resolving  Patient had significant increase in Cr from baseline. UA significant for casts concerning for ATN possibly 2/2 excessive diuresis coupled with poor PO intake at home vs due to severe sepsis on admission. FeUrea 8.9% suggesting possible pre renal(hypotension-sepsis) contribution to CATALINA.   - UOP following rapid response was 0 ml output over one hour, then 25cc/hr  - S/p 500cc bolus overnight with increase in UOP but UOP later on UOP decreased to 20s-30s/ hourly. 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 Will observe for a withdrawal effect. Will continue to hold  -Anemia: Hb stable monitor Hb and transfuse if Hb<7.   -Thrombocytopenia is improving. Thrombocytopenia is likely due to sepsis or could be related to marrow involvement by prostate cancer.       Skin  Shingles: Continue Valtrex(valcyclovir) through OGT (Total of 7 days) - no req for isolation     GI  -AXR with air filled stomach and likely ileus from aerophagia and infection.   -AXR continues to show mild colonic distension (10/8)  - S/p enema and manual disimpaction with no stool evacuation (10/8)  -Will add laxatives through OGT  -NGT placed, on suction(300 cc to date in the wall container)      F no IVF as pt is +ve balance +1626 over last 24 hrs  E- replete lytes prn;   N-NPO    PPx - Heparin SC for DVT and protonix for GI    CODE - FULL. · Assessment		  73 yo male with hx of HTN, COPD, Prostate CA (s/p radiation and seed 2013 on leuprolide outpatient) who presents from [facility] onc with worsening of SOB and episode of unwitnessed syncope at home found to have severe sepsis with gram + cocci in clusters identified as S. aureus and ATN. Hospital course complicated by rapid response after patient removed his CPAP now in MICU for for septic shock.    ID  #Septic shock: resolved  -Patient admitted with severe sepsis requiring fluid bolus. Patient was on maintenance fluid of 60 cc/hr NS. Patient hypotensive during rapid response requiring 500 cc bolus and peripheral levophed to be titrated to MAP of 60 mmHg (Septic shock)  -Patient prescribed Amoxicillin for bronchitis in OP w/o improvement of SOB. Patient given broad spectrum coverage with vancomycin+ceftriaxone+azithromycin. which was switched to vanc+zosyn.   -Patient blood cx grew (gram positive cocci in clusters/Staph aureus) (on 10/4,10/5 and 10/6- "all 4 bottles are positive. One bottle is positive on 10/7 "rest are pending") likely 2/2 PNA vs Skin infection (shingles/right LL lesion). Repeat bl. Cx drawn 10/9 F/U results  -CRP is 6.50 (mildly elevated) but ESR is markedly elevated >135  -TTE negative for vegetations.   -KELLY done 10/9 is NEGATIVE for vegetation (pre-liminary: thickening of aortic valve which mostly calcified-Mild MR). F/U official report. Recommend to repeat KELLY in 3-4 days because of persistent positive blood Cx  -Will do CT chest/Abd/pelvis for a source of infection. If NEGATIVE, will pursue gallium scan  -Pt has no leucocytoses- Cr is back to baseline"normalized"-  -Will d/c zosyn as pt responded well to vancomycin alone (WBCs is normal : from 40K to 7K) and also to decrease effect on renal functions   -Will switch vancomycin to nafcillin based on sensitivity    Respiratory   #Acute hypoxic Respiratory Failure  10/5: Xray showing possible increasing right sided consolidation. CT positive for COPD known prior to admission. Dyspnea likely due to copious thick secretions 2/2 pneumonia with possible aspiration on BIPAP coupled with severe sepsis/septic shock. Patient found to be unresponsive while on BIPAP was intubated for airway protection.  -s/p intubation w/o sedation(10/6)  -LE Venous duplex is negative for DVT  -Cardiologist recommend to r/o PE (Pt has syncopal episode, recent flight to [place]-Active Tx of prostate Ca- Positive troponin-RA/RV are dilated on ECHO) wt V/Q Scan.  -Pt is tolerating CPAP well since the morning-possible extubation today     #Possible COPD exacerbation  Patient with hx of COPD outpatient w/o home O2 requirements. Patient presented w/ difficulty breathing in ED and subsequently placed on CPAP w/ improvement in symptoms. Attempt to wean off BiPAP was poorly tolerated by patient . Patient had rapid response after removing his CPAP mask with ABG showing 7.25/55/334/23 off CPAP.   -Repeat ABG following intubation shows 7.38/49/237/23   -ABG on 10/8: 7.46/39/128/27/99  -Pt has less secretions compared to previous days  -c/w duonebs Q4   -On exam, pt has  expiratory wheezing: Continue steroids 40 daily    Cardiovascular  #New onset paroxysmal A.fib Vs MAT:  -s/p lopressor and Amiodarone bolus-->back to NSR  -As per cardiologist recs: Check TSH-Continue telemetry-consider anticoagulation if additional a.fib.  -Hematologist recommend to consider anticoagulation as his thrombocytopenia is stable    #HFrEF  -TTE shows HFrEF with EF 45%, right atrial dilatation, septal wall motion abnormalities and mild hypokinesis of left ventricle. Unable to visualize any vegetations or R heart structures. Unable to calculate pulmonary a pressures.   -As per cardiolgist: ECHO was nomal last year normal, Would recheck echo next week. BNP was very high on admission there are no other clinical findings for CHF.   -Continue Aspirin/lipitor  -Consider ischemia eval when acute issues resolve    #Elevated troponins :  -EKG positive for ST depression in anteroseptal leads, Echo positive for septal hypokinesis, Troponin 0.06 -->0.10-->0.19-->0.23-->0.17-->0.15, likely probably due to ischemia from hypoxia exacerbated by renal insufficiency (cr. 2.07 on admission).   -Continue trending trops to peak    Renal  ATN-resolving  Patient had significant increase in Cr from baseline. UA significant for casts concerning for ATN possibly 2/2 excessive diuresis coupled with poor PO intake at home vs due to severe sepsis on admission. FeUrea 8.9% suggesting possible pre renal(hypotension-sepsis) contribution to CATALINA.   - UOP following rapid response was 0 ml output over one hour, then 25cc/hr  - S/p 500cc bolus overnight with increase in UOP but UOP later on UOP decreased to 20s-30s/ hourly. 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 Will observe for a withdrawal effect. Will continue to hold  -Anemia: Hb stable monitor Hb and transfuse if Hb<7.   -Thrombocytopenia is improving. Thrombocytopenia is likely due to sepsis or could be related to marrow involvement by prostate cancer.       Skin  Shingles: Continue Valtrex(valcyclovir) through OGT (Total of 7 days) - no req for isolation     GI  -AXR with air filled stomach and likely ileus from aerophagia and infection.   -AXR continues to show mild colonic distension (10/8)  - S/p enema and manual disimpaction with no stool evacuation (10/8)  -Will add laxatives through OGT  -NGT placed, on suction(300 cc to date in the wall container)      F no IVF as pt is +ve balance +1626 over last 24 hrs  E- replete lytes prn;   N-NPO    PPx - Heparin SC for DVT and protonix for GI    CODE - FULL.

## 2017-10-09 NOTE — PROGRESS NOTE ADULT - SUBJECTIVE AND OBJECTIVE BOX
Cardiology for Preister    cc: follow-up cardiology evaluation and management of left heart failure    interval - extubated; no cp now; no new complaints    PAST MEDICAL & SURGICAL HISTORY:  COPD (chronic obstructive pulmonary disease)  Hypertension  Obstructive sleep apnea syndrome  Prostate cancer metastatic to bone  PC (prostate cancer): with seed placement    MEDICATIONS  (STANDING):  acetylcysteine 20% Inhalation 3 milliLiter(s) Inhalation three times a day  ALBUTerol/ipratropium for Nebulization 3 milliLiter(s) Nebulizer every 4 hours  aspirin  chewable 81 milliGRAM(s) Oral daily  atorvastatin 20 milliGRAM(s) Oral at bedtime  dextrose 5%. 1000 milliLiter(s) (50 mL/Hr) IV Continuous <Continuous>  dextrose 50% Injectable 12.5 Gram(s) IV Push once  dextrose 50% Injectable 25 Gram(s) IV Push once  dextrose 50% Injectable 25 Gram(s) IV Push once  heparin  Injectable 7500 Unit(s) SubCutaneous every 8 hours  insulin lispro (HumaLOG) corrective regimen sliding scale   SubCutaneous Before meals and at bedtime  lactulose Syrup 30 Gram(s) Oral daily  melatonin 3 milliGRAM(s) Oral at bedtime  methylPREDNISolone sodium succinate Injectable 40 milliGRAM(s) IV Push daily  nafcillin  IVPB 2 Gram(s) IV Intermittent every 4 hours  nafcillin  IVPB      pantoprazole   Suspension 40 milliGRAM(s) Oral daily  polyethylene glycol 3350 17 Gram(s) Oral two times a day  valACYclovir 1000 milliGRAM(s) Oral every 8 hours    MEDICATIONS  (PRN):  acetaminophen  Suppository 650 milliGRAM(s) Rectal every 6 hours PRN For Temp greater than 38 C (100.4 F)  bisacodyl Suppository 10 milliGRAM(s) Rectal daily PRN Constipation  dextrose Gel 1 Dose(s) Oral once PRN Blood Glucose LESS THAN 70 milliGRAM(s)/deciliter  glucagon  Injectable 1 milliGRAM(s) IntraMuscular once PRN Glucose LESS THAN 70 milligrams/deciliter  simethicone 80 milliGRAM(s) Chew two times a day PRN Gas    Vital Signs Last 24 Hrs  T(C): 36.9 (09 Oct 2017 18:38), Max: 37.1 (08 Oct 2017 22:53)  T(F): 98.4 (09 Oct 2017 18:38), Max: 98.7 (08 Oct 2017 22:53)  HR: 70 (09 Oct 2017 20:00) (44 - 102)  BP: 122/72 (09 Oct 2017 20:00) (105/69 - 176/79)  BP(mean): 89 (09 Oct 2017 20:00) (72 - 110)  RR: 23 (09 Oct 2017 20:00) (11 - 29)  SpO2: 99% (09 Oct 2017 20:00) (87% - 100%)      physical exam  alert  nad  anicteric  no jvd  breath sounds present; coarse  rr; s1/s2 present; no diastolic murmur  soft abd; nt  trace le edema bilat  le pulses present     I&O's Detail    08 Oct 2017 07:01  -  09 Oct 2017 07:00  --------------------------------------------------------  IN:    Enteral Tube Flush: 410 mL    IV PiggyBack: 950 mL    sodium chloride 0.9%: 700 mL    Solution: 450 mL  Total IN: 2510 mL    OUT:    Indwelling Catheter - Urethral: 884 mL  Total OUT: 884 mL    Total NET: 1626 mL      09 Oct 2017 07:01  -  09 Oct 2017 21:51  --------------------------------------------------------  IN:    IV PiggyBack: 300 mL    sodium chloride 0.9%: 1005 mL  Total IN: 1305 mL    OUT:    Indwelling Catheter - Urethral: 680 mL    Nasoenteral Tube: 200 mL  Total OUT: 880 mL    Total NET: 425 mL                          7.4    9.9   )-----------( 134      ( 09 Oct 2017 05:50 )             24.7   10-09    140  |  101  |  27<H>  ----------------------------<  111<H>  3.5   |  27  |  0.95    Ca    8.8      09 Oct 2017 05:50  Mg     2.5     10-08    sedimentation Rate, Erythrocyte (10.09.17 @ 05:50)    Sedimentation Rate, Erythrocyte: >130 mm/Hr    I&O's Summary    08 Oct 2017 07:01  -  09 Oct 2017 07:00  --------------------------------------------------------  IN: 2510 mL / OUT: 884 mL / NET: 1626 mL    09 Oct 2017 07:01  -  09 Oct 2017 21:53  --------------------------------------------------------  IN: 1305 mL / OUT: 880 mL / NET: 425 mL        < from: KELLY w/Doppler (10.09.17 @ 13:35) >  EXAM:  ESOPHAGEAL ECHO (CARDIOL)                          *** ADDENDUM 10/09/2017  ***    Patient Height: 172.0 cm  Patient Weight: 109.0 kg  BSA: 2.2 m^2  Interpretation Summary  Normal left ventricular size and wall thickness.The left ventricular  ejection   fraction is estimated to be 40-45%The right ventricle is dilated.The   right   ventricular systolic function is mildly reduced.The left atrium is   dilated.The   right atrium is dilated.Thickening and calcification of the aortic   valveNo   aortic regurgitation noted.Mitral valve thickening noted.There is mild to   moderate mitral regurgitation.There is trace tricuspid   regurgitation.There was   insufficient TR detected from which to calculate pulmonary artery   systolic   pressure. No pulmonic regurgitation noted.There is aortic root   sclerosis/calcification.Mild atherosclerotic plaque(s) in the aortic   arch.Mild   atherosclerotic plaque(s) in the descending aorta.There is no pericardial   effusion.No discrete vegetations noted.    < end of copied text >    < from: Xray Chest 1 View AP -PORTABLE-Routine (10.09.17 @ 05:37) >  EXAM:  XR CHEST 1 VIEW PORT ROUTINE                          PROCEDURE DATE:  10/09/2017                     INTERPRETATION:    Portable AP Radiograph dated 10/9/2017 5:37 AM    CLINICAL INFORMATION: 72 years, Male, Intubated, PNA    PRIOR STUDIES:October 8, 2017    FINDINGS: Support tubes and lines are unchanged. Left pleural effusion is   stable. Right pleural effusion has increased. Pulmonary vascular   congestion has increased.    IMPRESSION:  Interval increase of right pleural effusion and pulmonary vascular   congestion.            "Thank you for the opportunity to participate in the care of this   patient."        KATHY PABLO M.D., RADIOLOGY ATTENDING  This document has been electronically signed. Oct  9 2017  8:33AM    < end of copied text >

## 2017-10-09 NOTE — PROGRESS NOTE ADULT - ATTENDING COMMENTS
Patient seen and examined with house-staff during bedside rounds.  Resident note read, including vitals, physical findings, laboratory data, and radiological reports.   Revisions included below.  Direct personal management at bed side and extensive interpretation of the data.  Plan was outlined and discussed in details with the housestaff.  Decision making of high complexity  I discussed the case in details with pathology and thoracic surgery, also rheumatology. The cytology still pending. I doubt patient has MRSA pneumonia most likely colonizer.  continue steroids. Patient is still unstable or open lung biopsy. Final decision is the proceed with no immunosuppression therapy once infection is role out.  Continue current regimen. Patient seen and examined with house-staff during bedside rounds.  Resident note read, including vitals, physical findings, laboratory data, and radiological reports.   Revisions included below.  Direct personal management at bed side and extensive interpretation of the data.  Plan was outlined and discussed in details with the housestaff.  Decision making of high complexity  KELLY was negative. Patient once on weaning trial and was extubated. The source of the bacteremia is unknown and will proceed with CT scan chest abdomen and pelvis and then gallium scan. Patient is hemodynamically stable Continue antibiotic.

## 2017-10-09 NOTE — CONSULT NOTE ADULT - SUBJECTIVE AND OBJECTIVE BOX
TEDDY GUZMAN          MRN-[id number]           : 1945    HPI:  This is a 72 year old male with a PMHx of HTN, COPD, Prostate CA (, s/p radiation and seed placement, on leuprolide with a recent elevation of PSA and imaging showing bone mets) presents from his heme/on office with worsening of SOB and and episode syncope in the morning. The patient states that this morning, he was waking up from a nap and upon getting up from his bed, he felt unwell, dizzy, and lightheaded and then woke up on the ground. He states that he lost consciousness for about 5 seconds before he came too. He hit the left side of his head on the ground, but did not feel dizzy, lightheaded, nauseous after the event. He denies any chest pain, palpitations, diaphoresis or changes in vision prior or after the event as well. He got up and went to his doctors office. He notes that he has had several episodes like this in the past and they occur when he is getting up from his bed or up from a chair. He notes that he usually feels dizzy or lightheaded, but has never had any other symptoms. At his doctors office, he was told about the elevated PSA and the bone mets on imaging. It was at the office that his doctor noticed his increased SOB and sent him to the ED.     The pt notes that he has had SOB since early September with associated productive cough with yellow sputum. The pt states that he went to go see his PCP, Dr. Glass who gave him amoxicillin for bronchitis. The pt states that he felt better and that his breathing and cough seemed improved to him over the past month and did not realize the worsening of his respiratory status until today at his doctors office. The patient denies any fever, chills, n/v/d/c, abdominal pain, hematochezia, melena, hematuria, dysuria, urinary frequency, urinary urgency, numbness or tingling in his extremities. The pt notes that he has nocturia, however this has been chronic in nature as well as worsening of LE edema since the SOB began.    In ED: Vitals: 98.9, HR 73, BP 99/63, RR 24, 96%RA. Presented with leukocytosis and given vanc/zosyn in ED. Pt received about 1L ns( ordered for 2 but second was not given). Pt desaturated in the ED and was placed on BiPAP (05 Oct 2017 01:42)    PAST MEDICAL & SURGICAL HISTORY:  COPD (chronic obstructive pulmonary disease)  Hypertension  Obstructive sleep apnea syndrome  Prostate cancer metastatic to bone  PC (prostate cancer): with seed placement    FAMILY HISTORY:  No pertinent family history in first degree relatives    SOCIAL HISTORY:   at [employer] currently on a Sabbatical, prior smoker, lives alone without any home care    ROS:    Unable to attain due to:                      DYSPNEA (Y/N):	n  N/V (Y/N): n	  SECRETIONS (Y/N): n	  AGITATION (Y/N): n  PAIN(Y/N): n       -Provocation/Palliation:     -Quality/Quantity:     -Radiating:     -Severity:     -Timing/Frequency:     -Impact on ADLs:    GENERAL: Anxious to get ETT out  HEENT: denies   NECK: denies   CVS:  denies  RESP:  anxious to get ETT out  GI: lost 20lbs. over the Summer via dieting and wants more assist with wt. loss, feels gassy   : prostate ca since    MUSC:  ambulates independently  NEURO: denies   PSYCH:  denies, likes to work on his laptop which son will bring in tomorrow  SKIN: denies   LYMPH: denies     Allergies  No Known Allergies    Intolerances    OPIATE NAIVE (Y/N): y  -iStop reviewed (Y/N): DAMIEN, Ref# [id number]    MEDICATIONS:      MEDICATIONS  (STANDING):  acetylcysteine 20% Inhalation 3 milliLiter(s) Inhalation three times a day  ALBUTerol/ipratropium for Nebulization 3 milliLiter(s) Nebulizer every 4 hours  aspirin  chewable 81 milliGRAM(s) Oral daily  atorvastatin 20 milliGRAM(s) Oral at bedtime  chlorhexidine 0.12% Liquid 15 milliLiter(s) Swish and Spit two times a day  dextrose 5%. 1000 milliLiter(s) (50 mL/Hr) IV Continuous <Continuous>  dextrose 50% Injectable 12.5 Gram(s) IV Push once  dextrose 50% Injectable 25 Gram(s) IV Push once  dextrose 50% Injectable 25 Gram(s) IV Push once  heparin  Injectable 7500 Unit(s) SubCutaneous every 8 hours  insulin lispro (HumaLOG) corrective regimen sliding scale   SubCutaneous Before meals and at bedtime  lactulose Syrup 30 Gram(s) Oral daily  melatonin 3 milliGRAM(s) Oral at bedtime  methylPREDNISolone sodium succinate Injectable 40 milliGRAM(s) IV Push daily  nafcillin  IVPB 2 Gram(s) IV Intermittent every 4 hours  nafcillin  IVPB      pantoprazole   Suspension 40 milliGRAM(s) Oral daily  polyethylene glycol 3350 17 Gram(s) Oral two times a day  sodium chloride 0.9%. 1000 milliLiter(s) (100 mL/Hr) IV Continuous <Continuous>  valACYclovir 1000 milliGRAM(s) Oral every 8 hours    MEDICATIONS  (PRN):  acetaminophen  Suppository 650 milliGRAM(s) Rectal every 6 hours PRN For Temp greater than 38 C (100.4 F)  bisacodyl Suppository 10 milliGRAM(s) Rectal daily PRN Constipation  dextrose Gel 1 Dose(s) Oral once PRN Blood Glucose LESS THAN 70 milliGRAM(s)/deciliter  glucagon  Injectable 1 milliGRAM(s) IntraMuscular once PRN Glucose LESS THAN 70 milligrams/deciliter  simethicone 80 milliGRAM(s) Chew two times a day PRN Gas    LABS:    CBC:                        7.4    9.9   )-----------( 134      ( 09 Oct 2017 05:50 )             24.7     CMP:    10-09    140  |  101  |  27<H>  ----------------------------<  111<H>  3.5   |  27  |  0.95    Ca    8.8      09 Oct 2017 05:50  Mg     2.5     10-08    Culture - Blood (10.07.17 @ 17:25)    Gram Stain:   Aerobic Bottle: Gram Positive Cocci in Clusters  Result called to and read back by_ Ms. KIA Bolaños  10/09/2017 10:31:58    Specimen Source: .Blood Blood    Culture Results:   Culture in progress    IMAGING:  Reviewed  < from: TTE Echo w/Cont Complete (10.05.17 @ 11:51) >  EXAM:  ECHOCARDIOGRAM W CONTRAST                        PROCEDURE DATE:  10/05/2017    The left ventricular ejection fraction is 45%.  trace mitral regurgitation.  No pulmonic regurgitation noted, no pericardial effusion    < from: Xray Chest 1 View AP -PORTABLE-Routine (10.09.17 @ 05:37) >  EXAM:  XR CHEST 1 VIEW PORT ROUTINE                        PROCEDURE DATE:  10/09/2017    IMPRESSION:  Interval increase of right pleural effusion and pulmonary vascular   congestion.    PHYSICAL EXAM:  T(C): 36.8 (10-09-17 @ 14:32), Max: 37.1 (10-08-17 @ 22:53)  HR: 52 (10-09-17 @ 14:00) (46 - 88)  BP: 126/65 (10-09-17 @ 14:00) (103/58 - 137/71)  RR: 16 (10-09-17 @ 14:00) (12 - 20)  SpO2: 89% (10-09-17 @ 14:00) (87% - 100%)  Wt(kg): --  Daily     Daily   CAPILLARY BLOOD GLUCOSE  122 (09 Oct 2017 12:00)    I&O's Summary    08 Oct 2017 07:01  -  09 Oct 2017 07:00  --------------------------------------------------------  IN: 2510 mL / OUT: 884 mL / NET: 1626 mL    09 Oct 2017 07:01  -  09 Oct 2017 14:57  --------------------------------------------------------  IN: 900 mL / OUT: 450 mL / NET: 450 mL    GENERAL: Awake and alert, about to mouth needs/concerns, in fair condition with son Arturo at bedside  HEENT: normocephalic, EOMs intact, anicteric, hearing appears intact, no nasal discharge, dry mucous membranes     NECK: short   CVS: SB with PVCs on ECG   RESP: ETT to CPAP on vent  GI: protuberant, mild ascites, +BS, softly distended   : mild penile edema, f/c with light tea colored output  MUSC: MAEsx4, bilateral LEs with 2+ edema, brisk cap refill to all extremities    NEURO: awake and alert, mouthing and writing words   PSYCH: calm and pleasant    SKIN: no rash    LYMPH: no supraclavicular LAD    PREADMIT Karnofsky:  90%           CURRENT Karnofsky: 40%  CACHEXIA (Y/N): n  BMI: 36.5    ADVANCE DIRECTIVES: Full Code     Decision maker: pt  Legal surrogate:    PSYCHOSOCIAL-SPIRITUAL ASSESSMENT: PSSA pending    GOALS OF CARE DISCUSSION:  Palliative care info/counseling provided	      Documentation of GOC: per son, advance directive conversation post extubation    AGENCY CHOICE DISCUSSED:   none          REFERRALS:	   Unit SW/Case Mgmt  -declined  Patient/Family Support  Massage Therapy-declined  Music Therapy  Nutrition

## 2017-10-09 NOTE — CONSULT NOTE ADULT - ASSESSMENT
72 y.o gentleman with h/o metastatic prostate ca to bones s/p RT with seed implants and on leuprolide sent from Oncologist's office for unwitnessed syncopal episode, SOB, and productive cough, found to be septic from pna and GPC bacteremia and ATN with respiratory failure requiring intubation, now on CPAP, seen as an ICU trigger for an active stage IV malignancy

## 2017-10-09 NOTE — CONSULT NOTE ADULT - PROBLEM SELECTOR RECOMMENDATION 9
improving
ST depression probably due to hypoxia.  Although he has risk factors for ASCAD (hypertension and remote cigarette smoking) he has not had any symptoms or other clinical findings for ASCAD.  EF 45% on admission was borderline nomal last year normal,.  I would expect improvement.  Would recheck echo next week.  Although BNP was very high on admission there are no other clinical findings for CHF.

## 2017-10-09 NOTE — CONSULT NOTE ADULT - PROBLEM SELECTOR RECOMMENDATION 5
intro to service made. Referral made to Music Therapist for additive support.  Pt. declined any Chaplaincy visit.  Will cont. to follow for support

## 2017-10-09 NOTE — PROGRESS NOTE ADULT - ASSESSMENT
A/recs:  1) acute hypoxic respiratory failure with elevated tn and abnormal septal wall motion on tte in addition to rv dilatation and abnormal ecg; left heart failure (ef=45% by tte)  a - nstemi versus demand ischemia - rec repeat tn to confirm downtrend; on asa/statin; rec bb if no contraindication (check with pulm before starting with possible copd exacerbation)  b- repeat ecg  c - recommendation for v/q scan to exclude pe noted by ccm  d - consider ischemia eval when acute issues resolve     2) paroxysmal atrial fibrillation  a - rec av-node blockade - consider bb as above if no contraindications  b - recommend check tsh  c - continuous telemetry - note unwitnessed syncope prior to admission - monitor for now  d - consider ac if additional sustained afib - elevated risk of ac with anemia and recent syncope    3) htn - avoid labile bp    4) sepsis due to pna per ccm with bacteremia   -check crp    4) possible copd exacerbation - per [facility]    5) acute renal failure - monitor    6) normocytic anemia and thrombocytopenia  -hem-onc following    7) shingles    8) prostate ca per hem-onc    9) K>4, Mg>2 A/recs:  1) acute hypoxic respiratory failure with elevated tn and abnormal septal wall motion on tte in addition to rv dilatation and abnormal ecg; left heart failure (ef=45% by tte)  a - nstemi versus demand ischemia - rec repeat tn to confirm downtrend; on asa/statin; rec bb if no contraindication (check with pulm before starting with possible copd exacerbation)  b- repeat ecg  c - recommendation for v/q scan to exclude pe noted by ccm  d - consider ischemia eval when acute issues resolve     2) paroxysmal atrial fibrillation  a - rec av-node blockade - consider bb as above if no contraindications  b - recommend check tsh  c - continuous telemetry - note unwitnessed syncope prior to admission - monitor for now  d - consider ac if additional sustained afib - elevated risk of ac with anemia and recent syncope    3) htn - avoid labile bp    4) sepsis due to pna per [facility] with bacteremia   -check crp    4) possible copd exacerbation - per [facility]    5) acute renal failure - monitor    6) normocytic anemia and thrombocytopenia  -hem-onc following    7) shingles    8) prostate ca per hem-onc    9) K>4, Mg>2  Preister requested I continue to follow; patient aware and agreed

## 2017-10-09 NOTE — CONSULT NOTE ADULT - PROBLEM SELECTOR RECOMMENDATION 2
on nafcillin
Again, probably due to ischemia from hypoxia exacerbated by renal insufficiency (cr. 2.07 on admission).

## 2017-10-09 NOTE — PROGRESS NOTE ADULT - SUBJECTIVE AND OBJECTIVE BOX
The patient is awake and alert but still intubated. He indicates that he is breathing okay. He denies being in pain.        CHEMOTHERAPY REGIMEN:        Day:                          Diet:  Protocol:                                    IVF:      MEDICATIONS  (STANDING):  acetylcysteine 20% Inhalation 3 milliLiter(s) Inhalation three times a day  ALBUTerol/ipratropium for Nebulization 3 milliLiter(s) Nebulizer every 4 hours  aspirin  chewable 81 milliGRAM(s) Oral daily  atorvastatin 20 milliGRAM(s) Oral at bedtime  chlorhexidine 0.12% Liquid 15 milliLiter(s) Swish and Spit two times a day  dextrose 5%. 1000 milliLiter(s) (50 mL/Hr) IV Continuous <Continuous>  dextrose 50% Injectable 12.5 Gram(s) IV Push once  dextrose 50% Injectable 25 Gram(s) IV Push once  dextrose 50% Injectable 25 Gram(s) IV Push once  heparin  Injectable 7500 Unit(s) SubCutaneous every 8 hours  insulin lispro (HumaLOG) corrective regimen sliding scale   SubCutaneous Before meals and at bedtime  lactulose Syrup 30 Gram(s) Oral daily  melatonin 3 milliGRAM(s) Oral at bedtime  methylPREDNISolone sodium succinate Injectable 40 milliGRAM(s) IV Push daily  pantoprazole  Injectable 40 milliGRAM(s) IV Push daily  piperacillin/tazobactam IVPB. 3.375 Gram(s) IV Intermittent every 6 hours  polyethylene glycol 3350 17 Gram(s) Oral two times a day  sodium chloride 0.9%. 1000 milliLiter(s) (100 mL/Hr) IV Continuous <Continuous>  valACYclovir 1000 milliGRAM(s) Oral every 8 hours  vancomycin  IVPB 1500 milliGRAM(s) IV Intermittent every 12 hours  vancomycin  IVPB        MEDICATIONS  (PRN):  acetaminophen  Suppository 650 milliGRAM(s) Rectal every 6 hours PRN For Temp greater than 38 C (100.4 F)  bisacodyl Suppository 10 milliGRAM(s) Rectal daily PRN Constipation  dextrose Gel 1 Dose(s) Oral once PRN Blood Glucose LESS THAN 70 milliGRAM(s)/deciliter  glucagon  Injectable 1 milliGRAM(s) IntraMuscular once PRN Glucose LESS THAN 70 milligrams/deciliter  simethicone 80 milliGRAM(s) Chew two times a day PRN Gas      Allergies    No Known Allergies    Intolerances        DVT Prophylaxis: [x ] YES [ ] NO      Antibiotics: [x ] YES [ ] NO    Pain Scale (1-10):       Location:    Vital Signs Last 24 Hrs  T(C): 37.1 (08 Oct 2017 22:53), Max: 37.4 (08 Oct 2017 07:01)  T(F): 98.7 (08 Oct 2017 22:53), Max: 99.4 (08 Oct 2017 07:01)  HR: 88 (09 Oct 2017 06:00) (50 - 96)  BP: 111/70 (09 Oct 2017 06:00) (103/58 - 127/49)  BP(mean): 78 (09 Oct 2017 06:00) (72 - 105)  RR: 17 (09 Oct 2017 05:25) (16 - 23)  SpO2: 96% (09 Oct 2017 06:00) (87% - 100%)    Drug Dosing Weight  Height (cm): 172.72 (05 Oct 2017 01:27)  Weight (kg): 109 (05 Oct 2017 01:27)  BMI (kg/m2): 36.5 (05 Oct 2017 01:27)  BSA (m2): 2.21 (05 Oct 2017 01:27)    PHYSICAL EXAM:      Constitutional: awake and alert.  Eyes: conjunctival pallor.  ENMT: breathing tube and orogastric tube remain in place.  Neck: line in place in the right neck.  Respiratory: some wheezing noted bilaterally.  Cardiovascular: S1>S2 at apex. Controlled rate.  Gastrointestinal: distended, soft, nontender, active bowel sounds.  Genitourinary: voiding without difficulty.  Extremities: mild leg edema.  Neurological: no gross focal deficits.  Skin: warm and dry.  Lymph Nodes: none palp.  Musculoskeletal: full ROM.  Psychiatric: affect normal.        URINARY CATHETER: [ ] YES [ ] NO     LABS:  CBC Full  -  ( 09 Oct 2017 05:50 )  WBC Count : 9.9 K/uL  Hemoglobin : 7.4 g/dL  Hematocrit : 24.7 %  Platelet Count - Automated : 134 K/uL  Mean Cell Volume : 100.0 fL  Mean Cell Hemoglobin : 30.0 pg  Mean Cell Hemoglobin Concentration : 30.0 g/dL  Auto Neutrophil # : x  Auto Lymphocyte # : x  Auto Monocyte # : x  Auto Eosinophil # : x  Auto Basophil # : x  Auto Neutrophil % : x  Auto Lymphocyte % : x  Auto Monocyte % : x  Auto Eosinophil % : x  Auto Basophil % : x    10-09    140  |  101  |  27<H>  ----------------------------<  111<H>  3.5   |  27  |  0.95    Ca    8.8      09 Oct 2017 05:50  Mg     2.5     10-08            CULTURES:    RADIOLOGY & ADDITIONAL STUDIES:

## 2017-10-10 DIAGNOSIS — Z71.89 OTHER SPECIFIED COUNSELING: ICD-10-CM

## 2017-10-10 LAB
ANION GAP SERPL CALC-SCNC: 13 MMOL/L — SIGNIFICANT CHANGE UP (ref 5–17)
BUN SERPL-MCNC: 25 MG/DL — HIGH (ref 7–23)
CALCIUM SERPL-MCNC: 8.9 MG/DL — SIGNIFICANT CHANGE UP (ref 8.4–10.5)
CHLORIDE SERPL-SCNC: 103 MMOL/L — SIGNIFICANT CHANGE UP (ref 96–108)
CO2 SERPL-SCNC: 28 MMOL/L — SIGNIFICANT CHANGE UP (ref 22–31)
CREAT SERPL-MCNC: 0.99 MG/DL — SIGNIFICANT CHANGE UP (ref 0.5–1.3)
GLUCOSE BLDC GLUCOMTR-MCNC: 111 MG/DL — HIGH (ref 70–99)
GLUCOSE BLDC GLUCOMTR-MCNC: 120 MG/DL — HIGH (ref 70–99)
GLUCOSE BLDC GLUCOMTR-MCNC: 155 MG/DL — HIGH (ref 70–99)
GLUCOSE SERPL-MCNC: 100 MG/DL — HIGH (ref 70–99)
GRAM STN FLD: SIGNIFICANT CHANGE UP
HCT VFR BLD CALC: 25.1 % — LOW (ref 39–50)
HGB BLD-MCNC: 7.9 G/DL — LOW (ref 13–17)
MAGNESIUM SERPL-MCNC: 2.1 MG/DL — SIGNIFICANT CHANGE UP (ref 1.6–2.6)
MCHC RBC-ENTMCNC: 31 PG — SIGNIFICANT CHANGE UP (ref 27–34)
MCHC RBC-ENTMCNC: 31.5 G/DL — LOW (ref 32–36)
MCV RBC AUTO: 98.4 FL — SIGNIFICANT CHANGE UP (ref 80–100)
PHOSPHATE SERPL-MCNC: 3.3 MG/DL — SIGNIFICANT CHANGE UP (ref 2.5–4.5)
PLATELET # BLD AUTO: 167 K/UL — SIGNIFICANT CHANGE UP (ref 150–400)
POTASSIUM SERPL-MCNC: 3.7 MMOL/L — SIGNIFICANT CHANGE UP (ref 3.5–5.3)
POTASSIUM SERPL-SCNC: 3.7 MMOL/L — SIGNIFICANT CHANGE UP (ref 3.5–5.3)
RBC # BLD: 2.55 M/UL — LOW (ref 4.2–5.8)
RBC # FLD: 16.1 % — SIGNIFICANT CHANGE UP (ref 10.3–16.9)
SODIUM SERPL-SCNC: 144 MMOL/L — SIGNIFICANT CHANGE UP (ref 135–145)
TROPONIN T SERPL-MCNC: 0.44 NG/ML — CRITICAL HIGH (ref 0–0.01)
TROPONIN T SERPL-MCNC: 0.54 NG/ML — CRITICAL HIGH (ref 0–0.01)
TSH SERPL-MCNC: 0.93 UIU/ML — SIGNIFICANT CHANGE UP (ref 0.35–4.94)
WBC # BLD: 13.5 K/UL — HIGH (ref 3.8–10.5)
WBC # FLD AUTO: 13.5 K/UL — HIGH (ref 3.8–10.5)

## 2017-10-10 PROCEDURE — 99233 SBSQ HOSP IP/OBS HIGH 50: CPT | Mod: 25,GC

## 2017-10-10 PROCEDURE — 99255 IP/OBS CONSLTJ NEW/EST HI 80: CPT | Mod: GC

## 2017-10-10 PROCEDURE — 71010: CPT | Mod: 26

## 2017-10-10 PROCEDURE — 99497 ADVNCD CARE PLAN 30 MIN: CPT

## 2017-10-10 PROCEDURE — 99233 SBSQ HOSP IP/OBS HIGH 50: CPT | Mod: GC

## 2017-10-10 PROCEDURE — 99498 ADVNCD CARE PLAN ADDL 30 MIN: CPT

## 2017-10-10 PROCEDURE — 93010 ELECTROCARDIOGRAM REPORT: CPT

## 2017-10-10 RX ORDER — POTASSIUM CHLORIDE 20 MEQ
40 PACKET (EA) ORAL EVERY 4 HOURS
Qty: 0 | Refills: 0 | Status: COMPLETED | OUTPATIENT
Start: 2017-10-10 | End: 2017-10-11

## 2017-10-10 RX ORDER — PANTOPRAZOLE SODIUM 20 MG/1
40 TABLET, DELAYED RELEASE ORAL
Qty: 0 | Refills: 0 | Status: DISCONTINUED | OUTPATIENT
Start: 2017-10-10 | End: 2017-10-11

## 2017-10-10 RX ORDER — SPIRONOLACTONE 25 MG/1
25 TABLET, FILM COATED ORAL ONCE
Qty: 0 | Refills: 0 | Status: COMPLETED | OUTPATIENT
Start: 2017-10-10 | End: 2017-10-10

## 2017-10-10 RX ORDER — METOCLOPRAMIDE HCL 10 MG
10 TABLET ORAL EVERY 6 HOURS
Qty: 0 | Refills: 0 | Status: DISCONTINUED | OUTPATIENT
Start: 2017-10-10 | End: 2017-10-11

## 2017-10-10 RX ORDER — FUROSEMIDE 40 MG
20 TABLET ORAL ONCE
Qty: 0 | Refills: 0 | Status: COMPLETED | OUTPATIENT
Start: 2017-10-10 | End: 2017-10-10

## 2017-10-10 RX ADMIN — Medication 3 MILLILITER(S): at 01:29

## 2017-10-10 RX ADMIN — Medication 20 MILLIGRAM(S): at 13:19

## 2017-10-10 RX ADMIN — PANTOPRAZOLE SODIUM 40 MILLIGRAM(S): 20 TABLET, DELAYED RELEASE ORAL at 14:32

## 2017-10-10 RX ADMIN — POLYETHYLENE GLYCOL 3350 17 GRAM(S): 17 POWDER, FOR SOLUTION ORAL at 05:30

## 2017-10-10 RX ADMIN — Medication 3 MILLILITER(S): at 09:30

## 2017-10-10 RX ADMIN — ATORVASTATIN CALCIUM 20 MILLIGRAM(S): 80 TABLET, FILM COATED ORAL at 21:20

## 2017-10-10 RX ADMIN — VALACYCLOVIR 1000 MILLIGRAM(S): 500 TABLET, FILM COATED ORAL at 00:15

## 2017-10-10 RX ADMIN — Medication 3 MILLILITER(S): at 18:43

## 2017-10-10 RX ADMIN — Medication 3 MILLILITER(S): at 05:36

## 2017-10-10 RX ADMIN — Medication 10 MILLIGRAM(S): at 13:19

## 2017-10-10 RX ADMIN — HEPARIN SODIUM 7500 UNIT(S): 5000 INJECTION INTRAVENOUS; SUBCUTANEOUS at 22:00

## 2017-10-10 RX ADMIN — HEPARIN SODIUM 7500 UNIT(S): 5000 INJECTION INTRAVENOUS; SUBCUTANEOUS at 14:31

## 2017-10-10 RX ADMIN — NAFCILLIN 200 GRAM(S): 10 INJECTION, POWDER, FOR SOLUTION INTRAVENOUS at 14:32

## 2017-10-10 RX ADMIN — Medication 81 MILLIGRAM(S): at 14:32

## 2017-10-10 RX ADMIN — NAFCILLIN 200 GRAM(S): 10 INJECTION, POWDER, FOR SOLUTION INTRAVENOUS at 10:40

## 2017-10-10 RX ADMIN — Medication 40 MILLIGRAM(S): at 06:00

## 2017-10-10 RX ADMIN — VALACYCLOVIR 1000 MILLIGRAM(S): 500 TABLET, FILM COATED ORAL at 14:31

## 2017-10-10 RX ADMIN — NAFCILLIN 200 GRAM(S): 10 INJECTION, POWDER, FOR SOLUTION INTRAVENOUS at 02:00

## 2017-10-10 RX ADMIN — VALACYCLOVIR 1000 MILLIGRAM(S): 500 TABLET, FILM COATED ORAL at 05:31

## 2017-10-10 RX ADMIN — Medication 3 MILLIGRAM(S): at 21:19

## 2017-10-10 RX ADMIN — NAFCILLIN 200 GRAM(S): 10 INJECTION, POWDER, FOR SOLUTION INTRAVENOUS at 22:00

## 2017-10-10 RX ADMIN — SPIRONOLACTONE 25 MILLIGRAM(S): 25 TABLET, FILM COATED ORAL at 19:52

## 2017-10-10 RX ADMIN — VALACYCLOVIR 1000 MILLIGRAM(S): 500 TABLET, FILM COATED ORAL at 21:19

## 2017-10-10 RX ADMIN — NAFCILLIN 200 GRAM(S): 10 INJECTION, POWDER, FOR SOLUTION INTRAVENOUS at 06:00

## 2017-10-10 RX ADMIN — NAFCILLIN 200 GRAM(S): 10 INJECTION, POWDER, FOR SOLUTION INTRAVENOUS at 18:21

## 2017-10-10 RX ADMIN — Medication 3 MILLIGRAM(S): at 00:15

## 2017-10-10 RX ADMIN — Medication 40 MILLIEQUIVALENT(S): at 19:52

## 2017-10-10 RX ADMIN — Medication 3 MILLILITER(S): at 22:35

## 2017-10-10 RX ADMIN — HEPARIN SODIUM 7500 UNIT(S): 5000 INJECTION INTRAVENOUS; SUBCUTANEOUS at 06:00

## 2017-10-10 RX ADMIN — Medication 1: at 18:29

## 2017-10-10 NOTE — PHYSICAL THERAPY INITIAL EVALUATION ADULT - IMPAIRMENTS FOUND, PT EVAL
ventilation and respiration/gas exchange/aerobic capacity/endurance/gait, locomotion, and balance/muscle strength

## 2017-10-10 NOTE — PHYSICAL THERAPY INITIAL EVALUATION ADULT - DIAGNOSIS, PT EVAL
Practice Pattern 6C: Impaired ventilation, respiration/gas exchange and aerobic capacity/endurance associated with airway clearance dysfunction

## 2017-10-10 NOTE — PROGRESS NOTE ADULT - ASSESSMENT
· Assessment	  73 yo male with hx of HTN, COPD, Prostate CA (s/p radiation and seed 2013 on leuprolide outpatient) who presents from [facility] onc with worsening of SOB and episode of unwitnessed syncope at home found to have severe sepsis with gram + cocci in clusters identified as S. aureus and ATN. Hospital course complicated by rapid response for hypotension,unresponsve to painful stimuli and was transferred to MICU for for septic shock.    ID  #Septic shock: resolved  -Patient admitted with severe sepsis requiring fluid bolus. Patient was on maintenance fluid of 60 cc/hr NS. Patient hypotensive during rapid response requiring 500 cc bolus and peripheral levophed to be titrated to MAP of 60 mmHg (Septic shock)  -Patient prescribed Amoxicillin for bronchitis in OP w/o improvement of SOB. Patient given broad spectrum coverage with vancomycin+ceftriaxone+azithromycin. which was switched to vanc+zosyn.   -Patient blood cx grew (gram positive cocci in clusters/Staph aureus) (on 10/4,10/5 and 10/6- "all 4 bottles are positive. One bottle is positive on 10/7 "rest are pending") likely 2/2 PNA vs Skin infection (shingles/right LL lesion). Repeat bl. Cx drawn 10/9 F/U results  -CRP is 6.50 (mildly elevated) but ESR is markedly elevated >135  -TTE negative for vegetations.   -KELLY done 10/9 is NEGATIVE for vegetation (pre-liminary: thickening of aortic valve which mostly calcified-Mild MR). F/U official report. Recommend to repeat KELLY in 3-4 days because of persistent positive blood Cx  -Will do CT chest/Abd/pelvis for a source of infection. If NEGATIVE, will pursue gallium scan  -Pt has no leucocytoses- Cr is back to baseline"normalized"-  -Will d/c zosyn as pt responded well to vancomycin alone (WBCs is normal : from 40K to 7K) and also to decrease effect on renal functions   -Will switch vancomycin to nafcillin based on sensitivity    Respiratory   #Acute hypoxic Respiratory Failure  10/5: Xray showing possible increasing right sided consolidation. CT positive for COPD known prior to admission. Dyspnea likely due to copious thick secretions 2/2 pneumonia with possible aspiration on BIPAP coupled with severe sepsis/septic shock. Patient found to be unresponsive while on BIPAP was intubated for airway protection.  -s/p intubation w/o sedation(10/6)  -LE Venous duplex is negative for DVT  -Cardiologist recommend to r/o PE (Pt has syncopal episode, recent flight to [place]-Active Tx of prostate Ca- Positive troponin-RA/RV are dilated on ECHO) wtih V/Q Scan.  -Pt is tolerating CPAP well since the morning-possible extubation today     #Possible COPD exacerbation  Patient with hx of COPD outpatient w/o home O2 requirements. Patient presented w/ difficulty breathing in ED and subsequently placed on CPAP w/ improvement in symptoms. Attempt to wean off BiPAP was poorly tolerated by patient . Patient had rapid response after removing his CPAP mask with ABG showing 7.25/55/334/23 off CPAP.   -Repeat ABG following intubation shows 7.38/49/237/23   -ABG on 10/8: 7.46/39/128/27/99  -Pt has less secretions compared to previous days  -c/w duonebs Q4   -On exam, pt has  expiratory wheezing: Continue steroids 40 daily    Cardiovascular  #New onset paroxysmal A.fib Vs MAT:  -s/p lopressor and Amiodarone bolus-->back to NSR  -As per cardiologist recs: Check TSH-Continue telemetry-consider anticoagulation if additional a.fib.  -Hematologist recommend to consider anticoagulation as his thrombocytopenia is stable    #HFrEF  -TTE shows HFrEF with EF 45%, right atrial dilatation, septal wall motion abnormalities and mild hypokinesis of left ventricle. Unable to visualize any vegetations or R heart structures. Unable to calculate pulmonary a pressures.   -As per cardiolgist: ECHO was nomal last year normal, Would recheck echo next week. BNP was very high on admission there are no other clinical findings for CHF.   -Continue Aspirin/lipitor  -Consider ischemia eval when acute issues resolve    #Elevated troponins :  -EKG positive for ST depression in anteroseptal leads, Echo positive for septal hypokinesis, Troponin 0.06 -->0.10-->0.19-->0.23-->0.17-->0.15, likely probably due to ischemia from hypoxia exacerbated by renal insufficiency (cr. 2.07 on admission).   -Continue trending trops to peak    Renal  ATN-resolving  Patient had significant increase in Cr from baseline. UA significant for casts concerning for ATN possibly 2/2 excessive diuresis coupled with poor PO intake at home vs due to severe sepsis on admission. FeUrea 8.9% suggesting possible pre renal(hypotension-sepsis) contribution to CATALINA.   - UOP following rapid response was 0 ml output over one hour, then 25cc/hr  - S/p 500cc bolus overnight with increase in UOP but UOP later on UOP decreased to 20s-30s/ hourly. 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 Will observe for a withdrawal effect. Will continue to hold  -Anemia: Hb stable monitor Hb and transfuse if Hb<7.   -Thrombocytopenia is improving. Thrombocytopenia is likely due to sepsis or could be related to marrow involvement by prostate cancer.       Skin  Shingles: Continue Valtrex(valcyclovir) through OGT (Total of 7 days) - no req for isolation     GI  -AXR with air filled stomach and likely ileus from aerophagia and infection 10/7  -AXR continues to show mild colonic distension (10/8)  - S/p enema and manual disimpaction with no stool evacuation (10/8)  -Will start Reglan 10 mg Q6H as prokinetic agent   -Will continue laxatives PO (lactulose/Miralax)      F no IVF as pt is +ve balance +105 over last 24 hrs  E- replete lytes prn;   N-DASH diet    PPx - Heparin SC for DVT and protonix for GI    CODE - FULL. · Assessment	  71 yo male with hx of HTN, COPD, Prostate CA (s/p radiation and seed 2013 on leuprolide outpatient) who presents from [facility] onc with worsening of SOB and episode of unwitnessed syncope at home found to have severe sepsis with gram + cocci in clusters identified as S. aureus and ATN. Hospital course complicated by rapid response for hypotension,unresponsve to painful stimuli and was transferred to MICU for for septic shock.    ID  #Septic shock: resolved  -Patient admitted with severe sepsis requiring fluid bolus. Patient was on maintenance fluid of 60 cc/hr NS. Patient hypotensive during rapid response requiring 500 cc bolus and peripheral levophed to be titrated to MAP of 60 mmHg (Septic shock)  -Patient prescribed Amoxicillin for bronchitis in OP w/o improvement of SOB. Patient given broad spectrum coverage with vancomycin+ceftriaxone+azithromycin. which was switched to vanc+zosyn (zosyn was started on saturday 10/7 and d/c 10/9) (Vancomycin was switched to Nafcillin on 10/9  -TTE negative for vegetations.  -CRP is 6.50 (mildly elevated) but ESR is markedly elevated >135     -Pt started to have leucocytoses 10/10 of 13.5K which could be due to steroid use, will observe.   -Pt is euvolemic, perfusing well, UOP is adequate, will monitor I/O  -Patient blood cx grew (gram positive cocci in clusters/Staph aureus) (on 10/4,10/5,10/6,10/7 and 10/9 One bottle is positive too   -Will remove lines as a possible source of infection (remove Saldana's cath, remover Right IJ after getting peripheral lines)  -KELLY done 10/9 is NEGATIVE for vegetation (Thickening of aortic valve which mostly calcified-Mild MR). F/U official report. Recommend to repeat KELLY in 3-4 days because of persistent positive blood Cx  -Will consult ID and possible gallium scan. F/U ID recs    Respiratory   #Acute hypoxic Respiratory Failure-resolved  10/5: Xray showing possible increasing right sided consolidation. CT positive for COPD known prior to admission. Dyspnea likely due to copious thick secretions 2/2 pneumonia with possible aspiration on BIPAP coupled with severe sepsis/septic shock. Patient found to be unresponsive while on BIPAP was intubated for airway protection.  -s/p intubation(10/4) w/o sedation(10/6).  -LE Venous duplex is negative for DVT    -Pt was extubated yesterday 10/9 and placed on CPAP for sleep apnea  -Cardiologist recommend to r/o PE (Pt has syncopal episode, recent flight to [place]-Active Tx of prostate Ca- Positive troponin-RA/RV are dilated on ECHO) wt V/Q Scan.    #Possible COPD exacerbation  Patient with hx of COPD outpatient w/o home O2 requirements. Patient presented w/ difficulty breathing in ED and subsequently placed on CPAP w/ improvement in symptoms. Attempt to wean off BiPAP was poorly tolerated by patient . Patient had rapid response after removing his CPAP mask with ABG showing 7.25/55/334/23 off CPAP.   -Repeat ABG following intubation shows 7.38/49/237/23   -ABG on 10/8: 7.46/39/128/27/99  -Pt has wet cough after extubation- incinteve spirometry/ PT and ambulation  -c/w duonebs Q4   -On exam, pt has  expiratory wheezing: Continue steroids 40 daily    Cardiovascular  #New onset paroxysmal A.fib Vs MAT:  -s/p lopressor and Amiodarone bolus-->back to NSR  -As per cardiologist recs: Check TSH-Continue telemetry-consider anticoagulation if additional a.fib-consider bb as above if no contraindications (Pt's HR is within 60s,50s), will hold BB for now)  -Pt also needs to be on ACEI (later on after acute event resolves because pt has presented with CATALINA from severe sepsis and pt also has labile BP)  -Hematologist recommend to consider anticoagulation as his thrombocytopenia is stable    #HFrEF  -TTE shows HFrEF with EF 45%, right atrial dilatation, septal wall motion abnormalities and mild hypokinesis of left ventricle. Unable to visualize any vegetations or R heart structures. Unable to calculate pulmonary a pressures.   -As per cardiolgist: ECHO was nomal last year normal, Would recheck echo next week. BNP was very high on admission there are no other clinical findings for CHF.   -Continue Aspirin/lipitor  -Consider ischemia eval when acute issues resolve    #Elevated troponins :  -EKG positive for ST depression in anteroseptal leads, Echo positive for septal hypokinesis, Troponin 0.06 -->0.10-->0.19-->0.23-->0.17-->0.15, likely probably due to ischemia from hypoxia exacerbated by renal insufficiency (cr. 2.07 on admission).   -Continue trending trops to peak    Renal  ATN-resolved  Patient had significant increase in Cr from baseline. UA significant for casts concerning for ATN possibly 2/2 excessive diuresis coupled with poor PO intake at home vs due to severe sepsis on admission. FeUrea 8.9% suggesting possible pre renal(hypotension-sepsis) contribution to CATALINA.   - UOP following rapid response was 0 ml output over one hour, then 25cc/hr  - S/p 500cc bolus overnight with increase in UOP but UOP later on UOP decreased to 20s-30s/ hourly. 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 Will observe for a withdrawal effect. Will continue to hold  -Anemia: Hb stable monitor Hb and transfuse if Hb<7.   -Thrombocytopenia resolved     Skin  Shingles: Continue Valtrex(valcyclovir) through OGT (Total of 7 days) - no req for isolation     GI  -AXR with air filled stomach and likely ileus from aerophagia and infection 10/7  -AXR continues to show mild colonic distension (10/8)  - S/p enema and manual disimpaction with no stool evacuation (10/8)  -Will start Reglan 10 mg Q6H as prokinetic agent   -Will continue laxatives PO (lactulose/Miralax)      F no IVF as pt is +ve balance +105 over last 24 hrs  E- replete lytes prn;   N-DASH diet    PPx - Heparin SC for DVT and protonix for GI    CODE - FULL. · Assessment	  71 yo male with hx of HTN, COPD, Prostate CA (s/p radiation and seed 2013 on leuprolide outpatient) who presents from [facility] onc with worsening of SOB and episode of unwitnessed syncope at home found to have severe sepsis with gram + cocci in clusters identified as S. aureus and ATN. Hospital course complicated by rapid response for hypotension,unresponsve to painful stimuli and was transferred to MICU for for septic shock.    ID  #Septic shock: resolved  -Patient admitted with severe sepsis requiring fluid bolus. Patient was on maintenance fluid of 60 cc/hr NS. Patient hypotensive during rapid response requiring 500 cc bolus and peripheral levophed to be titrated to MAP of 60 mmHg (Septic shock)  -Patient prescribed Amoxicillin for bronchitis in OP w/o improvement of SOB. Patient given broad spectrum coverage with vancomycin+ceftriaxone+azithromycin. which was switched to vanc+zosyn (zosyn was started on saturday 10/7 and d/c 10/9) (Vancomycin was switched to Nafcillin on 10/9  -TTE negative for vegetations.  -CRP is 6.50 (mildly elevated) but ESR is markedly elevated >135     -Pt started to have leucocytoses 10/10 of 13.5K which could be due to steroid use, will observe.   -Pt is euvolemic, perfusing well, UOP is adequate, will monitor I/O  -Patient blood cx grew (gram positive cocci in clusters/Staph aureus) (on 10/4,10/5,10/6,10/7 and 10/9 One bottle is positive too   -Will remove lines as a possible source of infection (remove Saldana's cath, remover Right IJ after getting peripheral lines)  -KELLY done 10/9 is NEGATIVE for vegetation (Thickening of aortic valve which mostly calcified-Mild MR). F/U official report. Recommend to repeat KELLY in 3-4 days because of persistent positive blood Cx  -Will consult ID and possible gallium scan. F/U ID recs    Respiratory   #Acute hypoxic Respiratory Failure-resolved  10/5: Xray showing possible increasing right sided consolidation. CT positive for COPD known prior to admission. Dyspnea likely due to copious thick secretions 2/2 pneumonia with possible aspiration on BIPAP coupled with severe sepsis/septic shock. Patient found to be unresponsive while on BIPAP was intubated for airway protection.  -s/p intubation(10/4) w/o sedation(10/6).  -LE Venous duplex is negative for DVT    -Pt was extubated yesterday 10/9 and placed on CPAP for sleep apnea  -Cardiologist recommend to r/o PE (Pt has syncopal episode, recent flight to [place]-Active Tx of prostate Ca- Positive troponin-RA/RV are dilated on ECHO) wt V/Q Scan.    #Possible COPD exacerbation  Patient with hx of COPD outpatient w/o home O2 requirements. Patient presented w/ difficulty breathing in ED and subsequently placed on CPAP w/ improvement in symptoms. Attempt to wean off BiPAP was poorly tolerated by patient . Patient had rapid response after removing his CPAP mask with ABG showing 7.25/55/334/23 off CPAP.   -Repeat ABG following intubation shows 7.38/49/237/23   -ABG on 10/8: 7.46/39/128/27/99    -Pt has wet cough after extubation- incinteve spirometry/ PT and ambulation  -c/w duonebs Q4   -On exam, pt has  expiratory wheezing: Continue steroids 40 daily    Cardiovascular  #New onset paroxysmal A.fib Vs MAT:  -s/p lopressor and Amiodarone bolus-->back to NSR  -As per cardiologist recs: Check TSH-Continue telemetry-consider anticoagulation if additional a.fib-consider bb as above if no contraindications (Pt's HR is within 60s,50s), will hold BB for now)  -Pt also needs to be on ACEI (later on after acute event resolves because pt has presented with CATALINA from severe sepsis and pt also has labile BP)    #HFrEF  -TTE shows HFrEF with EF 45%, right atrial dilatation, septal wall motion abnormalities and mild hypokinesis of left ventricle. Unable to visualize any vegetations or R heart structures. Unable to calculate pulmonary a pressures.   -As per cardiolgist: ECHO was nomal last year normal,. BNP was very high on admission there are no other clinical findings for CHF.   -Continue Aspirin/lipitor.  -Consider ischemia eval when acute issues resolve.  -Pt has left sided pl.effuison and also mild left leg pitting edema (+1 to the shin), will add lasix 20 mg once and monitor UOP    #NSTEMI :  -EKG positive for ST depression in anteroseptal leads, Echo positive for septal hypokinesis, Troponin 0.06 -->0.10-->0.19-->0.23-->0.17-->0.15, likely probably due to demand ischemia from hypoxia exacerbated by renal insufficiency (cr. 2.07 on admission).   -recheck troponin (as per cardio recs)    Renal  ATN-resolved  Patient had significant increase in Cr from baseline. UA significant for casts concerning for ATN possibly 2/2 excessive diuresis coupled with poor PO intake at home vs due to severe sepsis on admission. FeUrea 8.9% suggesting possible pre renal(hypotension-sepsis) contribution to CATALINA. - UOP following rapid response was 0 ml output over one hour, then fluctuates from admission to ICU till 10/8 but 10/9 was increasing close to normal limits.  -Creatinine is back to baseline     Heme/onc  #Prostate ca w/ mets to spine  As per oncologist: Bicalutamide has been recently D/Mango. Will observe for a withdrawal effect. Will continue to hold  -Anemia: Hb stable monitor Hb and transfuse if Hb<7.   -Thrombocytopenia resolved     Skin  Shingles: Continue Valtrex(valcyclovir) through OGT (Total of 7 days) - no req for isolation     GI  -AXR with air filled stomach and likely ileus from aerophagia and infection 10/7  -AXR continues to show mild colonic distension (10/8)  - S/p enema and manual disimpaction with no stool evacuation (10/8)  -Will start Reglan 10 mg Q6H as prokinetic agent   -Will continue laxatives PO (lactulose/Miralax)      F no IVF as pt is +ve balance +105 over last 24 hrs  E- replete lytes prn;   N-DASH diet    PPx - Heparin SC for DVT and protonix for GI    CODE - FULL. · Assessment	  71 yo male with hx of HTN, COPD, Prostate CA (s/p radiation and seed 2013 on leuprolide outpatient) who presents from [facility] onc with worsening of SOB and episode of unwitnessed syncope at home found to have severe sepsis with gram + cocci in clusters identified as S. aureus and ATN. Hospital course complicated by rapid response for hypotension,unresponsve to painful stimuli and was transferred to MICU for for septic shock.    ID  #Septic shock: resolved  -Patient admitted with severe sepsis requiring fluid bolus. Patient was on maintenance fluid of 60 cc/hr NS. Patient hypotensive during rapid response requiring 500 cc bolus and peripheral levophed to be titrated to MAP of 60 mmHg (Septic shock)  -Patient prescribed Amoxicillin for bronchitis in OP w/o improvement of SOB. Patient given broad spectrum coverage with vancomycin+ceftriaxone+azithromycin. which was switched to vanc+zosyn (zosyn was started on saturday 10/7 and d/c 10/9) (Vancomycin was switched to Nafcillin on 10/9  -TTE negative for vegetations.  -CRP is 6.50 (mildly elevated) but ESR is markedly elevated >135     -Pt started to have leucocytoses 10/10 of 13.5K which could be due to steroid use, will observe.   -Pt is euvolemic, perfusing well, UOP is adequate, will monitor I/O  -Patient blood cx grew (gram positive cocci in clusters/Staph aureus) (on 10/4,10/5,10/6,10/7 and 10/9 One bottle is positive too   -Will remove lines as a possible source of infection (remove Saldana's cath, remover Right IJ after getting peripheral lines)  -KELLY done 10/9 is NEGATIVE for vegetation (Thickening of aortic valve which mostly calcified-Mild MR). F/U official report. Recommend to repeat KELLY in 3-4 days because of persistent positive blood Cx  -Will consult ID and possible gallium scan. F/U ID recs    Respiratory   #Acute hypoxic Respiratory Failure-resolved  10/5: Xray showing possible increasing right sided consolidation. CT positive for COPD known prior to admission. Dyspnea likely due to copious thick secretions 2/2 pneumonia with possible aspiration on BIPAP coupled with severe sepsis/septic shock. Patient found to be unresponsive while on BIPAP was intubated for airway protection.  -s/p intubation(10/4) w/o sedation(10/6).  -LE Venous duplex is negative for DVT    -Pt was extubated yesterday 10/9 and placed on CPAP for sleep apnea  -Cardiologist recommend to r/o PE (Pt has syncopal episode, recent flight to [place]-Active Tx of prostate Ca- Positive troponin-RA/RV are dilated on ECHO) wt V/Q Scan.    #Possible COPD exacerbation  Patient with hx of COPD outpatient w/o home O2 requirements. Patient presented w/ difficulty breathing in ED and subsequently placed on CPAP w/ improvement in symptoms. Attempt to wean off BiPAP was poorly tolerated by patient . Patient had rapid response after removing his CPAP mask with ABG showing 7.25/55/334/23 off CPAP.   -Repeat ABG following intubation shows 7.38/49/237/23   -ABG on 10/8: 7.46/39/128/27/99    -Pt has wet cough after extubation- incinteve spirometry/ PT and ambulation  -c/w duonebs Q4   -On exam, pt has  expiratory wheezing: Continue steroids 40 daily    Cardiovascular  #New onset paroxysmal A.fib Vs MAT:  -s/p lopressor and Amiodarone bolus-->back to NSR  -As per cardiologist recs: Check TSH-Continue telemetry-consider anticoagulation if additional a.fib-consider bb as above if no contraindications (Pt's HR is within 60s,50s), will hold BB for now)  -Pt also needs to be on ACEI (later on after acute event resolves because pt has presented with CATALINA from severe sepsis and pt also has labile BP)    #HFrEF  -TTE shows HFrEF with EF 45%, right atrial dilatation, septal wall motion abnormalities and mild hypokinesis of left ventricle. Unable to visualize any vegetations or R heart structures. Unable to calculate pulmonary a pressures.   -As per cardiolgist: ECHO was nomal last year normal,. BNP was very high on admission there are no other clinical findings for CHF.   -Continue Aspirin/lipitor.  -Consider ischemia eval when acute issues resolve.  -Pt has left sided pl.effuison and also mild left leg pitting edema (+1 to the shin), will add lasix 20 mg once and monitor UOP    #NSTEMI :  -EKG positive for ST depression in anteroseptal leads, Echo positive for septal hypokinesis, Troponin 0.06 -->0.10-->0.19-->0.23-->0.17-->0.15, likely probably due to demand ischemia from hypoxia exacerbated by renal insufficiency (cr. 2.07 on admission).   -recheck troponin (as per cardio recs)    Renal  ATN-resolved  Patient had significant increase in Cr from baseline. UA significant for casts concerning for ATN possibly 2/2 excessive diuresis coupled with poor PO intake at home vs due to severe sepsis on admission. FeUrea 8.9% suggesting possible pre renal(hypotension-sepsis) contribution to CATALINA. - UOP following rapid response was 0 ml output over one hour, then fluctuates from admission to ICU till 10/8 but 10/9 was increasing close to normal limits.  -Creatinine is back to baseline     Heme/onc  #Prostate ca w/ mets to spine  As per oncologist: Bicalutamide has been recently D/Mango. Will observe for a withdrawal effect. Will continue to hold  -Anemia: Hb stable monitor Hb and transfuse if Hb<7.   -Thrombocytopenia resolved     Skin  Shingles: Continue Valtrex(valcyclovir) through OGT (Total of 7 days) - no req for isolation     GI  -AXR with air filled stomach and likely ileus from aerophagia and infection 10/7  -AXR continues to show mild colonic distension (10/8)  - S/p enema and manual disimpaction with no stool evacuation (10/8)  -Will start Reglan 10 mg Q6H as prokinetic agent   -Will continue laxatives PO (lactulose/Miralax)      F no IVF as pt is +ve balance +105 over last 24 hrs  E- replete lytes prn;   N-DASH diet    PPx - Heparin SC for DVT     CODE - FULL. · Assessment	  73 yo male with hx of HTN, COPD, Prostate CA (s/p radiation and seed 2013 on leuprolide outpatient) who presents from [facility] onc with worsening of SOB and episode of unwitnessed syncope at home found to have severe sepsis with gram + cocci in clusters identified as S. aureus and ATN. Hospital course complicated by rapid response for hypotension,unresponsve to painful stimuli and was transferred to MICU for for septic shock.    ID  #Septic shock: resolved  -Patient admitted with severe sepsis requiring fluid bolus. Patient was on maintenance fluid of 60 cc/hr NS. Patient hypotensive during rapid response requiring 500 cc bolus and peripheral levophed to be titrated to MAP of 60 mmHg (Septic shock)  -Patient prescribed Amoxicillin for bronchitis in OP w/o improvement of SOB. Patient given broad spectrum coverage with vancomycin+ceftriaxone+azithromycin. which was switched to vanc+zosyn (zosyn was started on saturday 10/7 and d/c 10/9) (Vancomycin was switched to Nafcillin on 10/9  -TTE negative for vegetations.  -CRP is 6.50 (mildly elevated) but ESR is markedly elevated >135     -Pt started to have leucocytoses 10/10 of 13.5K which could be due to steroid use, will observe.   -Pt is euvolemic, perfusing well, UOP is adequate, will monitor I/O  -Patient blood cx grew (gram positive cocci in clusters/Staph aureus) (on 10/4,10/5,10/6,10/7 and 10/9 One bottle is positive too   -Will remove lines as a possible source of infection (remove Saldana's cath, remover Right IJ after getting peripheral lines)  -KELLY done 10/9 is NEGATIVE for vegetation (Thickening of aortic valve which mostly calcified-Mild MR). F/U official report. Recommend to repeat KELLY in 3-4 days because of persistent positive blood Cx  -Will consult ID and repeat blood Cx and do a full body gallium scan. F/U further ID recs    Respiratory   #Acute hypoxic Respiratory Failure-resolved  10/5: Xray showing possible increasing right sided consolidation. CT positive for COPD known prior to admission. Dyspnea likely due to copious thick secretions 2/2 pneumonia with possible aspiration on BIPAP coupled with severe sepsis/septic shock. Patient found to be unresponsive while on BIPAP was intubated for airway protection.  -s/p intubation(10/4) w/o sedation(10/6).  -LE Venous duplex is negative for DVT    -Pt was extubated yesterday 10/9 and placed on CPAP for sleep apnea  -Cardiologist recommend to r/o PE (Pt has syncopal episode, recent flight to [place]-Active Tx of prostate Ca- Positive troponin-RA/RV are dilated on ECHO) wt V/Q Scan.    #Possible COPD exacerbation  Patient with hx of COPD outpatient w/o home O2 requirements. Patient presented w/ difficulty breathing in ED and subsequently placed on CPAP w/ improvement in symptoms. Attempt to wean off BiPAP was poorly tolerated by patient . Patient had rapid response after removing his CPAP mask with ABG showing 7.25/55/334/23 off CPAP.   -Repeat ABG following intubation shows 7.38/49/237/23   -ABG on 10/8: 7.46/39/128/27/99    -Pt has wet cough after extubation- incinteve spirometry/ PT and ambulation  -c/w duonebs Q4   -On exam, pt has  expiratory wheezing: Continue steroids 40 daily    Cardiovascular  #New onset paroxysmal A.fib Vs MAT:  -s/p lopressor and Amiodarone bolus-->back to NSR  -As per cardiologist recs: Check TSH-Continue telemetry-consider anticoagulation if additional a.fib-consider bb as above if no contraindications (Pt's HR is within 60s,50s), will hold BB for now)  -Pt also needs to be on ACEI (later on after acute event resolves because pt has presented with CATALINA from severe sepsis and pt also has labile BP)    #HFrEF  -TTE shows HFrEF with EF 45%, right atrial dilatation, septal wall motion abnormalities and mild hypokinesis of left ventricle. Unable to visualize any vegetations or R heart structures. Unable to calculate pulmonary a pressures.   -As per cardiolgist: ECHO was nomal last year normal,. BNP was very high on admission there are no other clinical findings for CHF.   -Continue Aspirin/lipitor.  -Consider ischemia eval when acute issues resolve.  -Pt has left sided pl.effuison and also mild left leg pitting edema (+1 to the shin), will add lasix 20 mg once and monitor UOP    #NSTEMI :  -EKG positive for ST depression in anteroseptal leads, Echo positive for septal hypokinesis, Troponin 0.06 -->0.10-->0.19-->0.23-->0.17-->0.15, likely probably due to demand ischemia from hypoxia exacerbated by renal insufficiency (cr. 2.07 on admission).   -recheck troponin (as per cardio recs)    Renal  ATN-resolved  Patient had significant increase in Cr from baseline. UA significant for casts concerning for ATN possibly 2/2 excessive diuresis coupled with poor PO intake at home vs due to severe sepsis on admission. FeUrea 8.9% suggesting possible pre renal(hypotension-sepsis) contribution to CATALINA. - UOP following rapid response was 0 ml output over one hour, then fluctuates from admission to ICU till 10/8 but 10/9 was increasing close to normal limits.  -Creatinine is back to baseline     Heme/onc  #Prostate ca w/ mets to spine  As per oncologist: Bicalutamide has been recently D/Mango. Will observe for a withdrawal effect. Will continue to hold  -Anemia: Hb stable monitor Hb and transfuse if Hb<7.   -Thrombocytopenia resolved     Skin  Shingles: Continue Valtrex(valcyclovir) through OGT (Total of 7 days) - no req for isolation     GI  -AXR with air filled stomach and likely ileus from aerophagia and infection 10/7  -AXR continues to show mild colonic distension (10/8)  - S/p enema and manual disimpaction with no stool evacuation (10/8)  -Will start Reglan 10 mg Q6H as prokinetic agent   -Will continue laxatives PO (lactulose/Miralax)      F no IVF as pt is +ve balance +105 over last 24 hrs  E- replete lytes prn;   N-DASH diet    PPx - Heparin SC for DVT     CODE - FULL. · Assessment	  73 yo male with hx of HTN, COPD, Prostate CA (s/p radiation and seed 2013 on leuprolide outpatient) who presents from [facility] onc with worsening of SOB and episode of unwitnessed syncope at home found to have severe sepsis with gram + cocci in clusters identified as S. aureus and ATN. Hospital course complicated by rapid response for hypotension,unresponsve to painful stimuli and was transferred to MICU for for septic shock and has had persistent MSSA bacteremia with resolving respiratory failure and ATN.    ID  #Septic shock: resolved  -Patient admitted with severe sepsis requiring fluid bolus. Patient was on maintenance fluid of 60 cc/hr NS. Patient hypotensive during rapid response requiring 500 cc bolus and peripheral levophed to be titrated to MAP of 60 mmHg (Septic shock)  -Patient prescribed Amoxicillin for bronchitis in OP w/o improvement of SOB. Patient given broad spectrum coverage with vancomycin+ceftriaxone+azithromycin. which was switched to vanc+zosyn (zosyn was started on saturday 10/7 and d/c 10/9) (Vancomycin was switched to Nafcillin on 10/9  -TTE negative for vegetations.  -CRP is 6.50 (mildly elevated) but ESR is markedly elevated >135     -Pt started to have leucocytoses 10/10 of 13.5K which could be due to steroid use, will observe.   -Pt is euvolemic, perfusing well, UOP is adequate, will monitor I/O  -Patient blood cx grew (gram positive cocci in clusters/Staph aureus) (on 10/4,10/5,10/6,10/7 and 10/9 One bottle is positive too   -Will remove lines as a possible continued source of infection ( remover Right IJ after getting peripheral lines)  -KELLY done 10/9 is NEGATIVE for vegetation (Thickening of aortic valve which mostly calcified-Mild MR). F/U official report. Recommend to repeat KELLY in 3-4 days because of persistent positive blood Cx  -Will consult ID and repeat blood Cx and do a full body bone/gallium scan. F/U further ID recs    Respiratory   #Acute hypoxic Respiratory Failure-resolved  -s/p intubation(10/4) w/o sedation(10/6).  -LE Venous duplex is negative for DVT    -Pt was extubated yesterday 10/9 and placed on CPAP for likely sleep apnea with pulse ox desaturation while asleep      #Possible COPD exacerbation  Patient with hx of COPD outpatient w/o home O2 requirements. Patient presented w/ difficulty breathing in ED and subsequently placed on CPAP w/ improvement in symptoms. Attempt to wean off BiPAP was poorly tolerated by patient . Patient had rapid response after removing his CPAP mask with ABG showing 7.25/55/334/23 off CPAP.   -Repeat ABG following intubation shows 7.38/49/237/23   -ABG on 10/8: 7.46/39/128/27/99    -Pt has wet cough after extubation- incinteve spirometry/ PT and ambulation  -c/w duonebs Q4   -On exam, pt has  expiratory wheezing: Continue steroids 40 daily    Cardiovascular  #New onset paroxysmal A.fib Vs MAT:  -s/p lopressor and Amiodarone bolus-->back to NSR  -As per cardiologist recs: Check TSH-Continue telemetry-consider anticoagulation if additional a.fib-consider bb as above if no contraindications (Pt's HR is within 60s,50s), will hold BB for now)  -Pt also needs to be on ACEI (later on after acute event resolves because pt has presented with CATALINA from severe sepsis and pt also has labile BP)    #HFrEF  -TTE shows HFrEF with EF 45%, right atrial dilatation, septal wall motion abnormalities and mild hypokinesis of left ventricle. Unable to visualize any vegetations or R heart structures. Unable to calculate pulmonary a pressures.   -As per cardiolgist: ECHO was nomal last year normal,. BNP was very high on admission there are no other clinical findings for CHF.   -Continue Aspirin/lipitor.  -Consider ischemia eval when acute issues resolve.  -Pt has left sided pl.effuison and also mild left leg pitting edema (+1 to the shin), will add lasix 20 mg once and monitor UOP    #NSTEMI :  -EKG positive for ST depression in anteroseptal leads, Echo positive for septal hypokinesis, Troponin 0.06 -->0.10-->0.19-->0.23-->0.17-->0.15, likely probably due to demand ischemia from hypoxia exacerbated by renal insufficiency (cr. 2.07 on admission).   -recheck troponin (as per cardio recs)    Renal  ATN-resolved  Patient had significant increase in Cr from baseline. UA significant for casts concerning for ATN possibly 2/2 excessive diuresis coupled with poor PO intake at home vs due to severe sepsis on admission. FeUrea 8.9% suggesting possible pre renal(hypotension-sepsis) contribution to CATALINA. - UOP following rapid response was 0 ml output over one hour, then fluctuates from admission to ICU till 10/8 but 10/9 was increasing close to normal limits.  -Creatinine is back to baseline     Heme/onc  #Prostate ca w/ mets to spine  As per oncologist: Bicalutamide has been recently D/Mango. Will observe for a withdrawal effect. Will continue to hold  -Anemia: Hb stable monitor Hb and transfuse if Hb<7.   -Thrombocytopenia resolved     Skin  Shingles: Continue Valtrex(valcyclovir) through OGT (Total of 7 days) - no req for isolation     GI  -AXR with air filled stomach and likely partial ileus with aerophagia  -AXR continues to show mild colonic distension (10/8)  - S/p enema and manual disimpaction with no stool evacuation (10/8)  -Will start Reglan 10 mg Q6H as prokinetic agent   -Will continue laxatives PO (lactulose/Miralax)      F no IVF as pt is +ve balance +105 over last 24 hrs  E- replete lytes prn;   N-DASH diet    PPx - Heparin SC for DVT     CODE - FULL.

## 2017-10-10 NOTE — PHYSICAL THERAPY INITIAL EVALUATION ADULT - PERTINENT HX OF CURRENT PROBLEM, REHAB EVAL
Patient is a 72 year old M who presents from his heme/onc office with worsening of SOB and episode syncope in the morning. Found to be septic secondary to pneumonia.  Relevant PMH includes HTN, COPD, Prostate CA (2013, s/p radiation and seed placement, on leuprolide with a recent elevation of PSA and imaging showing bone mets)

## 2017-10-10 NOTE — PROGRESS NOTE ADULT - ATTENDING COMMENTS
The patient continues to improve. IV antibiotic therapy to continue. The patient is encouraged to expectorate sputum.

## 2017-10-10 NOTE — PHYSICAL THERAPY INITIAL EVALUATION ADULT - CRITERIA FOR SKILLED THERAPEUTIC INTERVENTIONS
anticipated discharge recommendation/risk reduction/prevention/rehab potential/impairments found/functional limitations in following categories/therapy frequency/anticipated equipment needs at discharge

## 2017-10-10 NOTE — PROGRESS NOTE ADULT - ASSESSMENT
A/recs:  1) acute hypoxic respiratory failure with elevated tn and abnormal septal wall motion on tte in addition to rv dilatation and abnormal ecg; left heart failure (ef=45% by tte)  a - nstemi versus demand ischemia - serial tn until downtrend confirmed; no cp  b - continue asa and statin as tolerated  c - holding bb with recent possible copd exacerbation  d - holding ac with elevated risk of bleeding and anemia  e - rec v/q scan  f - consider ischemia eval when acute issues resolve     2) paroxysmal atrial fibrillation  a - bb remains held per ccm as above  b - check tsh  c - continuous telemetry - note unwitnessed syncope prior to admission - monitoring    3) htn - monitor    4) sepsis due to pna per [facility] with bacteremia   -rec check crp    4) possible copd exacerbation - per primary team    5) acute renal failure - monitor cr/uop    6) normocytic anemia and thrombocytopenia  -per hem-onc    7) shingles    8) prostate ca per hem-onc    9) maintain K>4, Mg>2    10) appreciate palliative care input    risks, benefits, alternatives of above strategy (including management of elevated tn) discussed with patient at great  length; questions answered; patient agreed to proceed as documented    case discussed with ICU Housestaff

## 2017-10-10 NOTE — CHART NOTE - NSCHARTNOTEFT_GEN_A_CORE
Admitting Diagnosis:   71 yo male with hx of HTN, COPD, Prostate CA (s/p radiation and seed 2013 on leuprolide outpatient) who presents from [facility] onc with worsening of SOB and episode of unwitnessed syncope at home found to have severe sepsis with gram + cocci in clusters identified as S. aureus and ATN. Hospital course complicated by rapid response after patient removed his CPAP now in MICU for for septic shock.    PAST MEDICAL & SURGICAL HISTORY:  COPD (chronic obstructive pulmonary disease)  Hypertension  Obstructive sleep apnea syndrome  Prostate cancer metastatic to bone  PC (prostate cancer): with seed placement    Current Nutrition Order: NPO since 10/6     GI Issues: Distended    Pain: None reported    Skin Integrity: Intact    Labs:   10-10    144  |  103  |  25<H>  ----------------------------<  100<H>  3.7   |  28  |  0.99    Ca    8.9      10 Oct 2017 05:07  Phos  3.3     10-10  Mg     2.1     10-10    CAPILLARY BLOOD GLUCOSE  103 (09 Oct 2017 23:00)  116 (09 Oct 2017 17:00)  122 (09 Oct 2017 12:00)    Medications:  MEDICATIONS  (STANDING):  acetylcysteine 20% Inhalation 3 milliLiter(s) Inhalation three times a day  ALBUTerol/ipratropium for Nebulization 3 milliLiter(s) Nebulizer every 4 hours  aspirin  chewable 81 milliGRAM(s) Oral daily  atorvastatin 20 milliGRAM(s) Oral at bedtime  dextrose 5%. 1000 milliLiter(s) (50 mL/Hr) IV Continuous <Continuous>  dextrose 50% Injectable 12.5 Gram(s) IV Push once  dextrose 50% Injectable 25 Gram(s) IV Push once  dextrose 50% Injectable 25 Gram(s) IV Push once  heparin  Injectable 7500 Unit(s) SubCutaneous every 8 hours  insulin lispro (HumaLOG) corrective regimen sliding scale   SubCutaneous Before meals and at bedtime  lactulose Syrup 30 Gram(s) Oral daily  melatonin 3 milliGRAM(s) Oral at bedtime  methylPREDNISolone sodium succinate Injectable 40 milliGRAM(s) IV Push daily  nafcillin  IVPB 2 Gram(s) IV Intermittent every 4 hours  nafcillin  IVPB      pantoprazole   Suspension 40 milliGRAM(s) Oral daily  polyethylene glycol 3350 17 Gram(s) Oral two times a day  valACYclovir 1000 milliGRAM(s) Oral every 8 hours    MEDICATIONS  (PRN):  acetaminophen  Suppository 650 milliGRAM(s) Rectal every 6 hours PRN For Temp greater than 38 C (100.4 F)  bisacodyl Suppository 10 milliGRAM(s) Rectal daily PRN Constipation  dextrose Gel 1 Dose(s) Oral once PRN Blood Glucose LESS THAN 70 milliGRAM(s)/deciliter  glucagon  Injectable 1 milliGRAM(s) IntraMuscular once PRN Glucose LESS THAN 70 milligrams/deciliter  simethicone 80 milliGRAM(s) Chew two times a day PRN Gas    Weight:  No new weights to assess    Estimated energy needs using 70 kg IBW:  25-30 kcal/kg (4804-2540 kcal).   1.3-1.5 g/kg ( g protein).   30-35 mL/kg (7193-9831 mL fluid).      Subjective: S/P extubation 10/9. Pt resting on Venti-mask. Noted pt NPO since 10/6.     Previous Nutrition Diagnosis: Inadequate energy intake RT NPO AEB meeting 0% of nutrition needs.      Active [ X  ]  Resolved [   ]    Goal: Initiate nutrition within 24-48 hours.     Recommendations:   1. Consider SLP eval prior to PO diet as pt s/p extubation & for safest PO consumption.   2. If able to swallow consider Low sodium diet with texture determined by SLP.   3. Keep nutrition in line with goals of care.     Education: Not appropriate this visit    Risk Level: High [  X ] Moderate [   ] Low [   ]

## 2017-10-10 NOTE — CONSULT NOTE ADULT - SUBJECTIVE AND OBJECTIVE BOX
HPI:  72 year old male with a PMHx of HTN, COPD, Prostate CA (2013, s/p radiation and seed placement, on leuprolide with a recent elevation of PSA and imaging showing bone mets) presented from his heme/onc office with worsening of SOB and an episode syncope. He had SOB since early September with associated productive cough with yellow sputum, s/p amoxicillin course for bronchitis.  On arrival to [facility] he was found to have severe sepsis with gram + cocci in clusters identified as MSSA and ATN, hospital course complicated by septic shock.  Received Vancomycin from 10/4-10-9, nafcillin (10/9-present).    PAST MEDICAL & SURGICAL HISTORY:  COPD (chronic obstructive pulmonary disease)  Hypertension  Obstructive sleep apnea syndrome  Prostate cancer metastatic to bone  PC (prostate cancer): with seed placement    REVIEW OF SYSTEMS:    General: no fevers, no chills  Skin/Breast: +rash  Respiratory and Thorax: +cough  Cardiovascular:	no chest pain  Gastrointestinal:	 no nausea, vomiting , diarrhea  Genitourinary: no dysuria, no hematuria  Neurological: no focal weakness/numbness    ANTIBIOTICS:  nafcillin  IVPB 2 Gram(s) IV Intermittent every 4 hours  nafcillin  IVPB      valACYclovir 1000 milliGRAM(s) Oral every 8 hours    MEDICATIONS  (STANDING):  acetylcysteine 20% Inhalation 3 milliLiter(s) Inhalation three times a day  ALBUTerol/ipratropium for Nebulization 3 milliLiter(s) Nebulizer every 4 hours  aspirin  chewable 81 milliGRAM(s) Oral daily  atorvastatin 20 milliGRAM(s) Oral at bedtime  dextrose 5%. 1000 milliLiter(s) (50 mL/Hr) IV Continuous <Continuous>  dextrose 50% Injectable 12.5 Gram(s) IV Push once  dextrose 50% Injectable 25 Gram(s) IV Push once  dextrose 50% Injectable 25 Gram(s) IV Push once  heparin  Injectable 7500 Unit(s) SubCutaneous every 8 hours  insulin lispro (HumaLOG) corrective regimen sliding scale   SubCutaneous Before meals and at bedtime  melatonin 3 milliGRAM(s) Oral at bedtime  methylPREDNISolone sodium succinate Injectable 40 milliGRAM(s) IV Push daily  nafcillin  IVPB 2 Gram(s) IV Intermittent every 4 hours  nafcillin  IVPB      pantoprazole    Tablet 40 milliGRAM(s) Oral before breakfast  polyethylene glycol 3350 17 Gram(s) Oral two times a day  valACYclovir 1000 milliGRAM(s) Oral every 8 hours    MEDICATIONS  (PRN):  acetaminophen  Suppository 650 milliGRAM(s) Rectal every 6 hours PRN For Temp greater than 38 C (100.4 F)  bisacodyl Suppository 10 milliGRAM(s) Rectal daily PRN Constipation  dextrose Gel 1 Dose(s) Oral once PRN Blood Glucose LESS THAN 70 milliGRAM(s)/deciliter  glucagon  Injectable 1 milliGRAM(s) IntraMuscular once PRN Glucose LESS THAN 70 milligrams/deciliter  metoclopramide Injectable 10 milliGRAM(s) IV Push every 6 hours PRN nausea  simethicone 80 milliGRAM(s) Chew two times a day PRN Gas      Allergies    No Known Allergies    Intolerances    FAMILY HISTORY:  No pertinent family history in first degree relatives    Vital Signs Last 24 Hrs  T(C): 36.7 (10 Oct 2017 13:32), Max: 37.1 (09 Oct 2017 22:49)  T(F): 98.1 (10 Oct 2017 13:32), Max: 98.8 (09 Oct 2017 22:49)  HR: 60 (10 Oct 2017 16:00) (50 - 102)  BP: 119/63 (10 Oct 2017 16:00) (97/47 - 176/79)  BP(mean): 83 (10 Oct 2017 16:00) (62 - 586)  RR: 23 (10 Oct 2017 16:00) (18 - 29)  SpO2: 96% (10 Oct 2017 16:00) (92% - 99%)    PHYSICAL EXAM:  Constitutional: AAOx3, NAD  Eyes: MEHUL, EOMI  Ear/Nose/Throat: no oral lesion, MMM	  Neck: no JVD, no lymphadenopathy, R IJ site without erythema or induration  Respiratory: diffuse scattered wheezes and rhonchi b/l  Cardiovascular: S1S2 RRR, no MRG  Gastrointestinal: soft, (+) BS, no HSM  Extremities: no edema  Vascular: distal pulses intact  Skin: dermatomal herpetic lesions on L chest wall fully crusted    LABS:                        7.9    13.5  )-----------( 167      ( 10 Oct 2017 05:02 )             25.1     10-10    144  |  103  |  25<H>  ----------------------------<  100<H>  3.7   |  28  |  0.99    Ca    8.9      10 Oct 2017 05:07  Phos  3.3     10-10  Mg     2.1     10-10    MICROBIOLOGY:    Culture - Blood (collected 10-09-17 @ 13:45)  Source: .Blood Blood  Gram Stain (10-10-17 @ 11:02):    Aerobic Bottle: Gram Positive Cocci in Clusters    Result called to and read back by_ Ms. KIA Bolaños RN  10/10/2017 11:01:23  Preliminary Report (10-10-17 @ 11:03):    Culture in progress    Culture - Blood (collected 10-09-17 @ 13:45)  Source: .Blood Blood  Gram Stain (10-10-17 @ 12:00):    Aerobic Bottle: Gram Positive Cocci in Clusters    Result called to and read back by_ Ms. KIA Bolaños RN  10/10/2017 11:01:23  Preliminary Report (10-10-17 @ 12:01):    Culture in progress    Culture - Blood (collected 10-07-17 @ 17:25)  Source: .Blood Blood  Gram Stain (10-09-17 @ 10:34):    Aerobic Bottle: Gram Positive Cocci in Clusters    Result called to and read back by_ Ms. KIA Bolaños  10/09/2017 10:31:58  Preliminary Report (10-10-17 @ 12:30):    Growth in aerobic bottle: Staphylococcus aureus    Susceptibility to follow.    Culture - Blood (collected 10-07-17 @ 17:25)  Source: .Blood Blood  Preliminary Report (10-09-17 @ 18:01):    No growth at 2 days.    Culture - Sputum (collected 10-07-17 @ 13:36)  Source: .Sputum Sputum  Gram Stain (10-07-17 @ 15:00):    Moderate epithelial cells    Numerous White blood cells    Moderate Gram positive cocci in pairs, chains and clusters    Rare Yeast  Final Report (10-09-17 @ 11:58):    Normal Respiratory [place] present    Culture - Blood (collected 10-06-17 @ 19:38)  Source: .Blood Blood  Gram Stain (10-07-17 @ 13:42):    Aerobic Bottle: Gram Positive Cocci in Clusters    Result called to and read back by_ Jerrell Hill RN (7 EA) 10/07/2017 13:41  Preliminary Report (10-09-17 @ 12:03):    Growth in aerobic bottle: Staphylococcus aureus  Organism: Staphylococcus aureus (10-09-17 @ 11:59)  Organism: Staphylococcus aureus (10-09-17 @ 11:59)      -  Cefazolin: S <=4      -  Clindamycin: S <=0.5      -  Erythromycin: S <=0.5      -  Linezolid: S 4      -  Oxacillin: S 0.5      -  Penicillin: R >8      -  RIF- Rifampin: S <=1      -  Trimethoprim/Sulfamethoxazole: S <=0.5/9.5      -  Vancomycin: S 2      Method Type: DILMA    Culture - Blood (collected 10-06-17 @ 19:38)  Source: .Blood Blood  Gram Stain (10-07-17 @ 13:44):    Aerobic Bottle: Gram Positive Cocci in Clusters    Result called to and read back by_ Jerrell Hill RN (7 EA) 10/07/2017 13:41  Preliminary Report (10-09-17 @ 12:10):    Growth in aerobic bottle: Staphylococcus aureus  Organism: Staphylococcus aureus (10-09-17 @ 12:06)  Organism: Staphylococcus aureus (10-09-17 @ 12:06)      -  Cefazolin: S <=4      -  Clindamycin: S <=0.5      -  Erythromycin: S <=0.5      -  Linezolid: S 4      -  Oxacillin: S 0.5      -  Penicillin: R >8      -  RIF- Rifampin: S <=1      -  Trimethoprim/Sulfamethoxazole: S <=0.5/9.5      -  Vancomycin: S 2      Method Type: DILMA    Culture - Blood (collected 10-05-17 @ 14:03)  Source: .Blood Blood-Peripheral  Gram Stain (10-07-17 @ 13:45):    Aerobic Bottle: Gram Positive Cocci in Clusters    Result called to and read back by_ Jerrell Hill RN (7 EA) 10/07/2017 13:41  Preliminary Report (10-09-17 @ 12:13):    Growth in aerobic bottle: Staphylococcus aureus  Organism: Staphylococcus aureus (10-09-17 @ 12:11)  Organism: Staphylococcus aureus (10-09-17 @ 12:11)      -  Cefazolin: S <=4      -  Clindamycin: S <=0.5      -  Erythromycin: S <=0.5      -  Linezolid: S 2      -  Oxacillin: S 0.5      -  Penicillin: R >8      -  RIF- Rifampin: S <=1      -  Trimethoprim/Sulfamethoxazole: S <=0.5/9.5      -  Vancomycin: S 2      Method Type: DILMA    Culture - Blood (collected 10-05-17 @ 14:03)  Source: .Blood Blood-Peripheral  Gram Stain (10-06-17 @ 15:23):    Aerobic Bottle: Gram Positive Cocci in Clusters    Result called to and read back by_ Ms. MICHELE Saab RN  10/06/2017    15:21:22  Preliminary Report (10-08-17 @ 12:21):    Growth in aerobic bottle: Staphylococcus aureus  Organism: Staphylococcus aureus (10-08-17 @ 12:21)  Organism: Staphylococcus aureus (10-08-17 @ 12:21)      -  Cefazolin: S <=4      -  Clindamycin: S <=0.5      -  Erythromycin: S <=0.5      -  Linezolid: S 4      -  Oxacillin: S 0.5      -  Penicillin: R >8      -  RIF- Rifampin: S <=1      -  Trimethoprim/Sulfamethoxazole: S <=0.5/9.5      -  Vancomycin: S 2      Method Type: DILMA    Culture - Urine (collected 10-05-17 @ 08:34)  Source: .Urine Clean Catch (Midstream)  Final Report (10-06-17 @ 10:51):    No growth    Culture - Blood (collected 10-04-17 @ 19:37)  Source: .Blood Blood-Peripheral  Gram Stain (10-05-17 @ 09:39):    Aerobic and Anaerobic Bottle: Gram Positive Cocci in Clusters    Result called to and read back by_ Dr. Weiner  10/05/2017 09:35:30  Final Report (10-07-17 @ 11:42):    Growth in aerobic and anaerobic bottles: Staphylococcus aureus    See previous culture FOR SENSITIVITY    Culture - Blood (collected 10-04-17 @ 19:37)  Source: .Blood Blood-Peripheral  Gram Stain (10-05-17 @ 09:37):    Aerobic and Anaerobic Bottle: Gram Positive Cocci in Clusters    Result called to and read back by_ Dr. Weiner  10/05/2017 09:35:30    ***Blood Panel PCR results on this specimen are available    approximately 3 hours after the Gram stain result.***    Gram stain, PCR, and/or culture results may not always    correspond due to difference in methodologies.    ************************************************************    This PCR assay was performed using [employer].    The following targets are tested for: Enterococcus,    vancomycin resistant enterococci, Listeria monocytogenes,    coagulase negative staphylococci, S. aureus,    methicillin resistant S. aureus, Streptococcus agalactiae    (Group B), S. pneumoniae, S. pyogenes (Group A),    Acinetobacter baumannii, Enterobacter cloacae, E. coli,    Klebsiella oxytoca, K. pneumoniae, Proteus sp.,    Serratia marcescens, Haemophilus influenzae,    Neisseria meningitidis, Pseudomonas aeruginosa, Candida    albicans, C. glabrata, C krusei, C parapsilosis,    C. tropicalis and the KPC resistance gene.  Final Report (10-07-17 @ 11:38):    Growth in aerobic and anaerobic bottles: Staphylococcus aureus  Organism: Blood Culture PCR  Staphylococcus aureus (10-07-17 @ 11:38)  Organism: Staphylococcus aureus (10-07-17 @ 11:38)      -  Cefazolin: S <=4      -  Clindamycin: S <=0.5      -  Erythromycin: S <=0.5      -  Linezolid: S 4      -  Oxacillin: S 0.5      -  Penicillin: R >8      -  RIF- Rifampin: S <=1      -  Trimethoprim/Sulfamethoxazole: S <=0.5/9.5      -  Vancomycin: S 2      Method Type: DILMA  Organism: Blood Culture PCR (10-07-17 @ 11:38)      -  Staphylococcus aureus: Detec Any isolate of Staphylococcus aureus from a blood culture is NOT considered a contaminant.      Method Type: PCR    RADIOLOGY & ADDITIONAL STUDIES reviewed HPI:  72 year old male with a PMHx of HTN, COPD, Prostate CA (2013, s/p radiation and seed placement, on leuprolide with a recent elevation of PSA and imaging showing bone mets) presented from his heme/onc office with worsening of SOB and an episode syncope. He had SOB since early September with associated productive cough with yellow sputum, s/p amoxicillin course for bronchitis.  On arrival to [facility] he was found to have severe sepsis with gram + cocci in clusters identified as MSSA, hospital course was complicated by septic shock and ATN.  Received Vancomycin from 10/4-10-9, nafcillin (10/9-present).    PAST MEDICAL & SURGICAL HISTORY:  COPD (chronic obstructive pulmonary disease)  Hypertension  Obstructive sleep apnea syndrome  Prostate cancer metastatic to bone  PC (prostate cancer): with seed placement    REVIEW OF SYSTEMS:    General: no fevers, no chills  Skin/Breast: +rash  Respiratory and Thorax: +cough  Cardiovascular:	no chest pain  Gastrointestinal:	 no nausea, vomiting , diarrhea  Genitourinary: no dysuria, no hematuria  Neurological: no focal weakness/numbness    ANTIBIOTICS:  nafcillin  IVPB 2 Gram(s) IV Intermittent every 4 hours  nafcillin  IVPB      valACYclovir 1000 milliGRAM(s) Oral every 8 hours    MEDICATIONS  (STANDING):  acetylcysteine 20% Inhalation 3 milliLiter(s) Inhalation three times a day  ALBUTerol/ipratropium for Nebulization 3 milliLiter(s) Nebulizer every 4 hours  aspirin  chewable 81 milliGRAM(s) Oral daily  atorvastatin 20 milliGRAM(s) Oral at bedtime  dextrose 5%. 1000 milliLiter(s) (50 mL/Hr) IV Continuous <Continuous>  dextrose 50% Injectable 12.5 Gram(s) IV Push once  dextrose 50% Injectable 25 Gram(s) IV Push once  dextrose 50% Injectable 25 Gram(s) IV Push once  heparin  Injectable 7500 Unit(s) SubCutaneous every 8 hours  insulin lispro (HumaLOG) corrective regimen sliding scale   SubCutaneous Before meals and at bedtime  melatonin 3 milliGRAM(s) Oral at bedtime  methylPREDNISolone sodium succinate Injectable 40 milliGRAM(s) IV Push daily  nafcillin  IVPB 2 Gram(s) IV Intermittent every 4 hours  nafcillin  IVPB      pantoprazole    Tablet 40 milliGRAM(s) Oral before breakfast  polyethylene glycol 3350 17 Gram(s) Oral two times a day  valACYclovir 1000 milliGRAM(s) Oral every 8 hours    MEDICATIONS  (PRN):  acetaminophen  Suppository 650 milliGRAM(s) Rectal every 6 hours PRN For Temp greater than 38 C (100.4 F)  bisacodyl Suppository 10 milliGRAM(s) Rectal daily PRN Constipation  dextrose Gel 1 Dose(s) Oral once PRN Blood Glucose LESS THAN 70 milliGRAM(s)/deciliter  glucagon  Injectable 1 milliGRAM(s) IntraMuscular once PRN Glucose LESS THAN 70 milligrams/deciliter  metoclopramide Injectable 10 milliGRAM(s) IV Push every 6 hours PRN nausea  simethicone 80 milliGRAM(s) Chew two times a day PRN Gas      Allergies    No Known Allergies    Intolerances    FAMILY HISTORY:  No pertinent family history in first degree relatives    Vital Signs Last 24 Hrs  T(C): 36.7 (10 Oct 2017 13:32), Max: 37.1 (09 Oct 2017 22:49)  T(F): 98.1 (10 Oct 2017 13:32), Max: 98.8 (09 Oct 2017 22:49)  HR: 60 (10 Oct 2017 16:00) (50 - 102)  BP: 119/63 (10 Oct 2017 16:00) (97/47 - 176/79)  BP(mean): 83 (10 Oct 2017 16:00) (62 - 586)  RR: 23 (10 Oct 2017 16:00) (18 - 29)  SpO2: 96% (10 Oct 2017 16:00) (92% - 99%)    PHYSICAL EXAM:  Constitutional: AAOx3, NAD  Eyes: MEHUL, EOMI  Ear/Nose/Throat: no oral lesion, MMM	  Neck: no JVD, no lymphadenopathy, R IJ site without erythema or induration  Respiratory: diffuse scattered wheezes and rhonchi b/l  Cardiovascular: S1S2 RRR, no MRG  Gastrointestinal: soft, (+) BS, no HSM  Extremities: no edema  Vascular: distal pulses intact  Skin: dermatomal herpetic lesions on L chest wall fully crusted    LABS:                        7.9    13.5  )-----------( 167      ( 10 Oct 2017 05:02 )             25.1     10-10    144  |  103  |  25<H>  ----------------------------<  100<H>  3.7   |  28  |  0.99    Ca    8.9      10 Oct 2017 05:07  Phos  3.3     10-10  Mg     2.1     10-10    MICROBIOLOGY:    Culture - Blood (collected 10-09-17 @ 13:45)  Source: .Blood Blood  Gram Stain (10-10-17 @ 11:02):    Aerobic Bottle: Gram Positive Cocci in Clusters    Result called to and read back by_ Ms. KIA Bolaños RN  10/10/2017 11:01:23  Preliminary Report (10-10-17 @ 11:03):    Culture in progress    Culture - Blood (collected 10-09-17 @ 13:45)  Source: .Blood Blood  Gram Stain (10-10-17 @ 12:00):    Aerobic Bottle: Gram Positive Cocci in Clusters    Result called to and read back by_ Ms. KIA Bolaños RN  10/10/2017 11:01:23  Preliminary Report (10-10-17 @ 12:01):    Culture in progress    Culture - Blood (collected 10-07-17 @ 17:25)  Source: .Blood Blood  Gram Stain (10-09-17 @ 10:34):    Aerobic Bottle: Gram Positive Cocci in Clusters    Result called to and read back by_ Ms. KIA Bolaños  10/09/2017 10:31:58  Preliminary Report (10-10-17 @ 12:30):    Growth in aerobic bottle: Staphylococcus aureus    Susceptibility to follow.    Culture - Blood (collected 10-07-17 @ 17:25)  Source: .Blood Blood  Preliminary Report (10-09-17 @ 18:01):    No growth at 2 days.    Culture - Sputum (collected 10-07-17 @ 13:36)  Source: .Sputum Sputum  Gram Stain (10-07-17 @ 15:00):    Moderate epithelial cells    Numerous White blood cells    Moderate Gram positive cocci in pairs, chains and clusters    Rare Yeast  Final Report (10-09-17 @ 11:58):    Normal Respiratory [place] present    Culture - Blood (collected 10-06-17 @ 19:38)  Source: .Blood Blood  Gram Stain (10-07-17 @ 13:42):    Aerobic Bottle: Gram Positive Cocci in Clusters    Result called to and read back by_ Jerrell Hill RN (7 EA) 10/07/2017 13:41  Preliminary Report (10-09-17 @ 12:03):    Growth in aerobic bottle: Staphylococcus aureus  Organism: Staphylococcus aureus (10-09-17 @ 11:59)  Organism: Staphylococcus aureus (10-09-17 @ 11:59)      -  Cefazolin: S <=4      -  Clindamycin: S <=0.5      -  Erythromycin: S <=0.5      -  Linezolid: S 4      -  Oxacillin: S 0.5      -  Penicillin: R >8      -  RIF- Rifampin: S <=1      -  Trimethoprim/Sulfamethoxazole: S <=0.5/9.5      -  Vancomycin: S 2      Method Type: DILMA    Culture - Blood (collected 10-06-17 @ 19:38)  Source: .Blood Blood  Gram Stain (10-07-17 @ 13:44):    Aerobic Bottle: Gram Positive Cocci in Clusters    Result called to and read back by_ Jerrell Hill RN (7 EA) 10/07/2017 13:41  Preliminary Report (10-09-17 @ 12:10):    Growth in aerobic bottle: Staphylococcus aureus  Organism: Staphylococcus aureus (10-09-17 @ 12:06)  Organism: Staphylococcus aureus (10-09-17 @ 12:06)      -  Cefazolin: S <=4      -  Clindamycin: S <=0.5      -  Erythromycin: S <=0.5      -  Linezolid: S 4      -  Oxacillin: S 0.5      -  Penicillin: R >8      -  RIF- Rifampin: S <=1      -  Trimethoprim/Sulfamethoxazole: S <=0.5/9.5      -  Vancomycin: S 2      Method Type: DILMA    Culture - Blood (collected 10-05-17 @ 14:03)  Source: .Blood Blood-Peripheral  Gram Stain (10-07-17 @ 13:45):    Aerobic Bottle: Gram Positive Cocci in Clusters    Result called to and read back by_ Jerrell Hill RN (7 EA) 10/07/2017 13:41  Preliminary Report (10-09-17 @ 12:13):    Growth in aerobic bottle: Staphylococcus aureus  Organism: Staphylococcus aureus (10-09-17 @ 12:11)  Organism: Staphylococcus aureus (10-09-17 @ 12:11)      -  Cefazolin: S <=4      -  Clindamycin: S <=0.5      -  Erythromycin: S <=0.5      -  Linezolid: S 2      -  Oxacillin: S 0.5      -  Penicillin: R >8      -  RIF- Rifampin: S <=1      -  Trimethoprim/Sulfamethoxazole: S <=0.5/9.5      -  Vancomycin: S 2      Method Type: DILMA    Culture - Blood (collected 10-05-17 @ 14:03)  Source: .Blood Blood-Peripheral  Gram Stain (10-06-17 @ 15:23):    Aerobic Bottle: Gram Positive Cocci in Clusters    Result called to and read back by_ Ms. MICHELE Saab RN  10/06/2017    15:21:22  Preliminary Report (10-08-17 @ 12:21):    Growth in aerobic bottle: Staphylococcus aureus  Organism: Staphylococcus aureus (10-08-17 @ 12:21)  Organism: Staphylococcus aureus (10-08-17 @ 12:21)      -  Cefazolin: S <=4      -  Clindamycin: S <=0.5      -  Erythromycin: S <=0.5      -  Linezolid: S 4      -  Oxacillin: S 0.5      -  Penicillin: R >8      -  RIF- Rifampin: S <=1      -  Trimethoprim/Sulfamethoxazole: S <=0.5/9.5      -  Vancomycin: S 2      Method Type: DILMA    Culture - Urine (collected 10-05-17 @ 08:34)  Source: .Urine Clean Catch (Midstream)  Final Report (10-06-17 @ 10:51):    No growth    Culture - Blood (collected 10-04-17 @ 19:37)  Source: .Blood Blood-Peripheral  Gram Stain (10-05-17 @ 09:39):    Aerobic and Anaerobic Bottle: Gram Positive Cocci in Clusters    Result called to and read back by_ Dr. Weiner  10/05/2017 09:35:30  Final Report (10-07-17 @ 11:42):    Growth in aerobic and anaerobic bottles: Staphylococcus aureus    See previous culture FOR SENSITIVITY    Culture - Blood (collected 10-04-17 @ 19:37)  Source: .Blood Blood-Peripheral  Gram Stain (10-05-17 @ 09:37):    Aerobic and Anaerobic Bottle: Gram Positive Cocci in Clusters    Result called to and read back by_ Dr. Weiner  10/05/2017 09:35:30    ***Blood Panel PCR results on this specimen are available    approximately 3 hours after the Gram stain result.***    Gram stain, PCR, and/or culture results may not always    correspond due to difference in methodologies.    ************************************************************    This PCR assay was performed using [employer].    The following targets are tested for: Enterococcus,    vancomycin resistant enterococci, Listeria monocytogenes,    coagulase negative staphylococci, S. aureus,    methicillin resistant S. aureus, Streptococcus agalactiae    (Group B), S. pneumoniae, S. pyogenes (Group A),    Acinetobacter baumannii, Enterobacter cloacae, E. coli,    Klebsiella oxytoca, K. pneumoniae, Proteus sp.,    Serratia marcescens, Haemophilus influenzae,    Neisseria meningitidis, Pseudomonas aeruginosa, Candida    albicans, C. glabrata, C krusei, C parapsilosis,    C. tropicalis and the KPC resistance gene.  Final Report (10-07-17 @ 11:38):    Growth in aerobic and anaerobic bottles: Staphylococcus aureus  Organism: Blood Culture PCR  Staphylococcus aureus (10-07-17 @ 11:38)  Organism: Staphylococcus aureus (10-07-17 @ 11:38)      -  Cefazolin: S <=4      -  Clindamycin: S <=0.5      -  Erythromycin: S <=0.5      -  Linezolid: S 4      -  Oxacillin: S 0.5      -  Penicillin: R >8      -  RIF- Rifampin: S <=1      -  Trimethoprim/Sulfamethoxazole: S <=0.5/9.5      -  Vancomycin: S 2      Method Type: DILMA  Organism: Blood Culture PCR (10-07-17 @ 11:38)      -  Staphylococcus aureus: Detec Any isolate of Staphylococcus aureus from a blood culture is NOT considered a contaminant.      Method Type: PCR    RADIOLOGY & ADDITIONAL STUDIES reviewed

## 2017-10-10 NOTE — PHYSICAL THERAPY INITIAL EVALUATION ADULT - ADDITIONAL COMMENTS
Patient lives in an elevator apartment building without steps to enter. Prior to admission, patient states he was independent for all functional mobility without assistive device.

## 2017-10-10 NOTE — PROGRESS NOTE ADULT - SUBJECTIVE AND OBJECTIVE BOX
TEDDY GUZMAN   MRN-[id number]         CC: Patient is a 72y old  Male who presents with a chief complaint of SOB, syncope (05 Oct 2017 01:42).  Pt now extubated to NC, reporting great relief with ETT removal.  He expresses also great concern for his future and does not see himself alive in the next 5-10 years    HPI:  This is a 72 year old male with a PMHx of HTN, COPD, Prostate CA (2013, s/p radiation and seed placement, on leuprolide with a recent elevation of PSA and imaging showing bone mets) presents from his heme/on office with worsening of SOB and and episode syncope in the morning. The patient states that this morning, he was waking up from a nap and upon getting up from his bed, he felt unwell, dizzy, and lightheaded and then woke up on the ground. He states that he lost consciousness for about 5 seconds before he came too. He hit the left side of his head on the ground, but did not feel dizzy, lightheaded, nauseous after the event. He denies any chest pain, palpitations, diaphoresis or changes in vision prior or after the event as well. He got up and went to his doctors office. He notes that he has had several episodes like this in the past and they occur when he is getting up from his bed or up from a chair. He notes that he usually feels dizzy or lightheaded, but has never had any other symptoms. At his doctors office, he was told about the elevated PSA and the bone mets on imaging. It was at the office that his doctor noticed his increased SOB and sent him to the ED.     The pt notes that he has had SOB since early September with associated productive cough with yellow sputum. The pt states that he went to go see his PCP, Dr. Glass who gave him amoxicillin for bronchitis. The pt states that he felt better and that his breathing and cough seemed improved to him over the past month and did not realize the worsening of his respiratory status until today at his doctors office. The patient denies any fever, chills, n/v/d/c, abdominal pain, hematochezia, melena, hematuria, dysuria, urinary frequency, urinary urgency, numbness or tingling in his extremities. The pt notes that he has nocturia, however this has been chronic in nature as well as worsening of LE edema since the SOB began.    In ED: Vitals: 98.9, HR 73, BP 99/63, RR 24, 96%RA. Presented with leukocytosis and given vanc/zosyn in ED. Pt received about 1L ns( ordered for 2 but second was not given). Pt desaturated in the ED and was placed on BiPAP (05 Oct 2017 01:42)    ROS:  UNABLE TO OBTAIN  due to:    DYSPNEA (Y/N):	n  N/V (Y/N): n	  SECRETIONS (Y/N): n	  AGITATION (Y/N): n  PAIN(Y/N): n       -Provocation/Palliation:     -Quality/Quantity:     -Radiating:     -Severity:     -Timing/Frequency:     -Impact on ADLs:     OTHER REVIEW OF SYSTEMS: +cough, voice hoarse  ALLERGIES:  No Known Allergies    OPIATE NAÏVE (Y/N): y  -iStop reviewed (Y/N): Y    MEDICATIONS: reviewed  MEDICATIONS  (STANDING):  acetylcysteine 20% Inhalation 3 milliLiter(s) Inhalation three times a day  ALBUTerol/ipratropium for Nebulization 3 milliLiter(s) Nebulizer every 4 hours  aspirin  chewable 81 milliGRAM(s) Oral daily  atorvastatin 20 milliGRAM(s) Oral at bedtime  dextrose 5%. 1000 milliLiter(s) (50 mL/Hr) IV Continuous <Continuous>  dextrose 50% Injectable 12.5 Gram(s) IV Push once  dextrose 50% Injectable 25 Gram(s) IV Push once  dextrose 50% Injectable 25 Gram(s) IV Push once  heparin  Injectable 7500 Unit(s) SubCutaneous every 8 hours  insulin lispro (HumaLOG) corrective regimen sliding scale   SubCutaneous Before meals and at bedtime  melatonin 3 milliGRAM(s) Oral at bedtime  methylPREDNISolone sodium succinate Injectable 40 milliGRAM(s) IV Push daily  nafcillin  IVPB 2 Gram(s) IV Intermittent every 4 hours  nafcillin  IVPB      pantoprazole    Tablet 40 milliGRAM(s) Oral before breakfast  polyethylene glycol 3350 17 Gram(s) Oral two times a day  valACYclovir 1000 milliGRAM(s) Oral every 8 hours    MEDICATIONS  (PRN):  acetaminophen  Suppository 650 milliGRAM(s) Rectal every 6 hours PRN For Temp greater than 38 C (100.4 F)  bisacodyl Suppository 10 milliGRAM(s) Rectal daily PRN Constipation  dextrose Gel 1 Dose(s) Oral once PRN Blood Glucose LESS THAN 70 milliGRAM(s)/deciliter  glucagon  Injectable 1 milliGRAM(s) IntraMuscular once PRN Glucose LESS THAN 70 milligrams/deciliter  metoclopramide Injectable 10 milliGRAM(s) IV Push every 6 hours PRN nausea  simethicone 80 milliGRAM(s) Chew two times a day PRN Gas      PHYSICAL EXAM:  T(C): 36.7 (10-10-17 @ 13:32), Max: 37.1 (10-09-17 @ 22:49)  T(F): 98.1 (10-10-17 @ 13:32), Max: 98.8 (10-09-17 @ 22:49)  HR: 62 (10-10-17 @ 17:00) (52 - 102)  BP: 125/62 (10-10-17 @ 17:00) (97/47 - 176/79)  RR: 25 (10-10-17 @ 17:00) (18 - 29)  SpO2: 92% (10-10-17 @ 17:00) (92% - 99%)    GENERAL: Pleasant elderly gentleman appearing worried but eager to talk  HEENT: normocephalic     	  NECK: right IJ TLC          CVS: SB with PVCs          	  RESP: NC, resp even and not labored, nonproductive cough       	  GI: softly protuberant            	  : f/c           	  MUSC: MAEsx4 without any apparent difficulty      	  NEURO: no focal deficits, can speak fair-good Cantonese    	  PSYCH: calm and pleasant, able to convey thoughts/concerns appropriately        SKIN: left lower chest crusty lesions        	   LYMPH: no supraclavicular LAD       LABS: reviewed                        7.9    13.5  )-----------( 167      ( 10 Oct 2017 05:02 )             25.1     10-10    144  |  103  |  25<H>  ----------------------------<  100<H>  3.7   |  28  |  0.99    Ca    8.9      10 Oct 2017 05:07  Phos  3.3     10-10  Mg     2.1     10-10    IMAGING: reviewed  none new    ADVANCE DIRECTIVES:  DNR/DNI    MOLST-as of today    Decision maker: pt  Legal surrogate: chelsi Morris and Teddy, no formal HCPs    PSYCHOSOCIAL-SPIRITUAL ASSESSMENT:  Reviewed    GOALS OF CARE DISCUSSION:  Palliative care info/counseling provided	      Family meeting-met with dorothea Morris separately to update and to discuss his distress with inability to discuss advance directives with pt  Advanced Directives addressed please see Advance Care Planning Note	      See previous Palliative Medicine Note  Documentation of GOC: recover from sepsis    AGENCY CHOICE DISCUSSED:   none          REFERRALS:	   Palliative Med   Unit SW/Case Mgmt  Speech/Swallow  Music Therapy  Nutrition  PT/OT    [ ]CRITICAL CARE TIME PROVIDED TO UNSTABLE PT W/ ORGAN FAILURE            [x]> 50% OF THE TIME SPENT IN COUNSELING AND COORDINATING CARE     [ ]PROLONGED SERVICE       FACE TO FACE: [x]PT     [x ]PT & FAMILY    Start:               End:  	       Minutes: TEDDY GUZMAN   MRN-[id number]         CC: Patient is a 72y old  Male who presents with a chief complaint of SOB, syncope (05 Oct 2017 01:42).  Pt now extubated to NC, reporting great relief with ETT removal.  He expresses also great concern for his future and does not see himself alive in the next 5-10 years    HPI:  This is a 72 year old male with a PMHx of HTN, COPD, Prostate CA (2013, s/p radiation and seed placement, on leuprolide with a recent elevation of PSA and imaging showing bone mets) presents from his heme/on office with worsening of SOB and and episode syncope in the morning. The patient states that this morning, he was waking up from a nap and upon getting up from his bed, he felt unwell, dizzy, and lightheaded and then woke up on the ground. He states that he lost consciousness for about 5 seconds before he came too. He hit the left side of his head on the ground, but did not feel dizzy, lightheaded, nauseous after the event. He denies any chest pain, palpitations, diaphoresis or changes in vision prior or after the event as well. He got up and went to his doctors office. He notes that he has had several episodes like this in the past and they occur when he is getting up from his bed or up from a chair. He notes that he usually feels dizzy or lightheaded, but has never had any other symptoms. At his doctors office, he was told about the elevated PSA and the bone mets on imaging. It was at the office that his doctor noticed his increased SOB and sent him to the ED.     The pt notes that he has had SOB since early September with associated productive cough with yellow sputum. The pt states that he went to go see his PCP, Dr. Glass who gave him amoxicillin for bronchitis. The pt states that he felt better and that his breathing and cough seemed improved to him over the past month and did not realize the worsening of his respiratory status until today at his doctors office. The patient denies any fever, chills, n/v/d/c, abdominal pain, hematochezia, melena, hematuria, dysuria, urinary frequency, urinary urgency, numbness or tingling in his extremities. The pt notes that he has nocturia, however this has been chronic in nature as well as worsening of LE edema since the SOB began.    In ED: Vitals: 98.9, HR 73, BP 99/63, RR 24, 96%RA. Presented with leukocytosis and given vanc/zosyn in ED. Pt received about 1L ns( ordered for 2 but second was not given). Pt desaturated in the ED and was placed on BiPAP (05 Oct 2017 01:42)    ROS:  UNABLE TO OBTAIN  due to:    DYSPNEA (Y/N):	n  N/V (Y/N): n	  SECRETIONS (Y/N): n	  AGITATION (Y/N): n  PAIN(Y/N): n       -Provocation/Palliation:     -Quality/Quantity:     -Radiating:     -Severity:     -Timing/Frequency:     -Impact on ADLs:     OTHER REVIEW OF SYSTEMS: +cough, voice hoarse  ALLERGIES:  No Known Allergies    OPIATE NAÏVE (Y/N): y  -iStop reviewed (Y/N): Y    MEDICATIONS: reviewed  MEDICATIONS  (STANDING):  acetylcysteine 20% Inhalation 3 milliLiter(s) Inhalation three times a day  ALBUTerol/ipratropium for Nebulization 3 milliLiter(s) Nebulizer every 4 hours  aspirin  chewable 81 milliGRAM(s) Oral daily  atorvastatin 20 milliGRAM(s) Oral at bedtime  dextrose 5%. 1000 milliLiter(s) (50 mL/Hr) IV Continuous <Continuous>  dextrose 50% Injectable 12.5 Gram(s) IV Push once  dextrose 50% Injectable 25 Gram(s) IV Push once  dextrose 50% Injectable 25 Gram(s) IV Push once  heparin  Injectable 7500 Unit(s) SubCutaneous every 8 hours  insulin lispro (HumaLOG) corrective regimen sliding scale   SubCutaneous Before meals and at bedtime  melatonin 3 milliGRAM(s) Oral at bedtime  methylPREDNISolone sodium succinate Injectable 40 milliGRAM(s) IV Push daily  nafcillin  IVPB 2 Gram(s) IV Intermittent every 4 hours  nafcillin  IVPB      pantoprazole    Tablet 40 milliGRAM(s) Oral before breakfast  polyethylene glycol 3350 17 Gram(s) Oral two times a day  valACYclovir 1000 milliGRAM(s) Oral every 8 hours    MEDICATIONS  (PRN):  acetaminophen  Suppository 650 milliGRAM(s) Rectal every 6 hours PRN For Temp greater than 38 C (100.4 F)  bisacodyl Suppository 10 milliGRAM(s) Rectal daily PRN Constipation  dextrose Gel 1 Dose(s) Oral once PRN Blood Glucose LESS THAN 70 milliGRAM(s)/deciliter  glucagon  Injectable 1 milliGRAM(s) IntraMuscular once PRN Glucose LESS THAN 70 milligrams/deciliter  metoclopramide Injectable 10 milliGRAM(s) IV Push every 6 hours PRN nausea  simethicone 80 milliGRAM(s) Chew two times a day PRN Gas      PHYSICAL EXAM:  T(C): 36.7 (10-10-17 @ 13:32), Max: 37.1 (10-09-17 @ 22:49)  T(F): 98.1 (10-10-17 @ 13:32), Max: 98.8 (10-09-17 @ 22:49)  HR: 62 (10-10-17 @ 17:00) (52 - 102)  BP: 125/62 (10-10-17 @ 17:00) (97/47 - 176/79)  RR: 25 (10-10-17 @ 17:00) (18 - 29)  SpO2: 92% (10-10-17 @ 17:00) (92% - 99%)    GENERAL: Pleasant elderly gentleman appearing worried but eager to talk  HEENT: normocephalic     	  NECK: right IJ TLC          CVS: SB with PVCs          	  RESP: NC, resp even and not labored, nonproductive cough       	  GI: softly protuberant            	  : f/c           	  MUSC: MAEsx4 without any apparent difficulty      	  NEURO: no focal deficits, can speak fair-good Cantonese    	  PSYCH: calm and pleasant, able to convey thoughts/concerns appropriately        SKIN: left lower chest crusty lesions        	   LYMPH: no supraclavicular LAD       LABS: reviewed                        7.9    13.5  )-----------( 167      ( 10 Oct 2017 05:02 )             25.1     10-10    144  |  103  |  25<H>  ----------------------------<  100<H>  3.7   |  28  |  0.99    Ca    8.9      10 Oct 2017 05:07  Phos  3.3     10-10  Mg     2.1     10-10    IMAGING: reviewed  none new    ADVANCE DIRECTIVES:  DNR only with trial of intubation   MOLST-as of today    Decision maker: pt  Legal surrogate: chelsi Morris and Teddy, no formal HCPs    PSYCHOSOCIAL-SPIRITUAL ASSESSMENT:  Reviewed    GOALS OF CARE DISCUSSION:  Palliative care info/counseling provided	      Family meeting-met with dorothea Morris separately to update and to discuss his distress with inability to discuss advance directives with pt  Advanced Directives addressed please see Advance Care Planning Note	      See previous Palliative Medicine Note  Documentation of GOC: recover from sepsis    AGENCY CHOICE DISCUSSED:   none          REFERRALS:	   Palliative Med   Unit SW/Case Mgmt  Speech/Swallow  Music Therapy  Nutrition  PT/OT    [ ]CRITICAL CARE TIME PROVIDED TO UNSTABLE PT W/ ORGAN FAILURE            [x]> 50% OF THE TIME SPENT IN COUNSELING AND COORDINATING CARE     [ ]PROLONGED SERVICE       FACE TO FACE: [x]PT     [x ]PT & FAMILY    Start:               End:  	       Minutes:

## 2017-10-10 NOTE — CHART NOTE - NSCHARTNOTEFT_GEN_A_CORE
PGY3 Palliative care Resident note:    Patient was seen by bedside, had extensive discussion about patient life and his goals. Patient stated he is doubtful he would live past 10 years due to the aggressiveness of his prostate cancer. Initially he expressed his desire to have his children make decisions about his end of life care. However he also wishes to be comfortable and would not any to remain on life support at the end. Discussed with patient about advanced directives to relieve burden on his children, patient agreed. Will return later to discuss code status and general future goal of care.

## 2017-10-10 NOTE — PHYSICAL THERAPY INITIAL EVALUATION ADULT - MANUAL MUSCLE TESTING RESULTS, REHAB EVAL
Strength grossly at least 3/5 based on functional assessment of  bed mobility, transfers and ambulation

## 2017-10-10 NOTE — PROGRESS NOTE ADULT - SUBJECTIVE AND OBJECTIVE BOX
ADVANCE CARE PLANNING MEETING  START TIME: 1635  END TIME: 1730  TOTAL TIME: 55    A FACE TO FACE MEETING TO DISCUSS ADVANCE CARE PLANNING WAS HELD TODAY REGARDING: TEDDY GUZMAN with Dr. Santiago  PRIMARY DECISION MAKER: pt  ALTERNATE/SURROGATE:  DISCUSSED ADVANCE DIRECTIVES INCLUDING, BUT NOT LIMITED TO, HEALTHCARE PROXY AND CODE STATUS.  DECISION REGARDING CODE STATUS: DNR/DNI  DOCUMENTATION COMPLETED TODAY: [facility] DNR form and MOLST    -role of HCP discussed with pt and pt verbally elected both sons to be HCPs but wants more time to consider who shall be primary and wants to complete paperwork at a later time ADVANCE CARE PLANNING MEETING  START TIME: 1635  END TIME: 1730  TOTAL TIME: 55    A FACE TO FACE MEETING TO DISCUSS ADVANCE CARE PLANNING WAS HELD TODAY REGARDING: TEDDY GUZMAN with Dr. Santiago  PRIMARY DECISION MAKER: pt  ALTERNATE/SURROGATE:  DISCUSSED ADVANCE DIRECTIVES INCLUDING, BUT NOT LIMITED TO, HEALTHCARE PROXY AND CODE STATUS.  DECISION REGARDING CODE STATUS: DNR only with a trial of intubation  DOCUMENTATION COMPLETED TODAY: [facility] DNR form and MOLST    -role of HCP discussed with pt and pt verbally elected both sons to be HCPs but wants more time to consider who shall be primary and wants to complete paperwork at a later time

## 2017-10-10 NOTE — CONSULT NOTE ADULT - ASSESSMENT
Assessment:  72 year old male with a PMHx of HTN, COPD, Prostate CA (2013, s/p radiation and seed placement, on leuprolide with a recent elevation of PSA and imaging showing bone mets) admitted for MSSA bacteremia, blood cultures continue to be positive in spite of 6 days of Vancomycin.  Persistent bacteremia may be 2/2 to unidentified source vs. vancomycin treatment failure.    Recommendations:    -Nafcillin is preferred agent for MSSA bacteremia.  Recommend continued therapy with Nafcillin 2 g q4.  -Would remove central line as this may be a nidus for MSSA  -Gallium scan to r/o unidentified source of bacteremia  -Repeat blood cultures daily until cleared  -Will continue to follow    Thank you for the consult.

## 2017-10-10 NOTE — PROGRESS NOTE ADULT - PROBLEM SELECTOR PLAN 5
MOLST completed along with [facility] DNR form.  Pt is now DNR/DNI with a trial of intubation and a feeding tube if warranted MOLST completed along with [facility] DNR form.  Pt is now DNR with a trial of intubation and a feeding tube if warranted

## 2017-10-10 NOTE — PROGRESS NOTE ADULT - PROBLEM SELECTOR PLAN 6
support provided to son who visually appears distressed.  Attempted to update son on pt's decision regarding advance directives post mtg, but son was asleep in waiting room    As discussed during the palliative IDT meeting and as identified during the patients PSSA screening the patient would benefit from: Music Therapy

## 2017-10-10 NOTE — PHYSICAL THERAPY INITIAL EVALUATION ADULT - GENERAL OBSERVATIONS, REHAB EVAL
Received semi-supine in bed with +tele, +pulse ox, +glaser catheter, on 4LPM O2 via NC, in no apparent distress. Patient denies pain or respiratory distress at rest.

## 2017-10-10 NOTE — PROGRESS NOTE ADULT - SUBJECTIVE AND OBJECTIVE BOX
INTERVAL HPI/OVERNIGHT EVENTS:  Pt was extubated and  passed bedside dysphaga yesterday. Used BiPAP overnight from 11 pm to 5 am for his sleep apnea and underlying COPD  SUBJECTIVE: Patient seen and examined at bedside.    OBJECTIVE:    VITAL SIGNS:  ICU Vital Signs Last 24 Hrs  T(C): 36.7 (10 Oct 2017 13:32), Max: 37.1 (09 Oct 2017 22:49)  T(F): 98.1 (10 Oct 2017 13:32), Max: 98.8 (09 Oct 2017 22:49)  HR: 58 (10 Oct 2017 12:00) (50 - 102)  BP: 117/63 (10 Oct 2017 12:00) (97/47 - 176/79)  BP(mean): 85 (10 Oct 2017 12:00) (62 - 110)  ABP: --  ABP(mean): --  RR: 21 (10 Oct 2017 12:00) (16 - 29)  SpO2: 99% (10 Oct 2017 12:00) (89% - 99%)        10-09 @ 07:01  -  10-10 @ 07:00  --------------------------------------------------------  IN: 1605 mL / OUT: 1500 mL / NET: 105 mL    10-10 @ 07:01  -  10-10 @ 13:32  --------------------------------------------------------  IN: 100 mL / OUT: 100 mL / NET: 0 mL      CAPILLARY BLOOD GLUCOSE  120 (10 Oct 2017 12:00)          PHYSICAL EXAM:    General: NAD  HEENT: NC/AT; PERRL, clear conjunctiva  Neck: supple  Respiratory: CTA b/l  Cardiovascular: +S1/S2; RRR  Abdomen: soft, NT/ND; +BS x4  Extremities: WWP, 2+ peripheral pulses b/l; no LE edema  Skin: normal color and turgor; no rash  Neurological:     MEDICATIONS:  MEDICATIONS  (STANDING):  acetylcysteine 20% Inhalation 3 milliLiter(s) Inhalation three times a day  ALBUTerol/ipratropium for Nebulization 3 milliLiter(s) Nebulizer every 4 hours  aspirin  chewable 81 milliGRAM(s) Oral daily  atorvastatin 20 milliGRAM(s) Oral at bedtime  dextrose 5%. 1000 milliLiter(s) (50 mL/Hr) IV Continuous <Continuous>  dextrose 50% Injectable 12.5 Gram(s) IV Push once  dextrose 50% Injectable 25 Gram(s) IV Push once  dextrose 50% Injectable 25 Gram(s) IV Push once  heparin  Injectable 7500 Unit(s) SubCutaneous every 8 hours  insulin lispro (HumaLOG) corrective regimen sliding scale   SubCutaneous Before meals and at bedtime  melatonin 3 milliGRAM(s) Oral at bedtime  methylPREDNISolone sodium succinate Injectable 40 milliGRAM(s) IV Push daily  nafcillin  IVPB 2 Gram(s) IV Intermittent every 4 hours  nafcillin  IVPB      pantoprazole    Tablet 40 milliGRAM(s) Oral before breakfast  polyethylene glycol 3350 17 Gram(s) Oral two times a day  valACYclovir 1000 milliGRAM(s) Oral every 8 hours    MEDICATIONS  (PRN):  acetaminophen  Suppository 650 milliGRAM(s) Rectal every 6 hours PRN For Temp greater than 38 C (100.4 F)  bisacodyl Suppository 10 milliGRAM(s) Rectal daily PRN Constipation  dextrose Gel 1 Dose(s) Oral once PRN Blood Glucose LESS THAN 70 milliGRAM(s)/deciliter  glucagon  Injectable 1 milliGRAM(s) IntraMuscular once PRN Glucose LESS THAN 70 milligrams/deciliter  metoclopramide Injectable 10 milliGRAM(s) IV Push every 6 hours PRN nausea  simethicone 80 milliGRAM(s) Chew two times a day PRN Gas      ALLERGIES:  Allergies    No Known Allergies    Intolerances        LABS:                        7.9    13.5  )-----------( 167      ( 10 Oct 2017 05:02 )             25.1     10-10    144  |  103  |  25<H>  ----------------------------<  100<H>  3.7   |  28  |  0.99    Ca    8.9      10 Oct 2017 05:07  Phos  3.3     10-10  Mg     2.1     10-10            RADIOLOGY & ADDITIONAL TESTS: Reviewed. INTERVAL HPI/OVERNIGHT EVENTS:  Pt was extubated and  passed bedside dysphaga yesterday. Used BiPAP overnight from 11 pm to 5 am for his sleep apnea and underlying COPD. No BM yesterday but had a big BM this morning    SUBJECTIVE: Patient seen and examined at bedside and he stated that he slept well. No SOB, chest pain, belly pain, N/V.     OBJECTIVE:    VITAL SIGNS:  ICU Vital Signs Last 24 Hrs  T(C): 36.7 (10 Oct 2017 13:32), Max: 37.1 (09 Oct 2017 22:49)  T(F): 98.1 (10 Oct 2017 13:32), Max: 98.8 (09 Oct 2017 22:49)  HR: 58 (10 Oct 2017 12:00) (50 - 102)  BP: 117/63 (10 Oct 2017 12:00) (97/47 - 176/79)  BP(mean): 85 (10 Oct 2017 12:00) (62 - 110)  ABP: --  ABP(mean): --  RR: 21 (10 Oct 2017 12:00) (16 - 29)  SpO2: 99% (10 Oct 2017 12:00) (89% - 99%)        10-09 @ 07:01  -  10-10 @ 07:00  --------------------------------------------------------  IN: 1605 mL / OUT: 1500 mL / NET: 105 mL    10-10 @ 07:01  -  10-10 @ 13:32  --------------------------------------------------------  IN: 100 mL / OUT: 100 mL / NET: 0 mL      CAPILLARY BLOOD GLUCOSE  120 (10 Oct 2017 12:00)          PHYSICAL EXAM:    General: on ventimask, NAD  HEENT: NC/AT; PERRL, clear conjunctiva, moist mucus membranes,  Neck: supple, no lymphadenopathy, Right IJ in situ (Day 6)  Respiratory: exp wheezing with prolonged expiratory phase, aegophony, decreased air entry left basal post., right basal crackles post   Cardiovascular: Irregular, regular rate, +S1/S2;, no murmurs or rubs appreciated  Abdomen: soft, NT; +BS x4, mildly distended, tympanic in all quadrants  Extremities: WWP, 2+ peripheral pulses b/l; right shin bruise with point of entry(scab),   Skin: normal color and turgor; dryed rash (erupted vesicles around left nipple area=dermatomal), venous stasis skin discoloration b/l  Neurological: Awake-AOx3    MEDICATIONS:  MEDICATIONS  (STANDING):  acetylcysteine 20% Inhalation 3 milliLiter(s) Inhalation three times a day  ALBUTerol/ipratropium for Nebulization 3 milliLiter(s) Nebulizer every 4 hours  aspirin  chewable 81 milliGRAM(s) Oral daily  atorvastatin 20 milliGRAM(s) Oral at bedtime  dextrose 5%. 1000 milliLiter(s) (50 mL/Hr) IV Continuous <Continuous>  dextrose 50% Injectable 12.5 Gram(s) IV Push once  dextrose 50% Injectable 25 Gram(s) IV Push once  dextrose 50% Injectable 25 Gram(s) IV Push once  heparin  Injectable 7500 Unit(s) SubCutaneous every 8 hours  insulin lispro (HumaLOG) corrective regimen sliding scale   SubCutaneous Before meals and at bedtime  melatonin 3 milliGRAM(s) Oral at bedtime  methylPREDNISolone sodium succinate Injectable 40 milliGRAM(s) IV Push daily  nafcillin  IVPB 2 Gram(s) IV Intermittent every 4 hours  nafcillin  IVPB      pantoprazole    Tablet 40 milliGRAM(s) Oral before breakfast  polyethylene glycol 3350 17 Gram(s) Oral two times a day  valACYclovir 1000 milliGRAM(s) Oral every 8 hours    MEDICATIONS  (PRN):  acetaminophen  Suppository 650 milliGRAM(s) Rectal every 6 hours PRN For Temp greater than 38 C (100.4 F)  bisacodyl Suppository 10 milliGRAM(s) Rectal daily PRN Constipation  dextrose Gel 1 Dose(s) Oral once PRN Blood Glucose LESS THAN 70 milliGRAM(s)/deciliter  glucagon  Injectable 1 milliGRAM(s) IntraMuscular once PRN Glucose LESS THAN 70 milligrams/deciliter  metoclopramide Injectable 10 milliGRAM(s) IV Push every 6 hours PRN nausea  simethicone 80 milliGRAM(s) Chew two times a day PRN Gas      ALLERGIES:  Allergies    No Known Allergies    Intolerances        LABS:                        7.9    13.5  )-----------( 167      ( 10 Oct 2017 05:02 )             25.1     10-10    144  |  103  |  25<H>  ----------------------------<  100<H>  3.7   |  28  |  0.99    Ca    8.9      10 Oct 2017 05:07  Phos  3.3     10-10  Mg     2.1     10-10            RADIOLOGY & ADDITIONAL TESTS: Reviewed. INTERVAL HPI/OVERNIGHT EVENTS:  Pt was extubated and  passed bedside dysphaga yesterday. Used CPAP overnight from 11 pm to 5 am for his sleep apnea and underlying COPD. No BM yesterday but had a big BM this morning    SUBJECTIVE: Patient seen and examined at bedside and he stated that he slept well. No SOB, chest pain, belly pain, N/V.     OBJECTIVE:    VITAL SIGNS:  ICU Vital Signs Last 24 Hrs  T(C): 36.7 (10 Oct 2017 13:32), Max: 37.1 (09 Oct 2017 22:49)  T(F): 98.1 (10 Oct 2017 13:32), Max: 98.8 (09 Oct 2017 22:49)  HR: 58 (10 Oct 2017 12:00) (50 - 102)  BP: 117/63 (10 Oct 2017 12:00) (97/47 - 176/79)  BP(mean): 85 (10 Oct 2017 12:00) (62 - 110)  ABP: --  ABP(mean): --  RR: 21 (10 Oct 2017 12:00) (16 - 29)  SpO2: 99% (10 Oct 2017 12:00) (89% - 99%)        10-09 @ 07:01  -  10-10 @ 07:00  --------------------------------------------------------  IN: 1605 mL / OUT: 1500 mL / NET: 105 mL    10-10 @ 07:01  -  10-10 @ 13:32  --------------------------------------------------------  IN: 100 mL / OUT: 100 mL / NET: 0 mL      CAPILLARY BLOOD GLUCOSE  120 (10 Oct 2017 12:00)          PHYSICAL EXAM:    General: on ventimask, NAD  HEENT: NC/AT; PERRL, clear conjunctiva, moist mucus membranes,  Neck: supple, no lymphadenopathy, Right IJ in situ (Day 6)  Respiratory: exp wheezing with prolonged expiratory phase, aegophony, decreased air entry left basal post., right basal crackles post   Cardiovascular: Irregular, regular rate, +S1/S2;, no murmurs or rubs appreciated  Abdomen: soft, NT; +BS x4, mildly distended, tympanic in all quadrants  Extremities: WWP, 2+ peripheral pulses b/l; right shin bruise with point of entry(scab),   Skin: normal color and turgor; dryed rash (erupted vesicles around left nipple area=dermatomal), venous stasis skin discoloration b/l  Neurological: Awake-AOx3    MEDICATIONS:  MEDICATIONS  (STANDING):  acetylcysteine 20% Inhalation 3 milliLiter(s) Inhalation three times a day  ALBUTerol/ipratropium for Nebulization 3 milliLiter(s) Nebulizer every 4 hours  aspirin  chewable 81 milliGRAM(s) Oral daily  atorvastatin 20 milliGRAM(s) Oral at bedtime  dextrose 5%. 1000 milliLiter(s) (50 mL/Hr) IV Continuous <Continuous>  dextrose 50% Injectable 12.5 Gram(s) IV Push once  dextrose 50% Injectable 25 Gram(s) IV Push once  dextrose 50% Injectable 25 Gram(s) IV Push once  heparin  Injectable 7500 Unit(s) SubCutaneous every 8 hours  insulin lispro (HumaLOG) corrective regimen sliding scale   SubCutaneous Before meals and at bedtime  melatonin 3 milliGRAM(s) Oral at bedtime  methylPREDNISolone sodium succinate Injectable 40 milliGRAM(s) IV Push daily  nafcillin  IVPB 2 Gram(s) IV Intermittent every 4 hours  nafcillin  IVPB      pantoprazole    Tablet 40 milliGRAM(s) Oral before breakfast  polyethylene glycol 3350 17 Gram(s) Oral two times a day  valACYclovir 1000 milliGRAM(s) Oral every 8 hours    MEDICATIONS  (PRN):  acetaminophen  Suppository 650 milliGRAM(s) Rectal every 6 hours PRN For Temp greater than 38 C (100.4 F)  bisacodyl Suppository 10 milliGRAM(s) Rectal daily PRN Constipation  dextrose Gel 1 Dose(s) Oral once PRN Blood Glucose LESS THAN 70 milliGRAM(s)/deciliter  glucagon  Injectable 1 milliGRAM(s) IntraMuscular once PRN Glucose LESS THAN 70 milligrams/deciliter  metoclopramide Injectable 10 milliGRAM(s) IV Push every 6 hours PRN nausea  simethicone 80 milliGRAM(s) Chew two times a day PRN Gas      ALLERGIES:  Allergies    No Known Allergies    Intolerances        LABS:                        7.9    13.5  )-----------( 167      ( 10 Oct 2017 05:02 )             25.1     10-10    144  |  103  |  25<H>  ----------------------------<  100<H>  3.7   |  28  |  0.99    Ca    8.9      10 Oct 2017 05:07  Phos  3.3     10-10  Mg     2.1     10-10            RADIOLOGY & ADDITIONAL TESTS: Reviewed. INTERVAL HPI/OVERNIGHT EVENTS:  Pt was extubated and  passed bedside dysphaga yesterday. Used CPAP overnight from 11 pm to 5 am for his sleep apnea and underlying COPD. No BM yesterday but had a big BM this morning    SUBJECTIVE: Patient seen and examined at bedside and he stated that he slept well. No SOB, chest pain, belly pain, N/V.     OBJECTIVE:    VITAL SIGNS:  ICU Vital Signs Last 24 Hrs  T(C): 36.7 (10 Oct 2017 13:32), Max: 37.1 (09 Oct 2017 22:49)  T(F): 98.1 (10 Oct 2017 13:32), Max: 98.8 (09 Oct 2017 22:49)  HR: 58 (10 Oct 2017 12:00) (50 - 102)  BP: 117/63 (10 Oct 2017 12:00) (97/47 - 176/79)  BP(mean): 85 (10 Oct 2017 12:00) (62 - 110)  ABP: --  ABP(mean): --  RR: 21 (10 Oct 2017 12:00) (16 - 29)  SpO2: 99% (10 Oct 2017 12:00) (89% - 99%)        10-09 @ 07:01  -  10-10 @ 07:00  --------------------------------------------------------  IN: 1605 mL / OUT: 1500 mL / NET: 105 mL    10-10 @ 07:01  -  10-10 @ 13:32  --------------------------------------------------------  IN: 100 mL / OUT: 100 mL / NET: 0 mL      CAPILLARY BLOOD GLUCOSE  120 (10 Oct 2017 12:00)          PHYSICAL EXAM:    General: on ventimask, NAD  HEENT: NC/AT; PERRL, clear conjunctiva, moist mucus membranes,  Neck: supple, no lymphadenopathy, Right IJ in situ (Day 6)  Respiratory: exp wheezing with prolonged expiratory phase, egophony, decreased air entry left basal post., right basal crackles post   Cardiovascular: Irregular, regular rate, +S1/S2;, no murmurs or rubs appreciated  Abdomen: soft, NT; +BS x4, mildly distended, tympanic in all quadrants  Extremities: WWP,right shin bruise with point of entry(scab), 1+ edema  Vasc: 2+ peripheral pulses b/l  Skin: normal color and turgor; dryed rash (erupted vesicles around left nipple area=dermatomal), venous stasis skin discoloration b/l  Neurological: Awake-AOx3, moves all extremities  Psych: affect appropriate    MEDICATIONS:  MEDICATIONS  (STANDING):  acetylcysteine 20% Inhalation 3 milliLiter(s) Inhalation three times a day  ALBUTerol/ipratropium for Nebulization 3 milliLiter(s) Nebulizer every 4 hours  aspirin  chewable 81 milliGRAM(s) Oral daily  atorvastatin 20 milliGRAM(s) Oral at bedtime  dextrose 5%. 1000 milliLiter(s) (50 mL/Hr) IV Continuous <Continuous>  dextrose 50% Injectable 12.5 Gram(s) IV Push once  dextrose 50% Injectable 25 Gram(s) IV Push once  dextrose 50% Injectable 25 Gram(s) IV Push once  heparin  Injectable 7500 Unit(s) SubCutaneous every 8 hours  insulin lispro (HumaLOG) corrective regimen sliding scale   SubCutaneous Before meals and at bedtime  melatonin 3 milliGRAM(s) Oral at bedtime  methylPREDNISolone sodium succinate Injectable 40 milliGRAM(s) IV Push daily  nafcillin  IVPB 2 Gram(s) IV Intermittent every 4 hours  nafcillin  IVPB      pantoprazole    Tablet 40 milliGRAM(s) Oral before breakfast  polyethylene glycol 3350 17 Gram(s) Oral two times a day  valACYclovir 1000 milliGRAM(s) Oral every 8 hours    MEDICATIONS  (PRN):  acetaminophen  Suppository 650 milliGRAM(s) Rectal every 6 hours PRN For Temp greater than 38 C (100.4 F)  bisacodyl Suppository 10 milliGRAM(s) Rectal daily PRN Constipation  dextrose Gel 1 Dose(s) Oral once PRN Blood Glucose LESS THAN 70 milliGRAM(s)/deciliter  glucagon  Injectable 1 milliGRAM(s) IntraMuscular once PRN Glucose LESS THAN 70 milligrams/deciliter  metoclopramide Injectable 10 milliGRAM(s) IV Push every 6 hours PRN nausea  simethicone 80 milliGRAM(s) Chew two times a day PRN Gas      ALLERGIES:  Allergies    No Known Allergies    Intolerances        LABS:                        7.9    13.5  )-----------( 167      ( 10 Oct 2017 05:02 )             25.1     10-10    144  |  103  |  25<H>  ----------------------------<  100<H>  3.7   |  28  |  0.99    Ca    8.9      10 Oct 2017 05:07  Phos  3.3     10-10  Mg     2.1     10-10            RADIOLOGY & ADDITIONAL TESTS: CXR with bilateral effusions

## 2017-10-10 NOTE — PROGRESS NOTE ADULT - SUBJECTIVE AND OBJECTIVE BOX
Extubated. Tolerated CPAP overnight without difficulty. Now on a mask. He reports feeling better.    CHEMOTHERAPY REGIMEN:        Day:                          Diet:  Protocol:                                    IVF:      MEDICATIONS  (STANDING):  acetylcysteine 20% Inhalation 3 milliLiter(s) Inhalation three times a day  ALBUTerol/ipratropium for Nebulization 3 milliLiter(s) Nebulizer every 4 hours  aspirin  chewable 81 milliGRAM(s) Oral daily  atorvastatin 20 milliGRAM(s) Oral at bedtime  dextrose 5%. 1000 milliLiter(s) (50 mL/Hr) IV Continuous <Continuous>  dextrose 50% Injectable 12.5 Gram(s) IV Push once  dextrose 50% Injectable 25 Gram(s) IV Push once  dextrose 50% Injectable 25 Gram(s) IV Push once  heparin  Injectable 7500 Unit(s) SubCutaneous every 8 hours  insulin lispro (HumaLOG) corrective regimen sliding scale   SubCutaneous Before meals and at bedtime  lactulose Syrup 30 Gram(s) Oral daily  melatonin 3 milliGRAM(s) Oral at bedtime  methylPREDNISolone sodium succinate Injectable 40 milliGRAM(s) IV Push daily  nafcillin  IVPB 2 Gram(s) IV Intermittent every 4 hours  nafcillin  IVPB      pantoprazole   Suspension 40 milliGRAM(s) Oral daily  polyethylene glycol 3350 17 Gram(s) Oral two times a day  valACYclovir 1000 milliGRAM(s) Oral every 8 hours    MEDICATIONS  (PRN):  acetaminophen  Suppository 650 milliGRAM(s) Rectal every 6 hours PRN For Temp greater than 38 C (100.4 F)  bisacodyl Suppository 10 milliGRAM(s) Rectal daily PRN Constipation  dextrose Gel 1 Dose(s) Oral once PRN Blood Glucose LESS THAN 70 milliGRAM(s)/deciliter  glucagon  Injectable 1 milliGRAM(s) IntraMuscular once PRN Glucose LESS THAN 70 milligrams/deciliter  simethicone 80 milliGRAM(s) Chew two times a day PRN Gas      Allergies    No Known Allergies    Intolerances        DVT Prophylaxis: [x ] YES [ ] NO      Antibiotics: [x ] YES [ ] NO    Pain Scale (1-10):       Location:    Vital Signs Last 24 Hrs  T(C): 36.3 (10 Oct 2017 05:18), Max: 37.1 (09 Oct 2017 22:49)  T(F): 97.3 (10 Oct 2017 05:18), Max: 98.8 (09 Oct 2017 22:49)  HR: 70 (10 Oct 2017 06:00) (44 - 102)  BP: 124/65 (10 Oct 2017 06:00) (102/50 - 176/79)  BP(mean): 81 (10 Oct 2017 06:00) (62 - 110)  RR: 24 (10 Oct 2017 06:00) (11 - 29)  SpO2: 98% (10 Oct 2017 06:00) (87% - 100%)    Drug Dosing Weight  Height (cm): 172.72 (05 Oct 2017 01:27)  Weight (kg): 109 (05 Oct 2017 01:27)  BMI (kg/m2): 36.5 (05 Oct 2017 01:27)  BSA (m2): 2.21 (05 Oct 2017 01:27)    PHYSICAL EXAM:      Constitutional: extubated.  Eyes: conjunctiva pink.  ENMT: mask in place.  Neck: line in right neck.  Respiratory: some wheezing noted bilaterally.  Cardiovascular: S1>S2 at apex. Controlled rate.  Gastrointestinal: soft, nontender, active bowel sounds.  Genitourinary: voiding without difficulty.  Extremities: some edema of the legs.  Neurological: no gross focal deficits.  Skin: warm and dry.  Lymph Nodes: none palp.  Musculoskeletal: full ROM.  Psychiatric: affect normal.      URINARY CATHETER: [ ] YES [ ] NO     LABS:  CBC Full  -  ( 10 Oct 2017 05:02 )  WBC Count : 13.5 K/uL  Hemoglobin : 7.9 g/dL  Hematocrit : 25.1 %  Platelet Count - Automated : 167 K/uL  Mean Cell Volume : 98.4 fL  Mean Cell Hemoglobin : 31.0 pg  Mean Cell Hemoglobin Concentration : 31.5 g/dL  Auto Neutrophil # : x  Auto Lymphocyte # : x  Auto Monocyte # : x  Auto Eosinophil # : x  Auto Basophil # : x  Auto Neutrophil % : x  Auto Lymphocyte % : x  Auto Monocyte % : x  Auto Eosinophil % : x  Auto Basophil % : x    10-10    144  |  103  |  25<H>  ----------------------------<  100<H>  3.7   |  28  |  0.99    Ca    8.9      10 Oct 2017 05:07  Phos  3.3     10-10  Mg     2.1     10-10            CULTURES:    RADIOLOGY & ADDITIONAL STUDIES:

## 2017-10-11 LAB
-  CEFAZOLIN: SIGNIFICANT CHANGE UP
-  CLINDAMYCIN: SIGNIFICANT CHANGE UP
-  ERYTHROMYCIN: SIGNIFICANT CHANGE UP
-  LINEZOLID: SIGNIFICANT CHANGE UP
-  OXACILLIN: SIGNIFICANT CHANGE UP
-  PENICILLIN: SIGNIFICANT CHANGE UP
-  RIFAMPIN: SIGNIFICANT CHANGE UP
-  TRIMETHOPRIM/SULFAMETHOXAZOLE: SIGNIFICANT CHANGE UP
-  VANCOMYCIN: SIGNIFICANT CHANGE UP
ANION GAP SERPL CALC-SCNC: 16 MMOL/L — SIGNIFICANT CHANGE UP (ref 5–17)
APTT BLD: 29.1 SEC — SIGNIFICANT CHANGE UP (ref 27.5–37.4)
BLD GP AB SCN SERPL QL: NEGATIVE — SIGNIFICANT CHANGE UP
BUN SERPL-MCNC: 23 MG/DL — SIGNIFICANT CHANGE UP (ref 7–23)
CALCIUM SERPL-MCNC: 9 MG/DL — SIGNIFICANT CHANGE UP (ref 8.4–10.5)
CHLORIDE SERPL-SCNC: 100 MMOL/L — SIGNIFICANT CHANGE UP (ref 96–108)
CO2 SERPL-SCNC: 26 MMOL/L — SIGNIFICANT CHANGE UP (ref 22–31)
CREAT SERPL-MCNC: 1.08 MG/DL — SIGNIFICANT CHANGE UP (ref 0.5–1.3)
GLUCOSE BLDC GLUCOMTR-MCNC: 105 MG/DL — HIGH (ref 70–99)
GLUCOSE BLDC GLUCOMTR-MCNC: 120 MG/DL — HIGH (ref 70–99)
GLUCOSE BLDC GLUCOMTR-MCNC: 133 MG/DL — HIGH (ref 70–99)
GLUCOSE BLDC GLUCOMTR-MCNC: 160 MG/DL — HIGH (ref 70–99)
GLUCOSE SERPL-MCNC: 104 MG/DL — HIGH (ref 70–99)
HCT VFR BLD CALC: 28.1 % — LOW (ref 39–50)
HGB BLD-MCNC: 8.3 G/DL — LOW (ref 13–17)
INR BLD: 1.34 — HIGH (ref 0.88–1.16)
MAGNESIUM SERPL-MCNC: 1.8 MG/DL — SIGNIFICANT CHANGE UP (ref 1.6–2.6)
MCHC RBC-ENTMCNC: 29.5 G/DL — LOW (ref 32–36)
MCHC RBC-ENTMCNC: 29.9 PG — SIGNIFICANT CHANGE UP (ref 27–34)
MCV RBC AUTO: 101.1 FL — HIGH (ref 80–100)
METHOD TYPE: SIGNIFICANT CHANGE UP
PHOSPHATE SERPL-MCNC: 3.3 MG/DL — SIGNIFICANT CHANGE UP (ref 2.5–4.5)
PLATELET # BLD AUTO: 183 K/UL — SIGNIFICANT CHANGE UP (ref 150–400)
POTASSIUM SERPL-MCNC: 3.6 MMOL/L — SIGNIFICANT CHANGE UP (ref 3.5–5.3)
POTASSIUM SERPL-SCNC: 3.6 MMOL/L — SIGNIFICANT CHANGE UP (ref 3.5–5.3)
PROTHROM AB SERPL-ACNC: 15 SEC — HIGH (ref 9.8–12.7)
RBC # BLD: 2.78 M/UL — LOW (ref 4.2–5.8)
RBC # FLD: 15.7 % — SIGNIFICANT CHANGE UP (ref 10.3–16.9)
RH IG SCN BLD-IMP: POSITIVE — SIGNIFICANT CHANGE UP
SODIUM SERPL-SCNC: 142 MMOL/L — SIGNIFICANT CHANGE UP (ref 135–145)
TROPONIN T SERPL-MCNC: 0.56 NG/ML — CRITICAL HIGH (ref 0–0.01)
VANCOMYCIN FLD-MCNC: 6 UG/ML — SIGNIFICANT CHANGE UP
WBC # BLD: 12.8 K/UL — HIGH (ref 3.8–10.5)
WBC # FLD AUTO: 12.8 K/UL — HIGH (ref 3.8–10.5)

## 2017-10-11 PROCEDURE — 74000: CPT | Mod: 26

## 2017-10-11 PROCEDURE — 99233 SBSQ HOSP IP/OBS HIGH 50: CPT

## 2017-10-11 PROCEDURE — 71010: CPT | Mod: 26

## 2017-10-11 PROCEDURE — 99233 SBSQ HOSP IP/OBS HIGH 50: CPT | Mod: GC

## 2017-10-11 RX ORDER — FUROSEMIDE 40 MG
20 TABLET ORAL
Qty: 0 | Refills: 0 | Status: DISCONTINUED | OUTPATIENT
Start: 2017-10-11 | End: 2017-10-20

## 2017-10-11 RX ORDER — POTASSIUM CHLORIDE 20 MEQ
40 PACKET (EA) ORAL EVERY 4 HOURS
Qty: 0 | Refills: 0 | Status: COMPLETED | OUTPATIENT
Start: 2017-10-11 | End: 2017-10-11

## 2017-10-11 RX ORDER — METOCLOPRAMIDE HCL 10 MG
10 TABLET ORAL EVERY 8 HOURS
Qty: 0 | Refills: 0 | Status: DISCONTINUED | OUTPATIENT
Start: 2017-10-11 | End: 2017-10-13

## 2017-10-11 RX ORDER — MAGNESIUM SULFATE 500 MG/ML
1 VIAL (ML) INJECTION ONCE
Qty: 0 | Refills: 0 | Status: COMPLETED | OUTPATIENT
Start: 2017-10-11 | End: 2017-10-11

## 2017-10-11 RX ADMIN — ATORVASTATIN CALCIUM 20 MILLIGRAM(S): 80 TABLET, FILM COATED ORAL at 21:45

## 2017-10-11 RX ADMIN — NAFCILLIN 200 GRAM(S): 10 INJECTION, POWDER, FOR SOLUTION INTRAVENOUS at 23:22

## 2017-10-11 RX ADMIN — NAFCILLIN 200 GRAM(S): 10 INJECTION, POWDER, FOR SOLUTION INTRAVENOUS at 02:25

## 2017-10-11 RX ADMIN — Medication 3 MILLILITER(S): at 13:26

## 2017-10-11 RX ADMIN — Medication 100 GRAM(S): at 12:21

## 2017-10-11 RX ADMIN — Medication 3 MILLILITER(S): at 06:06

## 2017-10-11 RX ADMIN — SIMETHICONE 80 MILLIGRAM(S): 80 TABLET, CHEWABLE ORAL at 12:22

## 2017-10-11 RX ADMIN — Medication 10 MILLIGRAM(S): at 21:45

## 2017-10-11 RX ADMIN — HEPARIN SODIUM 7500 UNIT(S): 5000 INJECTION INTRAVENOUS; SUBCUTANEOUS at 13:25

## 2017-10-11 RX ADMIN — Medication 40 MILLIEQUIVALENT(S): at 00:26

## 2017-10-11 RX ADMIN — VALACYCLOVIR 1000 MILLIGRAM(S): 500 TABLET, FILM COATED ORAL at 07:28

## 2017-10-11 RX ADMIN — NAFCILLIN 200 GRAM(S): 10 INJECTION, POWDER, FOR SOLUTION INTRAVENOUS at 19:58

## 2017-10-11 RX ADMIN — Medication 3 MILLILITER(S): at 02:25

## 2017-10-11 RX ADMIN — HEPARIN SODIUM 7500 UNIT(S): 5000 INJECTION INTRAVENOUS; SUBCUTANEOUS at 21:44

## 2017-10-11 RX ADMIN — Medication 10 MILLIGRAM(S): at 13:26

## 2017-10-11 RX ADMIN — PANTOPRAZOLE SODIUM 40 MILLIGRAM(S): 20 TABLET, DELAYED RELEASE ORAL at 07:27

## 2017-10-11 RX ADMIN — HEPARIN SODIUM 7500 UNIT(S): 5000 INJECTION INTRAVENOUS; SUBCUTANEOUS at 06:46

## 2017-10-11 RX ADMIN — Medication 3 MILLILITER(S): at 18:22

## 2017-10-11 RX ADMIN — SIMETHICONE 80 MILLIGRAM(S): 80 TABLET, CHEWABLE ORAL at 18:26

## 2017-10-11 RX ADMIN — Medication 3 MILLILITER(S): at 21:45

## 2017-10-11 RX ADMIN — Medication 40 MILLIEQUIVALENT(S): at 13:26

## 2017-10-11 RX ADMIN — VALACYCLOVIR 1000 MILLIGRAM(S): 500 TABLET, FILM COATED ORAL at 21:45

## 2017-10-11 RX ADMIN — Medication 81 MILLIGRAM(S): at 12:22

## 2017-10-11 RX ADMIN — NAFCILLIN 200 GRAM(S): 10 INJECTION, POWDER, FOR SOLUTION INTRAVENOUS at 06:00

## 2017-10-11 RX ADMIN — Medication 40 MILLIEQUIVALENT(S): at 04:34

## 2017-10-11 RX ADMIN — NAFCILLIN 200 GRAM(S): 10 INJECTION, POWDER, FOR SOLUTION INTRAVENOUS at 12:23

## 2017-10-11 RX ADMIN — Medication 3 MILLILITER(S): at 09:20

## 2017-10-11 RX ADMIN — Medication 40 MILLIEQUIVALENT(S): at 18:22

## 2017-10-11 RX ADMIN — Medication 20 MILLIGRAM(S): at 06:29

## 2017-10-11 RX ADMIN — VALACYCLOVIR 1000 MILLIGRAM(S): 500 TABLET, FILM COATED ORAL at 13:26

## 2017-10-11 RX ADMIN — Medication 1: at 21:46

## 2017-10-11 RX ADMIN — Medication 3 MILLIGRAM(S): at 23:21

## 2017-10-11 RX ADMIN — Medication 40 MILLIGRAM(S): at 06:27

## 2017-10-11 RX ADMIN — Medication 20 MILLIGRAM(S): at 18:22

## 2017-10-11 RX ADMIN — NAFCILLIN 200 GRAM(S): 10 INJECTION, POWDER, FOR SOLUTION INTRAVENOUS at 16:40

## 2017-10-11 NOTE — PROGRESS NOTE ADULT - PROBLEM SELECTOR PLAN 5
MOLST completed along with [facility] DNR form yesterday.  Pt is DNR with a trial of intubation and a feeding tube if warranted.  Per pt, these wishes were also discussed with family/sons himself

## 2017-10-11 NOTE — PROGRESS NOTE ADULT - SUBJECTIVE AND OBJECTIVE BOX
HC    72 year old male with a PMHx of HTN, COPD, Prostate CA (2013, s/p radiation and seed placement, on leuprolide with a recent elevation of PSA and imaging showing bone mets) presents from his heme/on office with worsening of SOB and and episode syncope the morning of admission 10/4. Pt was admitted for severe sepsis  w/ CATALINA and possible pneumonia. Pt empiracally treated for CAP w/ ceftriaxone/azithromycin/vancomycin and placed on BiPAP. Rapid response was called 10/5 for respiratory failure and hypotension once it was discovered that the patient removed his BiPaP on the floor. Patient stepped up to the MICU and started on peripheral levophed for closer monitoring. Patient intubated that PM for airway protection. In the MICU had OGT, right IJ with left A-line placed. Pt's ABX broadened to Zosyn Vancomycin (dosed by level). OG tube was placed for decompression of previous ileus seen and abdominal distention. Blood Cx came back positive for MSSA w/ persistent bacteremia - last blood cx NGTD. Patient transitioned to nafcillin for MSSA coverage. Pt also had an ECHO (TTE) which showed HFrEF with EF 45%, right atrial dilatation, septal wall motion abnormalities and mild hypokinesis of left ventricle. KELLY which came back negative for any vegetation and recommended to repeat it in 3-4 days because of persistent bacteremia. Troponin was trended to peak because of T wave inversion on EKG (mostly demand ischemia). 10/6 patient came off propofol and levophed. Pt was Started on ,Duonebs Q4 and Solumedrol 40mg Q6. Patient continued to have secretions, started on mucormist. LE dopplers negative for DVT. Liquid BM x2 overnight. Went into afib w/ RVR ~6 am. IV lopressor x2 unsuccessful. Started on amiodarone bolus and patient became bradycardic. Cardiologist was contacted and recommended to continue telemetry, hold Anticoagulation for now. Pt w/ obstipation in MICU started on bowel regimen w/ successful BM and minimal improvement in abdominal distention. Successfully extubated to nasal cannula. Started on aldactone. Patient has been HDS w/ good O2 saturation now stable for 7 La.     SUBJECTIVE: Patient seen and examined at bedside. No acute events since transfer. No acute complaints. Patient resting comfortably in chair.     OBJECTIVE:    VITAL SIGNS:  ICU Vital Signs Last 24 Hrs  T(C): 37.2 (11 Oct 2017 17:00), Max: 37.2 (11 Oct 2017 17:00)  T(F): 98.9 (11 Oct 2017 17:00), Max: 98.9 (11 Oct 2017 17:00)  HR: 76 (11 Oct 2017 16:28) (58 - 96)  BP: 122/62 (11 Oct 2017 14:10) (93/47 - 140/69)  BP(mean): 87 (11 Oct 2017 14:10) (67 - 108)  ABP: --  ABP(mean): --  RR: 20 (11 Oct 2017 16:28) (20 - 30)  SpO2: 96% (11 Oct 2017 16:28) (83% - 99%)        10-10 @ 07:01  -  10-11 @ 07:00  --------------------------------------------------------  IN: 1950 mL / OUT: 2960 mL / NET: -1010 mL    10-11 @ 07:01  -  10-11 @ 17:49  --------------------------------------------------------  IN: 200 mL / OUT: 850 mL / NET: -650 mL      CAPILLARY BLOOD GLUCOSE  133 (11 Oct 2017 12:17)      POCT Blood Glucose.: 120 mg/dL (11 Oct 2017 16:35)      PHYSICAL EXAM:    General: NAD, resting comfortably in chair. Speaking in full sentences; no accessory muscle use.   HEENT: NC/AT; PERRL, clear conjunctiva, MMM  Neck: supple, dressing over RIJ site clean/dry/intact  Respiratory: crackles at bases b/l, wheeze L upper lobe, breath sounds throughout.   Cardiovascular: +S1/S2; RRR, no m/r/g  Abdomen: soft, NT, minimal distention; +BS x4, no palpable masses or organomegaly.   Extremities: WWP, 2+ peripheral pulses b/l; no LE edema  Vascular: 2+ radial pulse/dp/pt  Skin: vesicular rash left chest crusted over, unchanged. Superficial bruising b/l forearms.  Neurological: A&Ox3, no gross deficits, CN 2-12 intact, following commands    MEDICATIONS:  MEDICATIONS  (STANDING):  ALBUTerol/ipratropium for Nebulization 3 milliLiter(s) Nebulizer every 4 hours  aspirin  chewable 81 milliGRAM(s) Oral daily  atorvastatin 20 milliGRAM(s) Oral at bedtime  dextrose 5%. 1000 milliLiter(s) (50 mL/Hr) IV Continuous <Continuous>  dextrose 50% Injectable 12.5 Gram(s) IV Push once  dextrose 50% Injectable 25 Gram(s) IV Push once  dextrose 50% Injectable 25 Gram(s) IV Push once  furosemide   Injectable 20 milliGRAM(s) IV Push two times a day  heparin  Injectable 7500 Unit(s) SubCutaneous every 8 hours  insulin lispro (HumaLOG) corrective regimen sliding scale   SubCutaneous Before meals and at bedtime  melatonin 3 milliGRAM(s) Oral at bedtime  metoclopramide 10 milliGRAM(s) Oral every 8 hours  nafcillin  IVPB 2 Gram(s) IV Intermittent every 4 hours  nafcillin  IVPB      polyethylene glycol 3350 17 Gram(s) Oral two times a day  potassium chloride    Tablet ER 40 milliEquivalent(s) Oral every 4 hours  valACYclovir 1000 milliGRAM(s) Oral every 8 hours    MEDICATIONS  (PRN):  acetaminophen  Suppository 650 milliGRAM(s) Rectal every 6 hours PRN For Temp greater than 38 C (100.4 F)  bisacodyl Suppository 10 milliGRAM(s) Rectal daily PRN Constipation  dextrose Gel 1 Dose(s) Oral once PRN Blood Glucose LESS THAN 70 milliGRAM(s)/deciliter  glucagon  Injectable 1 milliGRAM(s) IntraMuscular once PRN Glucose LESS THAN 70 milligrams/deciliter  simethicone 80 milliGRAM(s) Chew two times a day PRN Gas      ALLERGIES:  Allergies    No Known Allergies    Intolerances        LABS:                        8.3    12.8  )-----------( 183      ( 11 Oct 2017 06:13 )             28.1     10-11    142  |  100  |  23  ----------------------------<  104<H>  3.6   |  26  |  1.08    Ca    9.0      11 Oct 2017 06:13  Phos  3.3     10-11  Mg     1.8     10-11      PT/INR - ( 11 Oct 2017 06:13 )   PT: 15.0 sec;   INR: 1.34          PTT - ( 11 Oct 2017 06:13 )  PTT:29.1 sec      RADIOLOGY & ADDITIONAL TESTS: Reviewed. HC    72 year old male with a PMHx of HTN, COPD, Prostate CA (2013, s/p radiation and seed placement, on leuprolide with a recent elevation of PSA and imaging showing bone mets) presents from his heme/on office with worsening of SOB and and episode syncope the morning of admission 10/4. Pt was admitted for severe sepsis  w/ CATALINA and possible pneumonia. Pt empiracally treated for CAP w/ ceftriaxone/azithromycin/vancomycin and placed on BiPAP. Rapid response was called 10/5 for respiratory failure and hypotension once it was discovered that the patient removed his BiPaP on the floor. Patient stepped up to the MICU and started on peripheral levophed for closer monitoring. Patient intubated that PM for airway protection. In the MICU had OGT, right IJ with left A-line placed. Pt's ABX broadened to Zosyn Vancomycin (dosed by level). OG tube was placed for decompression of previous ileus seen and abdominal distention. Blood Cx came back positive for MSSA w/ persistent bacteremia - last blood cx NGTD. Patient transitioned to nafcillin for MSSA coverage. Pt also had an ECHO (TTE) which showed HFrEF with EF 45%, right atrial dilatation, septal wall motion abnormalities and mild hypokinesis of left ventricle. KELLY which came back negative for any vegetation and recommended to repeat it in 3-4 days because of persistent bacteremia. Troponin was trended to peak because of T wave inversion on EKG (mostly demand ischemia). 10/6 patient came off propofol and levophed. Pt was Started on ,Duonebs Q4 and Solumedrol 40mg Q6. Patient continued to have secretions, started on mucormist. LE dopplers negative for DVT. Liquid BM x2 overnight. Went into afib w/ RVR ~6 am. IV lopressor x2 unsuccessful. Started on amiodarone bolus and patient became bradycardic. Cardiologist was contacted and recommended to continue telemetry, hold Anticoagulation for now. Pt w/ obstipation in MICU started on bowel regimen w/ successful BM and minimal improvement in abdominal distention. Successfully extubated to nasal cannula. Started on aldactone. Patient has been HDS w/ good O2 saturation now stable for 7 La.     SUBJECTIVE: Patient seen and examined at bedside. No acute events since transfer. No acute complaints. Patient resting comfortably in chair.     OBJECTIVE:    VITAL SIGNS:  ICU Vital Signs Last 24 Hrs  T(C): 37.2 (11 Oct 2017 17:00), Max: 37.2 (11 Oct 2017 17:00)  T(F): 98.9 (11 Oct 2017 17:00), Max: 98.9 (11 Oct 2017 17:00)  HR: 76 (11 Oct 2017 16:28) (58 - 96)  BP: 122/62 (11 Oct 2017 14:10) (93/47 - 140/69)  BP(mean): 87 (11 Oct 2017 14:10) (67 - 108)  ABP: --  ABP(mean): --  RR: 20 (11 Oct 2017 16:28) (20 - 30)  SpO2: 96% (11 Oct 2017 16:28) (83% - 99%)        10-10 @ 07:01  -  10-11 @ 07:00  --------------------------------------------------------  IN: 1950 mL / OUT: 2960 mL / NET: -1010 mL    10-11 @ 07:01  -  10-11 @ 17:49  --------------------------------------------------------  IN: 200 mL / OUT: 850 mL / NET: -650 mL      CAPILLARY BLOOD GLUCOSE  133 (11 Oct 2017 12:17)      POCT Blood Glucose.: 120 mg/dL (11 Oct 2017 16:35)      PHYSICAL EXAM:    General: NAD, resting comfortably in chair. Speaking in full sentences; no accessory muscle use.   HEENT: NC/AT; PERRL, clear conjunctiva, MMM  Neck: supple, dressing over RIJ site clean/dry/intact  Respiratory: crackles at bases b/l, wheeze L upper lobe, breath sounds throughout.   Cardiovascular: +S1/S2; RRR, no m/r/g  Abdomen: soft, NT, minimal distention; +BS x4, no palpable masses or organomegaly.   Extremities: WWP, 2+ peripheral pulses b/l; b/l 3+ pitting edema to mid thigh.   Vascular: 2+ radial pulse/dp/pt  Skin: vesicular rash left chest crusted over, unchanged. Superficial bruising b/l forearms.  Neurological: A&Ox3, no gross deficits, CN 2-12 intact, following commands    MEDICATIONS:  MEDICATIONS  (STANDING):  ALBUTerol/ipratropium for Nebulization 3 milliLiter(s) Nebulizer every 4 hours  aspirin  chewable 81 milliGRAM(s) Oral daily  atorvastatin 20 milliGRAM(s) Oral at bedtime  dextrose 5%. 1000 milliLiter(s) (50 mL/Hr) IV Continuous <Continuous>  dextrose 50% Injectable 12.5 Gram(s) IV Push once  dextrose 50% Injectable 25 Gram(s) IV Push once  dextrose 50% Injectable 25 Gram(s) IV Push once  furosemide   Injectable 20 milliGRAM(s) IV Push two times a day  heparin  Injectable 7500 Unit(s) SubCutaneous every 8 hours  insulin lispro (HumaLOG) corrective regimen sliding scale   SubCutaneous Before meals and at bedtime  melatonin 3 milliGRAM(s) Oral at bedtime  metoclopramide 10 milliGRAM(s) Oral every 8 hours  nafcillin  IVPB 2 Gram(s) IV Intermittent every 4 hours  nafcillin  IVPB      polyethylene glycol 3350 17 Gram(s) Oral two times a day  potassium chloride    Tablet ER 40 milliEquivalent(s) Oral every 4 hours  valACYclovir 1000 milliGRAM(s) Oral every 8 hours    MEDICATIONS  (PRN):  acetaminophen  Suppository 650 milliGRAM(s) Rectal every 6 hours PRN For Temp greater than 38 C (100.4 F)  bisacodyl Suppository 10 milliGRAM(s) Rectal daily PRN Constipation  dextrose Gel 1 Dose(s) Oral once PRN Blood Glucose LESS THAN 70 milliGRAM(s)/deciliter  glucagon  Injectable 1 milliGRAM(s) IntraMuscular once PRN Glucose LESS THAN 70 milligrams/deciliter  simethicone 80 milliGRAM(s) Chew two times a day PRN Gas      ALLERGIES:  Allergies    No Known Allergies    Intolerances        LABS:                        8.3    12.8  )-----------( 183      ( 11 Oct 2017 06:13 )             28.1     10-11    142  |  100  |  23  ----------------------------<  104<H>  3.6   |  26  |  1.08    Ca    9.0      11 Oct 2017 06:13  Phos  3.3     10-11  Mg     1.8     10-11      PT/INR - ( 11 Oct 2017 06:13 )   PT: 15.0 sec;   INR: 1.34          PTT - ( 11 Oct 2017 06:13 )  PTT:29.1 sec      RADIOLOGY & ADDITIONAL TESTS: Reviewed.

## 2017-10-11 NOTE — PROGRESS NOTE ADULT - SUBJECTIVE AND OBJECTIVE BOX
On followup, the right int jugular catheter was removed. The site is non-indurated, non-inflamed and non-tender. Notes and cultures are followed.

## 2017-10-11 NOTE — PROGRESS NOTE ADULT - SUBJECTIVE AND OBJECTIVE BOX
TEDDY GUZMAN   MRN-[id number]         CC: Patient is a 72y old  Male who presents with a chief complaint of SOB, syncope (05 Oct 2017 01:42) found to be in septic shock.  Pt reports feeling overall better and is happy he had conversation with sons yesterday regarding advance directives, found it to be a surprisingly positive experience, and feels he can cont. to focus on living his life with cancer rather than dying from it. He also expresses his excitement to continually improve to physically see his new 2wk old fraternal twins grandchildren.  P.T. notes reviewed    HPI:  This is a 72 year old male with a PMHx of HTN, COPD, Prostate CA (2013, s/p radiation and seed placement, on leuprolide with a recent elevation of PSA and imaging showing bone mets) presents from his heme/on office with worsening of SOB and and episode syncope in the morning. The patient states that this morning, he was waking up from a nap and upon getting up from his bed, he felt unwell, dizzy, and lightheaded and then woke up on the ground. He states that he lost consciousness for about 5 seconds before he came too. He hit the left side of his head on the ground, but did not feel dizzy, lightheaded, nauseous after the event. He denies any chest pain, palpitations, diaphoresis or changes in vision prior or after the event as well. He got up and went to his doctors office. He notes that he has had several episodes like this in the past and they occur when he is getting up from his bed or up from a chair. He notes that he usually feels dizzy or lightheaded, but has never had any other symptoms. At his doctors office, he was told about the elevated PSA and the bone mets on imaging. It was at the office that his doctor noticed his increased SOB and sent him to the ED.     The pt notes that he has had SOB since early September with associated productive cough with yellow sputum. The pt states that he went to go see his PCP, Dr. Glass who gave him amoxicillin for bronchitis. The pt states that he felt better and that his breathing and cough seemed improved to him over the past month and did not realize the worsening of his respiratory status until today at his doctors office. The patient denies any fever, chills, n/v/d/c, abdominal pain, hematochezia, melena, hematuria, dysuria, urinary frequency, urinary urgency, numbness or tingling in his extremities. The pt notes that he has nocturia, however this has been chronic in nature as well as worsening of LE edema since the SOB began.    In ED: Vitals: 98.9, HR 73, BP 99/63, RR 24, 96%RA. Presented with leukocytosis and given vanc/zosyn in ED. Pt received about 1L ns( ordered for 2 but second was not given). Pt desaturated in the ED and was placed on BiPAP (05 Oct 2017 01:42)      ROS:  UNABLE TO OBTAIN  due to:    DYSPNEA (Y/N):	mild, with exertion  N/V (Y/N): n	  SECRETIONS (Y/N): n	  AGITATION (Y/N): n  PAIN(Y/N): n       -Provocation/Palliation:     -Quality/Quantity:     -Radiating:     -Severity:     -Timing/Frequency:     -Impact on ADLs:     OTHER REVIEW OF SYSTEMS:  ALLERGIES:  No Known Allergies    OPIATE NAÏVE (Y/N): y  -iStop reviewed (Y/N): Y    MEDICATIONS: reviewed  MEDICATIONS  (STANDING):  ALBUTerol/ipratropium for Nebulization 3 milliLiter(s) Nebulizer every 4 hours  aspirin  chewable 81 milliGRAM(s) Oral daily  atorvastatin 20 milliGRAM(s) Oral at bedtime  dextrose 5%. 1000 milliLiter(s) (50 mL/Hr) IV Continuous <Continuous>  dextrose 50% Injectable 12.5 Gram(s) IV Push once  dextrose 50% Injectable 25 Gram(s) IV Push once  dextrose 50% Injectable 25 Gram(s) IV Push once  furosemide   Injectable 20 milliGRAM(s) IV Push two times a day  heparin  Injectable 7500 Unit(s) SubCutaneous every 8 hours  insulin lispro (HumaLOG) corrective regimen sliding scale   SubCutaneous Before meals and at bedtime  melatonin 3 milliGRAM(s) Oral at bedtime  metoclopramide 10 milliGRAM(s) Oral every 8 hours  nafcillin  IVPB 2 Gram(s) IV Intermittent every 4 hours  nafcillin  IVPB      polyethylene glycol 3350 17 Gram(s) Oral two times a day  potassium chloride    Tablet ER 40 milliEquivalent(s) Oral every 4 hours  valACYclovir 1000 milliGRAM(s) Oral every 8 hours    MEDICATIONS  (PRN):  acetaminophen  Suppository 650 milliGRAM(s) Rectal every 6 hours PRN For Temp greater than 38 C (100.4 F)  bisacodyl Suppository 10 milliGRAM(s) Rectal daily PRN Constipation  dextrose Gel 1 Dose(s) Oral once PRN Blood Glucose LESS THAN 70 milliGRAM(s)/deciliter  glucagon  Injectable 1 milliGRAM(s) IntraMuscular once PRN Glucose LESS THAN 70 milligrams/deciliter  simethicone 80 milliGRAM(s) Chew two times a day PRN Gas      PHYSICAL EXAM:  T(C): 36.4 (10-11-17 @ 14:10), Max: 36.8 (10-11-17 @ 01:06)  T(F): 97.6 (10-11-17 @ 14:10), Max: 98.2 (10-11-17 @ 01:06)  HR: 76 (10-11-17 @ 14:10) (56 - 96)  BP: 122/62 (10-11-17 @ 14:10) (93/47 - 140/69)  RR: 20 (10-11-17 @ 14:10) (20 - 30)  SpO2: 96% (10-11-17 @ 14:10) (83% - 99%)    GENERAL: sitting up in chair appearing to be in good spirits, smiling upon entering   HEENT: normocephalic, anicteric, hearing appears intact, no nasal discharge, moist mucous membranes     	  NECK: short         CVS: SR          	  RESP: 6LNC, mild cough      	  GI: softly protuberant            	  : voiding, light tea color           	  MUSC:       	  NEURO: no focal deficits    	  PSYCH: calm and pleasant         SKIN: dry crusty brown lesion to left lower chest        	        LABS: reviewed                        8.3    12.8  )-----------( 183      ( 11 Oct 2017 06:13 )             28.1     10-11    142  |  100  |  23  ----------------------------<  104<H>  3.6   |  26  |  1.08    Ca    9.0      11 Oct 2017 06:13  Phos  3.3     10-11  Mg     1.8     10-11    PT/INR - ( 11 Oct 2017 06:13 )   PT: 15.0 sec;   INR: 1.34     PTT - ( 11 Oct 2017 06:13 )  PTT:29.1 sec    IMAGING: reviewed  none new    ADVANCE DIRECTIVES:  DNR only    MOLST     Decision maker: pt  Legal surrogate:    PSYCHOSOCIAL-SPIRITUAL ASSESSMENT:  Reviewed    GOALS OF CARE DISCUSSION:  Palliative care info/counseling provided	      Family meeting  Advanced Directives addressed please see Advance Care Planning Note	      See previous Palliative Medicine Note  Documentation of GOC:     AGENCY CHOICE DISCUSSED:     Home care          REFERRALS:	   Palliative Med   Unit SW/Case Mgmt  Music Therapy  Nutrition  PT/OT    [ ]CRITICAL CARE TIME PROVIDED TO UNSTABLE PT W/ ORGAN FAILURE            [x]> 50% OF THE TIME SPENT IN COUNSELING AND COORDINATING CARE     [ ]PROLONGED SERVICE       FACE TO FACE: [x]PT     [ ]PT & FAMILY    Start:               End:  	       Minutes:

## 2017-10-11 NOTE — PROGRESS NOTE ADULT - ASSESSMENT
The patient continues to recover slowly. Blood cultures still positive as of 10/9. Another set is now pending.

## 2017-10-11 NOTE — PROGRESS NOTE ADULT - SUBJECTIVE AND OBJECTIVE BOX
Cardiology for [facility]    cc: follow-up cardiology evaluation and management of left heart failure    interval - now 7 [facility]achman; no cp.  no new complaints    PAST MEDICAL & SURGICAL HISTORY:  COPD (chronic obstructive pulmonary disease)  Hypertension  Obstructive sleep apnea syndrome  Prostate cancer metastatic to bone  PC (prostate cancer): with seed placement    MEDICATIONS  (STANDING):  ALBUTerol/ipratropium for Nebulization 3 milliLiter(s) Nebulizer every 4 hours  aspirin  chewable 81 milliGRAM(s) Oral daily  atorvastatin 20 milliGRAM(s) Oral at bedtime  dextrose 5%. 1000 milliLiter(s) (50 mL/Hr) IV Continuous <Continuous>  dextrose 50% Injectable 12.5 Gram(s) IV Push once  dextrose 50% Injectable 25 Gram(s) IV Push once  dextrose 50% Injectable 25 Gram(s) IV Push once  furosemide   Injectable 20 milliGRAM(s) IV Push two times a day  heparin  Injectable 7500 Unit(s) SubCutaneous every 8 hours  insulin lispro (HumaLOG) corrective regimen sliding scale   SubCutaneous Before meals and at bedtime  melatonin 3 milliGRAM(s) Oral at bedtime  metoclopramide 10 milliGRAM(s) Oral every 8 hours  nafcillin  IVPB 2 Gram(s) IV Intermittent every 4 hours  nafcillin  IVPB      polyethylene glycol 3350 17 Gram(s) Oral two times a day  valACYclovir 1000 milliGRAM(s) Oral every 8 hours    MEDICATIONS  (PRN):  acetaminophen  Suppository 650 milliGRAM(s) Rectal every 6 hours PRN For Temp greater than 38 C (100.4 F)  bisacodyl Suppository 10 milliGRAM(s) Rectal daily PRN Constipation  dextrose Gel 1 Dose(s) Oral once PRN Blood Glucose LESS THAN 70 milliGRAM(s)/deciliter  glucagon  Injectable 1 milliGRAM(s) IntraMuscular once PRN Glucose LESS THAN 70 milligrams/deciliter  simethicone 80 milliGRAM(s) Chew two times a day PRN Gas    Vital Signs Last 24 Hrs  T(C): 37.2 (10-11-17 @ 21:00), Max: 37.2 (10-11-17 @ 17:00)  T(F): 98.9 (10-11-17 @ 21:00), Max: 98.9 (10-11-17 @ 17:00)  HR: 75 (10-11-17 @ 20:46) (58 - 96)  BP: 115/65 (10-11-17 @ 20:46) (103/55 - 140/69)  BP(mean): 82 (10-11-17 @ 20:46) (75 - 108)  RR: 18 (10-11-17 @ 20:46) (17 - 27)  SpO2: 97% (10-11-17 @ 20:46) (83% - 98%)    physical exam  nad  oob to chair  mmm  decreased breath sounds  rr; s1/s2 present; no rub  soft abd; nd  trace-1+ bilat le edema  warm le      I&O's Detail    10 Oct 2017 07:01  -  11 Oct 2017 07:00  --------------------------------------------------------  IN:    IV PiggyBack: 600 mL    Oral Fluid: 1350 mL  Total IN: 1950 mL    OUT:    Indwelling Catheter - Urethral: 2960 mL  Total OUT: 2960 mL    Total NET: -1010 mL      11 Oct 2017 07:01  -  11 Oct 2017 23:11  --------------------------------------------------------  IN:    Oral Fluid: 300 mL    Solution: 100 mL    Solution: 200 mL  Total IN: 600 mL    OUT:    Indwelling Catheter - Urethral: 625 mL    Voided: 1475 mL  Total OUT: 2100 mL    Total NET: -1500 mL                            8.3    12.8  )-----------( 183      ( 11 Oct 2017 06:13 )             28.1   142  |  100  |  23  ----------------------------<  104<H>  3.6   |  26  |  1.08    Ca    9.0      11 Oct 2017 06:13  Phos  3.3     10-11  Mg     1.8     10-11      Troponin T, Serum (10.11.17 @ 06:13)    Troponin T, Serum: 0.56: 3RD CRITICAL TROPT  Reference interval for troponin T is </= 0.01 ng/mL which includes the  99th percentile of a healthy population. Troponin T results are not  interchangeable with troponin I results. ng/mL    Thyroid Stimulating Hormone, Serum (10.10.17 @ 15:58)    Thyroid Stimulating Hormone, Serum: 0.930 uIU/mL      C-Reactive Protein, Serum (10.09.17 @ 05:50)    C-Reactive Protein, Serum: 6.50 mg/dL    Sedimentation Rate, Erythrocyte (10.09.17 @ 05:50)    Sedimentation Rate, Erythrocyte: >130 mm/Hr            I&O's Summary    10 Oct 2017 07:01  -  11 Oct 2017 07:00  --------------------------------------------------------  IN: 1950 mL / OUT: 2960 mL / NET: -1010 mL    11 Oct 2017 07:01  -  11 Oct 2017 23:12  --------------------------------------------------------  IN: 600 mL / OUT: 2100 mL / NET: -1500 mL      tele reviewed 10-    < from: KELLY w/Doppler (10.09.17 @ 13:35) >  EXAM:  ESOPHAGEAL ECHO (CARDIOL)                          *** ADDENDUM 10/09/2017  ***    Patient Height: 172.0 cm  Patient Weight: 109.0 kg  BSA: 2.2 m^2  Interpretation Summary  Normal left ventricular size and wall thickness.The left ventricular  ejection   fraction is estimated to be 40-45%The right ventricle is dilated.The   right   ventricular systolic function is mildly reduced.The left atrium is   dilated.The   right atrium is dilated.Thickening and calcification of the aortic   valveNo   aortic regurgitation noted.Mitral valve thickening noted.There is mild to   moderate mitral regurgitation.There is trace tricuspid   regurgitation.There was   insufficient TR detected from which to calculate pulmonary artery   systolic   pressure. No pulmonic regurgitation noted.There is aortic root   sclerosis/calcification.Mild atherosclerotic plaque(s) in the aortic   arch.Mild   atherosclerotic plaque(s) in the descending aorta.There is no pericardial   effusion.No discrete vegetations noted.    < end of copied text >    < from: Xray Chest 1 View AP -PORTABLE-Routine (10.09.17 @ 05:37) >  EXAM:  XR CHEST 1 VIEW PORT ROUTINE                          PROCEDURE DATE:  10/09/2017                     INTERPRETATION:    Portable AP Radiograph dated 10/9/2017 5:37 AM    CLINICAL INFORMATION: 72 years, Male, Intubated, PNA    PRIOR STUDIES:October 8, 2017    FINDINGS: Support tubes and lines are unchanged. Left pleural effusion is   stable. Right pleural effusion has increased. Pulmonary vascular   congestion has increased.    IMPRESSION:  Interval increase of right pleural effusion and pulmonary vascular   congestion.            "Thank you for the opportunity to participate in the care of this   patient."        KATHY PABLO M.D., RADIOLOGY ATTENDING  This document has been electronically signed. Oct  9 2017  8:33AM    < end of copied text >

## 2017-10-11 NOTE — PROGRESS NOTE ADULT - ASSESSMENT
72 y.o gentleman with h/o metastatic prostate ca to bones s/p RT with seed implants and on leuprolide sent from Oncologist's office for unwitnessed syncopal episode, SOB, and productive cough, found to be septic from pna and GPC bacteremia and ATN with respiratory failure requiring intubation, now on CPAP, seen as an ICU trigger for an active stage IV malignancy 72 y.o gentleman with h/o metastatic prostate ca to bones s/p RT with seed implants and on leuprolide sent from Oncologist's office for unwitnessed syncopal episode, SOB, and productive cough, found to be septic from pna and GPC bacteremia and ATN with respiratory failure requiring intubation, now on NC, seen as an ICU trigger for an active stage IV malignancy

## 2017-10-11 NOTE — PROGRESS NOTE ADULT - ASSESSMENT
Assessment	  73 yo male with hx of HTN, COPD, Prostate CA (s/p radiation and seed 2013 on leuprolide outpatient) who presents from heme onc with worsening of SOB and episode of unwitnessed syncope at home found to have severe sepsis with gram + cocci in clusters identified as S. aureus and ATN. Hospital course complicated by rapid response for hypotension,unresponsve to painful stimuli and was transferred to MICU for for septic shock. Septic shock has resolved but pt has had persistent MSSA bacteremia with resolving respiratory failure and ATN.    ID  #Sepsis w/ persistent MSSA bacteremia  - patient admitted and transferred to MICU requiring peripheral levophed now off pressor support.   - persistent bacteremia with MSSA; originally treated w/ ceftriaxone,azithromycin,vancomycin then - > vancomycin/zosyn -> now nafcillin  - most recent blood cx NGTD; pending finalization  -TTE negative for vegetations.   -KELLY done 10/9 is NEGATIVE for vegetation (Thickening of aortic valve which mostly calcified-Mild MR). Recommend to repeat KELLY in 3-4 days because of persistent positive blood Cx  - Gallium scan done today; pending read.     Respiratory   #Acute hypoxic Respiratory Failure-resolved  -s/p intubation(10/4) w/o sedation(10/6).  -LE Venous duplex is negative for DVT  - likely component of underlying COPD w/ severe sepsis      #Possible COPD exacerbation  - Patient now s/p steroids   - Wheezing on exam this PM   - c/w duonebs q 4   - encourage ambulation and incentive spirometry.       Cardiovascular  #New onset paroxysmal A.fib Vs MAT:  -s/p lopressor and Amiodarone bolus-->back to NSR  -As per cardiologist recs: Continue telemetry-No anticoagulation for now for elevated risk of bleeding and anemia-Rec V/Q scan  -Rate control: (Pt's HR is within 60s,50s), will hold BB for now  -F/U further cardiologist recs    #HFrEF  -TTE shows HFrEF with EF 45%, right atrial dilatation, septal wall motion abnormalities and mild hypokinesis of left ventricle. Unable to visualize any vegetations or R heart structures. Unable to calculate pulmonary a pressures.   -As per cardiolgist: ECHO was nomal last year normal,-Consider ischemia eval when acute issues resolve.  - c/w ASA + lipitor  - pt hypervolemic on exam, c/w lasix. To be reevaluated daily  - strict I/Os     #NSTEMI :  -EKG positive for ST depression in anteroseptal leads, Echo positive for septal hypokinesis, Troponin 0.06 -->0.10-->0.19-->0.23-->0.17-->0.15-->0.44-->0.54-->0.56, likely probably due to demand ischemia from hypoxia exacerbated by renal insufficiency (cr. 2.07 on admission).   - Daily troponin    Renal  ATN-resolved  - ATN on admission w/ Cr now at baseline.     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  -Anemia: Hb stable monitor Hb and transfuse if Hb<7.   -Thrombocytopenia resolved     Skin    #Shingles  Day 5/7 of valtrex, to complete course. No isolation    GI    #ileus -resolved  -c/w PO agents for constipation and reglan for motility   - patient w/ multiple BM    F no IVF  E- replete lytes prn;   N-DASH diet    PPx - Heparin SC for DVT     CODE - DNR not DNI    Dispo: 7 [facility]

## 2017-10-11 NOTE — PROGRESS NOTE ADULT - ASSESSMENT
Assessment	  71 yo male with hx of HTN, COPD, Prostate CA (s/p radiation and seed 2013 on leuprolide outpatient) who presents from [facility] onc with worsening of SOB and episode of unwitnessed syncope at home found to have severe sepsis with gram + cocci in clusters identified as S. aureus and ATN. Hospital course complicated by rapid response for hypotension,unresponsve to painful stimuli and was transferred to MICU for for septic shock and has had persistent MSSA bacteremia with resolving respiratory failure and ATN.    ID  #Septic shock: resolved  -Patient admitted with severe sepsis requiring fluid bolus. Patient was on maintenance fluid of 60 cc/hr NS. Patient hypotensive during rapid response requiring 500 cc bolus and peripheral levophed to be titrated to MAP of 60 mmHg (Septic shock)  -Patient prescribed Amoxicillin for bronchitis in OP w/o improvement of SOB. Patient given broad spectrum coverage with vancomycin+ceftriaxone+azithromycin. which was switched to vanc+zosyn (zosyn was started on saturday 10/7 and d/c 10/9) (Vancomycin was switched to Nafcillin on 10/9  -TTE negative for vegetations.  -CRP is 6.50 (mildly elevated) but ESR is markedly elevated >135     -Pt started to have leucocytoses 10/10 of 13.5K which could be due to steroid use, will observe.   -Pt is euvolemic, perfusing well, UOP is adequate, will monitor I/O  -Patient blood cx grew (gram positive cocci in clusters/Staph aureus) (on 10/4,10/5,10/6,10/7 and 10/9 One bottle is positive too   -Will remove lines as a possible continued source of infection ( remover Right IJ after getting peripheral lines)  -KELLY done 10/9 is NEGATIVE for vegetation (Thickening of aortic valve which mostly calcified-Mild MR). F/U official report. Recommend to repeat KELLY in 3-4 days because of persistent positive blood Cx  -Will consult ID and repeat blood Cx and do a full body bone/gallium scan. F/U further ID recs    Respiratory   #Acute hypoxic Respiratory Failure-resolved  -s/p intubation(10/4) w/o sedation(10/6).  -LE Venous duplex is negative for DVT    -Pt was extubated yesterday 10/9 and placed on CPAP for likely sleep apnea with pulse ox desaturation while asleep      #Possible COPD exacerbation  Patient with hx of COPD outpatient w/o home O2 requirements. Patient presented w/ difficulty breathing in ED and subsequently placed on CPAP w/ improvement in symptoms. Attempt to wean off BiPAP was poorly tolerated by patient . Patient had rapid response after removing his CPAP mask with ABG showing 7.25/55/334/23 off CPAP.   -Repeat ABG following intubation shows 7.38/49/237/23   -ABG on 10/8: 7.46/39/128/27/99    -Pt has wet cough after extubation- incinteve spirometry/ PT and ambulation  -c/w duonebs Q4   -On exam, pt has  expiratory wheezing: Continue steroids 40 daily    Cardiovascular  #New onset paroxysmal A.fib Vs MAT:  -s/p lopressor and Amiodarone bolus-->back to NSR  -As per cardiologist recs: Check TSH-Continue telemetry-consider anticoagulation if additional a.fib-consider bb as above if no contraindications (Pt's HR is within 60s,50s), will hold BB for now)  -Pt also needs to be on ACEI (later on after acute event resolves because pt has presented with CATALINA from severe sepsis and pt also has labile BP)    #HFrEF  -TTE shows HFrEF with EF 45%, right atrial dilatation, septal wall motion abnormalities and mild hypokinesis of left ventricle. Unable to visualize any vegetations or R heart structures. Unable to calculate pulmonary a pressures.   -As per cardiolgist: ECHO was nomal last year normal,. BNP was very high on admission there are no other clinical findings for CHF.   -Continue Aspirin/lipitor.  -Consider ischemia eval when acute issues resolve.  -Pt has left sided pl.effuison and also mild left leg pitting edema (+1 to the shin), will add lasix 20 mg once and monitor UOP    #NSTEMI :  -EKG positive for ST depression in anteroseptal leads, Echo positive for septal hypokinesis, Troponin 0.06 -->0.10-->0.19-->0.23-->0.17-->0.15, likely probably due to demand ischemia from hypoxia exacerbated by renal insufficiency (cr. 2.07 on admission).   -recheck troponin (as per cardio recs)    Renal  ATN-resolved  Patient had significant increase in Cr from baseline. UA significant for casts concerning for ATN possibly 2/2 excessive diuresis coupled with poor PO intake at home vs due to severe sepsis on admission. FeUrea 8.9% suggesting possible pre renal(hypotension-sepsis) contribution to CATALINA. - UOP following rapid response was 0 ml output over one hour, then fluctuates from admission to ICU till 10/8 but 10/9 was increasing close to normal limits.  -Creatinine is back to baseline     Heme/onc  #Prostate ca w/ mets to spine  As per oncologist: Bicalutamide has been recently D/Mango. Will observe for a withdrawal effect. Will continue to hold  -Anemia: Hb stable monitor Hb and transfuse if Hb<7.   -Thrombocytopenia resolved     Skin  Shingles: Continue Valtrex(valcyclovir) through OGT (Total of 7 days) - no req for isolation     GI  -AXR with air filled stomach and likely partial ileus with aerophagia  -AXR continues to show mild colonic distension (10/8)  - S/p enema and manual disimpaction with no stool evacuation (10/8)  -Will start Reglan 10 mg Q6H as prokinetic agent   -Will continue laxatives PO (lactulose/Miralax)      F no IVF as pt is +ve balance +105 over last 24 hrs  E- replete lytes prn;   N-DASH diet    PPx - Heparin SC for DVT     CODE - FULL. Assessment	  73 yo male with hx of HTN, COPD, Prostate CA (s/p radiation and seed 2013 on leuprolide outpatient) who presents from [facility] onc with worsening of SOB and episode of unwitnessed syncope at home found to have severe sepsis with gram + cocci in clusters identified as S. aureus and ATN. Hospital course complicated by rapid response for hypotension,unresponsve to painful stimuli and was transferred to MICU for for septic shock. Septic shock has resolved but pt has had persistent MSSA bacteremia with resolving respiratory failure and ATN.    ID  #Septic shock: resolved  -Patient admitted with severe sepsis requiring fluid bolus. Patient was on maintenance fluid of 60 cc/hr NS. Patient hypotensive during rapid response requiring 500 cc bolus and peripheral levophed to be titrated to MAP of 60 mmHg (Septic shock)  -Patient prescribed Amoxicillin for bronchitis in OP w/o improvement of SOB. Patient given broad spectrum coverage with vancomycin+ceftriaxone+azithromycin. which was switched to vanc+zosyn (zosyn was started on saturday 10/7 and d/c 10/9) (Vancomycin was switched to Nafcillin on 10/9  -TTE negative for vegetations.  -CRP is 6.50 (mildly elevated) but ESR is markedly elevated >135   -KELLY done 10/9 is NEGATIVE for vegetation (Thickening of aortic valve which mostly calcified-Mild MR). F/U official report. Recommend to repeat KELLY in 3-4 days because of persistent positive blood Cx    -Pt leucocytoses is trending down from 13.5K to 12.8 which could be due to steroid use, will observe and will d/c steoids   -Pt is euvolemic, perfusing well, UOP is adequate, will monitor I/O  -Patient blood cx grew (gram positive cocci in clusters/Staph aureus) (on 10/4,10/5,10/6,10/7 and 10/9   -Central line and Saldana's catheter were removed (Pt got two new peripheral line as of 10/11)  -Repeat blood Cx done on 10/10 is NEGATIVE todate. F/U final report  -ID was consulted and recommended to do daily blood Cx and full body bone/gallium scan. Scan was done 10/11. F/U results  -KELLY to be repeated either 10/12 or 10/13.    Respiratory   #Acute hypoxic Respiratory Failure-resolved  -s/p intubation(10/4) w/o sedation(10/6).  -LE Venous duplex is negative for DVT  -Pt was extubated on 10/9 and placed on CPAP for likely sleep apnea with pulse ox desaturation while asleep      #Possible COPD exacerbation  Patient with hx of COPD outpatient w/o home O2 requirements. Patient presented w/ difficulty breathing in ED and subsequently placed on CPAP w/ improvement in symptoms. Attempt to wean off BiPAP was poorly tolerated by patient . Patient had rapid response after removing his CPAP mask with ABG showing 7.25/55/334/23 off CPAP.   -Repeat ABG following intubation shows 7.38/49/237/23   -ABG on 10/8: 7.46/39/128/27/99    -Pt still has wet cough after extubation- incinteve spirometry/ PT and ambulation  -c/w duonebs Q4   -Pt was placed on mucomyst while he had thick secretions. Will d/c because the risk of bronchospasm  -On exam, pt has mild end expiratory wheezing which could be related to mucomyst. will d/c  -Pt finished a course of steroids(6 days). Will d/c    Cardiovascular  #New onset paroxysmal A.fib Vs MAT:  -s/p lopressor and Amiodarone bolus-->back to NSR  -As per cardiologist recs: Continue telemetry-No anticoagulation for now for elevated risk of bleeding and anemia-Rec V/Q scan  -Rate control: (Pt's HR is within 60s,50s), will hold BB for now  -Pt also needs to be on ACEI (later on after acute event resolves because pt has presented with CATALINA from severe sepsis and pt also has labile BP)  -F/U further cardiologist recs    #HFrEF  -TTE shows HFrEF with EF 45%, right atrial dilatation, septal wall motion abnormalities and mild hypokinesis of left ventricle. Unable to visualize any vegetations or R heart structures. Unable to calculate pulmonary a pressures.   -As per cardiolgist: ECHO was nomal last year normal,-Consider ischemia eval when acute issues resolve.  -Continue Aspirin/lipitor.  -Pt has left sided pl.effuison and also +2 b/l leg edema > on left leg. Pt responded well to lasix 20 mg yesterday (-500). Will continue diuresis. Lasix 20 BID. Monitor kidney functions(for possible worsening if pt is overdiuresd and monitor electrolytes (K-for replacement)    #NSTEMI :  -EKG positive for ST depression in anteroseptal leads, Echo positive for septal hypokinesis, Troponin 0.06 -->0.10-->0.19-->0.23-->0.17-->0.15-->0.44-->0.54-->0.56, likely probably due to demand ischemia from hypoxia exacerbated by renal insufficiency (cr. 2.07 on admission).   -Coninue trending trops to peak (as per cardio recs)    Renal  ATN-resolved  Patient had significant increase in Cr from baseline. UA significant for casts concerning for ATN possibly 2/2 excessive diuresis coupled with poor PO intake at home vs due to severe sepsis on admission. FeUrea 8.9% suggesting possible pre renal(hypotension-sepsis) contribution to CATALINA. - UOP following rapid response was 0 ml output over one hour, then fluctuates from admission to ICU till 10/8 but 10/9 was increasing close to normal limits.  -UOP is back to normal since yetserday. Pt also on lasix  -Creatinine and BUN are back to baseline     Heme/onc  #Prostate ca w/ mets to spine  As per oncologist: Bicalutamide has been recently D/Mango. Will observe for a withdrawal effect. Will continue to hold  -Anemia: Hb stable monitor Hb and transfuse if Hb<7.   -Thrombocytopenia resolved     Skin  Shingles: Continue Valtrex(valcyclovir) through OGT (Total of 7 days=Day 5) - no req for isolation     GI  -AXR with air filled stomach and likely partial ileus with aerophagia  -AXR continues to show mild colonic distension (10/8)  - S/p enema and manual disimpaction with no stool evacuation (10/8)  -Will continue Reglan 10 mg Q6H as prokinetic agent(for 24-48 hours). Also encourage ambulation. Monitor QT   -Pt moved his bowel yesterday 6 times and passing gas too. Repeated Abd. xray 10/11 improved compared to previous ones  -Will continue laxatives PO (lactulose/Miralax)      F no IVF as pt has left pl. effusion and b/l leg   E- replete lytes prn;   N-DASH diet    PPx - Heparin SC for DVT     CODE - DNR    Dispo: 7 [facility] Assessment	  71 yo male with hx of HTN, COPD, Prostate CA (s/p radiation and seed 2013 on leuprolide outpatient) who presents from heme onc with worsening of SOB and episode of unwitnessed syncope at home found to have severe sepsis with gram + cocci in clusters identified as S. aureus and ATN. Hospital course complicated by rapid response for hypotension,unresponsve to painful stimuli and was transferred to MICU for for septic shock. Septic shock has resolved but pt has had persistent MSSA bacteremia with resolving respiratory failure and ATN.    ID  #Sepsis: resolving  -Patient admitted with severe sepsis requiring fluid bolus. Patient was on maintenance fluid of 60 cc/hr NS. Patient hypotensive during rapid response requiring 500 cc bolus and peripheral levophed to be titrated to MAP of 60 mmHg (Septic shock)  -Patient prescribed Amoxicillin for bronchitis in OP w/o improvement of SOB. Patient given broad spectrum coverage with vancomycin+ceftriaxone+azithromycin. which was switched to vanc+zosyn (zosyn was started on saturday 10/7 and d/c 10/9) (Vancomycin was switched to Nafcillin on 10/9  -TTE negative for vegetations.  -CRP is 6.50 (mildly elevated) but ESR is markedly elevated >135   -KELLY done 10/9 is NEGATIVE for vegetation (Thickening of aortic valve which mostly calcified-Mild MR). F/U official report. Recommend to repeat KELLY in 3-4 days because of persistent positive blood Cx    -Pt leucocytoses is trending down from 13.5K to 12.8 which could be due to steroid use, will observe and will d/c steoids   -Pt is euvolemic, perfusing well, UOP is adequate, will monitor I/O  -Patient blood cx grew (gram positive cocci in clusters/Staph aureus) (on 10/4,10/5,10/6,10/7 and 10/9   -Central line and Saldana's catheter were removed (Pt got two new peripheral line as of 10/11)  -Repeat blood Cx done on 10/10 is NEGATIVE todate. F/U final report  -ID was consulted and recommended to do daily blood Cx and full body bone/gallium scan. Scan was done 10/11. F/U results  -KELLY to be repeated either 10/12 or 10/13.    Respiratory   #Acute hypoxic Respiratory Failure-resolved  -s/p intubation(10/4) w/o sedation(10/6).  -LE Venous duplex is negative for DVT  -Pt was extubated on 10/9 and placed on CPAP for likely sleep apnea with pulse ox desaturation while asleep      #Possible COPD exacerbation  Patient with hx of COPD outpatient w/o home O2 requirements. Patient presented w/ difficulty breathing in ED and subsequently placed on CPAP w/ improvement in symptoms. Attempt to wean off BiPAP was poorly tolerated by patient . Patient had rapid response after removing his CPAP mask with ABG showing 7.25/55/334/23 off CPAP.   -Repeat ABG following intubation shows 7.38/49/237/23   -ABG on 10/8: 7.46/39/128/27/99    -Pt still has wet cough after extubation- incinteve spirometry/ PT and ambulation  -c/w duonebs Q4   -Pt was placed on mucomyst while he had thick secretions. Will d/c because the risk of bronchospasm  -On exam, pt has mild end expiratory wheezing which could be related to mucomyst. will d/c  -Pt finished a course of steroids(5 days). Will d/c    Cardiovascular  #New onset paroxysmal A.fib Vs MAT:  -s/p lopressor and Amiodarone bolus-->back to NSR  -As per cardiologist recs: Continue telemetry-No anticoagulation for now for elevated risk of bleeding and anemia-Rec V/Q scan  -Rate control: (Pt's HR is within 60s,50s), will hold BB for now  -Pt also needs to be on ACEI (later on after acute event resolves because pt has presented with CATALINA from severe sepsis and pt also has labile BP)  -F/U further cardiologist recs    #HFrEF  -TTE shows HFrEF with EF 45%, right atrial dilatation, septal wall motion abnormalities and mild hypokinesis of left ventricle. Unable to visualize any vegetations or R heart structures. Unable to calculate pulmonary a pressures.   -As per cardiolgist: ECHO was nomal last year normal,-Consider ischemia eval when acute issues resolve.  -Continue Aspirin/lipitor.  -Pt has left sided pl.effuison and also +2 b/l leg edema > on left leg. Pt responded well to lasix 20 mg yesterday (-500). Will continue diuresis. Lasix 20 BID. Monitor kidney functions(for possible worsening if pt is overdiuresd and monitor electrolytes (K-for replacement)    #NSTEMI :  -EKG positive for ST depression in anteroseptal leads, Echo positive for septal hypokinesis, Troponin 0.06 -->0.10-->0.19-->0.23-->0.17-->0.15-->0.44-->0.54-->0.56, likely probably due to demand ischemia from hypoxia exacerbated by renal insufficiency (cr. 2.07 on admission).   -Coninue trending trops to peak (as per cardio recs)    Renal  ATN-resolved  Patient had significant increase in Cr from baseline. UA significant for casts concerning for ATN possibly 2/2 excessive diuresis coupled with poor PO intake at home vs due to severe sepsis on admission. FeUrea 8.9% suggesting possible pre renal(hypotension-sepsis) contribution to CATALINA. - UOP following rapid response was 0 ml output over one hour, then fluctuates from admission to ICU till 10/8 but 10/9 was increasing close to normal limits.  -UOP is back to normal since yetserday. Pt also on lasix and also spironolactone to decrease K loss.  -Creatinine and BUN are back to baseline     Heme/onc  #Prostate ca w/ mets to spine  As per oncologist: Bicalutamide has been recently D/Mango. Will observe for a withdrawal effect. Will continue to hold  -Anemia: Hb stable monitor Hb and transfuse if Hb<7.   -Thrombocytopenia resolved     Skin  Shingles: Continue Valtrex(valcyclovir) through OGT (Total of 7 days=Day 5) - no req for isolation     GI  -AXR with air filled stomach and likely partial ileus with aerophagia  -AXR continues to show mild colonic distension (10/8)  - S/p enema and manual disimpaction with no stool evacuation (10/8)  -Will continue Reglan 10 mg Q6H as prokinetic agent(for 24-48 hours). Also encourage ambulation. Monitor QT   -Pt moved his bowel yesterday 6 times and passing gas too. Repeated Abd. xray 10/11 improved compared to previous ones  -Will continue laxatives PO (lactulose/Miralax)      F no IVF as pt has left pl. effusion and b/l leg   E- replete lytes prn;   N-DASH diet    PPx - Heparin SC for DVT     CODE - DNR    Dispo: 7 [facility] Assessment	  73 yo male with hx of HTN, COPD, Prostate CA (s/p radiation and seed 2013 on leuprolide outpatient) who presents from heme onc with worsening of SOB and episode of unwitnessed syncope at home found to have severe sepsis with gram + cocci in clusters identified as S. aureus and ATN. Hospital course complicated by rapid response for hypotension,unresponsve to painful stimuli and was transferred to MICU for for septic shock. Septic shock has resolved but pt has had persistent MSSA bacteremia with resolving respiratory failure and ATN.    ID  #Sepsis: resolving  -Patient admitted with severe sepsis requiring fluid bolus. Patient was on maintenance fluid of 60 cc/hr NS. Patient hypotensive during rapid response requiring 500 cc bolus and peripheral levophed to be titrated to MAP of 60 mmHg (Septic shock)  -Patient prescribed Amoxicillin for bronchitis in OP w/o improvement of SOB. Patient given broad spectrum coverage with vancomycin+ceftriaxone+azithromycin. which was switched to vanc+zosyn (zosyn was started on saturday 10/7 and d/c 10/9) (Vancomycin was switched to Nafcillin on 10/9  -TTE negative for vegetations.  -CRP is 6.50 (mildly elevated) but ESR is markedly elevated >135   -KELLY done 10/9 is NEGATIVE for vegetation (Thickening of aortic valve which mostly calcified-Mild MR). F/U official report. Recommend to repeat KELLY in 3-4 days because of persistent positive blood Cx    -Pt leucocytoses is trending down from 13.5K to 12.8 which could be due to steroid use, will observe and will d/c steoids   -Pt is euvolemic, perfusing well, UOP is adequate, will monitor I/O  -Patient blood cx grew (gram positive cocci in clusters/Staph aureus) (on 10/4,10/5,10/6,10/7 and 10/9   -Central line and Saldana's catheter were removed (Pt got two new peripheral line as of 10/11)  -Repeat blood Cx done on 10/10 is NEGATIVE todate. F/U final report  -ID was consulted and recommended to do daily blood Cx and full body bone/gallium scan. Scan was done 10/11 and to have second stage 10/12. F/U results  -KELLY to be repeated either 10/12 or 10/13.    Respiratory   #Acute hypoxic Respiratory Failure-resolved  -s/p intubation(10/4) w/o sedation(10/6).  -LE Venous duplex is negative for DVT  -Pt was extubated on 10/9 and placed on CPAP for likely sleep apnea with pulse ox desaturation while asleep  -Breathing and CXR better with negative fluid balance      #Possible COPD exacerbation  Patient with hx of COPD outpatient w/o home O2 requirements. Patient presented w/ difficulty breathing in ED and subsequently placed on CPAP w/ improvement in symptoms. Attempt to wean off BiPAP was poorly tolerated by patient . Patient had rapid response after removing his CPAP mask with ABG showing 7.25/55/334/23 off CPAP.   -Repeat ABG following intubation shows 7.38/49/237/23   -ABG on 10/8: 7.46/39/128/27/99    -Pt still has wet cough after extubation- incinteve spirometry/ PT and ambulation  -c/w duonebs Q4   -Pt was placed on mucomyst while he had thick secretions. Will d/c because the risk of bronchospasm  -On exam, pt has mild end expiratory wheezing which could be related to mucomyst. will d/c  -Pt finished a course of steroids(5 days). Will d/c    Cardiovascular  #New onset paroxysmal A.fib Vs MAT:  -s/p lopressor and Amiodarone bolus-->back to NSR  -As per cardiologist recs: Continue telemetry-No anticoagulation for now for elevated risk of bleeding and anemia-Rec V/Q scan  -Rate control: (Pt's HR is within 60s,50s), will hold BB for now  -Pt also needs to be on ACEI (later on after acute event resolves because pt has presented with CATALINA from severe sepsis and pt also has labile BP)  -F/U further cardiologist recs    #HFrEF  -TTE shows HFrEF with EF 45%, right atrial dilatation, septal wall motion abnormalities and mild hypokinesis of left ventricle. Unable to visualize any vegetations or R heart structures. Unable to calculate pulmonary a pressures.   -As per cardiolgist: ECHO was nomal last year normal,-Consider ischemia eval when acute issues resolve.  -Continue Aspirin/lipitor.  -Pt has left sided pl.effuison and also +2 b/l leg edema > on left leg. Pt responded well to lasix 20 mg yesterday (-500). Will continue diuresis. Lasix 20 BID. Monitor kidney functions(for possible worsening if pt is overdiuresd and monitor electrolytes (K-for replacement)    #NSTEMI :  -EKG positive for ST depression in anteroseptal leads, Echo positive for septal hypokinesis, Troponin 0.06 -->0.10-->0.19-->0.23-->0.17-->0.15-->0.44-->0.54-->0.56, likely probably due to demand ischemia from hypoxia exacerbated by renal insufficiency (cr. 2.07 on admission).   -Coninue trending trops to peak (as per cardio recs)    Renal  ATN-resolved  Patient had significant increase in Cr from baseline. UA significant for casts concerning for ATN possibly 2/2 excessive diuresis coupled with poor PO intake at home vs due to severe sepsis on admission. FeUrea 8.9% suggesting possible pre renal(hypotension-sepsis) contribution to CATALINA. - UOP following rapid response was 0 ml output over one hour, then fluctuates from admission to ICU till 10/8 but 10/9 was increasing close to normal limits.  -UOP is back to normal since yetserday. Pt also on lasix and also spironolactone to decrease K loss with improved CXR.  -Creatinine and BUN are back to baseline     Heme/onc  #Prostate ca w/ mets to spine  As per oncologist: Bicalutamide has been recently D/Mango. Will observe for a withdrawal effect. Will continue to hold  -Anemia: Hb stable monitor Hb and transfuse if Hb<7.   -Thrombocytopenia resolved     Skin  Shingles: Continue Valtrex(valcyclovir) through OGT (Total of 7 days=Day 5) - no req for isolation     GI  -AXR with air filled stomach and likely partial ileus with aerophagia  -AXR continues to show mild colonic distension (10/8)  - S/p enema and manual disimpaction with no stool evacuation (10/8)  -Will continue Reglan 10 mg Q6H as prokinetic agent(for 24-48 hours). Also encourage ambulation. Monitor QT   -Pt moved his bowel yesterday 6 times and passing gas too. Repeated Abd. xray 10/11 improved compared to previous ones  -Will continue laxatives PO (lactulose/Miralax)      F no IVF as pt has left pl. effusion and b/l leg   E- replete lytes prn;   N-DASH diet    PPx - Heparin SC for DVT     CODE - DNR    Dispo: [facility]

## 2017-10-11 NOTE — PROGRESS NOTE ADULT - PROBLEM SELECTOR PLAN 6
Will cont. to follow for support.  Patient to have access to supportive services during rest of hospital stay as the patient/family deemed necessary ie. Chaplaincy, Massage Therapy, Music Therapy, Pt/family supportive services, Palliative SW, etc.    As discussed during the palliative IDT meeting and as identified during the patients PSSA screening the patient would benefit from: Music Therapy, nutrition, P.T.

## 2017-10-11 NOTE — PROGRESS NOTE ADULT - SUBJECTIVE AND OBJECTIVE BOX
INTERVAL HPI/OVERNIGHT EVENTS:  Right IJ line was removed. UOP over 24hrs is 1200s with one dose of lasix 20 mg. Troponin slightly increased from 0.44 to 0.54. No BM overnight but he had >4 during the day yesterday. Pt had his meals w/o any complaints    SUBJECTIVE: Patient seen and examined at bedside. He denied any complaints. No SOB, CP, abdominal pain, N/V/D.     OBJECTIVE:    VITAL SIGNS:  ICU Vital Signs Last 24 Hrs  T(C): 36.8 (11 Oct 2017 06:09), Max: 36.8 (10 Oct 2017 11:11)  T(F): 98.2 (11 Oct 2017 06:09), Max: 98.2 (10 Oct 2017 11:11)  HR: 78 (11 Oct 2017 08:00) (54 - 84)  BP: 112/57 (11 Oct 2017 08:00) (93/47 - 130/62)  BP(mean): 75 (11 Oct 2017 08:00) (67 - 586)  ABP: --  ABP(mean): --  RR: 25 (11 Oct 2017 08:00) (18 - 30)  SpO2: 91% (11 Oct 2017 08:00) (87% - 99%)        10-10 @ 07:01  -  10-11 @ 07:00  --------------------------------------------------------  IN: 1950 mL / OUT: 2960 mL / NET: -1010 mL    10-11 @ 07:01  -  10-11 @ 08:14  --------------------------------------------------------  IN: 0 mL / OUT: 75 mL / NET: -75 mL      CAPILLARY BLOOD GLUCOSE  105 (11 Oct 2017 05:03)      POCT Blood Glucose.: 105 mg/dL (11 Oct 2017 05:15)      PHYSICAL EXAM:    General: on nasal cannula, NAD  HEENT: NC/AT; PERRL, clear conjunctiva, moist mucus membranes,  Neck: supple, no lymphadenopathy.  Respiratory: End exp wheezing with pursed lip breathing, b/l upper rhonchi ant/post,   Cardiovascular: Irregular rhythm, regular rate, +S1/S2;, no murmurs or rubs appreciated  Abdomen: soft, NT; +BS x4, mildly distended, tympanic in all quadrants  Extremities: WWP,right shin bruise with point of entry(scab), 2+ edema up to the shin b/l  Vasc: 2+ peripheral pulses b/l  Skin: normal color and turgor; dryed rash (erupted vesicles around left nipple area=dermatomal), venous stasis skin discoloration b/l  Neurological: Awake-AOx3, moves all extremities  Psych: affect appropriate    MEDICATIONS:  MEDICATIONS  (STANDING):  acetylcysteine 20% Inhalation 3 milliLiter(s) Inhalation three times a day  ALBUTerol/ipratropium for Nebulization 3 milliLiter(s) Nebulizer every 4 hours  aspirin  chewable 81 milliGRAM(s) Oral daily  atorvastatin 20 milliGRAM(s) Oral at bedtime  dextrose 5%. 1000 milliLiter(s) (50 mL/Hr) IV Continuous <Continuous>  dextrose 50% Injectable 12.5 Gram(s) IV Push once  dextrose 50% Injectable 25 Gram(s) IV Push once  dextrose 50% Injectable 25 Gram(s) IV Push once  furosemide   Injectable 20 milliGRAM(s) IV Push two times a day  heparin  Injectable 7500 Unit(s) SubCutaneous every 8 hours  insulin lispro (HumaLOG) corrective regimen sliding scale   SubCutaneous Before meals and at bedtime  magnesium sulfate  IVPB 1 Gram(s) IV Intermittent once  melatonin 3 milliGRAM(s) Oral at bedtime  methylPREDNISolone sodium succinate Injectable 40 milliGRAM(s) IV Push daily  metoclopramide 10 milliGRAM(s) Oral every 8 hours  nafcillin  IVPB 2 Gram(s) IV Intermittent every 4 hours  nafcillin  IVPB      pantoprazole    Tablet 40 milliGRAM(s) Oral before breakfast  polyethylene glycol 3350 17 Gram(s) Oral two times a day  valACYclovir 1000 milliGRAM(s) Oral every 8 hours    MEDICATIONS  (PRN):  acetaminophen  Suppository 650 milliGRAM(s) Rectal every 6 hours PRN For Temp greater than 38 C (100.4 F)  bisacodyl Suppository 10 milliGRAM(s) Rectal daily PRN Constipation  dextrose Gel 1 Dose(s) Oral once PRN Blood Glucose LESS THAN 70 milliGRAM(s)/deciliter  glucagon  Injectable 1 milliGRAM(s) IntraMuscular once PRN Glucose LESS THAN 70 milligrams/deciliter  simethicone 80 milliGRAM(s) Chew two times a day PRN Gas      ALLERGIES:  Allergies    No Known Allergies    LABS:                        8.3    12.8  )-----------( 183      ( 11 Oct 2017 06:13 )             28.1     10-11    142  |  100  |  23  ----------------------------<  104<H>  3.6   |  26  |  1.08    Ca    9.0      11 Oct 2017 06:13  Phos  3.3     10-11  Mg     1.8     10-11      PT/INR - ( 11 Oct 2017 06:13 )   PT: 15.0 sec;   INR: 1.34          PTT - ( 11 Oct 2017 06:13 )  PTT:29.1 sec      RADIOLOGY & ADDITIONAL TESTS: Reviewed. Hospital Course:  72 year old male with a PMHx of HTN, COPD, Prostate CA (2013, s/p radiation and seed placement, on leuprolide with a recent elevation of PSA and imaging showing bone mets) presents from his heme/on office with worsening of SOB and and episode syncope the morning of admission 10/4. Pt was admitted for severe sepsis (leucocytoses-tachypnea)with and CATALINA with possible pneumonia. Pt received IVF and Abx(Ceft-Azithro and Vancomycin) and was placed on a BiPAP. Later on during the night, pt was persistently hypotensive despite 2L bolus. Was placed on pressors(septic shock). Rapid response was called because BiPAP was not on and pt shorly after became unresponsive even to to painful stimuli (sterna rub). Pt possibly aspirated(copious thick secretions after intubation) or his levophed was not properly adjusted (up-titration) , so he was intubated for airway protection and was transferred to ICU. Pt had Saldana's, OGT, right IJ with left A-line (10-4). Pt's ABX broadened to Zosyn Vancomycin (dosed by level). OG tube was placed for decompression of previous ileus seen and abdominal distention.    O/N: ETT advanced 1cm. Central line and A-line placed. ABG shows continued acidosis, 2amps bicarb given. Repeat ABG with resolution of acidemia. VBG obtained to check mixed venous sat (99%), repeat after decreasing FiO2 from 80% to 60% showed mixed venous sat of 88%. Troponin uptrending (0.10-->0.19). Next Hi at 6AM. Propofol wean started at 6AM. 10/6: Type and screen ordered. Given 1500 of Vanc at 8am. Repeat vanc trough ordered for 9pm. DC zosyn. Started on Protonix. Inspiratory and expiratory wheezes, started Duonebs Q4 and Solumedrol 40mg Q6. Patient with thick secretions, started on mucormist. LE dopplers negative for DVT. Bcx + for Gr+ Cocci. Patient vomited around ET tube, given Enema to assist with BM.   O/N: Troponin T peaked at 10pm (0.17). Vanc 1500mg given, next Vanc level at 12pm. Pt w/ thick secretions all night. Liquid BM x2 overnight. Went into afib w/ RVR ~6 am. IV lopressor x2 unsuccessful. Started on amiodarone bolus.   10/7: tolerated Cpap very well today but was desaturating to 86% likely 2/2 thick secretions so was not extubated today. BLood cultures still positive, redrew cultures today. Had 3 BMs overnight, and one small one today.   O/N: Ordered lactulose and miralax. Reordered Vanc 1500mg x1. UOP tapered off, so ordered 500cc bolus around 130am. ABG in the am w/pO2 60s, however pt saturating 93%, ordered for repeat Abg.   10/8: Attempted CPAP trial this morning, complicated by excess thick oral secretions that leave patient gagging and choking despite suctioning. Put back on Assist control. Patient saturating well with moderate secretions for the duration of the day. Cocnern regarding abdominal distension - on Xray there appears to be moderate colonic distension. Enema performed, minimal watery brown output. Manual disimpaction also attempted, met with empty rectal vault. Patient has no complaint of abdominal pain or discomfort. Is passing flatus. Follow up needed with Cardiology regarding KELLY tomorrow.   O/N: No BM overnight, patient without abdominal discomfort  10/9: BCx on 10/7 positive for MSSA, repeat Bcx sent. Started on naficilin. KELLY negative. Ordered for CT chest-abd-pelvis to look for infectious site and seeding of bactremia. Successfully extubated.   O/N: NILDA. Urine more yellow than pink by this AM  10/10: Patient now DNR. Started on Lasix with good UOP. Started on aldactone. 4x BM and passing gas. Blood cultures positive for MSSA. Redrawn Bcx. ID on board. 2 peripheral lines placed, Central line removed.   O/N: Hi elevated, will follow trend with AM labs as per Dr. Pagan. UOP >60/hr    INTERVAL HPI/OVERNIGHT EVENTS:  Right IJ line was removed. UOP over 24hrs is 1200s with one dose of lasix 20 mg. Troponin slightly increased from 0.44 to 0.54. No BM overnight but he had 6 during the day yesterday 10/10. Pt had his meals w/o any complaints.    SUBJECTIVE: Patient seen and examined at bedside. He denied any complaints. No SOB, CP, abdominal pain, N/V/D, bony pain. He has on/off cough with white sputum. He was OOB on the chair    OBJECTIVE:    VITAL SIGNS:  ICU Vital Signs Last 24 Hrs  T(C): 36.8 (11 Oct 2017 06:09), Max: 36.8 (10 Oct 2017 11:11)  T(F): 98.2 (11 Oct 2017 06:09), Max: 98.2 (10 Oct 2017 11:11)  HR: 78 (11 Oct 2017 08:00) (54 - 84)  BP: 112/57 (11 Oct 2017 08:00) (93/47 - 130/62)  BP(mean): 75 (11 Oct 2017 08:00) (67 - 586)  ABP: --  ABP(mean): --  RR: 25 (11 Oct 2017 08:00) (18 - 30)  SpO2: 91% (11 Oct 2017 08:00) (87% - 99%)        10-10 @ 07:01  -  10-11 @ 07:00  --------------------------------------------------------  IN: 1950 mL / OUT: 2960 mL / NET: -1010 mL    10-11 @ 07:01  -  10-11 @ 08:14  --------------------------------------------------------  IN: 0 mL / OUT: 75 mL / NET: -75 mL      CAPILLARY BLOOD GLUCOSE  105 (11 Oct 2017 05:03)      POCT Blood Glucose.: 105 mg/dL (11 Oct 2017 05:15)      PHYSICAL EXAM:    General: on nasal cannula, NAD  HEENT: NC/AT; PERRL, clear conjunctiva, moist mucus membranes,  Neck: supple, no lymphadenopathy.  Respiratory: End exp wheezing with pursed lip breathing, b/l upper rhonchi ant/post, good air entry throughout  Cardiovascular: Irregular rhythm, regular rate, +S1/S2;, no murmurs or rubs appreciated  Abdomen: soft, NT; +BS x4, mildly distended, tympanic in all quadrants  Extremities: WWP,right shin bruise with point of entry(scab), 2+ edema up to the shin b/l  Vasc: 2+ peripheral pulses b/l  Skin: normal color and turgor; dryed rash (erupted vesicles around left nipple area=dermatomal), venous stasis skin discoloration b/l  Neurological: Awake-AOx3, moves all extremities  Psych: affect appropriate    MEDICATIONS:  MEDICATIONS  (STANDING):  acetylcysteine 20% Inhalation 3 milliLiter(s) Inhalation three times a day  ALBUTerol/ipratropium for Nebulization 3 milliLiter(s) Nebulizer every 4 hours  aspirin  chewable 81 milliGRAM(s) Oral daily  atorvastatin 20 milliGRAM(s) Oral at bedtime  dextrose 5%. 1000 milliLiter(s) (50 mL/Hr) IV Continuous <Continuous>  dextrose 50% Injectable 12.5 Gram(s) IV Push once  dextrose 50% Injectable 25 Gram(s) IV Push once  dextrose 50% Injectable 25 Gram(s) IV Push once  furosemide   Injectable 20 milliGRAM(s) IV Push two times a day  heparin  Injectable 7500 Unit(s) SubCutaneous every 8 hours  insulin lispro (HumaLOG) corrective regimen sliding scale   SubCutaneous Before meals and at bedtime  magnesium sulfate  IVPB 1 Gram(s) IV Intermittent once  melatonin 3 milliGRAM(s) Oral at bedtime  methylPREDNISolone sodium succinate Injectable 40 milliGRAM(s) IV Push daily  metoclopramide 10 milliGRAM(s) Oral every 8 hours  nafcillin  IVPB 2 Gram(s) IV Intermittent every 4 hours  nafcillin  IVPB      pantoprazole    Tablet 40 milliGRAM(s) Oral before breakfast  polyethylene glycol 3350 17 Gram(s) Oral two times a day  valACYclovir 1000 milliGRAM(s) Oral every 8 hours    MEDICATIONS  (PRN):  acetaminophen  Suppository 650 milliGRAM(s) Rectal every 6 hours PRN For Temp greater than 38 C (100.4 F)  bisacodyl Suppository 10 milliGRAM(s) Rectal daily PRN Constipation  dextrose Gel 1 Dose(s) Oral once PRN Blood Glucose LESS THAN 70 milliGRAM(s)/deciliter  glucagon  Injectable 1 milliGRAM(s) IntraMuscular once PRN Glucose LESS THAN 70 milligrams/deciliter  simethicone 80 milliGRAM(s) Chew two times a day PRN Gas      ALLERGIES:  Allergies    No Known Allergies    LABS:                        8.3    12.8  )-----------( 183      ( 11 Oct 2017 06:13 )             28.1     10-11    142  |  100  |  23  ----------------------------<  104<H>  3.6   |  26  |  1.08    Ca    9.0      11 Oct 2017 06:13  Phos  3.3     10-11  Mg     1.8     10-11      PT/INR - ( 11 Oct 2017 06:13 )   PT: 15.0 sec;   INR: 1.34          PTT - ( 11 Oct 2017 06:13 )  PTT:29.1 sec      RADIOLOGY & ADDITIONAL TESTS: Reviewed. Hospital Course:  72 year old male with a PMHx of HTN, COPD, Prostate CA (2013, s/p radiation and seed placement, on leuprolide with a recent elevation of PSA and imaging showing bone mets) presents from his heme/on office with worsening of SOB and and episode syncope the morning of admission 10/4. Pt was admitted for severe sepsis (leucocytoses-tachypnea)with and CATALINA with possible pneumonia. Pt received IVF and Abx(Ceft-Azithro and Vancomycin) and was placed on a BiPAP. On 10/5 , pt was persistently hypotensive despite 2L bolus. Was placed on pressors(septic shock) by 5 pm. Rapid response was called because BiPAP was not on and pt shorly after became unresponsive even to to painful stimuli (sterna rub). Pt possibly aspirated(copious thick secretions after intubation) or his levophed was not properly adjusted (up-titration) , so he was intubated for airway protection and was transferred to ICU. Pt had Glaser's, OGT, right IJ with left A-line (10/5). Pt's ABX broadened to Zosyn Vancomycin (dosed by level). OG tube was placed for decompression of previous ileus seen and abdominal distention. Blood Cx came back positive for gram positive cocci for 10/4 .Repeat blood Cx was done. Pt also had an ECHO (TTE) which showed HFrEF with EF 45%, right atrial dilatation, septal wall motion abnormalities and mild hypokinesis of left ventricle. Unable to visualize any vegetations or R heart structures. Troponin was trended to peak because of T wave invesrion on EKG (mostly demand ischemia). On 10/6: Propofol wean started at 6AM and levophed was stopped by 9 am. Pt was Started on ,Duonebs Q4 and Solumedrol 40mg Q6. Patient continued to have secretions, started on mucormist. LE dopplers negative for DVT. Liquid BM x2 overnight. Went into afib w/ RVR ~6 am. IV lopressor x2 unsuccessful. Started on amiodarone bolus. Then his HR went to (50s-60s). Cardiologist was contacted and recommended to continue telemetry, hold Anticoagulation for now. On 10/7: tolerated Cpap very well today but was desaturating to 86% likely 2/2 thick secretions BLood cultures still positive, redrew cultures. Pt was placed on lactulose and miralax  Had 3 BMs overnight. 10/8: Attempted CPAP again and enema and manual disimpaction also attempted, met with empty rectal vault. Patient has no complaint of abdominal pain or discomfort. Is passing flatus. On 10/9: Pt had KELLY which came back negative for any vegetation and recommended to repeat it in 3-4 days because of persistent bacteremia.  BCx still positive for MSSA, repeat Bcx sent. Started on naficilin. Successfully extubated and BiPAP overnight for persumptive sleep apnea. UOP was fluctuating during the previous days but was steady. On 10/10: He also was started on Lasix with good UOP. Started on aldactone. Had 6 BM and passing gas. Redrawn Bcx. ID on board. 2 peripheral lines placed, Central line removed and  glaser's catheter removed. Pt has been protecting his airway since extubation. No signs of respiratory distress. He has been tolerating regular diet since yesterday without any complaints. Pt is hemodynamically stable and can be transferred to Telemetry (New NSTEMI/HFrEF and one episode of MAT/Afib).    INTERVAL HPI/OVERNIGHT EVENTS:  Right IJ line was removed. UOP over 24hrs is 1200s with one dose of lasix 20 mg. Troponin slightly increased from 0.44 to 0.54. No BM overnight but he had 6 during the day yesterday 10/10. Pt had his meals w/o any complaints.    SUBJECTIVE: Patient seen and examined at bedside. He denied any complaints. No SOB, CP, abdominal pain, N/V/D, bony pain. He has on/off cough with white sputum. He was OOB on the chair    OBJECTIVE:    VITAL SIGNS:  ICU Vital Signs Last 24 Hrs  T(C): 36.8 (11 Oct 2017 06:09), Max: 36.8 (10 Oct 2017 11:11)  T(F): 98.2 (11 Oct 2017 06:09), Max: 98.2 (10 Oct 2017 11:11)  HR: 78 (11 Oct 2017 08:00) (54 - 84)  BP: 112/57 (11 Oct 2017 08:00) (93/47 - 130/62)  BP(mean): 75 (11 Oct 2017 08:00) (67 - 586)  ABP: --  ABP(mean): --  RR: 25 (11 Oct 2017 08:00) (18 - 30)  SpO2: 91% (11 Oct 2017 08:00) (87% - 99%)        10-10 @ 07:01  -  10-11 @ 07:00  --------------------------------------------------------  IN: 1950 mL / OUT: 2960 mL / NET: -1010 mL    10-11 @ 07:01  -  10-11 @ 08:14  --------------------------------------------------------  IN: 0 mL / OUT: 75 mL / NET: -75 mL      CAPILLARY BLOOD GLUCOSE  105 (11 Oct 2017 05:03)      POCT Blood Glucose.: 105 mg/dL (11 Oct 2017 05:15)      PHYSICAL EXAM:    General: on nasal cannula, NAD  HEENT: NC/AT; PERRL, clear conjunctiva, moist mucus membranes,  Neck: supple, no lymphadenopathy.  Respiratory: End exp wheezing with pursed lip breathing, b/l upper rhonchi ant/post, good air entry throughout  Cardiovascular: Irregular rhythm, regular rate, +S1/S2;, no murmurs or rubs appreciated  Abdomen: soft, NT; +BS x4, mildly distended, tympanic in all quadrants  Extremities: WWP,right shin bruise with point of entry(scab), 2+ edema up to the shin b/l  Vasc: 2+ peripheral pulses b/l  Skin: normal color and turgor; dryed rash (erupted vesicles around left nipple area=dermatomal), venous stasis skin discoloration b/l  Neurological: Awake-AOx3, moves all extremities  Psych: affect appropriate    MEDICATIONS:  MEDICATIONS  (STANDING):  acetylcysteine 20% Inhalation 3 milliLiter(s) Inhalation three times a day  ALBUTerol/ipratropium for Nebulization 3 milliLiter(s) Nebulizer every 4 hours  aspirin  chewable 81 milliGRAM(s) Oral daily  atorvastatin 20 milliGRAM(s) Oral at bedtime  dextrose 5%. 1000 milliLiter(s) (50 mL/Hr) IV Continuous <Continuous>  dextrose 50% Injectable 12.5 Gram(s) IV Push once  dextrose 50% Injectable 25 Gram(s) IV Push once  dextrose 50% Injectable 25 Gram(s) IV Push once  furosemide   Injectable 20 milliGRAM(s) IV Push two times a day  heparin  Injectable 7500 Unit(s) SubCutaneous every 8 hours  insulin lispro (HumaLOG) corrective regimen sliding scale   SubCutaneous Before meals and at bedtime  magnesium sulfate  IVPB 1 Gram(s) IV Intermittent once  melatonin 3 milliGRAM(s) Oral at bedtime  methylPREDNISolone sodium succinate Injectable 40 milliGRAM(s) IV Push daily  metoclopramide 10 milliGRAM(s) Oral every 8 hours  nafcillin  IVPB 2 Gram(s) IV Intermittent every 4 hours  nafcillin  IVPB      pantoprazole    Tablet 40 milliGRAM(s) Oral before breakfast  polyethylene glycol 3350 17 Gram(s) Oral two times a day  valACYclovir 1000 milliGRAM(s) Oral every 8 hours    MEDICATIONS  (PRN):  acetaminophen  Suppository 650 milliGRAM(s) Rectal every 6 hours PRN For Temp greater than 38 C (100.4 F)  bisacodyl Suppository 10 milliGRAM(s) Rectal daily PRN Constipation  dextrose Gel 1 Dose(s) Oral once PRN Blood Glucose LESS THAN 70 milliGRAM(s)/deciliter  glucagon  Injectable 1 milliGRAM(s) IntraMuscular once PRN Glucose LESS THAN 70 milligrams/deciliter  simethicone 80 milliGRAM(s) Chew two times a day PRN Gas      ALLERGIES:  Allergies    No Known Allergies    LABS:                        8.3    12.8  )-----------( 183      ( 11 Oct 2017 06:13 )             28.1     10-11    142  |  100  |  23  ----------------------------<  104<H>  3.6   |  26  |  1.08    Ca    9.0      11 Oct 2017 06:13  Phos  3.3     10-11  Mg     1.8     10-11      PT/INR - ( 11 Oct 2017 06:13 )   PT: 15.0 sec;   INR: 1.34          PTT - ( 11 Oct 2017 06:13 )  PTT:29.1 sec      RADIOLOGY & ADDITIONAL TESTS: Reviewed.

## 2017-10-11 NOTE — PROGRESS NOTE ADULT - SUBJECTIVE AND OBJECTIVE BOX
The patient remains on the ICU. He is using a nasal cannula now. He was OOB yesterday and he has started to eat again. He continues to cough but is bringing up scant amounts of clear sputum. He is not having any bony pain.    CHEMOTHERAPY REGIMEN:        Day:                          Diet:  Protocol:                                    IVF:      MEDICATIONS  (STANDING):  acetylcysteine 20% Inhalation 3 milliLiter(s) Inhalation three times a day  ALBUTerol/ipratropium for Nebulization 3 milliLiter(s) Nebulizer every 4 hours  aspirin  chewable 81 milliGRAM(s) Oral daily  atorvastatin 20 milliGRAM(s) Oral at bedtime  dextrose 5%. 1000 milliLiter(s) (50 mL/Hr) IV Continuous <Continuous>  dextrose 50% Injectable 12.5 Gram(s) IV Push once  dextrose 50% Injectable 25 Gram(s) IV Push once  dextrose 50% Injectable 25 Gram(s) IV Push once  heparin  Injectable 7500 Unit(s) SubCutaneous every 8 hours  insulin lispro (HumaLOG) corrective regimen sliding scale   SubCutaneous Before meals and at bedtime  melatonin 3 milliGRAM(s) Oral at bedtime  methylPREDNISolone sodium succinate Injectable 40 milliGRAM(s) IV Push daily  nafcillin  IVPB 2 Gram(s) IV Intermittent every 4 hours  nafcillin  IVPB      pantoprazole    Tablet 40 milliGRAM(s) Oral before breakfast  polyethylene glycol 3350 17 Gram(s) Oral two times a day  valACYclovir 1000 milliGRAM(s) Oral every 8 hours    MEDICATIONS  (PRN):  acetaminophen  Suppository 650 milliGRAM(s) Rectal every 6 hours PRN For Temp greater than 38 C (100.4 F)  bisacodyl Suppository 10 milliGRAM(s) Rectal daily PRN Constipation  dextrose Gel 1 Dose(s) Oral once PRN Blood Glucose LESS THAN 70 milliGRAM(s)/deciliter  glucagon  Injectable 1 milliGRAM(s) IntraMuscular once PRN Glucose LESS THAN 70 milligrams/deciliter  metoclopramide Injectable 10 milliGRAM(s) IV Push every 6 hours PRN nausea  simethicone 80 milliGRAM(s) Chew two times a day PRN Gas      Allergies    No Known Allergies    Intolerances        DVT Prophylaxis: [x ] YES [ ] NO      Antibiotics: [x ] YES [ ] NO    Pain Scale (1-10):       Location:    Vital Signs Last 24 Hrs  T(C): 36.8 (11 Oct 2017 01:06), Max: 36.8 (10 Oct 2017 11:11)  T(F): 98.2 (11 Oct 2017 01:06), Max: 98.2 (10 Oct 2017 11:11)  HR: 58 (11 Oct 2017 06:00) (54 - 82)  BP: 130/62 (11 Oct 2017 06:00) (93/47 - 130/62)  BP(mean): 84 (11 Oct 2017 06:00) (63 - 586)  RR: 23 (11 Oct 2017 06:00) (18 - 30)  SpO2: 98% (11 Oct 2017 06:00) (90% - 99%)    Drug Dosing Weight  Height (cm): 172.72 (05 Oct 2017 01:27)  Weight (kg): 109 (05 Oct 2017 01:27)  BMI (kg/m2): 36.5 (05 Oct 2017 01:27)  BSA (m2): 2.21 (05 Oct 2017 01:27)    PHYSICAL EXAM:      Constitutional: continues to improve slowly.  Eyes: conjunctiva pink.  ENMT: NC in place.   Neck: right neck line has been removed.  Breasts: no masses or tenderness.  Respiratory: chest clear. No wheezing appreciated.  Cardiovascular: S1>S2 at apex. RSR with some ectopic beats.  Gastrointestinal: distended, soft, nontender, active bowel sounds.  Genitourinary: cath remains in place.  Extremities: some leg edema persists.  Neurological: no gross focal deficits.  Skin: warm and dry.  Lymph Nodes: none palp.  Musculoskeletal: full ROM.  Psychiatric: affect normal.        URINARY CATHETER: [x ] YES [ ] NO     LABS:  CBC Full  -  ( 10 Oct 2017 05:02 )  WBC Count : 13.5 K/uL  Hemoglobin : 7.9 g/dL  Hematocrit : 25.1 %  Platelet Count - Automated : 167 K/uL  Mean Cell Volume : 98.4 fL  Mean Cell Hemoglobin : 31.0 pg  Mean Cell Hemoglobin Concentration : 31.5 g/dL  Auto Neutrophil # : x  Auto Lymphocyte # : x  Auto Monocyte # : x  Auto Eosinophil # : x  Auto Basophil # : x  Auto Neutrophil % : x  Auto Lymphocyte % : x  Auto Monocyte % : x  Auto Eosinophil % : x  Auto Basophil % : x    10-10    144  |  103  |  25<H>  ----------------------------<  100<H>  3.7   |  28  |  0.99    Ca    8.9      10 Oct 2017 05:07  Phos  3.3     10-10  Mg     2.1     10-10            CULTURES:    RADIOLOGY & ADDITIONAL STUDIES:

## 2017-10-11 NOTE — PROGRESS NOTE ADULT - ASSESSMENT
A/recs:  1) acute hypoxic respiratory failure with elevated tn and abnormal septal wall motion on tte in addition to rv dilatation and abnormal ecg; left heart failure (ef=45% by tte)  a - nstemi versus demand ischemia - repeat tn until downtrend confirmed  b - asa/statin  e - recommend v/q scan - note continued elevations of tn  f - borderline hypotension - consider ace-i when bp improves - hold for now  g - consider ischemia eval when acute issues resolve if no contraindications    2) paroxysmal atrial fibrillation  a - monitor for additional episodes of arrhythmia  c - continuous telemetry - note unwitnessed syncope prior to admission - monitoring    3) htn - currently off anti-htn rx    4) sepsis due to pna per ccm with bacteremia   -rec infectious disease eval prior; note ccm planning for repeat [place]    4) possible copd exacerbation - management per ccm    5) acute renal failure - cr remains improved    6) normocytic anemia and thrombocytopenia - serial cbc    7) shingles    8) prostate ca - hem-onc following    9) K>4, Mg>2    10) palliative care following

## 2017-10-12 LAB
ANION GAP SERPL CALC-SCNC: 14 MMOL/L — SIGNIFICANT CHANGE UP (ref 5–17)
BUN SERPL-MCNC: 19 MG/DL — SIGNIFICANT CHANGE UP (ref 7–23)
CALCIUM SERPL-MCNC: 9.2 MG/DL — SIGNIFICANT CHANGE UP (ref 8.4–10.5)
CHLORIDE SERPL-SCNC: 100 MMOL/L — SIGNIFICANT CHANGE UP (ref 96–108)
CO2 SERPL-SCNC: 30 MMOL/L — SIGNIFICANT CHANGE UP (ref 22–31)
CREAT SERPL-MCNC: 1.16 MG/DL — SIGNIFICANT CHANGE UP (ref 0.5–1.3)
CULTURE RESULTS: SIGNIFICANT CHANGE UP
CULTURE RESULTS: SIGNIFICANT CHANGE UP
GLUCOSE BLDC GLUCOMTR-MCNC: 105 MG/DL — HIGH (ref 70–99)
GLUCOSE BLDC GLUCOMTR-MCNC: 126 MG/DL — HIGH (ref 70–99)
GLUCOSE BLDC GLUCOMTR-MCNC: 126 MG/DL — HIGH (ref 70–99)
GLUCOSE BLDC GLUCOMTR-MCNC: 186 MG/DL — HIGH (ref 70–99)
GLUCOSE SERPL-MCNC: 118 MG/DL — HIGH (ref 70–99)
HCT VFR BLD CALC: 29.5 % — LOW (ref 39–50)
HGB BLD-MCNC: 8.5 G/DL — LOW (ref 13–17)
MAGNESIUM SERPL-MCNC: 1.6 MG/DL — SIGNIFICANT CHANGE UP (ref 1.6–2.6)
MCHC RBC-ENTMCNC: 28.8 G/DL — LOW (ref 32–36)
MCHC RBC-ENTMCNC: 29.4 PG — SIGNIFICANT CHANGE UP (ref 27–34)
MCV RBC AUTO: 102.1 FL — HIGH (ref 80–100)
ORGANISM # SPEC MICROSCOPIC CNT: SIGNIFICANT CHANGE UP
ORGANISM # SPEC MICROSCOPIC CNT: SIGNIFICANT CHANGE UP
PHOSPHATE SERPL-MCNC: 3.9 MG/DL — SIGNIFICANT CHANGE UP (ref 2.5–4.5)
PLATELET # BLD AUTO: 207 K/UL — SIGNIFICANT CHANGE UP (ref 150–400)
POTASSIUM SERPL-MCNC: 4.1 MMOL/L — SIGNIFICANT CHANGE UP (ref 3.5–5.3)
POTASSIUM SERPL-SCNC: 4.1 MMOL/L — SIGNIFICANT CHANGE UP (ref 3.5–5.3)
RBC # BLD: 2.89 M/UL — LOW (ref 4.2–5.8)
RBC # FLD: 16.1 % — SIGNIFICANT CHANGE UP (ref 10.3–16.9)
SODIUM SERPL-SCNC: 144 MMOL/L — SIGNIFICANT CHANGE UP (ref 135–145)
SPECIMEN SOURCE: SIGNIFICANT CHANGE UP
SPECIMEN SOURCE: SIGNIFICANT CHANGE UP
TROPONIN T SERPL-MCNC: 0.51 NG/ML — CRITICAL HIGH (ref 0–0.01)
WBC # BLD: 12.5 K/UL — HIGH (ref 3.8–10.5)
WBC # FLD AUTO: 12.5 K/UL — HIGH (ref 3.8–10.5)

## 2017-10-12 PROCEDURE — 78806: CPT | Mod: 26

## 2017-10-12 PROCEDURE — 99233 SBSQ HOSP IP/OBS HIGH 50: CPT

## 2017-10-12 PROCEDURE — 71010: CPT | Mod: 26

## 2017-10-12 PROCEDURE — 99233 SBSQ HOSP IP/OBS HIGH 50: CPT | Mod: GC

## 2017-10-12 RX ORDER — MAGNESIUM SULFATE 500 MG/ML
2 VIAL (ML) INJECTION
Qty: 0 | Refills: 0 | Status: COMPLETED | OUTPATIENT
Start: 2017-10-12 | End: 2017-10-12

## 2017-10-12 RX ADMIN — Medication 81 MILLIGRAM(S): at 12:11

## 2017-10-12 RX ADMIN — VALACYCLOVIR 1000 MILLIGRAM(S): 500 TABLET, FILM COATED ORAL at 06:00

## 2017-10-12 RX ADMIN — NAFCILLIN 200 GRAM(S): 10 INJECTION, POWDER, FOR SOLUTION INTRAVENOUS at 20:00

## 2017-10-12 RX ADMIN — Medication 20 MILLIGRAM(S): at 06:00

## 2017-10-12 RX ADMIN — HEPARIN SODIUM 7500 UNIT(S): 5000 INJECTION INTRAVENOUS; SUBCUTANEOUS at 05:58

## 2017-10-12 RX ADMIN — Medication 3 MILLILITER(S): at 15:38

## 2017-10-12 RX ADMIN — Medication 3 MILLILITER(S): at 22:09

## 2017-10-12 RX ADMIN — ATORVASTATIN CALCIUM 20 MILLIGRAM(S): 80 TABLET, FILM COATED ORAL at 22:09

## 2017-10-12 RX ADMIN — VALACYCLOVIR 1000 MILLIGRAM(S): 500 TABLET, FILM COATED ORAL at 15:41

## 2017-10-12 RX ADMIN — HEPARIN SODIUM 5000 UNIT(S): 5000 INJECTION INTRAVENOUS; SUBCUTANEOUS at 15:40

## 2017-10-12 RX ADMIN — Medication 3 MILLILITER(S): at 10:39

## 2017-10-12 RX ADMIN — Medication 50 GRAM(S): at 15:41

## 2017-10-12 RX ADMIN — HEPARIN SODIUM 7500 UNIT(S): 5000 INJECTION INTRAVENOUS; SUBCUTANEOUS at 22:09

## 2017-10-12 RX ADMIN — NAFCILLIN 200 GRAM(S): 10 INJECTION, POWDER, FOR SOLUTION INTRAVENOUS at 03:02

## 2017-10-12 RX ADMIN — Medication 10 MILLIGRAM(S): at 06:00

## 2017-10-12 RX ADMIN — NAFCILLIN 200 GRAM(S): 10 INJECTION, POWDER, FOR SOLUTION INTRAVENOUS at 12:10

## 2017-10-12 RX ADMIN — Medication 3 MILLILITER(S): at 06:00

## 2017-10-12 RX ADMIN — Medication 50 GRAM(S): at 18:06

## 2017-10-12 RX ADMIN — NAFCILLIN 200 GRAM(S): 10 INJECTION, POWDER, FOR SOLUTION INTRAVENOUS at 07:31

## 2017-10-12 RX ADMIN — Medication 20 MILLIGRAM(S): at 18:07

## 2017-10-12 RX ADMIN — Medication 10 MILLIGRAM(S): at 15:41

## 2017-10-12 RX ADMIN — Medication 10 MILLIGRAM(S): at 22:09

## 2017-10-12 RX ADMIN — Medication 3 MILLILITER(S): at 18:06

## 2017-10-12 RX ADMIN — NAFCILLIN 200 GRAM(S): 10 INJECTION, POWDER, FOR SOLUTION INTRAVENOUS at 16:51

## 2017-10-12 RX ADMIN — Medication 1: at 12:11

## 2017-10-12 RX ADMIN — Medication 100 MILLIGRAM(S): at 12:12

## 2017-10-12 RX ADMIN — VALACYCLOVIR 1000 MILLIGRAM(S): 500 TABLET, FILM COATED ORAL at 22:09

## 2017-10-12 NOTE — PROGRESS NOTE ADULT - SUBJECTIVE AND OBJECTIVE BOX
INTERVAL HPI/OVERNIGHT EVENTS: NILDA.     SUBJECTIVE: Patient seen and examined at bedside. No acute complaints. Patient states that his breathing is better and he feels less SOB. He has had cough, minimal sputum production.     OBJECTIVE:    VITAL SIGNS:  ICU Vital Signs Last 24 Hrs  T(C): 36.9 (12 Oct 2017 09:29), Max: 37.3 (12 Oct 2017 01:00)  T(F): 98.5 (12 Oct 2017 09:29), Max: 99.2 (12 Oct 2017 01:00)  HR: 78 (12 Oct 2017 09:45) (62 - 96)  BP: 121/58 (12 Oct 2017 04:53) (103/55 - 140/69)  BP(mean): 84 (12 Oct 2017 04:53) (77 - 108)  ABP: --  ABP(mean): --  RR: 15 (12 Oct 2017 09:45) (15 - 27)  SpO2: 94% (12 Oct 2017 09:45) (83% - 97%)        10-11 @ 07:01  -  10-12 @ 07:00  --------------------------------------------------------  IN: 1100 mL / OUT: 3700 mL / NET: -2600 mL    10-12 @ 07:01  -  10-12 @ 09:57  --------------------------------------------------------  IN: 0 mL / OUT: 100 mL / NET: -100 mL      CAPILLARY BLOOD GLUCOSE  126 (12 Oct 2017 07:24)      POCT Blood Glucose.: 126 mg/dL (12 Oct 2017 07:20)      PHYSICAL EXAM:    General: NAD, resting comfortably in chair  HEENT: NC/AT; PERRL, clear conjunctiva, MMM  Neck: supple, no JVD  Respiratory: rales and rhonchi b/l, no wheeze  Cardiovascular: +S1/S2; RRR, no m/r/g  Abdomen: soft, NT/ND; +BS x4, no palpable masses or organomegaly  Extremities: WWP, LE edema 2+ to above the knee  Vascular: 2+ peripheral pulses radial/dp/pt  Skin: normal color and turgor; no rash  Neurological: A&Ox3, CN 2-12 intact, no gross neuro deficits    MEDICATIONS:  MEDICATIONS  (STANDING):  ALBUTerol/ipratropium for Nebulization 3 milliLiter(s) Nebulizer every 4 hours  aspirin  chewable 81 milliGRAM(s) Oral daily  atorvastatin 20 milliGRAM(s) Oral at bedtime  dextrose 5%. 1000 milliLiter(s) (50 mL/Hr) IV Continuous <Continuous>  dextrose 50% Injectable 12.5 Gram(s) IV Push once  dextrose 50% Injectable 25 Gram(s) IV Push once  dextrose 50% Injectable 25 Gram(s) IV Push once  furosemide   Injectable 20 milliGRAM(s) IV Push two times a day  heparin  Injectable 7500 Unit(s) SubCutaneous every 8 hours  insulin lispro (HumaLOG) corrective regimen sliding scale   SubCutaneous Before meals and at bedtime  melatonin 3 milliGRAM(s) Oral at bedtime  metoclopramide 10 milliGRAM(s) Oral every 8 hours  nafcillin  IVPB 2 Gram(s) IV Intermittent every 4 hours  nafcillin  IVPB      polyethylene glycol 3350 17 Gram(s) Oral two times a day  valACYclovir 1000 milliGRAM(s) Oral every 8 hours    MEDICATIONS  (PRN):  acetaminophen  Suppository 650 milliGRAM(s) Rectal every 6 hours PRN For Temp greater than 38 C (100.4 F)  bisacodyl Suppository 10 milliGRAM(s) Rectal daily PRN Constipation  dextrose Gel 1 Dose(s) Oral once PRN Blood Glucose LESS THAN 70 milliGRAM(s)/deciliter  glucagon  Injectable 1 milliGRAM(s) IntraMuscular once PRN Glucose LESS THAN 70 milligrams/deciliter  guaiFENesin    Syrup 100 milliGRAM(s) Oral every 6 hours PRN Cough  simethicone 80 milliGRAM(s) Chew two times a day PRN Gas      ALLERGIES:  Allergies    No Known Allergies    Intolerances        LABS:                        8.5    12.5  )-----------( 207      ( 12 Oct 2017 07:01 )             29.5     10-12    144  |  100  |  19  ----------------------------<  118<H>  4.1   |  30  |  1.16    Ca    9.2      12 Oct 2017 07:01  Phos  3.9     10-12  Mg     1.6     10-12      PT/INR - ( 11 Oct 2017 06:13 )   PT: 15.0 sec;   INR: 1.34          PTT - ( 11 Oct 2017 06:13 )  PTT:29.1 sec      RADIOLOGY & ADDITIONAL TESTS: Reviewed.

## 2017-10-12 NOTE — PROGRESS NOTE ADULT - ASSESSMENT
Assessment	  71 yo male with hx of HTN, COPD, Prostate CA (s/p radiation and seed 2013 on leuprolide outpatient) who presents from [facility] onc with worsening of SOB and episode of unwitnessed syncope at home found to have severe sepsis with gram + cocci in clusters identified as S. aureus and ATN. Hospital course complicated by rapid response for hypotension,unresponsve to painful stimuli and was transferred to MICU for for septic shock. Septic shock has resolved but pt has had persistent MSSA bacteremia with resolving respiratory failure and ATN.    ID  #Sepsis w/ persistent MSSA bacteremia  - patient admitted and transferred to MICU requiring peripheral levophed now off pressor support.   - persistent bacteremia with MSSA; continue nafcillin  - most recent blood cx NGTD; pending finalization  - KELLY done 10/9 is NEGATIVE for vegetation (Thickening of aortic valve which mostly calcified-Mild MR). Recommend to repeat KELLY in 3-4 days because of persistent positive blood Cx  - Gallium scan to be completed today  - ID following; f/u recs    Respiratory   #Acute hypoxic Respiratory Failure-resolved  - extubated on NC tolerating it well.  -LE Venous duplex is negative for DVT  - likely component of underlying COPD w/ severe sepsis      #Possible COPD exacerbation  - c/w duonebs q 4   - encourage ambulation and incentive spirometry.       Cardiovascular  #New onset paroxysmal A.fib Vs MAT:  -s/p lopressor and Amiodarone bolus-->back to NSR  -As per cardiologist recs: Continue telemetry-No anticoagulation for now for elevated risk of bleeding and anemia  -Rate control: (Pt's HR is within 60s,50s), will hold BB for now  -F/U further cardiologist recs    #HFrEF  -TTE shows HFrEF with EF 45%, right atrial dilatation, septal wall motion abnormalities and mild hypokinesis of left ventricle. Unable to visualize any vegetations or R heart structures. Unable to calculate pulmonary a pressures.   - c/w ASA + lipitor  - pt hypervolemic on exam, c/w lasix. W/ rales and rhonchi on exam today.   - strict I/Os, daily weights, fluid restriction 1L  - acute onset of HFrEF; ischemia workup outpatient    #NSTEMI :  - EKG positive for ST depression in anteroseptal leads, Echo positive for septal hypokinesis, Troponin 0.06 -->0.10-->0.19-->0.23-->0.17-->0.15-->0.44-->0.54-->0.56, likely probably due to demand ischemia from hypoxia exacerbated by renal insufficiency (cr. 2.07 on admission).   - Daily troponin    Renal  ATN-resolved  - ATN on admission w/ Cr now at baseline.     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  -Anemia: Hb stable monitor Hb and transfuse if Hb<7.   -Thrombocytopenia resolved     Skin    #Shingles  Day 6/7 of valtrex, to complete course. No isolation    GI    #ileus -resolved  -c/w PO agents for constipation, d/c reglan  - patient w/ multiple BM    F no IVF  E- replete lytes prn  N-DASH diet, fluid restriction    PPx - Heparin SC for DVT     CODE - DNR not DNI    Dispo: 7 [facility]

## 2017-10-12 NOTE — CHART NOTE - NSCHARTNOTEFT_GEN_A_CORE
Admitting Diagnosis:   71 yo male with hx of HTN, COPD, Prostate CA (s/p radiation and seed 2013 on leuprolide outpatient) who presents from [facility] onc with worsening of SOB and episode of unwitnessed syncope at home found to have severe sepsis with gram + cocci in clusters identified as S. aureus and ATN. Hospital course complicated by rapid response for hypotension,unresponsve to painful stimuli and was transferred to MICU for for septic shock. Septic shock has resolved but pt has had persistent MSSA bacteremia with resolving respiratory failure and ATN.    PAST MEDICAL & SURGICAL HISTORY:  COPD (chronic obstructive pulmonary disease)  Hypertension  Obstructive sleep apnea syndrome  Prostate cancer metastatic to bone  PC (prostate cancer): with seed placement    Current Nutrition Order:   Diet, DASH/TLC:   Sodium & Cholesterol Restricted  1200mL Fluid Restriction ([id number]) (10-12-17 @ 08:18)      PO Intake: Good (%) [   ]  Fair (50-75%) [ X  ] Poor (<25%) [   ]    GI Issues: Reports last BM 10/11.     Pain: None reported    Skin Integrity: Intact    Labs:   10-12    144  |  100  |  19  ----------------------------<  118<H>  4.1   |  30  |  1.16    Ca    9.2      12 Oct 2017 07:01  Phos  3.9     10-12  Mg     1.6     10-12    CAPILLARY BLOOD GLUCOSE  126 (12 Oct 2017 07:24)  160 (11 Oct 2017 21:00)  133 (11 Oct 2017 12:17)    POCT Blood Glucose.: 126 mg/dL (12 Oct 2017 07:20)  POCT Blood Glucose.: 160 mg/dL (11 Oct 2017 20:55)  POCT Blood Glucose.: 120 mg/dL (11 Oct 2017 16:35)  POCT Blood Glucose.: 133 mg/dL (11 Oct 2017 12:20)    Medications:  MEDICATIONS  (STANDING):  ALBUTerol/ipratropium for Nebulization 3 milliLiter(s) Nebulizer every 4 hours  aspirin  chewable 81 milliGRAM(s) Oral daily  atorvastatin 20 milliGRAM(s) Oral at bedtime  dextrose 5%. 1000 milliLiter(s) (50 mL/Hr) IV Continuous <Continuous>  dextrose 50% Injectable 12.5 Gram(s) IV Push once  dextrose 50% Injectable 25 Gram(s) IV Push once  dextrose 50% Injectable 25 Gram(s) IV Push once  furosemide   Injectable 20 milliGRAM(s) IV Push two times a day  heparin  Injectable 7500 Unit(s) SubCutaneous every 8 hours  insulin lispro (HumaLOG) corrective regimen sliding scale   SubCutaneous Before meals and at bedtime  melatonin 3 milliGRAM(s) Oral at bedtime  metoclopramide 10 milliGRAM(s) Oral every 8 hours  nafcillin  IVPB 2 Gram(s) IV Intermittent every 4 hours  nafcillin  IVPB      polyethylene glycol 3350 17 Gram(s) Oral two times a day  valACYclovir 1000 milliGRAM(s) Oral every 8 hours    MEDICATIONS  (PRN):  acetaminophen  Suppository 650 milliGRAM(s) Rectal every 6 hours PRN For Temp greater than 38 C (100.4 F)  bisacodyl Suppository 10 milliGRAM(s) Rectal daily PRN Constipation  dextrose Gel 1 Dose(s) Oral once PRN Blood Glucose LESS THAN 70 milliGRAM(s)/deciliter  glucagon  Injectable 1 milliGRAM(s) IntraMuscular once PRN Glucose LESS THAN 70 milligrams/deciliter  guaiFENesin    Syrup 100 milliGRAM(s) Oral every 6 hours PRN Cough  simethicone 80 milliGRAM(s) Chew two times a day PRN Gas    Weight:  No new weights since admission    Estimated energy needs using 70 kg IBW:  25-30 kcal/kg (5890-1587 kcal).   1.3-1.5 g/kg ( g protein).   30-35 mL/kg (4297-3369 mL fluid).      Subjective: Pt reports a fair appetite. Diet recalled, pt consuming mostly 50% of meals. Discussed pt preferences, pt declined need for ONS.     Previous Nutrition Diagnosis: Inadequate energy intake RT NPO AEB meeting 0% of nutrition needs.      Active [   ]  Resolved [ X  ]    If resolved, new PES: Increased nutrient needs RT catabolic state AEB need for 1.3-1.5 g/kg protein.     Goal: Meet % of needs consistently.     Recommendations:   1. Encourage PO intake.  2. [place] pt preferences, menu provided.  3. Bowel regimen.  4. Please trend weights.     Education: Adequate PO intake & role of nutrition     Risk Level: High [ X  ] Moderate [   ] Low [   ]

## 2017-10-12 NOTE — PROGRESS NOTE ADULT - SUBJECTIVE AND OBJECTIVE BOX
Resting comfortably this am. Taking a breathing treatment. Looks good, stronger. Appetite is good. Tolerating being out of bed well. He is having no bony pain.    CHEMOTHERAPY REGIMEN:        Day:                          Diet:  Protocol:                                    IVF:      MEDICATIONS  (STANDING):  ALBUTerol/ipratropium for Nebulization 3 milliLiter(s) Nebulizer every 4 hours  aspirin  chewable 81 milliGRAM(s) Oral daily  atorvastatin 20 milliGRAM(s) Oral at bedtime  dextrose 5%. 1000 milliLiter(s) (50 mL/Hr) IV Continuous <Continuous>  dextrose 50% Injectable 12.5 Gram(s) IV Push once  dextrose 50% Injectable 25 Gram(s) IV Push once  dextrose 50% Injectable 25 Gram(s) IV Push once  furosemide   Injectable 20 milliGRAM(s) IV Push two times a day  heparin  Injectable 7500 Unit(s) SubCutaneous every 8 hours  insulin lispro (HumaLOG) corrective regimen sliding scale   SubCutaneous Before meals and at bedtime  melatonin 3 milliGRAM(s) Oral at bedtime  metoclopramide 10 milliGRAM(s) Oral every 8 hours  nafcillin  IVPB 2 Gram(s) IV Intermittent every 4 hours  nafcillin  IVPB      polyethylene glycol 3350 17 Gram(s) Oral two times a day  valACYclovir 1000 milliGRAM(s) Oral every 8 hours    MEDICATIONS  (PRN):  acetaminophen  Suppository 650 milliGRAM(s) Rectal every 6 hours PRN For Temp greater than 38 C (100.4 F)  bisacodyl Suppository 10 milliGRAM(s) Rectal daily PRN Constipation  dextrose Gel 1 Dose(s) Oral once PRN Blood Glucose LESS THAN 70 milliGRAM(s)/deciliter  glucagon  Injectable 1 milliGRAM(s) IntraMuscular once PRN Glucose LESS THAN 70 milligrams/deciliter  simethicone 80 milliGRAM(s) Chew two times a day PRN Gas      Allergies    No Known Allergies    Intolerances        DVT Prophylaxis: [x ] YES [ ] NO      Antibiotics: [x ] YES [ ] NO    Pain Scale (1-10):       Location:    Vital Signs Last 24 Hrs  T(C): 36.5 (12 Oct 2017 05:00), Max: 37.3 (12 Oct 2017 01:00)  T(F): 97.7 (12 Oct 2017 05:00), Max: 99.2 (12 Oct 2017 01:00)  HR: 70 (12 Oct 2017 05:49) (62 - 96)  BP: 121/58 (12 Oct 2017 04:53) (103/55 - 140/69)  BP(mean): 84 (12 Oct 2017 04:53) (75 - 108)  RR: 18 (12 Oct 2017 05:49) (17 - 27)  SpO2: 95% (12 Oct 2017 05:49) (83% - 97%)    Drug Dosing Weight  Height (cm): 172.72 (05 Oct 2017 01:27)  Weight (kg): 109 (05 Oct 2017 01:27)  BMI (kg/m2): 36.5 (05 Oct 2017 01:27)  BSA (m2): 2.21 (05 Oct 2017 01:27)    PHYSICAL EXAM:      Constitutional: good color.  Eyes: conjunctiva pink.  ENMT: mask in place for breathing treatment.  Neck: no masses.  Breasts: no masses.  Respiratory: chest clear.  Cardiovascular: S1>S2 at apex. RSR.  Gastrointestinal: distended, soft, nontender, active bowel sounds.  Genitourinary: voiding without difficulty.  Extremities: some leg edema.  Neurological: no gross focal deficits.  Skin: warm and dry.  Lymph Nodes: none palp.  Musculoskeletal: full ROM.  Psychiatric: affect normal.        URINARY CATHETER: [ ] YES [ ] NO     LABS:  CBC Full  -  ( 11 Oct 2017 06:13 )  WBC Count : 12.8 K/uL  Hemoglobin : 8.3 g/dL  Hematocrit : 28.1 %  Platelet Count - Automated : 183 K/uL  Mean Cell Volume : 101.1 fL  Mean Cell Hemoglobin : 29.9 pg  Mean Cell Hemoglobin Concentration : 29.5 g/dL  Auto Neutrophil # : x  Auto Lymphocyte # : x  Auto Monocyte # : x  Auto Eosinophil # : x  Auto Basophil # : x  Auto Neutrophil % : x  Auto Lymphocyte % : x  Auto Monocyte % : x  Auto Eosinophil % : x  Auto Basophil % : x    10-11    142  |  100  |  23  ----------------------------<  104<H>  3.6   |  26  |  1.08    Ca    9.0      11 Oct 2017 06:13  Phos  3.3     10-11  Mg     1.8     10-11      PT/INR - ( 11 Oct 2017 06:13 )   PT: 15.0 sec;   INR: 1.34          PTT - ( 11 Oct 2017 06:13 )  PTT:29.1 sec      CULTURES:    RADIOLOGY & ADDITIONAL STUDIES:

## 2017-10-12 NOTE — PROGRESS NOTE ADULT - ASSESSMENT
Assessment:  72 year old male with a PMHx of HTN, COPD, Prostate CA (2013, s/p radiation and seed placement, on leuprolide with a recent elevation of PSA and imaging showing bone mets) admitted for MSSA bacteremia, ID consulted for persistent bacteremia 2/2 to unidentified source vs. vancomycin treatment failure.  On Nafcillin (10/9-present), blood cultures cleared,     Recommendations:    -Continue Nafcillin 2 g q4.  -F/u Gallium scan to r/o unidentified source of bacteremia  -May need repeat KELLY, will discuss with cardiology

## 2017-10-12 NOTE — PROGRESS NOTE ADULT - ASSESSMENT
A/recs:  1) acute hypoxic respiratory failure with elevated tn and abnormal septal wall motion on tte in addition to rv dilatation and abnormal ecg; left heart failure (ef=45% by tte)  a - nstemi versus demand ischemia - confirm downtrend tn  b - asa/statin as tolerated  e - recommend v/q scan   f - consider ace-i when bp normalizes  g - consider ischemia eval when acute issues resolve if no contraindications - no cp now; repeat ecg    2) paroxysmal atrial fibrillation  a - continuous telemetry - note unwitnessed syncope prior to admission:  -pt denied syncope at doctor's office - reported syncope with loc upon attempting to stand at home after a nap - no prior episodes per patient    3) htn - monitor with current borderline hypotension    4) sepsis due to pna per ccm with bacteremia   -rec infectious disease follow-up to consider if repeat [place] is needed; recommend echo staff review images to confirm absence of suspected or possible vegetations    4) possible copd exacerbation - per ccm    5) acute renal failure - monitor cr, uop, and i/o    6) normocytic anemia and thrombocytopenia - monitor    7) shingles    8) prostate ca - per hem-onc    9) K>4, Mg>2    10) palliative care following  case discussed with housestaff (Blaine)

## 2017-10-12 NOTE — PROGRESS NOTE ADULT - ATTENDING COMMENTS
Agree with above.  The patient is clinically improved.  Blood cultures remain negative to date.  Can continue Nafcillin 2 grams IV q4hrs.  KELLY results noted.  Repeat KELLY normally not indicated but will discuss concerns with cardiology

## 2017-10-12 NOTE — PROGRESS NOTE ADULT - SUBJECTIVE AND OBJECTIVE BOX
Cardiology for Preister    cc: follow-up cardiology evaluation and management of left heart failure    interval - no cp    PAST MEDICAL & SURGICAL HISTORY:  COPD (chronic obstructive pulmonary disease)  Hypertension  Obstructive sleep apnea syndrome  Prostate cancer metastatic to bone  PC (prostate cancer): with seed placement    MEDICATIONS  (STANDING):  ALBUTerol/ipratropium for Nebulization 3 milliLiter(s) Nebulizer every 4 hours  aspirin  chewable 81 milliGRAM(s) Oral daily  atorvastatin 20 milliGRAM(s) Oral at bedtime  dextrose 5%. 1000 milliLiter(s) (50 mL/Hr) IV Continuous <Continuous>  dextrose 50% Injectable 12.5 Gram(s) IV Push once  dextrose 50% Injectable 25 Gram(s) IV Push once  dextrose 50% Injectable 25 Gram(s) IV Push once  furosemide   Injectable 20 milliGRAM(s) IV Push two times a day  heparin  Injectable 7500 Unit(s) SubCutaneous every 8 hours  insulin lispro (HumaLOG) corrective regimen sliding scale   SubCutaneous Before meals and at bedtime  melatonin 3 milliGRAM(s) Oral at bedtime  metoclopramide 10 milliGRAM(s) Oral every 8 hours  nafcillin  IVPB 2 Gram(s) IV Intermittent every 4 hours  nafcillin  IVPB      polyethylene glycol 3350 17 Gram(s) Oral two times a day  valACYclovir 1000 milliGRAM(s) Oral every 8 hours    MEDICATIONS  (PRN):  acetaminophen  Suppository 650 milliGRAM(s) Rectal every 6 hours PRN For Temp greater than 38 C (100.4 F)  bisacodyl Suppository 10 milliGRAM(s) Rectal daily PRN Constipation  dextrose Gel 1 Dose(s) Oral once PRN Blood Glucose LESS THAN 70 milliGRAM(s)/deciliter  glucagon  Injectable 1 milliGRAM(s) IntraMuscular once PRN Glucose LESS THAN 70 milligrams/deciliter  guaiFENesin    Syrup 100 milliGRAM(s) Oral every 6 hours PRN Cough  simethicone 80 milliGRAM(s) Chew two times a day PRN Gas    ICU Vital Signs Last 24 Hrs  T(C): 36.9 (12 Oct 2017 09:29), Max: 37.3 (12 Oct 2017 01:00)  T(F): 98.5 (12 Oct 2017 09:29), Max: 99.2 (12 Oct 2017 01:00)  HR: 78 (12 Oct 2017 09:45) (62 - 84)  BP: 107/58 (12 Oct 2017 08:56) (107/58 - 140/69)  BP(mean): 80 (12 Oct 2017 08:56) (79 - 108)  ABP: --  ABP(mean): --  RR: 15 (12 Oct 2017 09:45) (15 - 24)  SpO2: 94% (12 Oct 2017 09:45) (83% - 97%)      physical exam  nad  oob to chair  anicteric  no carotid bruit  breath sounds improving  rr; s1/s2 present; no harsh murmur  soft abd; nt  trace- edema le  ue and le pulses present     I&O's Detail    11 Oct 2017 07:01  -  12 Oct 2017 07:00  --------------------------------------------------------  IN:    Oral Fluid: 500 mL    Solution: 200 mL    Solution: 400 mL  Total IN: 1100 mL    OUT:    Indwelling Catheter - Urethral: 625 mL    Voided: 3075 mL  Total OUT: 3700 mL    Total NET: -2600 mL      12 Oct 2017 07:01  -  12 Oct 2017 11:09  --------------------------------------------------------  IN:    Oral Fluid: 100 mL  Total IN: 100 mL    OUT:    Voided: 250 mL  Total OUT: 250 mL    Total NET: -150 mL                                      8.5    12.5  )-----------( 207      ( 12 Oct 2017 07:01 )             29.5   10-12    144  |  100  |  19  ----------------------------<  118<H>  4.1   |  30  |  1.16    Ca    9.2      12 Oct 2017 07:01  Phos  3.9     10-12  Mg     1.6     10-12        Troponin T, Serum in AM (10.12.17 @ 07:01)    Troponin T, Serum: 0.51: 11th critical troponin  Reference interval for troponin T is </= 0.01 ng/mL which includes the  99th percentile of a healthy population. Troponin T results are not  interchangeable with troponin I results. ng/mL    I&O's Summary    11 Oct 2017 07:01  -  12 Oct 2017 07:00  --------------------------------------------------------  IN: 1100 mL / OUT: 3700 mL / NET: -2600 mL    12 Oct 2017 07:01  -  12 Oct 2017 11:11  --------------------------------------------------------  IN: 100 mL / OUT: 250 mL / NET: -150 mL            tele reviewed 10-    < from: KELLY w/Doppler (10.09.17 @ 13:35) >  EXAM:  ESOPHAGEAL ECHO (CARDIOL)                          *** ADDENDUM 10/09/2017  ***    Patient Height: 172.0 cm  Patient Weight: 109.0 kg  BSA: 2.2 m^2  Interpretation Summary  Normal left ventricular size and wall thickness.The left ventricular  ejection   fraction is estimated to be 40-45%The right ventricle is dilated.The   right   ventricular systolic function is mildly reduced.The left atrium is   dilated.The   right atrium is dilated.Thickening and calcification of the aortic   valveNo   aortic regurgitation noted.Mitral valve thickening noted.There is mild to   moderate mitral regurgitation.There is trace tricuspid   regurgitation.There was   insufficient TR detected from which to calculate pulmonary artery   systolic   pressure. No pulmonic regurgitation noted.There is aortic root   sclerosis/calcification.Mild atherosclerotic plaque(s) in the aortic   arch.Mild   atherosclerotic plaque(s) in the descending aorta.There is no pericardial   effusion.No discrete vegetations noted.    < end of copied text >    < from: Xray Chest 1 View AP -PORTABLE-Routine (10.09.17 @ 05:37) >  EXAM:  XR CHEST 1 VIEW PORT ROUTINE                          PROCEDURE DATE:  10/09/2017                     INTERPRETATION:    Portable AP Radiograph dated 10/9/2017 5:37 AM    CLINICAL INFORMATION: 72 years, Male, Intubated, PNA    PRIOR STUDIES:October 8, 2017    FINDINGS: Support tubes and lines are unchanged. Left pleural effusion is   stable. Right pleural effusion has increased. Pulmonary vascular   congestion has increased.    IMPRESSION:  Interval increase of right pleural effusion and pulmonary vascular   congestion.            "Thank you for the opportunity to participate in the care of this   patient."        KATHY PABLO M.D., RADIOLOGY ATTENDING  This document has been electronically signed. Oct  9 2017  8:33AM    < end of copied text >

## 2017-10-12 NOTE — PROGRESS NOTE ADULT - SUBJECTIVE AND OBJECTIVE BOX
INTERVAL HPI/OVERNIGHT EVENTS: NILDA, afebrile    Subjective: Pt seen and examined at bedside, no complaints.  States breathing and leg swelling have improved.  Denies F/C, CP, SOB.    ANTIBIOTICS/RELEVANT:  nafcillin  IVPB 2 Gram(s) IV Intermittent every 4 hours  nafcillin  IVPB      valACYclovir 1000 milliGRAM(s) Oral every 8 hours    MEDICATIONS  (STANDING):  ALBUTerol/ipratropium for Nebulization 3 milliLiter(s) Nebulizer every 4 hours  aspirin  chewable 81 milliGRAM(s) Oral daily  atorvastatin 20 milliGRAM(s) Oral at bedtime  dextrose 5%. 1000 milliLiter(s) (50 mL/Hr) IV Continuous <Continuous>  dextrose 50% Injectable 12.5 Gram(s) IV Push once  dextrose 50% Injectable 25 Gram(s) IV Push once  dextrose 50% Injectable 25 Gram(s) IV Push once  furosemide   Injectable 20 milliGRAM(s) IV Push two times a day  heparin  Injectable 7500 Unit(s) SubCutaneous every 8 hours  insulin lispro (HumaLOG) corrective regimen sliding scale   SubCutaneous Before meals and at bedtime  magnesium sulfate  IVPB 2 Gram(s) IV Intermittent every 2 hours  melatonin 3 milliGRAM(s) Oral at bedtime  metoclopramide 10 milliGRAM(s) Oral every 8 hours  nafcillin  IVPB 2 Gram(s) IV Intermittent every 4 hours  nafcillin  IVPB      polyethylene glycol 3350 17 Gram(s) Oral two times a day  valACYclovir 1000 milliGRAM(s) Oral every 8 hours    MEDICATIONS  (PRN):  acetaminophen  Suppository 650 milliGRAM(s) Rectal every 6 hours PRN For Temp greater than 38 C (100.4 F)  bisacodyl Suppository 10 milliGRAM(s) Rectal daily PRN Constipation  dextrose Gel 1 Dose(s) Oral once PRN Blood Glucose LESS THAN 70 milliGRAM(s)/deciliter  glucagon  Injectable 1 milliGRAM(s) IntraMuscular once PRN Glucose LESS THAN 70 milligrams/deciliter  guaiFENesin    Syrup 100 milliGRAM(s) Oral every 6 hours PRN Cough  simethicone 80 milliGRAM(s) Chew two times a day PRN Gas    Vital Signs Last 24 Hrs  T(C): 37.5 (12 Oct 2017 13:42), Max: 37.5 (12 Oct 2017 13:42)  T(F): 99.5 (12 Oct 2017 13:42), Max: 99.5 (12 Oct 2017 13:42)  HR: 90 (12 Oct 2017 12:32) (62 - 90)  BP: 125/58 (12 Oct 2017 12:32) (107/58 - 125/58)  BP(mean): 84 (12 Oct 2017 12:32) (80 - 94)  RR: 18 (12 Oct 2017 12:32) (15 - 20)  SpO2: 96% (12 Oct 2017 12:32) (92% - 97%)    PHYSICAL EXAM:  Constitutional: AAOx3, NAD  Eyes: MEHUL, EOMI  Ear/Nose/Throat: no oral lesion, MMM	  Neck: no JVD, no lymphadenopathy, R IJ site without erythema or induration  Respiratory: diffuse scattered rhonchi and decreased breath sounds at bases b/l  Cardiovascular: S1S2 RRR, no MRG  Gastrointestinal: soft, (+) BS, no HSM  Extremities: 2+ pitting edema to thighs, edema of hands b/l  Vascular: distal pulses intact  Skin: dermatomal herpetic lesions on L chest wall fully crusted    LABS:                        8.5    12.5  )-----------( 207      ( 12 Oct 2017 07:01 )             29.5     10-12    144  |  100  |  19  ----------------------------<  118<H>  4.1   |  30  |  1.16    Ca    9.2      12 Oct 2017 07:01  Phos  3.9     10-12  Mg     1.6     10-12      PT/INR - ( 11 Oct 2017 06:13 )   PT: 15.0 sec;   INR: 1.34          PTT - ( 11 Oct 2017 06:13 )  PTT:29.1 sec      MICROBIOLOGY:    Culture - Blood (collected 10 Oct 2017 17:30)  Source: .Blood Blood  Preliminary Report (11 Oct 2017 18:01):    No growth at 1 day.    Culture - Blood (collected 10 Oct 2017 17:30)  Source: .Blood Blood  Preliminary Report (11 Oct 2017 18:01):    No growth at 1 day.    RADIOLOGY & ADDITIONAL STUDIES: reviewed

## 2017-10-13 LAB
-  CEFAZOLIN: SIGNIFICANT CHANGE UP
-  CEFAZOLIN: SIGNIFICANT CHANGE UP
-  CLINDAMYCIN: SIGNIFICANT CHANGE UP
-  CLINDAMYCIN: SIGNIFICANT CHANGE UP
-  ERYTHROMYCIN: SIGNIFICANT CHANGE UP
-  ERYTHROMYCIN: SIGNIFICANT CHANGE UP
-  LINEZOLID: SIGNIFICANT CHANGE UP
-  LINEZOLID: SIGNIFICANT CHANGE UP
-  OXACILLIN: SIGNIFICANT CHANGE UP
-  OXACILLIN: SIGNIFICANT CHANGE UP
-  PENICILLIN: SIGNIFICANT CHANGE UP
-  PENICILLIN: SIGNIFICANT CHANGE UP
-  RIFAMPIN: SIGNIFICANT CHANGE UP
-  RIFAMPIN: SIGNIFICANT CHANGE UP
-  TRIMETHOPRIM/SULFAMETHOXAZOLE: SIGNIFICANT CHANGE UP
-  TRIMETHOPRIM/SULFAMETHOXAZOLE: SIGNIFICANT CHANGE UP
-  VANCOMYCIN: SIGNIFICANT CHANGE UP
-  VANCOMYCIN: SIGNIFICANT CHANGE UP
ANION GAP SERPL CALC-SCNC: 17 MMOL/L — SIGNIFICANT CHANGE UP (ref 5–17)
APPEARANCE UR: SIGNIFICANT CHANGE UP
BILIRUB UR-MCNC: NEGATIVE — SIGNIFICANT CHANGE UP
BLD GP AB SCN SERPL QL: NEGATIVE — SIGNIFICANT CHANGE UP
BUN SERPL-MCNC: 18 MG/DL — SIGNIFICANT CHANGE UP (ref 7–23)
CALCIUM SERPL-MCNC: 9.2 MG/DL — SIGNIFICANT CHANGE UP (ref 8.4–10.5)
CHLORIDE SERPL-SCNC: 96 MMOL/L — SIGNIFICANT CHANGE UP (ref 96–108)
CO2 SERPL-SCNC: 29 MMOL/L — SIGNIFICANT CHANGE UP (ref 22–31)
COLOR SPEC: (no result)
CREAT SERPL-MCNC: 1.17 MG/DL — SIGNIFICANT CHANGE UP (ref 0.5–1.3)
DIFF PNL FLD: (no result)
GLUCOSE BLDC GLUCOMTR-MCNC: 144 MG/DL — HIGH (ref 70–99)
GLUCOSE SERPL-MCNC: 144 MG/DL — HIGH (ref 70–99)
GLUCOSE UR QL: NEGATIVE — SIGNIFICANT CHANGE UP
HCT VFR BLD CALC: 26.2 % — LOW (ref 39–50)
HCT VFR BLD CALC: 28.6 % — LOW (ref 39–50)
HCT VFR BLD CALC: 28.7 % — LOW (ref 39–50)
HGB BLD-MCNC: 8.3 G/DL — LOW (ref 13–17)
HGB BLD-MCNC: 8.6 G/DL — LOW (ref 13–17)
HGB BLD-MCNC: 9 G/DL — LOW (ref 13–17)
KETONES UR-MCNC: NEGATIVE — SIGNIFICANT CHANGE UP
LEUKOCYTE ESTERASE UR-ACNC: NEGATIVE — SIGNIFICANT CHANGE UP
MAGNESIUM SERPL-MCNC: 2.2 MG/DL — SIGNIFICANT CHANGE UP (ref 1.6–2.6)
MCHC RBC-ENTMCNC: 30.7 PG — SIGNIFICANT CHANGE UP (ref 27–34)
MCHC RBC-ENTMCNC: 31.4 G/DL — LOW (ref 32–36)
MCV RBC AUTO: 98 FL — SIGNIFICANT CHANGE UP (ref 80–100)
METHOD TYPE: SIGNIFICANT CHANGE UP
METHOD TYPE: SIGNIFICANT CHANGE UP
NITRITE UR-MCNC: NEGATIVE — SIGNIFICANT CHANGE UP
PH UR: 6 — SIGNIFICANT CHANGE UP (ref 5–8)
PLATELET # BLD AUTO: 256 K/UL — SIGNIFICANT CHANGE UP (ref 150–400)
POTASSIUM SERPL-MCNC: 3.1 MMOL/L — LOW (ref 3.5–5.3)
POTASSIUM SERPL-SCNC: 3.1 MMOL/L — LOW (ref 3.5–5.3)
PROT UR-MCNC: 100 MG/DL
RBC # BLD: 2.93 M/UL — LOW (ref 4.2–5.8)
RBC # FLD: 16.3 % — SIGNIFICANT CHANGE UP (ref 10.3–16.9)
RH IG SCN BLD-IMP: POSITIVE — SIGNIFICANT CHANGE UP
SODIUM SERPL-SCNC: 142 MMOL/L — SIGNIFICANT CHANGE UP (ref 135–145)
SP GR SPEC: 1.01 — SIGNIFICANT CHANGE UP (ref 1–1.03)
TROPONIN T SERPL-MCNC: 0.34 NG/ML — CRITICAL HIGH (ref 0–0.01)
UROBILINOGEN FLD QL: 0.2 E.U./DL — SIGNIFICANT CHANGE UP
WBC # BLD: 12.8 K/UL — HIGH (ref 3.8–10.5)
WBC # FLD AUTO: 12.8 K/UL — HIGH (ref 3.8–10.5)

## 2017-10-13 PROCEDURE — 99233 SBSQ HOSP IP/OBS HIGH 50: CPT

## 2017-10-13 PROCEDURE — 99233 SBSQ HOSP IP/OBS HIGH 50: CPT | Mod: GC

## 2017-10-13 RX ORDER — TAMSULOSIN HYDROCHLORIDE 0.4 MG/1
0.4 CAPSULE ORAL AT BEDTIME
Qty: 0 | Refills: 0 | Status: DISCONTINUED | OUTPATIENT
Start: 2017-10-13 | End: 2017-10-17

## 2017-10-13 RX ORDER — POTASSIUM CHLORIDE 20 MEQ
40 PACKET (EA) ORAL EVERY 4 HOURS
Qty: 0 | Refills: 0 | Status: COMPLETED | OUTPATIENT
Start: 2017-10-13 | End: 2017-10-13

## 2017-10-13 RX ADMIN — Medication 20 MILLIGRAM(S): at 18:16

## 2017-10-13 RX ADMIN — HEPARIN SODIUM 7500 UNIT(S): 5000 INJECTION INTRAVENOUS; SUBCUTANEOUS at 06:06

## 2017-10-13 RX ADMIN — Medication 10 MILLIGRAM(S): at 06:06

## 2017-10-13 RX ADMIN — VALACYCLOVIR 1000 MILLIGRAM(S): 500 TABLET, FILM COATED ORAL at 14:40

## 2017-10-13 RX ADMIN — NAFCILLIN 200 GRAM(S): 10 INJECTION, POWDER, FOR SOLUTION INTRAVENOUS at 12:05

## 2017-10-13 RX ADMIN — ATORVASTATIN CALCIUM 20 MILLIGRAM(S): 80 TABLET, FILM COATED ORAL at 22:02

## 2017-10-13 RX ADMIN — NAFCILLIN 200 GRAM(S): 10 INJECTION, POWDER, FOR SOLUTION INTRAVENOUS at 08:03

## 2017-10-13 RX ADMIN — Medication 3 MILLILITER(S): at 10:21

## 2017-10-13 RX ADMIN — NAFCILLIN 200 GRAM(S): 10 INJECTION, POWDER, FOR SOLUTION INTRAVENOUS at 00:28

## 2017-10-13 RX ADMIN — SIMETHICONE 80 MILLIGRAM(S): 80 TABLET, CHEWABLE ORAL at 22:08

## 2017-10-13 RX ADMIN — VALACYCLOVIR 1000 MILLIGRAM(S): 500 TABLET, FILM COATED ORAL at 06:06

## 2017-10-13 RX ADMIN — SIMETHICONE 80 MILLIGRAM(S): 80 TABLET, CHEWABLE ORAL at 03:54

## 2017-10-13 RX ADMIN — VALACYCLOVIR 1000 MILLIGRAM(S): 500 TABLET, FILM COATED ORAL at 22:01

## 2017-10-13 RX ADMIN — Medication 3 MILLILITER(S): at 22:02

## 2017-10-13 RX ADMIN — POLYETHYLENE GLYCOL 3350 17 GRAM(S): 17 POWDER, FOR SOLUTION ORAL at 06:06

## 2017-10-13 RX ADMIN — NAFCILLIN 200 GRAM(S): 10 INJECTION, POWDER, FOR SOLUTION INTRAVENOUS at 03:08

## 2017-10-13 RX ADMIN — Medication 3 MILLILITER(S): at 03:55

## 2017-10-13 RX ADMIN — Medication 81 MILLIGRAM(S): at 12:05

## 2017-10-13 RX ADMIN — NAFCILLIN 200 GRAM(S): 10 INJECTION, POWDER, FOR SOLUTION INTRAVENOUS at 16:44

## 2017-10-13 RX ADMIN — Medication 40 MILLIEQUIVALENT(S): at 10:21

## 2017-10-13 RX ADMIN — Medication 40 MILLIEQUIVALENT(S): at 18:20

## 2017-10-13 RX ADMIN — Medication 3 MILLIGRAM(S): at 22:01

## 2017-10-13 RX ADMIN — Medication 20 MILLIGRAM(S): at 06:06

## 2017-10-13 RX ADMIN — Medication 100 MILLIGRAM(S): at 22:00

## 2017-10-13 RX ADMIN — TAMSULOSIN HYDROCHLORIDE 0.4 MILLIGRAM(S): 0.4 CAPSULE ORAL at 22:01

## 2017-10-13 RX ADMIN — Medication 3 MILLILITER(S): at 18:20

## 2017-10-13 RX ADMIN — Medication 40 MILLIEQUIVALENT(S): at 14:39

## 2017-10-13 RX ADMIN — Medication 3 MILLILITER(S): at 14:39

## 2017-10-13 RX ADMIN — NAFCILLIN 200 GRAM(S): 10 INJECTION, POWDER, FOR SOLUTION INTRAVENOUS at 20:00

## 2017-10-13 NOTE — PROGRESS NOTE ADULT - SUBJECTIVE AND OBJECTIVE BOX
The patient is uncomfortable this am because of ongoing abdominal distension and gas pains. Recent bowel motions have been mushy, no formed stool. He otherwise is feeling much better. Appetite is good. He is breathing okay.     CHEMOTHERAPY REGIMEN:        Day:                          Diet:  Protocol:                                    IVF:      MEDICATIONS  (STANDING):  ALBUTerol/ipratropium for Nebulization 3 milliLiter(s) Nebulizer every 4 hours  aspirin  chewable 81 milliGRAM(s) Oral daily  atorvastatin 20 milliGRAM(s) Oral at bedtime  dextrose 5%. 1000 milliLiter(s) (50 mL/Hr) IV Continuous <Continuous>  dextrose 50% Injectable 12.5 Gram(s) IV Push once  dextrose 50% Injectable 25 Gram(s) IV Push once  dextrose 50% Injectable 25 Gram(s) IV Push once  furosemide   Injectable 20 milliGRAM(s) IV Push two times a day  heparin  Injectable 7500 Unit(s) SubCutaneous every 8 hours  insulin lispro (HumaLOG) corrective regimen sliding scale   SubCutaneous Before meals and at bedtime  melatonin 3 milliGRAM(s) Oral at bedtime  metoclopramide 10 milliGRAM(s) Oral every 8 hours  nafcillin  IVPB 2 Gram(s) IV Intermittent every 4 hours  nafcillin  IVPB      polyethylene glycol 3350 17 Gram(s) Oral two times a day  valACYclovir 1000 milliGRAM(s) Oral every 8 hours    MEDICATIONS  (PRN):  acetaminophen  Suppository 650 milliGRAM(s) Rectal every 6 hours PRN For Temp greater than 38 C (100.4 F)  bisacodyl Suppository 10 milliGRAM(s) Rectal daily PRN Constipation  dextrose Gel 1 Dose(s) Oral once PRN Blood Glucose LESS THAN 70 milliGRAM(s)/deciliter  glucagon  Injectable 1 milliGRAM(s) IntraMuscular once PRN Glucose LESS THAN 70 milligrams/deciliter  guaiFENesin    Syrup 100 milliGRAM(s) Oral every 6 hours PRN Cough  simethicone 80 milliGRAM(s) Chew two times a day PRN Gas      Allergies    No Known Allergies    Intolerances        DVT Prophylaxis: [x ] YES [ ] NO      Antibiotics: [x ] YES [ ] NO    Pain Scale (1-10):       Location:    Vital Signs Last 24 Hrs  T(C): 37.2 (13 Oct 2017 05:00), Max: 37.5 (12 Oct 2017 13:42)  T(F): 98.9 (13 Oct 2017 05:00), Max: 99.5 (12 Oct 2017 13:42)  HR: 90 (13 Oct 2017 04:50) (78 - 90)  BP: 149/70 (13 Oct 2017 04:50) (107/58 - 149/70)  BP(mean): 100 (13 Oct 2017 04:50) (76 - 100)  RR: 18 (13 Oct 2017 04:50) (15 - 26)  SpO2: 90% (13 Oct 2017 04:50) (90% - 97%)    Drug Dosing Weight  Height (cm): 172.72 (05 Oct 2017 01:27)  Weight (kg): 103.4 (12 Oct 2017 09:45)  BMI (kg/m2): 34.7 (12 Oct 2017 09:45)  BSA (m2): 2.16 (12 Oct 2017 09:45)    PHYSICAL EXAM:      Constitutional: having gas pains and abdominal distension.  Eyes: conjunctiva pink.  ENMT: NC in place. Buccal mucosa moist.  Neck: no masses.  Respiratory: chest is clear.  Cardiovascular: S1>S2 at apex. RSR.  Gastrointestinal: markedly distended abdomen. Soft, nontender, active bowel sounds.  Genitourinary: voiding without difficulty.  Extremities: leg edema noted bilaterally.  Neurological: no gross focal deficits.  Skin: warm and dry.  Lymph Nodes: none palp.  Musculoskeletal: full ROM.  Psychiatric: affect normal.        URINARY CATHETER: [ ] YES [x ] NO     LABS:  CBC Full  -  ( 12 Oct 2017 07:01 )  WBC Count : 12.5 K/uL  Hemoglobin : 8.5 g/dL  Hematocrit : 29.5 %  Platelet Count - Automated : 207 K/uL  Mean Cell Volume : 102.1 fL  Mean Cell Hemoglobin : 29.4 pg  Mean Cell Hemoglobin Concentration : 28.8 g/dL  Auto Neutrophil # : x  Auto Lymphocyte # : x  Auto Monocyte # : x  Auto Eosinophil # : x  Auto Basophil # : x  Auto Neutrophil % : x  Auto Lymphocyte % : x  Auto Monocyte % : x  Auto Eosinophil % : x  Auto Basophil % : x    10-12    144  |  100  |  19  ----------------------------<  118<H>  4.1   |  30  |  1.16    Ca    9.2      12 Oct 2017 07:01  Phos  3.9     10-12  Mg     1.6     10-12      PT/INR - ( 11 Oct 2017 06:13 )   PT: 15.0 sec;   INR: 1.34          PTT - ( 11 Oct 2017 06:13 )  PTT:29.1 sec      CULTURES:    RADIOLOGY & ADDITIONAL STUDIES:

## 2017-10-13 NOTE — CONSULT NOTE ADULT - SUBJECTIVE AND OBJECTIVE BOX
CONSULT NOTE:    HPI:  This is a 72 year old male with a PMHx of HTN, COPD, Prostate CA (2013, s/p radiation and seed placement, on lupron with a recent elevation of PSA and imaging showing bone mets) [id number] with CHF exacerbation. Patient catheter was removed overnight, patient was unable to void, had bedside PVR >800cc and some blood at meatus so urology called. Pt states has urge to urinate, states has never had urinary retention in the past, denies frequency or dysuria. States that ever since he had seeds implanted in 2013 he has had intermittent hematuria and nocturia. States has never had a catheter before. Patient states he takes flomax to help him urinate.      Vital Signs Last 24 Hrs  T(C): 37.1 (13 Oct 2017 09:06), Max: 37.5 (12 Oct 2017 13:42)  T(F): 98.7 (13 Oct 2017 09:06), Max: 99.5 (12 Oct 2017 13:42)  HR: 68 (13 Oct 2017 08:25) (68 - 90)  BP: 168/72 (13 Oct 2017 08:25) (108/56 - 168/72)  BP(mean): 103 (13 Oct 2017 08:25) (76 - 103)  RR: 18 (13 Oct 2017 08:25) (18 - 26)  SpO2: 93% (13 Oct 2017 08:25) (90% - 97%)  I&O's Summary    12 Oct 2017 07:01  -  13 Oct 2017 07:00  --------------------------------------------------------  IN: 1300 mL / OUT: 1750 mL / NET: -450 mL        PE:  Gen: NAD  Abd: Soft, +suprapubic distention and TTP  : + small blood clot at meatus, uncircumcised phallus non TTP, no scrotal TTP      LABS:                        9.0    12.8  )-----------( 256      ( 13 Oct 2017 06:21 )             28.7     10-13    142  |  96  |  18  ----------------------------<  144<H>  3.1<L>   |  29  |  1.17    Ca    9.2      13 Oct 2017 06:21  Phos  3.9     10-12  Mg     2.2     10-13        Cultures  Culture Results:   No growth at 2 days. (10-10 @ 17:30)  Culture Results:   No growth at 2 days. (10-10 @ 17:30)  Culture Results:   Growth in aerobic bottle: Staphylococcus aureus  Susceptibility to follow. (10-09 @ 13:45)  Culture Results:   Growth in aerobic bottle: Staphylococcus aureus  Susceptibility to follow. (10-09 @ 13:45)

## 2017-10-13 NOTE — PROGRESS NOTE ADULT - SUBJECTIVE AND OBJECTIVE BOX
INTERVAL HPI/OVERNIGHT EVENTS: Patient w/ abdominal distention and gas overnight. AXR placed for AM.    SUBJECTIVE: Patient seen and examined at bedside. Patient acutely complaining of abdominal discomfort and pain. During discussion this AM patient had to urinate. Unable to void, patient had bright red blood from his penis. Clots noted on the bed cloth and floor. Bladder scan showed approximately 800 cc retained urine.     OBJECTIVE:    VITAL SIGNS:  ICU Vital Signs Last 24 Hrs  T(C): 37.1 (13 Oct 2017 09:06), Max: 37.5 (12 Oct 2017 13:42)  T(F): 98.7 (13 Oct 2017 09:06), Max: 99.5 (12 Oct 2017 13:42)  HR: 68 (13 Oct 2017 08:25) (68 - 90)  BP: 168/72 (13 Oct 2017 08:25) (108/56 - 168/72)  BP(mean): 103 (13 Oct 2017 08:25) (76 - 103)  ABP: --  ABP(mean): --  RR: 18 (13 Oct 2017 08:25) (18 - 26)  SpO2: 93% (13 Oct 2017 08:25) (90% - 97%)        10-12 @ 07:  -  10-13 @ 07:00  --------------------------------------------------------  IN: 1300 mL / OUT: 1750 mL / NET: -450 mL    10-13 @ 07:01  -  10-13 @ 12:35  --------------------------------------------------------  IN: 0 mL / OUT: 1100 mL / NET: -1100 mL      CAPILLARY BLOOD GLUCOSE  126 (12 Oct 2017 22:13)      POCT Blood Glucose.: 144 mg/dL (13 Oct 2017 11:13)      PHYSICAL EXAM:    General: NAD  HEENT: NC/AT; PERRL, clear conjunctiva, DMM  Neck: supple, no JVD  Respiratory: crackles b/l  Cardiovascular: +S1/S2; RRR, no m/r/g  Abdomen: soft, distended, suprapubic distention, dull to percussion, discomfort with pressure. No rebound/guarding.   Extremities: WWP, LE edema b/l to mid thigh  Vascular: peripheral pulses 2+ radial/dp/pt  Skin: normal color and turgor; chronic venous dermatitis b/l lower legs  Neurological: A&O x3, no gross deficits    MEDICATIONS:  MEDICATIONS  (STANDING):  ALBUTerol/ipratropium for Nebulization 3 milliLiter(s) Nebulizer every 4 hours  aspirin  chewable 81 milliGRAM(s) Oral daily  atorvastatin 20 milliGRAM(s) Oral at bedtime  dextrose 5%. 1000 milliLiter(s) (50 mL/Hr) IV Continuous <Continuous>  furosemide   Injectable 20 milliGRAM(s) IV Push two times a day  heparin  Injectable 7500 Unit(s) SubCutaneous every 8 hours  melatonin 3 milliGRAM(s) Oral at bedtime  metoclopramide 10 milliGRAM(s) Oral every 8 hours  nafcillin  IVPB 2 Gram(s) IV Intermittent every 4 hours  nafcillin  IVPB      polyethylene glycol 3350 17 Gram(s) Oral two times a day  potassium chloride    Tablet ER 40 milliEquivalent(s) Oral every 4 hours  valACYclovir 1000 milliGRAM(s) Oral every 8 hours    MEDICATIONS  (PRN):  acetaminophen  Suppository 650 milliGRAM(s) Rectal every 6 hours PRN For Temp greater than 38 C (100.4 F)  bisacodyl Suppository 10 milliGRAM(s) Rectal daily PRN Constipation  guaiFENesin    Syrup 100 milliGRAM(s) Oral every 6 hours PRN Cough  simethicone 80 milliGRAM(s) Chew two times a day PRN Gas      ALLERGIES:  Allergies    No Known Allergies    Intolerances        LABS:                        9.0    12.8  )-----------( 256      ( 13 Oct 2017 06:21 )             28.7     10-13    142  |  96  |  18  ----------------------------<  144<H>  3.1<L>   |  29  |  1.17    Ca    9.2      13 Oct 2017 06:21  Phos  3.9     10-12  Mg     2.2     10-13        Urinalysis Basic - ( 13 Oct 2017 10:28 )    Color: Red / Appearance: SL CLOUDY / S.010 / pH: x  Gluc: x / Ketone: NEGATIVE  / Bili: Negative / Urobili: 0.2 E.U./dL   Blood: x / Protein: 100 mg/dL / Nitrite: NEGATIVE   Leuk Esterase: NEGATIVE / RBC: Many /HPF / WBC 5-10 /HPF   Sq Epi: x / Non Sq Epi: x / Bacteria: Present /HPF        RADIOLOGY & ADDITIONAL TESTS: Reviewed.    NM Inflammatory Loc Wholebody, Gallium   Findings: There is physiologic activity within the liver, spleen and   bowel.    There is an unusually large amount of activity in the lower extremities   in the lung bones surrounding the knee joints and in the upper   extremities near the humeral heads. This pattern is typical of bone   marrow expansion and may be seen in patients who have bone marrow   stimulation either from anemia or medication.    Splenic uptake is often seen in patients with systemic illness, but the   finding is nonspecific    Impression: No clear source of infection was identified. There is   increased activity in the proximal humeri and in the lung bones   surrounding both knees. This pattern is typical of bone marrow expansion,   often seen in patients who are anemic.

## 2017-10-13 NOTE — PROGRESS NOTE ADULT - ATTENDING COMMENTS
Given his abdominal complaints would repeat an abdominal x-ray and perform a rectal exam to R/O impaction.

## 2017-10-13 NOTE — CONSULT NOTE ADULT - ASSESSMENT
71 yo m with urinary retention  1) 20 F coude placed drained 1.2 L tea colored urine without clot  2) catheter irrigated with sterile water, flushed easily no clots  3) f/u UA  4) keep glaser until patient restarted on his flomax and more ambulatory

## 2017-10-13 NOTE — PROGRESS NOTE ADULT - SUBJECTIVE AND OBJECTIVE BOX
INTERVAL HPI/OVERNIGHT EVENTS:    Patient seen and examined.  No complaints    CONSTITUTIONAL:  Negative fever or chills, feels well, good appetite  EYES:  Negative  blurry vision or double vision  CARDIOVASCULAR:  Negative for chest pain or palpitations  RESPIRATORY:  Negative for cough, wheezing, or SOB   GASTROINTESTINAL:  Negative for nausea, vomiting, diarrhea, constipation, or abdominal pain  GENITOURINARY:  Negative frequency, urgency or dysuria  NEUROLOGIC:  No headache, confusion, dizziness, lightheadedness      ANTIBIOTICS/RELEVANT:    MEDICATIONS  (STANDING):  ALBUTerol/ipratropium for Nebulization 3 milliLiter(s) Nebulizer every 4 hours  aspirin  chewable 81 milliGRAM(s) Oral daily  atorvastatin 20 milliGRAM(s) Oral at bedtime  dextrose 5%. 1000 milliLiter(s) (50 mL/Hr) IV Continuous <Continuous>  furosemide   Injectable 20 milliGRAM(s) IV Push two times a day  heparin  Injectable 7500 Unit(s) SubCutaneous every 8 hours  melatonin 3 milliGRAM(s) Oral at bedtime  nafcillin  IVPB 2 Gram(s) IV Intermittent every 4 hours  nafcillin  IVPB      polyethylene glycol 3350 17 Gram(s) Oral two times a day  potassium chloride    Tablet ER 40 milliEquivalent(s) Oral every 4 hours  valACYclovir 1000 milliGRAM(s) Oral every 8 hours    MEDICATIONS  (PRN):  acetaminophen  Suppository 650 milliGRAM(s) Rectal every 6 hours PRN For Temp greater than 38 C (100.4 F)  bisacodyl Suppository 10 milliGRAM(s) Rectal daily PRN Constipation  guaiFENesin    Syrup 100 milliGRAM(s) Oral every 6 hours PRN Cough  simethicone 80 milliGRAM(s) Chew two times a day PRN Gas        Vital Signs Last 24 Hrs  T(C): 36.6 (13 Oct 2017 13:37), Max: 37.4 (13 Oct 2017 01:00)  T(F): 97.9 (13 Oct 2017 13:37), Max: 99.4 (13 Oct 2017 01:00)  HR: 86 (13 Oct 2017 12:10) (68 - 90)  BP: 144/63 (13 Oct 2017 12:10) (108/56 - 168/72)  BP(mean): 91 (13 Oct 2017 12:10) (76 - 103)  RR: 18 (13 Oct 2017 12:10) (18 - 26)  SpO2: 96% (13 Oct 2017 12:10) (90% - 97%)    PHYSICAL EXAM:  Constitutional: non-toxic, no distress  Eyes:MEHUL, EOMI  Ear/Nose/Throat: no oral lesion, no sinus tenderness on percussion	  Neck:  supple  Respiratory: bilateral crackles  Cardiovascular: S1S2 RRR, no murmurs  Gastrointestinal:soft, (+) BS, no HSM  Extremities:no e/e/c  Vascular: DP Pulse:	right normal; left normal      LABS:                        9.0    12.8  )-----------( 256      ( 13 Oct 2017 06:21 )             28.7     10-13    142  |  96  |  18  ----------------------------<  144<H>  3.1<L>   |  29  |  1.17    Ca    9.2      13 Oct 2017 06:21  Phos  3.9     10-12  Mg     2.2     10-13        Urinalysis Basic - ( 13 Oct 2017 10:28 )    Color: Red / Appearance: SL CLOUDY / S.010 / pH: x  Gluc: x / Ketone: NEGATIVE  / Bili: Negative / Urobili: 0.2 E.U./dL   Blood: x / Protein: 100 mg/dL / Nitrite: NEGATIVE   Leuk Esterase: NEGATIVE / RBC: Many /HPF / WBC 5-10 /HPF   Sq Epi: x / Non Sq Epi: x / Bacteria: Present /HPF        MICROBIOLOGY:  Culture - Blood (10.10.17 @ 17:30)    Specimen Source: .Blood Blood    Culture Results:   No growth at 2 days.    Culture - Blood (10.10.17 @ 17:30)    Specimen Source: .Blood Blood    Culture Results:   No growth at 2 days.        RADIOLOGY & ADDITIONAL STUDIES:  < from: NM Inflammatory Loc Wholebody, Gallium (10.12.17 @ 15:10) >   No clear source of infection was identified. There is   increased activity in the proximal humeri and in the lung bones   surrounding both knees. This pattern is typical of bone marrow expansion,   often seen in patients who are anemic.    < from: KELLY w/Doppler (10.09.17 @ 13:35) >  No discrete vegetations noted.

## 2017-10-13 NOTE — PROGRESS NOTE ADULT - ASSESSMENT
A/recs:  1) acute hypoxic respiratory failure with elevated tn and abnormal septal wall motion on tte in addition to rv dilatation and abnormal ecg; left heart failure (ef=45% by tte); now with worsening bilat le edema  a - nstemi versus demand ischemia - downtrend tn noted  b - remains on asa/statin  c - consider ace-i when acute issues resolve  d - 0>i as tolerated - on lasix  e - strict i/o and daily weights  g - check ecg  h - tte 10-  h - consider cath on day prior to discharge if cleared by primary team and all consultants    2) paroxysmal atrial fibrillation  a - continuous telemetry - note unwitnessed syncope prior to admission:    3) htn - avoid labile bp    4) sepsis due to pna per ccm with bacteremia   -f/u id recs re [place]; plan for tte prior    4) possible copd exacerbation - consider bb if no contraindications    5) acute renal failure - resolved    6) normocytic anemia and thrombocytopenia - serial h/h noted    7) shingles    8) prostate ca - per hem-onc; events noted; now with jailyn    9) maintain K>4, Mg>2    10) palliative care following  case discussed with housestaff

## 2017-10-13 NOTE — PROGRESS NOTE ADULT - ASSESSMENT
IMPRESSION:  MSSA bacteremia.  Likely secondary to Pneumonia.  Blood cultures have cleared.  Nuclear med scan without an infectious source    Recommend:  1.  Continue nafcillin 2 grams IV q4hrs.  Can be switched to Ancef on discharge.  Will need PICC line for at least 4 weeks of IV antibiotics  2.  Will discuss with cardiology the need for repeat KELLY

## 2017-10-13 NOTE — PROGRESS NOTE ADULT - SUBJECTIVE AND OBJECTIVE BOX
Cardiology for [facility]    cc: follow-up cardiology evaluation and management of left heart failure    interval - events noted - glaser now in position; no cp    PAST MEDICAL & SURGICAL HISTORY:  COPD (chronic obstructive pulmonary disease)  Hypertension  Obstructive sleep apnea syndrome  Prostate cancer metastatic to bone  PC (prostate cancer): with seed placement    MEDICATIONS  (STANDING):  ALBUTerol/ipratropium for Nebulization 3 milliLiter(s) Nebulizer every 4 hours  aspirin  chewable 81 milliGRAM(s) Oral daily  atorvastatin 20 milliGRAM(s) Oral at bedtime  dextrose 5%. 1000 milliLiter(s) (50 mL/Hr) IV Continuous <Continuous>  furosemide   Injectable 20 milliGRAM(s) IV Push two times a day  melatonin 3 milliGRAM(s) Oral at bedtime  nafcillin  IVPB 2 Gram(s) IV Intermittent every 4 hours  nafcillin  IVPB      polyethylene glycol 3350 17 Gram(s) Oral two times a day  tamsulosin 0.4 milliGRAM(s) Oral at bedtime    MEDICATIONS  (PRN):  acetaminophen  Suppository 650 milliGRAM(s) Rectal every 6 hours PRN For Temp greater than 38 C (100.4 F)  bisacodyl Suppository 10 milliGRAM(s) Rectal daily PRN Constipation  guaiFENesin    Syrup 100 milliGRAM(s) Oral every 6 hours PRN Cough  simethicone 80 milliGRAM(s) Chew two times a day PRN Gas      ICU Vital Signs Last 24 Hrs  T(C): 37.4 (13 Oct 2017 21:00), Max: 37.4 (13 Oct 2017 01:00)  T(F): 99.3 (13 Oct 2017 21:00), Max: 99.4 (13 Oct 2017 01:00)  HR: 80 (13 Oct 2017 21:35) (68 - 90)  BP: 118/56 (13 Oct 2017 18:15) (118/56 - 168/72)  BP(mean): 81 (13 Oct 2017 18:15) (81 - 103)  ABP: --  ABP(mean): --  RR: 18 (13 Oct 2017 21:35) (18 - 26)  SpO2: 97% (13 Oct 2017 21:35) (90% - 97%)        physical exam  nad  oob to chair  mmm  neck supple  decreased breath sounds bases bilat  rr; s1/s2 present  soft abd; bowel sounds present  bilat le pitting edema  warm le     I&O's Detail    12 Oct 2017 07:01  -  13 Oct 2017 07:00  --------------------------------------------------------  IN:    Oral Fluid: 800 mL    Solution: 500 mL  Total IN: 1300 mL    OUT:    Voided: 1750 mL  Total OUT: 1750 mL    Total NET: -450 mL      13 Oct 2017 07:01  -  13 Oct 2017 23:16  --------------------------------------------------------  IN:  Total IN: 0 mL    OUT:    Indwelling Catheter - Urethral: 2550 mL  Total OUT: 2550 mL    Total NET: -2550 mL                          8.3    x     )-----------( x        ( 13 Oct 2017 22:45 )             26.2     10-13    142  |  96  |  18  ----------------------------<  144<H>  3.1<L>   |  29  |  1.17    Ca    9.2      13 Oct 2017 06:21  Phos  3.9     10-12  Mg     2.2     10-13      I&O's Summary    12 Oct 2017 07:01  -  13 Oct 2017 07:00  --------------------------------------------------------  IN: 1300 mL / OUT: 1750 mL / NET: -450 mL    13 Oct 2017 07:01  -  13 Oct 2017 23:17  --------------------------------------------------------  IN: 0 mL / OUT: 2550 mL / NET: -2550 mL    Troponin T, Serum in AM (10.13.17 @ 06:21)    Troponin T, Serum: 0.34: Checked result, consistent with patient history  Reference interval for troponin T is </= 0.01 ng/mL which includes the  99th percentile of a healthy population. Troponin T results are not  interchangeable with troponin I results. ng/mL        < from: KELLY w/Doppler (10.09.17 @ 13:35  EXAM:  ESOPHAGEAL ECHO (CARDIOL)                          *** ADDENDUM 10/09/2017  ***    Patient Height: 172.0 cm  Patient Weight: 109.0 kg  BSA: 2.2 m^2  Interpretation Summary  Normal left ventricular size and wall thickness.The left ventricular  ejection   fraction is estimated to be 40-45%The right ventricle is dilated.The   right   ventricular systolic function is mildly reduced.The left atrium is   dilated.The   right atrium is dilated.Thickening and calcification of the aortic   valveNo   aortic regurgitation noted.Mitral valve thickening noted.There is mild to   moderate mitral regurgitation.There is trace tricuspid   regurgitation.There was   insufficient TR detected from which to calculate pulmonary artery   systolic   pressure. No pulmonic regurgitation noted.There is aortic root   sclerosis/calcification.Mild atherosclerotic plaque(s) in the aortic   arch.Mild   atherosclerotic plaque(s) in the descending aorta.There is no pericardial   effusion.No discrete vegetations noted.    < end of copied text >    < from: Xray Chest 1 View AP -PORTABLE-Routine (10.09.17 @ 05:37) >  EXAM:  XR CHEST 1 VIEW PORT ROUTINE                          PROCEDURE DATE:  10/09/2017                     INTERPRETATION:    Portable AP Radiograph dated 10/9/2017 5:37 AM    CLINICAL INFORMATION: 72 years, Male, Intubated, PNA    PRIOR STUDIES:October 8, 2017    FINDINGS: Support tubes and lines are unchanged. Left pleural effusion is   stable. Right pleural effusion has increased. Pulmonary vascular   congestion has increased.    IMPRESSION:  Interval increase of right pleural effusion and pulmonary vascular   congestion.            "Thank you for the opportunity to participate in the care of this   patient."        KATHY PABLO M.D., RADIOLOGY ATTENDING  This document has been electronically signed. Oct  9 2017  8:33AM    < end of copied text >

## 2017-10-13 NOTE — PROGRESS NOTE ADULT - PROBLEM SELECTOR PLAN 6
Will cont. to follow for support.  DNR with a trial of intubation if needed    - as identified during the patients PSSA screening the patient would benefit from: Music Therapy, nutrition, P.T. Patient to have continual access to supportive services during rest of hospital stay as the patient/family deemed necessary ie. Chaplaincy, Massage Therapy, Music Therapy, Pt/family supportive services, Palliative SW, etc.

## 2017-10-13 NOTE — PROGRESS NOTE ADULT - ASSESSMENT
72 y.o gentleman with h/o metastatic prostate ca to bones s/p RT with seed implants and on leuprolide sent from Oncologist's office for unwitnessed syncopal episode, SOB, and productive cough, found to be septic from pna and GPC bacteremia and ATN with respiratory failure requiring intubation, now tolerating NC, seen initially as an ICU trigger for an active stage IV malignancy

## 2017-10-13 NOTE — PROGRESS NOTE ADULT - PROBLEM SELECTOR PLAN 5
MOLST completed along with [facility] DNR form yesterday.  Pt is DNR with a trial of intubation and a feeding tube if warranted.  Per pt, these wishes were also discussed with family/sons himself

## 2017-10-13 NOTE — PROGRESS NOTE ADULT - SUBJECTIVE AND OBJECTIVE BOX
TEDDY GUZMAN   MRN-[id number]         CC: Patient is a 72y old  Male who presents with a chief complaint of SOB, syncope (05 Oct 2017 01:42)  Pt reports feeling good and excited to go home, now on step down    HPI:  This is a 72 year old male with a PMHx of HTN, COPD, Prostate CA (2013, s/p radiation and seed placement, on leuprolide with a recent elevation of PSA and imaging showing bone mets) presents from his heme/on office with worsening of SOB and and episode syncope in the morning. The patient states that this morning, he was waking up from a nap and upon getting up from his bed, he felt unwell, dizzy, and lightheaded and then woke up on the ground. He states that he lost consciousness for about 5 seconds before he came too. He hit the left side of his head on the ground, but did not feel dizzy, lightheaded, nauseous after the event. He denies any chest pain, palpitations, diaphoresis or changes in vision prior or after the event as well. He got up and went to his doctors office. He notes that he has had several episodes like this in the past and they occur when he is getting up from his bed or up from a chair. He notes that he usually feels dizzy or lightheaded, but has never had any other symptoms. At his doctors office, he was told about the elevated PSA and the bone mets on imaging. It was at the office that his doctor noticed his increased SOB and sent him to the ED.     The pt notes that he has had SOB since early September with associated productive cough with yellow sputum. The pt states that he went to go see his PCP, Dr. Glass who gave him amoxicillin for bronchitis. The pt states that he felt better and that his breathing and cough seemed improved to him over the past month and did not realize the worsening of his respiratory status until today at his doctors office. The patient denies any fever, chills, n/v/d/c, abdominal pain, hematochezia, melena, hematuria, dysuria, urinary frequency, urinary urgency, numbness or tingling in his extremities. The pt notes that he has nocturia, however this has been chronic in nature as well as worsening of LE edema since the SOB began.    In ED: Vitals: 98.9, HR 73, BP 99/63, RR 24, 96%RA. Presented with leukocytosis and given vanc/zosyn in ED. Pt received about 1L ns( ordered for 2 but second was not given). Pt desaturated in the ED and was placed on BiPAP (05 Oct 2017 01:42)    ROS:  UNABLE TO OBTAIN  due to:    DYSPNEA (Y/N):	n  N/V (Y/N): n	  SECRETIONS (Y/N): n	  AGITATION (Y/N): n  PAIN(Y/N): n       -Provocation/Palliation:     -Quality/Quantity:     -Radiating:     -Severity:     -Timing/Frequency:     -Impact on ADLs:     OTHER REVIEW OF SYSTEMS:  ALLERGIES:  No Known Allergies    OPIATE NAÏVE (Y/N):  -iStop reviewed (Y/N): Y    MEDICATIONS: reviewed  MEDICATIONS  (STANDING):  ALBUTerol/ipratropium for Nebulization 3 milliLiter(s) Nebulizer every 4 hours  aspirin  chewable 81 milliGRAM(s) Oral daily  atorvastatin 20 milliGRAM(s) Oral at bedtime  dextrose 5%. 1000 milliLiter(s) (50 mL/Hr) IV Continuous <Continuous>  furosemide   Injectable 20 milliGRAM(s) IV Push two times a day  heparin  Injectable 7500 Unit(s) SubCutaneous every 8 hours  melatonin 3 milliGRAM(s) Oral at bedtime  metoclopramide 10 milliGRAM(s) Oral every 8 hours  nafcillin  IVPB 2 Gram(s) IV Intermittent every 4 hours  nafcillin  IVPB      polyethylene glycol 3350 17 Gram(s) Oral two times a day  potassium chloride    Tablet ER 40 milliEquivalent(s) Oral every 4 hours  valACYclovir 1000 milliGRAM(s) Oral every 8 hours    MEDICATIONS  (PRN):  acetaminophen  Suppository 650 milliGRAM(s) Rectal every 6 hours PRN For Temp greater than 38 C (100.4 F)  bisacodyl Suppository 10 milliGRAM(s) Rectal daily PRN Constipation  guaiFENesin    Syrup 100 milliGRAM(s) Oral every 6 hours PRN Cough  simethicone 80 milliGRAM(s) Chew two times a day PRN Gas    PHYSICAL EXAM:  T(C): 37.1 (10-13-17 @ 09:06), Max: 37.5 (10-12-17 @ 13:42)  T(F): 98.7 (10-13-17 @ 09:06), Max: 99.5 (10-12-17 @ 13:42)  HR: 68 (10-13-17 @ 08:25) (68 - 90)  BP: 168/72 (10-13-17 @ 08:25) (108/56 - 168/72)  RR: 18 (10-13-17 @ 08:25) (18 - 26)  SpO2: 93% (10-13-17 @ 08:25) (90% - 97%)    GENERAL:  Sitting up in chair working on his laptop, appearing fairly comfortable  HEENT: anicteric, moist mucous membranes, no thrush     	         CVS: SR          	  RESP: mildly productive cough of clear output, 6LNC, resp even and not labored       	  GI: protuberant            	  : voids           	  MUSC: no edema       	  NEURO: no focal deficits    	  PSYCH: calm, pleasant, and appropriae              LABS: reviewed                        9.0    12.8  )-----------( 256      ( 13 Oct 2017 06:21 )             28.7     10-13    142  |  96  |  18  ----------------------------<  144<H>  3.1<L>   |  29  |  1.17    Ca    9.2      13 Oct 2017 06:21  Phos  3.9     10-12  Mg     2.2     10-13    IMAGING: reviewed  < from: NM Inflammatory Loc Wholebody, Gallium (10.12.17 @ 15:10) >  EXAM:  NM INFLAMMATORY LOC GALLIUM WB                        PROCEDURE DATE:  10/12/2017    Impression: No clear source of infection was identified. There is   increased activity in the proximal humeri and in the lung bones   surrounding both knees. This pattern is typical of bone marrow expansion,   often seen in patients who are anemic.  < end of copied text >    ADVANCE DIRECTIVES:  DNR, trial of intubation  Decision maker: pt  Legal surrogate:    PSYCHOSOCIAL-SPIRITUAL ASSESSMENT:  Reviewed  Care plan unchanged    GOALS OF CARE DISCUSSION:  Palliative care info/counseling provided	      Documentation of GOC: rehab/home care at home    AGENCY CHOICE DISCUSSED:     Home care          REFERRALS:	   Unit SW/Case Mgmt  Music Therapy  Nutrition  PT/OT    [ ]CRITICAL CARE TIME PROVIDED TO UNSTABLE PT W/ ORGAN FAILURE            [x]> 50% OF THE TIME SPENT IN COUNSELING AND COORDINATING CARE     [ ]PROLONGED SERVICE       FACE TO FACE: [x]PT     [ ]PT & FAMILY    Start:               End:  	       Minutes:

## 2017-10-13 NOTE — PROGRESS NOTE ADULT - ASSESSMENT
Assessment	  71 yo male with hx of HTN, COPD, Prostate CA (s/p radiation and seed 2013 on leuprolide outpatient) who presents from [facility] onc with worsening of SOB and episode of unwitnessed syncope at home found to have severe sepsis with gram + cocci in clusters identified as S. aureus and ATN. Hospital course complicated by rapid response for hypotension,unresponsve to painful stimuli and was transferred to MICU for for septic shock. Septic shock has resolved but pt has had persistent MSSA bacteremia with resolving respiratory failure and ATN.    ID  #Sepsis w/ persistent MSSA bacteremia  - patient w/o leukocytosis, fever, w/ stable BP  - persistent bacteremia with MSSA; continue nafcillin  - most recent blood cx NGTD; pending finalization  - KELLY done 10/9 is NEGATIVE for vegetation (Thickening of aortic valve which mostly calcified-Mild MR). Repeat KELLY pending discussion with ID and cardiology for consensus  - Gallium showing no areas of pathologic uptake.   - ID following; f/u recs    Respiratory   #Acute hypoxic Respiratory Failure-resolved  -likely underlying COPD w/ septic shock during rapid response on 5 LA      #COPD  - c/w duonebs q 4   - encourage ambulation and incentive spirometry.       Cardiovascular  #New onset paroxysmal A.fib Vs MAT:  -in NSR, no AC given bleed  -Rate control: no req for HR control in NSR  -F/U further cardiologist recs    #HFrEF  -TTE shows HFrEF with EF 45%, right atrial dilatation, septal wall motion abnormalities and mild hypokinesis of left ventricle. Unable to visualize any vegetations or R heart structures. Unable to calculate pulmonary a pressures.   - c/w ASA + lipitor  - pt hypervolemic on exam, c/w lasix. W/ rales and rhonchi on exam today.   - strict I/Os, daily weights, fluid restriction 1.5L  - acute onset of HFrEF; ischemia workup outpatient    #NSTEMI :  - EKG positive for ST depression in anteroseptal leads, Echo positive for septal hypokinesis, Troponin 0.06 -->0.10-->0.19-->0.23-->0.17-->0.15-->0.44-->0.54-->0.56, likely probably due to demand ischemia from hypoxia exacerbated by renal insufficiency (cr. 2.07 on admission).   - Daily troponin    Renal/  ATN-resolved  - ATN on admission w/ Cr now at baseline.     Hematuria w/ urinary retention  - Flaquito bleeding from urethral meatus this AM on exam. Urology consulted for glaser placement. Immediately drained 800 cc of bloody urine.   - Hematuria likely due to underlying prostate malignancy w/ recent glaser placement  - maintain glaser, TOV as tolerated this weekend  - UA sent today     Heme/onc  #Prostate ca w/ mets to spine  Per oncologist continue to hold home meds.  -Anemia: Hb stable monitor Hb and transfuse if Hb<7.   -Thrombocytopenia resolved     Skin    #Shingles  Day 7/7 of valtrex, to complete course. No isolation    GI    #ileus -resolved  -c/w PO agents for constipation, d/c reglan  - patient w/ multiple BM    F no IVF  E- replete lytes prn  N-DASH diet, fluid restriction    PPx - Heparin SC for DVT     CODE - DNR not DNI    Dispo: [facility] Assessment	  73 yo male with hx of HTN, COPD, Prostate CA (s/p radiation and seed 2013 on leuprolide outpatient) who presents from [facility] onc with worsening of SOB and episode of unwitnessed syncope at home found to have severe sepsis with gram + cocci in clusters identified as S. aureus and ATN. Hospital course complicated by rapid response for hypotension,unresponsve to painful stimuli and was transferred to MICU for for septic shock. Septic shock has resolved but pt has had persistent MSSA bacteremia with resolving respiratory failure and ATN.    ID  #Sepsis w/ persistent MSSA bacteremia  - patient w/o leukocytosis, fever, w/ stable BP  - persistent bacteremia with MSSA; continue nafcillin  - most recent blood cx NGTD; pending finalization  - KELLY done 10/9 is NEGATIVE for vegetation (Thickening of aortic valve which mostly calcified-Mild MR). Repeat KELLY pending discussion with ID and cardiology for consensus  - Gallium showing no areas of pathologic uptake.   - ID following; f/u recs    Respiratory   #Acute hypoxic Respiratory Failure-resolved  -likely underlying COPD w/ septic shock during rapid response on 5 LA      #COPD  - c/w duonebs q 4   - encourage ambulation and incentive spirometry.       Cardiovascular  #New onset paroxysmal A.fib Vs MAT:  -in NSR, no AC given bleed  -Rate control: no req for HR control in NSR  -F/U further cardiologist recs    #HFrEF  -TTE shows HFrEF with EF 45%, right atrial dilatation, septal wall motion abnormalities and mild hypokinesis of left ventricle. Unable to visualize any vegetations or R heart structures. Unable to calculate pulmonary a pressures.   - c/w ASA + lipitor  - pt hypervolemic on exam, c/w lasix. W/ rales and rhonchi on exam today.   - strict I/Os, daily weights, fluid restriction 1.5L  - acute onset of HFrEF; ischemia workup outpatient    #NSTEMI :  - EKG positive for ST depression in anteroseptal leads, Echo positive for septal hypokinesis, Troponin 0.06 -->0.10-->0.19-->0.23-->0.17-->0.15-->0.44-->0.54-->0.56, likely probably due to demand ischemia from hypoxia exacerbated by renal insufficiency (cr. 2.07 on admission).   - Daily troponin    Renal/  ATN-resolved  - ATN on admission w/ Cr now at baseline.     Hematuria w/ urinary retention  - Flaquito bleeding from urethral meatus this AM on exam. Urology consulted for glaser placement. Immediately drained 800 cc of bloody urine. Irrigation showed no clots in bladder.   - Hematuria likely due to underlying prostate malignancy w/ recent glaser placement. Glaser possibly due to prostate inflammation/bleed   - maintain glaser, TOV as tolerated this weekend  - UA sent     Heme/onc  #Prostate ca w/ mets to spine  Per oncologist continue to hold home meds.  -Anemia: Hb stable monitor Hb and transfuse if Hb<7.   -Thrombocytopenia resolved     Skin    #Shingles  Day 7/7 of valtrex, to complete course. No isolation    GI    #ileus -resolved  -c/w PO agents for constipation, d/c reglan  - patient w/ multiple BM    Endo     discontinued ISS as patient not requiring coverage    F no IVF  E- replete lytes prn  N-DASH diet, fluid restriction    PPx - Heparin SC for DVT     CODE - DNR not DNI    Dispo: [facility]

## 2017-10-14 LAB
ANION GAP SERPL CALC-SCNC: 11 MMOL/L — SIGNIFICANT CHANGE UP (ref 5–17)
BUN SERPL-MCNC: 37 MG/DL — HIGH (ref 7–23)
CALCIUM SERPL-MCNC: 8.6 MG/DL — SIGNIFICANT CHANGE UP (ref 8.4–10.5)
CHLORIDE SERPL-SCNC: 97 MMOL/L — SIGNIFICANT CHANGE UP (ref 96–108)
CO2 SERPL-SCNC: 31 MMOL/L — SIGNIFICANT CHANGE UP (ref 22–31)
CREAT SERPL-MCNC: 1.49 MG/DL — HIGH (ref 0.5–1.3)
GLUCOSE SERPL-MCNC: 122 MG/DL — HIGH (ref 70–99)
HCT VFR BLD CALC: 22.7 % — LOW (ref 39–50)
HCT VFR BLD CALC: 22.8 % — LOW (ref 39–50)
HCT VFR BLD CALC: 23.7 % — LOW (ref 39–50)
HCT VFR BLD CALC: 24 % — LOW (ref 39–50)
HGB BLD-MCNC: 6.9 G/DL — CRITICAL LOW (ref 13–17)
HGB BLD-MCNC: 7.1 G/DL — LOW (ref 13–17)
HGB BLD-MCNC: 7.3 G/DL — LOW (ref 13–17)
HGB BLD-MCNC: 7.4 G/DL — LOW (ref 13–17)
MAGNESIUM SERPL-MCNC: 1.9 MG/DL — SIGNIFICANT CHANGE UP (ref 1.6–2.6)
MCHC RBC-ENTMCNC: 30.3 PG — SIGNIFICANT CHANGE UP (ref 27–34)
MCHC RBC-ENTMCNC: 30.4 G/DL — LOW (ref 32–36)
MCHC RBC-ENTMCNC: 30.4 G/DL — LOW (ref 32–36)
MCHC RBC-ENTMCNC: 30.4 PG — SIGNIFICANT CHANGE UP (ref 27–34)
MCHC RBC-ENTMCNC: 30.7 PG — SIGNIFICANT CHANGE UP (ref 27–34)
MCHC RBC-ENTMCNC: 31 PG — SIGNIFICANT CHANGE UP (ref 27–34)
MCHC RBC-ENTMCNC: 31.1 G/DL — LOW (ref 32–36)
MCHC RBC-ENTMCNC: 31.2 G/DL — LOW (ref 32–36)
MCV RBC AUTO: 100 FL — SIGNIFICANT CHANGE UP (ref 80–100)
MCV RBC AUTO: 98.7 FL — SIGNIFICANT CHANGE UP (ref 80–100)
MCV RBC AUTO: 99.2 FL — SIGNIFICANT CHANGE UP (ref 80–100)
MCV RBC AUTO: 99.6 FL — SIGNIFICANT CHANGE UP (ref 80–100)
PLATELET # BLD AUTO: 201 K/UL — SIGNIFICANT CHANGE UP (ref 150–400)
PLATELET # BLD AUTO: 207 K/UL — SIGNIFICANT CHANGE UP (ref 150–400)
PLATELET # BLD AUTO: 215 K/UL — SIGNIFICANT CHANGE UP (ref 150–400)
PLATELET # BLD AUTO: 217 K/UL — SIGNIFICANT CHANGE UP (ref 150–400)
POTASSIUM SERPL-MCNC: 3.7 MMOL/L — SIGNIFICANT CHANGE UP (ref 3.5–5.3)
POTASSIUM SERPL-SCNC: 3.7 MMOL/L — SIGNIFICANT CHANGE UP (ref 3.5–5.3)
RBC # BLD: 2.27 M/UL — LOW (ref 4.2–5.8)
RBC # BLD: 2.31 M/UL — LOW (ref 4.2–5.8)
RBC # BLD: 2.39 M/UL — LOW (ref 4.2–5.8)
RBC # BLD: 2.41 M/UL — LOW (ref 4.2–5.8)
RBC # FLD: 16.4 % — SIGNIFICANT CHANGE UP (ref 10.3–16.9)
RBC # FLD: 16.5 % — SIGNIFICANT CHANGE UP (ref 10.3–16.9)
RBC # FLD: 16.6 % — SIGNIFICANT CHANGE UP (ref 10.3–16.9)
RBC # FLD: 16.7 % — SIGNIFICANT CHANGE UP (ref 10.3–16.9)
SODIUM SERPL-SCNC: 139 MMOL/L — SIGNIFICANT CHANGE UP (ref 135–145)
WBC # BLD: 10.3 K/UL — SIGNIFICANT CHANGE UP (ref 3.8–10.5)
WBC # BLD: 10.9 K/UL — HIGH (ref 3.8–10.5)
WBC # BLD: 11.3 K/UL — HIGH (ref 3.8–10.5)
WBC # BLD: 11.8 K/UL — HIGH (ref 3.8–10.5)
WBC # FLD AUTO: 10.3 K/UL — SIGNIFICANT CHANGE UP (ref 3.8–10.5)
WBC # FLD AUTO: 10.9 K/UL — HIGH (ref 3.8–10.5)
WBC # FLD AUTO: 11.3 K/UL — HIGH (ref 3.8–10.5)
WBC # FLD AUTO: 11.8 K/UL — HIGH (ref 3.8–10.5)

## 2017-10-14 PROCEDURE — 71010: CPT | Mod: 26

## 2017-10-14 PROCEDURE — 99233 SBSQ HOSP IP/OBS HIGH 50: CPT | Mod: GC

## 2017-10-14 PROCEDURE — 99233 SBSQ HOSP IP/OBS HIGH 50: CPT

## 2017-10-14 RX ADMIN — NAFCILLIN 200 GRAM(S): 10 INJECTION, POWDER, FOR SOLUTION INTRAVENOUS at 00:17

## 2017-10-14 RX ADMIN — NAFCILLIN 200 GRAM(S): 10 INJECTION, POWDER, FOR SOLUTION INTRAVENOUS at 07:00

## 2017-10-14 RX ADMIN — NAFCILLIN 200 GRAM(S): 10 INJECTION, POWDER, FOR SOLUTION INTRAVENOUS at 12:20

## 2017-10-14 RX ADMIN — NAFCILLIN 200 GRAM(S): 10 INJECTION, POWDER, FOR SOLUTION INTRAVENOUS at 03:14

## 2017-10-14 RX ADMIN — NAFCILLIN 200 GRAM(S): 10 INJECTION, POWDER, FOR SOLUTION INTRAVENOUS at 23:47

## 2017-10-14 RX ADMIN — Medication 3 MILLILITER(S): at 18:34

## 2017-10-14 RX ADMIN — SIMETHICONE 80 MILLIGRAM(S): 80 TABLET, CHEWABLE ORAL at 18:40

## 2017-10-14 RX ADMIN — Medication 3 MILLILITER(S): at 05:17

## 2017-10-14 RX ADMIN — NAFCILLIN 200 GRAM(S): 10 INJECTION, POWDER, FOR SOLUTION INTRAVENOUS at 20:40

## 2017-10-14 RX ADMIN — NAFCILLIN 200 GRAM(S): 10 INJECTION, POWDER, FOR SOLUTION INTRAVENOUS at 15:46

## 2017-10-14 RX ADMIN — Medication 3 MILLILITER(S): at 21:04

## 2017-10-14 RX ADMIN — ATORVASTATIN CALCIUM 20 MILLIGRAM(S): 80 TABLET, FILM COATED ORAL at 22:21

## 2017-10-14 RX ADMIN — TAMSULOSIN HYDROCHLORIDE 0.4 MILLIGRAM(S): 0.4 CAPSULE ORAL at 22:21

## 2017-10-14 RX ADMIN — Medication 3 MILLILITER(S): at 12:19

## 2017-10-14 RX ADMIN — Medication 20 MILLIGRAM(S): at 18:34

## 2017-10-14 RX ADMIN — Medication 20 MILLIGRAM(S): at 05:32

## 2017-10-14 RX ADMIN — Medication 3 MILLILITER(S): at 15:46

## 2017-10-14 RX ADMIN — Medication 100 MILLIGRAM(S): at 05:32

## 2017-10-14 RX ADMIN — Medication 81 MILLIGRAM(S): at 12:20

## 2017-10-14 RX ADMIN — Medication 3 MILLIGRAM(S): at 22:21

## 2017-10-14 NOTE — PROGRESS NOTE ADULT - SUBJECTIVE AND OBJECTIVE BOX
Patient seen and examined. Saldana urine now clear after irrigation yesterday. Would recommend TOV before discharge. To maximize success of TOV please wait until having normal BM and OOBA, likely Monday.    ICU Vital Signs Last 24 Hrs  T(C): 36.8 (14 Oct 2017 09:04), Max: 37.4 (13 Oct 2017 21:00)  T(F): 98.3 (14 Oct 2017 09:04), Max: 99.3 (13 Oct 2017 21:00)  HR: 82 (14 Oct 2017 08:30) (80 - 90)  BP: 107/57 (14 Oct 2017 08:30) (90/55 - 144/63)  BP(mean): 79 (14 Oct 2017 02:27) (69 - 91)  ABP: --  ABP(mean): --  RR: 17 (14 Oct 2017 08:30) (16 - 26)  SpO2: 95% (14 Oct 2017 08:30) (93% - 98%)                          7.4    10.3  )-----------( 201      ( 14 Oct 2017 06:40 )             23.7

## 2017-10-14 NOTE — PROGRESS NOTE ADULT - ATTENDING COMMENTS
Pt awake and alert. Comfortable on NCO2. Hb decreased post hematuria presumed to be traumatic. Urine clear and HB stable. Continue Nafcillin and OOB. Abd distended but tympanitic and nontender. Will need PICC and would leave Saldana in today per . consider sleep study referral and continue nightime CPAP for likely MARIA ESTHER.

## 2017-10-14 NOTE — PROGRESS NOTE ADULT - SUBJECTIVE AND OBJECTIVE BOX
Cardiology for [facility]    cc: follow-up cardiology evaluation and management of left heart failure    interval - events noted    PAST MEDICAL & SURGICAL HISTORY:  COPD (chronic obstructive pulmonary disease)  Hypertension  Obstructive sleep apnea syndrome  Prostate cancer metastatic to bone  PC (prostate cancer): with seed placement    MEDICATIONS  (STANDING):  ALBUTerol/ipratropium for Nebulization 3 milliLiter(s) Nebulizer every 4 hours  aspirin  chewable 81 milliGRAM(s) Oral daily  atorvastatin 20 milliGRAM(s) Oral at bedtime  dextrose 5%. 1000 milliLiter(s) (50 mL/Hr) IV Continuous <Continuous>  furosemide   Injectable 20 milliGRAM(s) IV Push two times a day  melatonin 3 milliGRAM(s) Oral at bedtime  nafcillin  IVPB 2 Gram(s) IV Intermittent every 4 hours  nafcillin  IVPB      polyethylene glycol 3350 17 Gram(s) Oral two times a day  tamsulosin 0.4 milliGRAM(s) Oral at bedtime    MEDICATIONS  (PRN):  acetaminophen  Suppository 650 milliGRAM(s) Rectal every 6 hours PRN For Temp greater than 38 C (100.4 F)  bisacodyl Suppository 10 milliGRAM(s) Rectal daily PRN Constipation  guaiFENesin    Syrup 100 milliGRAM(s) Oral every 6 hours PRN Cough  simethicone 80 milliGRAM(s) Chew two times a day PRN Gas    ICU Vital Signs Last 24 Hrs  T(C): 36.8 (14 Oct 2017 09:04), Max: 37.4 (13 Oct 2017 21:00)  T(F): 98.3 (14 Oct 2017 09:04), Max: 99.3 (13 Oct 2017 21:00)  HR: 82 (14 Oct 2017 08:30) (80 - 90)  BP: 107/57 (14 Oct 2017 08:30) (90/55 - 144/63)  BP(mean): 79 (14 Oct 2017 02:27) (69 - 91)  ABP: --  ABP(mean): --  RR: 17 (14 Oct 2017 08:30) (16 - 26)  SpO2: 95% (14 Oct 2017 08:30) (93% - 98%)    physical exam  nad  supine   no hjr  decreased breath sounds   rr; s1/s2 present present  soft abd; nt/nd  le edema present bilat  le pulses present bilat     I&O's Detail    13 Oct 2017 07:01  -  14 Oct 2017 07:00  --------------------------------------------------------  IN:    Oral Fluid: 250 mL    Solution: 300 mL  Total IN: 550 mL    OUT:    Indwelling Catheter - Urethral: 3600 mL  Total OUT: 3600 mL    Total NET: -3050 mL                          7.4    10.3  )-----------( 201      ( 14 Oct 2017 06:40 )             23.7   10-14    139  |  97  |  37<H>  ----------------------------<  122<H>  3.7   |  31  |  1.49<H>    Ca    8.6      14 Oct 2017 05:55  Mg     1.9     10-14      I&O's Summary    13 Oct 2017 07:01  -  14 Oct 2017 07:00  --------------------------------------------------------  IN: 550 mL / OUT: 3600 mL / NET: -3050 mL          < from: KELLY w/Doppler (10.09.17 @ 13:35  EXAM:  ESOPHAGEAL ECHO (CARDIOL)                          *** ADDENDUM 10/09/2017  ***    Patient Height: 172.0 cm  Patient Weight: 109.0 kg  BSA: 2.2 m^2  Interpretation Summary  Normal left ventricular size and wall thickness.The left ventricular  ejection   fraction is estimated to be 40-45%The right ventricle is dilated.The   right   ventricular systolic function is mildly reduced.The left atrium is   dilated.The   right atrium is dilated.Thickening and calcification of the aortic   valveNo   aortic regurgitation noted.Mitral valve thickening noted.There is mild to   moderate mitral regurgitation.There is trace tricuspid   regurgitation.There was   insufficient TR detected from which to calculate pulmonary artery   systolic   pressure. No pulmonic regurgitation noted.There is aortic root   sclerosis/calcification.Mild atherosclerotic plaque(s) in the aortic   arch.Mild   atherosclerotic plaque(s) in the descending aorta.There is no pericardial   effusion.No discrete vegetations noted.    < end of copied text >    < from: Xray Chest 1 View AP -PORTABLE-Routine (10.09.17 @ 05:37) >  EXAM:  XR CHEST 1 VIEW PORT ROUTINE                          PROCEDURE DATE:  10/09/2017                     INTERPRETATION:    Portable AP Radiograph dated 10/9/2017 5:37 AM    CLINICAL INFORMATION: 72 years, Male, Intubated, PNA    PRIOR STUDIES:October 8, 2017    FINDINGS: Support tubes and lines are unchanged. Left pleural effusion is   stable. Right pleural effusion has increased. Pulmonary vascular   congestion has increased.    IMPRESSION:  Interval increase of right pleural effusion and pulmonary vascular   congestion.            "Thank you for the opportunity to participate in the care of this   patient."        KATHY PABLO M.D., RADIOLOGY ATTENDING  This document has been electronically signed. Oct  9 2017  8:33AM    < end of copied text >

## 2017-10-14 NOTE — PROGRESS NOTE ADULT - ASSESSMENT
Assessment	  71 yo male with hx of HTN, COPD, Prostate CA (s/p radiation and seed 2013 on leuprolide outpatient) who presents with worsening of SOB and an episode of unwitnessed syncope at home found to have severe sepsis with gram + cocci in clusters identified as S. aureus and ATN.  Hospital course complicated by rapid response for hypotension, unresponsive to painful stimuli and was transferred to MICU for septic shock.  Septic shock has resolved but pt has had persistent MSSA bacteremia with resolving respiratory failure and ATN.    ID  #Sepsis w/ persistent MSSA bacteremia  - patient w/o leukocytosis, fever, w/ stable BP  - persistent bacteremia with MSSA; continue nafcillin  - blood cx from 10/10 NGTD; pending finalization  - KELLY done 10/9 is NEGATIVE for vegetation (Thickening of aortic valve which mostly calcified-Mild MR). Repeat KELLY pending discussion with ID and cardiology for consensus  - Gallium showing no areas of pathologic uptake.   - ID following; f/u recs    Respiratory   #Acute hypoxic Respiratory Failure-resolved  -likely underlying COPD w/ septic shock during rapid response on 5 LA  -Consider pulm consult for obstructive sleep apnea      #COPD  - c/w duonebs q 4   - encourage ambulation and incentive spirometry.       Cardiovascular  #New onset paroxysmal A.fib Vs MAT:  -in NSR  -Rate control: no req for HR control in NSR  -F/U further cardiologist recs    #HFrEF  -TTE shows HFrEF with EF 45%, right atrial dilatation, septal wall motion abnormalities and mild hypokinesis of left ventricle. Unable to visualize any vegetations or R heart structures. Unable to calculate pulmonary a pressures.   - c/w ASA + lipitor  - pt hypervolemic on exam, improved today c/w lasix..   - strict I/Os, daily weights, fluid restriction 1.5L  - acute onset of HFrEF; ischemia workup outpatient    #NSTEMI :  - EKG positive for ST depression in anteroseptal leads, Echo positive for septal hypokinesis, Troponin 0.06 -->0.10-->0.19-->0.23-->0.17-->0.15-->0.44-->0.54-->0.56-->0.51 (peaked), likely probably due to demand ischemia from hypoxia exacerbated by renal insufficiency (cr. 2.07 on admission).       Renal/  ATN-resolved  - ATN on admission w/ Cr now at baseline.     Hematuria w/ urinary retention  - Flaquito bleeding from urethral meatus this yesterday on exam. Urology consulted for glaser placement. Immediately drained 800 cc of bloody urine. Irrigation showed no clots in bladder.   - Hematuria likely due to underlying prostate malignancy w/ recent glaser placement. Glaser possibly due to prostate inflammation/bleed  - Hemoglobin this AM 6.9, follow up CBCs today 7.4 and 7.3 without intervention, no intervenion at this time but will follow up CBCs and transfuse if hemoglbiin below 7.0   - maintain glaser, TOV as tolerated this weekend  - UA negative    Heme/onc  #Prostate ca w/ mets to spine  Per oncologist continue to hold home meds.  -Anemia: Hb stable monitor Hb and transfuse if Hb<7.   -Thrombocytopenia resolved     Skin    #Shingles  Finished valtrex, to complete course. No isolation    GI    #ileus -resolved  -c/w PO agents for constipation, d/c reglan  - patient w/ multiple BM    Endo     discontinued ISS as patient not requiring coverage    F no IVF  E- replete lytes prn  N-DASH diet, fluid restriction    PPx - Heparin SC for DVT     CODE - DNR not DNI    Dispo: [facility] Assessment	  73 yo male with hx of HTN, COPD, Prostate CA (s/p radiation and seed 2013 on leuprolide outpatient) who presents with worsening of SOB and an episode of unwitnessed syncope found to have severe sepsis with gram + cocci in clusters identified as S. aureus and ATN.  Hospital course complicated by rapid response for hypotension, unresponsive to painful stimuli and was transferred to MICU for septic shock.  Septic shock has resolved but pt has had persistent MSSA bacteremia with resolving respiratory failure and ATN.    ID  #Sepsis w/ persistent MSSA bacteremia  - patient w/o leukocytosis, fever, w/ stable BP  - persistent bacteremia with MSSA; continue nafcillin (will need 4 weeks abx per ID recs, first day was 10/10, will likely be d/c with PICC line)  - blood cx from 10/10 NGTD; pending finalization  - KELLY done 10/9 is NEGATIVE for vegetation (Thickening of aortic valve which mostly calcified-Mild MR). Repeat KELLY pending discussion with ID and cardiology for consensus  - Gallium showing no areas of pathologic uptake.   - ID following; f/u recs    Respiratory   #Acute hypoxic Respiratory Failure-resolved  -likely underlying COPD w/ septic shock during rapid response on 5 LA  -Consider pulm consult for obstructive sleep apnea      #COPD  - c/w duonebs q 4   - encourage ambulation and incentive spirometry.       Cardiovascular  #New onset paroxysmal A.fib Vs MAT:  -in NSR  -Rate control: no req for HR control in NSR  -F/U further cardiologist recs    #HFrEF  -TTE shows HFrEF with EF 45%, right atrial dilatation, septal wall motion abnormalities and mild hypokinesis of left ventricle. Unable to visualize any vegetations or R heart structures. Unable to calculate pulmonary a pressures.   - c/w ASA + lipitor  - pt hypervolemic on exam, improved today c/w lasix.   - strict I/Os, daily weights, fluid restriction 1.5L  - acute onset of HFrEF; ischemia workup outpatient    #NSTEMI :  - EKG positive for ST depression in anteroseptal leads, Echo positive for septal hypokinesis, Troponin 0.06 -->0.10-->0.19-->0.23-->0.17-->0.15-->0.44-->0.54-->0.56-->0.51 (peaked), likely probably due to demand ischemia from hypoxia exacerbated by renal insufficiency (cr. 2.07 on admission).       Renal/  ATN-resolved  - ATN on admission w/ Cr now at baseline.     Hematuria w/ urinary retention  - Flaquito bleeding from urethral meatus this yesterday on exam. Urology consulted for glaser placement. Immediately drained 800 cc of bloody urine. Irrigation showed no clots in bladder.   - Hematuria likely due to underlying prostate malignancy w/ recent glaser placement. Glaser possibly due to prostate inflammation/bleed  - Hemoglobin this AM 6.9, follow up CBCs today 7.4 and 7.3 without intervention, no intervention at this time but will follow up CBCs and transfuse if hemoglobin below 7.0   - maintain glaser, TOV as tolerated this weekend  - UA negative    Heme/onc  #Prostate ca w/ mets to spine  Per oncologist continue to hold home meds.  -Anemia: Hb stable monitor Hb and transfuse if Hb<7.   -Thrombocytopenia resolved     Skin    #Shingles  Finished valtrex    GI    #ileus -resolved  -c/w PO agents for constipation, d/c reglan  - patient w/ multiple BM    Endo     discontinued ISS as patient not requiring coverage    F no IVF  E- replete lytes prn  N-DASH diet, fluid restriction    PPx - On ASA 81mg, Heparin D/C due to bleed, can restart depending on follow up CBCs     CODE - DNR not DNI    Dispo: [id number]

## 2017-10-14 NOTE — PROGRESS NOTE ADULT - ASSESSMENT
IMPRESSION:  S. aureus bacteremia, secondary to Pneumonia    Recommend:  1.  Continue nafcillin at current dose  2. Will need at least 4 weeks IV antibiotics (day 1 = 10/10/17)

## 2017-10-14 NOTE — PROGRESS NOTE ADULT - ASSESSMENT
A/recs:  1) acute hypoxic respiratory failure with elevated tn and abnormal septal wall motion on tte in addition to rv dilatation and abnormal ecg; left heart failure (ef=45% by tte); now with worsening bilat le edema  a - nstemi versus demand ischemia   b - asa/statin as tolerated  c - prefer to start ace-i after initiating av-node blockade  d - 0>i as tolerated; strict i/o and daily weights  e - repeat ecg  f - please check tte today -  g - consider cath prior to discharge if cleared by primary team and all consultants    2) paroxysmal atrial fibrillation  a - telemetry - note unwitnessed syncope prior to admission  b - bb if no contraindications - check with pulm with possible copd    3) htn - avoid lability    4) sepsis due to pna per [facility] with bacteremia   -tte today; consider repeat [place] if indicated    4) possible copd exacerbation - per [facility]    5) acute renal failure - resolved    6) normocytic anemia and thrombocytopenia - consider prbc    7) shingles    8) prostate ca - per hem-onc and gu with urinary retention    9) K>4, Mg>2    10) palliative care following

## 2017-10-14 NOTE — PROGRESS NOTE ADULT - SUBJECTIVE AND OBJECTIVE BOX
INTERVAL HPI/OVERNIGHT EVENTS:    Patient seen and examined.  Feels well.  Has increased sputum production today.  O2 is titrating down    CONSTITUTIONAL:  Negative fever or chills, feels well, good appetite  EYES:  Negative  blurry vision or double vision  CARDIOVASCULAR:  Negative for chest pain or palpitations  RESPIRATORY:  + cough, wheezing, or SOB   GASTROINTESTINAL:  Negative for nausea, vomiting, diarrhea, constipation, or abdominal pain  GENITOURINARY:  Negative frequency, urgency or dysuria  NEUROLOGIC:  No headache, confusion, dizziness, lightheadedness      ANTIBIOTICS/RELEVANT:    MEDICATIONS  (STANDING):  ALBUTerol/ipratropium for Nebulization 3 milliLiter(s) Nebulizer every 4 hours  aspirin  chewable 81 milliGRAM(s) Oral daily  atorvastatin 20 milliGRAM(s) Oral at bedtime  dextrose 5%. 1000 milliLiter(s) (50 mL/Hr) IV Continuous <Continuous>  furosemide   Injectable 20 milliGRAM(s) IV Push two times a day  melatonin 3 milliGRAM(s) Oral at bedtime  nafcillin  IVPB 2 Gram(s) IV Intermittent every 4 hours  nafcillin  IVPB      polyethylene glycol 3350 17 Gram(s) Oral two times a day  tamsulosin 0.4 milliGRAM(s) Oral at bedtime    MEDICATIONS  (PRN):  acetaminophen  Suppository 650 milliGRAM(s) Rectal every 6 hours PRN For Temp greater than 38 C (100.4 F)  bisacodyl Suppository 10 milliGRAM(s) Rectal daily PRN Constipation  guaiFENesin    Syrup 100 milliGRAM(s) Oral every 6 hours PRN Cough  simethicone 80 milliGRAM(s) Chew two times a day PRN Gas        Vital Signs Last 24 Hrs  T(C): 36.8 (14 Oct 2017 09:04), Max: 37.4 (13 Oct 2017 21:00)  T(F): 98.3 (14 Oct 2017 09:04), Max: 99.3 (13 Oct 2017 21:00)  HR: 82 (14 Oct 2017 08:30) (80 - 90)  BP: 107/57 (14 Oct 2017 08:30) (90/55 - 144/63)  BP(mean): 79 (14 Oct 2017 02:27) (69 - 91)  RR: 17 (14 Oct 2017 08:30) (16 - 26)  SpO2: 95% (14 Oct 2017 08:30) (93% - 98%)    PHYSICAL EXAM:  Constitutional:  non-toxic, no distress  Eyes: no icterus  Ear/Nose/Throat:  no sinus tenderness on percussion	  Neck:  supple  Respiratory: bilateral crackles  Cardiovascular: S1S2 RRR, no murmurs  Gastrointestinal:soft, (+) BS, non-tender  Extremities:no edema  Vascular: DP Pulse:	right normal; left normal      LABS:                        7.4    10.3  )-----------( 201      ( 14 Oct 2017 06:40 )             23.7     10-    139  |  97  |  37<H>  ----------------------------<  122<H>  3.7   |  31  |  1.49<H>    Ca    8.6      14 Oct 2017 05:55  Mg     1.9     1014        Urinalysis Basic - ( 13 Oct 2017 10:28 )    Color: Red / Appearance: SL CLOUDY / S.010 / pH: x  Gluc: x / Ketone: NEGATIVE  / Bili: Negative / Urobili: 0.2 E.U./dL   Blood: x / Protein: 100 mg/dL / Nitrite: NEGATIVE   Leuk Esterase: NEGATIVE / RBC: Many /HPF / WBC 5-10 /HPF   Sq Epi: x / Non Sq Epi: x / Bacteria: Present /HPF        MICROBIOLOGY:  Culture - Blood (10.10.17 @ 17:30)    Specimen Source: .Blood Blood    Culture Results:   No growth at 3 days.        RADIOLOGY & ADDITION:  < from: NM Inflammatory Loc Wholebody, Gallium (10.12.17 @ 15:10) >  Impression: No clear source of infection was identified. There is   increased activity in the proximal humeri and in the lung bones   surrounding both knees. This pattern is typical of bone marrow expansion,   often seen in patients who are anemic.

## 2017-10-14 NOTE — PROGRESS NOTE ADULT - SUBJECTIVE AND OBJECTIVE BOX
Resting quite comfortably this am. His abdominal discomfort yesterday was due to urinary retention. A glaser cath is back in place.    CHEMOTHERAPY REGIMEN:        Day:                          Diet:  Protocol:                                    IVF:      MEDICATIONS  (STANDING):  ALBUTerol/ipratropium for Nebulization 3 milliLiter(s) Nebulizer every 4 hours  aspirin  chewable 81 milliGRAM(s) Oral daily  atorvastatin 20 milliGRAM(s) Oral at bedtime  dextrose 5%. 1000 milliLiter(s) (50 mL/Hr) IV Continuous <Continuous>  furosemide   Injectable 20 milliGRAM(s) IV Push two times a day  melatonin 3 milliGRAM(s) Oral at bedtime  nafcillin  IVPB 2 Gram(s) IV Intermittent every 4 hours  nafcillin  IVPB      polyethylene glycol 3350 17 Gram(s) Oral two times a day  tamsulosin 0.4 milliGRAM(s) Oral at bedtime    MEDICATIONS  (PRN):  acetaminophen  Suppository 650 milliGRAM(s) Rectal every 6 hours PRN For Temp greater than 38 C (100.4 F)  bisacodyl Suppository 10 milliGRAM(s) Rectal daily PRN Constipation  guaiFENesin    Syrup 100 milliGRAM(s) Oral every 6 hours PRN Cough  simethicone 80 milliGRAM(s) Chew two times a day PRN Gas      Allergies    No Known Allergies    Intolerances        DVT Prophylaxis: [ ] YES [ ] NO      Antibiotics: [x ] YES [ ] NO    Pain Scale (1-10):       Location:    Vital Signs Last 24 Hrs  T(C): 36.8 (14 Oct 2017 05:00), Max: 37.4 (13 Oct 2017 21:00)  T(F): 98.3 (14 Oct 2017 05:00), Max: 99.3 (13 Oct 2017 21:00)  HR: 82 (14 Oct 2017 05:02) (68 - 90)  BP: 108/59 (14 Oct 2017 02:27) (90/55 - 168/72)  BP(mean): 79 (14 Oct 2017 02:27) (69 - 103)  RR: 16 (14 Oct 2017 05:02) (16 - 26)  SpO2: 93% (14 Oct 2017 05:02) (93% - 98%)    Drug Dosing Weight  Height (cm): 172.72 (05 Oct 2017 01:27)  Weight (kg): 103.4 (12 Oct 2017 09:45)  BMI (kg/m2): 34.7 (12 Oct 2017 09:45)  BSA (m2): 2.16 (12 Oct 2017 09:45)    PHYSICAL EXAM:      Constitutional: feeling better this am.  Eyes: conjunctiva pink.  ENMT: NC in place. Buccal mucosa moist.  Neck: no masses.  Respiratory: chest clear.  Cardiovascular: S1>S2 at apex. RSR.  Gastrointestinal: still distended, soft, nontender, active bowel sounds.  Genitourinary: cath in place.  Extremities: leg edema persists.  Neurological: no gross focal deficits.  Skin: warm and dry.  Lymph Nodes: none palp.  Musculoskeletal: full ROM.  Psychiatric: affect normal.        URINARY CATHETER: [x ] YES [ ] NO     LABS:  CBC Full  -  ( 14 Oct 2017 06:40 )  WBC Count : 10.3 K/uL  Hemoglobin : 7.4 g/dL  Hematocrit : 23.7 %  Platelet Count - Automated : 201 K/uL  Mean Cell Volume : 99.2 fL  Mean Cell Hemoglobin : 31.0 pg  Mean Cell Hemoglobin Concentration : 31.2 g/dL  Auto Neutrophil # : x  Auto Lymphocyte # : x  Auto Monocyte # : x  Auto Eosinophil # : x  Auto Basophil # : x  Auto Neutrophil % : x  Auto Lymphocyte % : x  Auto Monocyte % : x  Auto Eosinophil % : x  Auto Basophil % : x    10-14    139  |  97  |  37<H>  ----------------------------<  122<H>  3.7   |  31  |  1.49<H>    Ca    8.6      14 Oct 2017 05:55  Mg     1.9     10-14        Urinalysis Basic - ( 13 Oct 2017 10:28 )    Color: Red / Appearance: SL CLOUDY / S.010 / pH: x  Gluc: x / Ketone: NEGATIVE  / Bili: Negative / Urobili: 0.2 E.U./dL   Blood: x / Protein: 100 mg/dL / Nitrite: NEGATIVE   Leuk Esterase: NEGATIVE / RBC: Many /HPF / WBC 5-10 /HPF   Sq Epi: x / Non Sq Epi: x / Bacteria: Present /HPF        CULTURES:    RADIOLOGY & ADDITIONAL STUDIES:

## 2017-10-14 NOTE — PROGRESS NOTE ADULT - SUBJECTIVE AND OBJECTIVE BOX
Transfer Note    72 year old male with a PMHx of HTN, COPD, Prostate CA (2013, s/p radiation and seed placement, on leuprolide with a recent elevation of PSA and imaging showing bone mets) presents from his heme/on office with worsening of SOB and episode syncope the morning of admission 10/4. Pt was admitted for severe sepsis  w/ CATALINA and possible pneumonia. Pt empirically treated for CAP w/ ceftriaxone/azithromycin/vancomycin and placed on BiPAP. Rapid response was called 10/5 for respiratory failure and hypotension once it was discovered that the patient removed his BiPaP on the floor. Patient stepped up to the MICU and started on peripheral levophed for closer monitoring. Patient intubated that PM for airway protection. In the MICU had OGT, right IJ with left A-line placed. Pt's ABX broadened to Zosyn Vancomycin (dosed by level). OG tube was placed for decompression of previous ileus seen and abdominal distention. Blood Cx came back positive for MSSA w/ persistent bacteremia - last blood cx NGTD. Patient transitioned to nafcillin for MSSA coverage. Pt also had an ECHO (TTE) which showed HFrEF with EF 45%, right atrial dilatation, septal wall motion abnormalities and mild hypokinesis of left ventricle. KELLY which came back negative for any vegetation and recommended to repeat it in 3-4 days because of persistent bacteremia. Troponin was trended to peak because of T wave inversion on EKG (mostly demand ischemia). 10/6 patient came off propofol and levophed. Pt was Started on ,Duonebs Q4 and Solumedrol 40mg Q6. Patient continued to have secretions, started on mucormist. LE dopplers negative for DVT. Liquid BM x2 overnight. Went into afib w/ RVR ~6 am. IV lopressor x2 unsuccessful. Started on amiodarone bolus and patient became bradycardic. Cardiologist was contacted and recommended to continue telemetry, hold Anticoagulation for now. Pt w/ obstipation in MICU started on bowel regimen w/ successful BM and minimal improvement in abdominal distention. Successfully extubated to nasal cannula. Started on aldactone. Patient was  HDS w/ good O2 saturation and transferred to [facility].  Since arriving on [facility], the patient's blood cultures have reamined negative.  Additionally he has been hemodyncaimally stable and saturating well.  On 10/13 the patient had abdominal distension 2/2 to urinary retention as he had a full bladder when scanned.      VITALS  Vital Signs Last 24 Hrs  T(C): 37.6 (14 Oct 2017 13:26), Max: 37.6 (14 Oct 2017 13:26)  T(F): 99.7 (14 Oct 2017 13:26), Max: 99.7 (14 Oct 2017 13:26)  HR: 94 (14 Oct 2017 12:00) (80 - 94)  BP: 120/57 (14 Oct 2017 12:00) (90/55 - 131/61)  BP(mean): 79 (14 Oct 2017 02:27) (69 - 88)  RR: 17 (14 Oct 2017 12:00) (16 - 26)  SpO2: 94% (14 Oct 2017 12:00) (93% - 98%)    I&O's Summary    13 Oct 2017 07:01  -  14 Oct 2017 07:00  --------------------------------------------------------  IN: 550 mL / OUT: 3600 mL / NET: -3050 mL    14 Oct 2017 07:01  -  14 Oct 2017 14:28  --------------------------------------------------------  IN: 360 mL / OUT: 1075 mL / NET: -715 mL        CAPILLARY BLOOD GLUCOSE          PHYSICAL EXAM  General: A&Ox 3; NAD  Head: NC/AT;   Eyes: PERRL; EOMI; anicteric sclera  Neck: Supple; no JVD  Respiratory: CTA B/L; no wheezes/crackles/rales auscultated w/ good air movement  Cardiovascular: Regular rhythm/rate; S1/S2; no gallops or murmurs auscultated  Gastrointestinal: Soft; NTND w/out rebound tenderness or guarding; bowel sounds normal  Extremities: WWP; no edema or cyanosis; radial/pedal pulses palpable  Neurological:  CNII-XII grossly intact; no obvious focal deficits    MEDICATIONS  (STANDING):  ALBUTerol/ipratropium for Nebulization 3 milliLiter(s) Nebulizer every 4 hours  aspirin  chewable 81 milliGRAM(s) Oral daily  atorvastatin 20 milliGRAM(s) Oral at bedtime  dextrose 5%. 1000 milliLiter(s) (50 mL/Hr) IV Continuous <Continuous>  furosemide   Injectable 20 milliGRAM(s) IV Push two times a day  melatonin 3 milliGRAM(s) Oral at bedtime  nafcillin  IVPB 2 Gram(s) IV Intermittent every 4 hours  nafcillin  IVPB      polyethylene glycol 3350 17 Gram(s) Oral two times a day  tamsulosin 0.4 milliGRAM(s) Oral at bedtime    MEDICATIONS  (PRN):  acetaminophen  Suppository 650 milliGRAM(s) Rectal every 6 hours PRN For Temp greater than 38 C (100.4 F)  bisacodyl Suppository 10 milliGRAM(s) Rectal daily PRN Constipation  guaiFENesin    Syrup 100 milliGRAM(s) Oral every 6 hours PRN Cough  simethicone 80 milliGRAM(s) Chew two times a day PRN Gas      LABS                        7.3    11.8  )-----------( 217      ( 14 Oct 2017 12:53 )             24.0     10-14    139  |  97  |  37<H>  ----------------------------<  122<H>  3.7   |  31  |  1.49<H>    Ca    8.6      14 Oct 2017 05:55  Mg     1.9     10-14          Urinalysis Basic - ( 13 Oct 2017 10:28 )    Color: Red / Appearance: SL CLOUDY / S.010 / pH: x  Gluc: x / Ketone: NEGATIVE  / Bili: Negative / Urobili: 0.2 E.U./dL   Blood: x / Protein: 100 mg/dL / Nitrite: NEGATIVE   Leuk Esterase: NEGATIVE / RBC: Many /HPF / WBC 5-10 /HPF   Sq Epi: x / Non Sq Epi: x / Bacteria: Present /HPF      CARDIAC MARKERS ( 13 Oct 2017 06:21 )  x     / 0.34 ng/mL / x     / x     / x            IMAGING/EKG/ETC  EKG:   Xray:  CT: Transfer Note    72 year old male with a PMHx of HTN, COPD, Prostate CA (2013, s/p radiation and seed placement, on leuprolide with a recent elevation of PSA and imaging showing bone mets) presents from his heme/onc office with worsening of SOB and episode syncope the morning of admission 10/4.  Pt was admitted for severe sepsis  w/ CATALINA and possible pneumonia. Pt empirically treated for CAP w/ ceftriaxone/azithromycin/vancomycin and placed on BiPAP. Rapid response was called 10/5 for respiratory failure and hypotension once it was discovered that the patient removed his BiPaP on the floor. Patient stepped up to the MICU and started on peripheral levophed for closer monitoring. Patient intubated that PM for airway protection. In the MICU had OGT, right IJ with left A-line placed. Pt's ABX broadened to Zosyn Vancomycin (dosed by level). OG tube was placed for decompression of previous ileus seen and abdominal distention. Blood Cx came back positive for MSSA w/ persistent bacteremia - last blood cx NGTD. Patient transitioned to nafcillin for MSSA coverage. Pt also had an ECHO (TTE) which showed HFrEF with EF 45%, right atrial dilatation, septal wall motion abnormalities and mild hypokinesis of left ventricle. KELLY which came back negative for any vegetation and recommended to repeat it in 3-4 days because of persistent bacteremia. Troponin was trended to peak because of T wave inversion on EKG (mostly demand ischemia). 10/6 patient came off propofol and levophed. Pt was Started on ,Duonebs Q4 and Solumedrol 40mg Q6. Patient continued to have secretions, started on mucormist. LE dopplers negative for DVT. Liquid BM x2 overnight. Went into afib w/ RVR ~6 am. IV lopressor x2 unsuccessful. Started on amiodarone bolus and patient became bradycardic. Cardiologist was contacted and recommended to continue telemetry, hold Anticoagulation for now. Pt w/ obstipation in MICU started on bowel regimen w/ successful BM and minimal improvement in abdominal distention. Successfully extubated to nasal cannula. Started on aldactone. Patient was  HDS w/ good O2 saturation and transferred to [facility].  Since arriving on [facility], the patient's blood cultures have reamined negative.  Additionally he has been hemodyncaimally stable and saturating well.  On 10/13 the patient had abdominal distension 2/2 to urinary retention as he had a full bladder when scanned.  He had bright red blood at the urethral meatus and a glaser was placed by urology for urinary retention.  It was determined that the blood was likely due to trauma and underlying friable prostate tissue.  Patient has been medicalaly optimizaed for transfer to [facility]      VITALS  Vital Signs Last 24 Hrs  T(C): 37.6 (14 Oct 2017 13:26), Max: 37.6 (14 Oct 2017 13:26)  T(F): 99.7 (14 Oct 2017 13:26), Max: 99.7 (14 Oct 2017 13:26)  HR: 94 (14 Oct 2017 12:00) (80 - 94)  BP: 120/57 (14 Oct 2017 12:00) (90/55 - 131/61)  BP(mean): 79 (14 Oct 2017 02:27) (69 - 88)  RR: 17 (14 Oct 2017 12:00) (16 - 26)  SpO2: 94% (14 Oct 2017 12:00) (93% - 98%)    I&O's Summary    13 Oct 2017 07:01  -  14 Oct 2017 07:00  --------------------------------------------------------  IN: 550 mL / OUT: 3600 mL / NET: -3050 mL    14 Oct 2017 07:01  -  14 Oct 2017 14:28  --------------------------------------------------------  IN: 360 mL / OUT: 1075 mL / NET: -715 mL        CAPILLARY BLOOD GLUCOSE      PHYSICAL EXAM  General: Male sitting in chair in NAD with NC in place  HEENT: NC/AT; PERRL, clear conjunctiva  Neck: supple, no JVD  Respiratory: Bibasilar crackles this morning  Cardiovascular: +S1/S2; RRR, no m/r/g  Abdomen: Obese, soft, distended, no rebound/guarding.   Extremities: WWP, chronic venous changes bilaterally  Vascular: peripheral pulses 2+ radial/dp/pt  Neurological: A&O x3, no gross deficits        MEDICATIONS  (STANDING):  ALBUTerol/ipratropium for Nebulization 3 milliLiter(s) Nebulizer every 4 hours  aspirin  chewable 81 milliGRAM(s) Oral daily  atorvastatin 20 milliGRAM(s) Oral at bedtime  dextrose 5%. 1000 milliLiter(s) (50 mL/Hr) IV Continuous <Continuous>  furosemide   Injectable 20 milliGRAM(s) IV Push two times a day  melatonin 3 milliGRAM(s) Oral at bedtime  nafcillin  IVPB 2 Gram(s) IV Intermittent every 4 hours  nafcillin  IVPB      polyethylene glycol 3350 17 Gram(s) Oral two times a day  tamsulosin 0.4 milliGRAM(s) Oral at bedtime    MEDICATIONS  (PRN):  acetaminophen  Suppository 650 milliGRAM(s) Rectal every 6 hours PRN For Temp greater than 38 C (100.4 F)  bisacodyl Suppository 10 milliGRAM(s) Rectal daily PRN Constipation  guaiFENesin    Syrup 100 milliGRAM(s) Oral every 6 hours PRN Cough  simethicone 80 milliGRAM(s) Chew two times a day PRN Gas      LABS                        7.3    11.8  )-----------( 217      ( 14 Oct 2017 12:53 )             24.0     10-    139  |  97  |  37<H>  ----------------------------<  122<H>  3.7   |  31  |  1.49<H>    Ca    8.6      14 Oct 2017 05:55  Mg     1.9     10-          Urinalysis Basic - ( 13 Oct 2017 10:28 )    Color: Red / Appearance: SL CLOUDY / S.010 / pH: x  Gluc: x / Ketone: NEGATIVE  / Bili: Negative / Urobili: 0.2 E.U./dL   Blood: x / Protein: 100 mg/dL / Nitrite: NEGATIVE   Leuk Esterase: NEGATIVE / RBC: Many /HPF / WBC 5-10 /HPF   Sq Epi: x / Non Sq Epi: x / Bacteria: Present /HPF      CARDIAC MARKERS ( 13 Oct 2017 06:21 )  x     / 0.34 ng/mL / x     / x     / x            < from: Xray Chest 1 View AP- PORTABLE-Urgent (10.14.17 @ 09:18) >  EXAM:  XR CHEST 1 VIEW PORT URGENT                          PROCEDURE DATE:  10/14/2017                     INTERPRETATION:  Clinical History: Abnormal chest sounds    Portable examination the chest demonstrates the heart to be within normal   limits in transverse diameter. Small bilateral effusions. Visualized   osseous structures are within normal limits.    Impression: Small bilateral effusions            "Thank you for the opportunity to participate in the care of this   patient."        ESTRELLA FUENTES M.D., ATTENDING RADIOLOGIST  This document has been electronically signed. Oct 14 2017 10:30AM    < end of copied text > Transfer Note    72 year old male with a PMHx of HTN, COPD, Prostate CA (2013, s/p radiation and seed placement, on leuprolide with a recent elevation of PSA and imaging showing bone mets) presents from his heme/onc office with worsening of SOB and episode syncope the morning of admission 10/4.  Pt was admitted for severe sepsis  w/ CATALINA and possible pneumonia. Pt empirically treated for CAP w/ ceftriaxone/azithromycin/vancomycin and placed on BiPAP. Rapid response was called 10/5 for respiratory failure and hypotension once it was discovered that the patient removed his BiPaP on the floor. Patient stepped up to the MICU and started on peripheral levophed for closer monitoring. Patient intubated that PM for airway protection. In the MICU had OGT, right IJ with left A-line placed. Pt's ABX broadened to Zosyn Vancomycin (dosed by level). OG tube was placed for decompression of previous ileus seen and abdominal distention. Blood Cx came back positive for MSSA w/ persistent bacteremia - last blood cx NGTD drawn on 10/10. Patient transitioned to nafcillin for MSSA coverage. Pt also had an ECHO (TTE) which showed HFrEF with EF 45%, right atrial dilatation, septal wall motion abnormalities and mild hypokinesis of left ventricle. KELLY which came back negative for any vegetation and recommended to repeat it in 3-4 days because of persistent bacteremia. Troponin was trended to peak because of T wave inversion on EKG (mostly demand ischemia). 10/6 patient came off propofol and levophed. Pt was Started on Duonebs Q4 and Solumedrol 40mg Q6. Patient continued to have secretions, started on mucomyst.  LE dopplers negative for DVT. Liquid BM x2 overnight. Went into afib w/ RVR ~6 am. IV lopressor x2 unsuccessful. Started on amiodarone bolus and patient became bradycardic. Cardiologist was contacted and recommended to continue telemetry, hold Anticoagulation for now. Pt w/ obstipation in MICU started on bowel regimen w/ successful BM and minimal improvement in abdominal distention. Successfully extubated to nasal cannula. Started on aldactone. Patient was HDS w/ good O2 saturation and transferred to [facility].  Since arriving on [place], the patient's blood cultures have remained negative.  Additionally he has been hemodynamically stable and saturating well on nasal cannula.  On 10/13 the patient had abdominal distension 2/2 to urinary retention as he had a full bladder when scanned.  He had bright red blood at the urethral meatus and a glaesr was placed by urology for urinary retention.  It was determined that the blood was likely due to trauma and underlying friable prostate tissue.  Patient has been medicalaly optimized for transfer to [facility]      VITALS  Vital Signs Last 24 Hrs  T(C): 37.6 (14 Oct 2017 13:26), Max: 37.6 (14 Oct 2017 13:26)  T(F): 99.7 (14 Oct 2017 13:26), Max: 99.7 (14 Oct 2017 13:26)  HR: 94 (14 Oct 2017 12:00) (80 - 94)  BP: 120/57 (14 Oct 2017 12:00) (90/55 - 131/61)  BP(mean): 79 (14 Oct 2017 02:27) (69 - 88)  RR: 17 (14 Oct 2017 12:00) (16 - 26)  SpO2: 94% (14 Oct 2017 12:00) (93% - 98%)    I&O's Summary    13 Oct 2017 07:01  -  14 Oct 2017 07:00  --------------------------------------------------------  IN: 550 mL / OUT: 3600 mL / NET: -3050 mL    14 Oct 2017 07:01  -  14 Oct 2017 14:28  --------------------------------------------------------  IN: 360 mL / OUT: 1075 mL / NET: -715 mL        CAPILLARY BLOOD GLUCOSE      PHYSICAL EXAM  General: Male sitting in chair in NAD with NC in place  HEENT: NC/AT; PERRL, clear conjunctiva  Neck: supple, no JVD  Respiratory: Bibasilar crackles, no rhonchi or wheezes  Cardiovascular: +S1/S2; RRR, no m/r/g  Abdomen: Obese, soft, distended, no rebound/guarding.   Extremities: WWP, chronic venous changes bilaterally  Vascular: peripheral pulses 2+ radial/dp/pt  Neurological: A&O x3, no gross deficits        MEDICATIONS  (STANDING):  ALBUTerol/ipratropium for Nebulization 3 milliLiter(s) Nebulizer every 4 hours  aspirin  chewable 81 milliGRAM(s) Oral daily  atorvastatin 20 milliGRAM(s) Oral at bedtime  dextrose 5%. 1000 milliLiter(s) (50 mL/Hr) IV Continuous <Continuous>  furosemide   Injectable 20 milliGRAM(s) IV Push two times a day  melatonin 3 milliGRAM(s) Oral at bedtime  nafcillin  IVPB 2 Gram(s) IV Intermittent every 4 hours  nafcillin  IVPB      polyethylene glycol 3350 17 Gram(s) Oral two times a day  tamsulosin 0.4 milliGRAM(s) Oral at bedtime    MEDICATIONS  (PRN):  acetaminophen  Suppository 650 milliGRAM(s) Rectal every 6 hours PRN For Temp greater than 38 C (100.4 F)  bisacodyl Suppository 10 milliGRAM(s) Rectal daily PRN Constipation  guaiFENesin    Syrup 100 milliGRAM(s) Oral every 6 hours PRN Cough  simethicone 80 milliGRAM(s) Chew two times a day PRN Gas      LABS                        7.3    11.8  )-----------( 217      ( 14 Oct 2017 12:53 )             24.0     10-    139  |  97  |  37<H>  ----------------------------<  122<H>  3.7   |  31  |  1.49<H>    Ca    8.6      14 Oct 2017 05:55  Mg     1.9     10-14          Urinalysis Basic - ( 13 Oct 2017 10:28 )    Color: Red / Appearance: SL CLOUDY / S.010 / pH: x  Gluc: x / Ketone: NEGATIVE  / Bili: Negative / Urobili: 0.2 E.U./dL   Blood: x / Protein: 100 mg/dL / Nitrite: NEGATIVE   Leuk Esterase: NEGATIVE / RBC: Many /HPF / WBC 5-10 /HPF   Sq Epi: x / Non Sq Epi: x / Bacteria: Present /HPF      CARDIAC MARKERS ( 13 Oct 2017 06:21 )  x     / 0.34 ng/mL / x     / x     / x            < from: Xray Chest 1 View AP- PORTABLE-Urgent (10.14.17 @ 09:18) >  EXAM:  XR CHEST 1 VIEW PORT URGENT                          PROCEDURE DATE:  10/14/2017                     INTERPRETATION:  Clinical History: Abnormal chest sounds    Portable examination the chest demonstrates the heart to be within normal   limits in transverse diameter. Small bilateral effusions. Visualized   osseous structures are within normal limits.    Impression: Small bilateral effusions            "Thank you for the opportunity to participate in the care of this   patient."        ESTRELLA FUENTES M.D., ATTENDING RADIOLOGIST  This document has been electronically signed. Oct 14 2017 10:30AM    < end of copied text >

## 2017-10-14 NOTE — PROGRESS NOTE ADULT - ASSESSMENT
Bun and creatinine have bumped up. This may be due to recent urinary retention episode. Would monitor closely and hydrate if necessary. Blood cultures remain negative.

## 2017-10-15 LAB
ANION GAP SERPL CALC-SCNC: 14 MMOL/L — SIGNIFICANT CHANGE UP (ref 5–17)
ANION GAP SERPL CALC-SCNC: 15 MMOL/L — SIGNIFICANT CHANGE UP (ref 5–17)
ANION GAP SERPL CALC-SCNC: 17 MMOL/L — SIGNIFICANT CHANGE UP (ref 5–17)
ANISOCYTOSIS BLD QL: SLIGHT — SIGNIFICANT CHANGE UP
BASO STIPL BLD QL SMEAR: SLIGHT — SIGNIFICANT CHANGE UP
BIZARRE PLATELETS BLD QL SMEAR: PRESENT — SIGNIFICANT CHANGE UP
BUN SERPL-MCNC: 35 MG/DL — HIGH (ref 7–23)
BUN SERPL-MCNC: 36 MG/DL — HIGH (ref 7–23)
BUN SERPL-MCNC: 36 MG/DL — HIGH (ref 7–23)
CALCIUM SERPL-MCNC: 8.5 MG/DL — SIGNIFICANT CHANGE UP (ref 8.4–10.5)
CALCIUM SERPL-MCNC: 8.5 MG/DL — SIGNIFICANT CHANGE UP (ref 8.4–10.5)
CALCIUM SERPL-MCNC: 8.6 MG/DL — SIGNIFICANT CHANGE UP (ref 8.4–10.5)
CHLORIDE SERPL-SCNC: 95 MMOL/L — LOW (ref 96–108)
CHLORIDE SERPL-SCNC: 95 MMOL/L — LOW (ref 96–108)
CHLORIDE SERPL-SCNC: 97 MMOL/L — SIGNIFICANT CHANGE UP (ref 96–108)
CO2 SERPL-SCNC: 29 MMOL/L — SIGNIFICANT CHANGE UP (ref 22–31)
CO2 SERPL-SCNC: 31 MMOL/L — SIGNIFICANT CHANGE UP (ref 22–31)
CO2 SERPL-SCNC: 31 MMOL/L — SIGNIFICANT CHANGE UP (ref 22–31)
CREAT SERPL-MCNC: 1.26 MG/DL — SIGNIFICANT CHANGE UP (ref 0.5–1.3)
CREAT SERPL-MCNC: 1.32 MG/DL — HIGH (ref 0.5–1.3)
CREAT SERPL-MCNC: 1.32 MG/DL — HIGH (ref 0.5–1.3)
CULTURE RESULTS: SIGNIFICANT CHANGE UP
EOSINOPHIL NFR BLD AUTO: 2 % — SIGNIFICANT CHANGE UP (ref 0–6)
GIANT PLATELETS BLD QL SMEAR: PRESENT — SIGNIFICANT CHANGE UP
GLUCOSE SERPL-MCNC: 128 MG/DL — HIGH (ref 70–99)
GLUCOSE SERPL-MCNC: 147 MG/DL — HIGH (ref 70–99)
GLUCOSE SERPL-MCNC: 148 MG/DL — HIGH (ref 70–99)
HAPTOGLOB SERPL-MCNC: 271 MG/DL — HIGH (ref 34–200)
HAPTOGLOB SERPL-MCNC: 272 MG/DL — HIGH (ref 34–200)
HCT VFR BLD CALC: 19.9 % — CRITICAL LOW (ref 39–50)
HCT VFR BLD CALC: 20.5 % — CRITICAL LOW (ref 39–50)
HCT VFR BLD CALC: 22.7 % — LOW (ref 39–50)
HCT VFR BLD CALC: 26.2 % — LOW (ref 39–50)
HGB BLD-MCNC: 6.2 G/DL — CRITICAL LOW (ref 13–17)
HGB BLD-MCNC: 6.5 G/DL — CRITICAL LOW (ref 13–17)
HGB BLD-MCNC: 7.1 G/DL — LOW (ref 13–17)
HGB BLD-MCNC: 8.2 G/DL — LOW (ref 13–17)
LDH SERPL L TO P-CCNC: 287 U/L — HIGH (ref 50–242)
LDH SERPL L TO P-CCNC: 371 U/L — HIGH (ref 50–242)
LG PLATELETS BLD QL AUTO: PRESENT — SIGNIFICANT CHANGE UP
LYMPHOCYTES # BLD AUTO: 10 % — LOW (ref 13–44)
LYMPHOCYTES # SPEC AUTO: 1 % — SIGNIFICANT CHANGE UP
MACROCYTES BLD QL: SLIGHT — SIGNIFICANT CHANGE UP
MANUAL DIF COMMENT BLD-IMP: SIGNIFICANT CHANGE UP
MANUAL DIF COMMENT BLD-IMP: SIGNIFICANT CHANGE UP
MANUAL SMEAR VERIFICATION: SIGNIFICANT CHANGE UP
MCHC RBC-ENTMCNC: 29.7 PG — SIGNIFICANT CHANGE UP (ref 27–34)
MCHC RBC-ENTMCNC: 30 PG — SIGNIFICANT CHANGE UP (ref 27–34)
MCHC RBC-ENTMCNC: 30.5 PG — SIGNIFICANT CHANGE UP (ref 27–34)
MCHC RBC-ENTMCNC: 31.2 G/DL — LOW (ref 32–36)
MCHC RBC-ENTMCNC: 31.3 G/DL — LOW (ref 32–36)
MCHC RBC-ENTMCNC: 31.3 G/DL — LOW (ref 32–36)
MCV RBC AUTO: 95 FL — SIGNIFICANT CHANGE UP (ref 80–100)
MCV RBC AUTO: 96 FL — SIGNIFICANT CHANGE UP (ref 80–100)
MCV RBC AUTO: 98 FL — SIGNIFICANT CHANGE UP (ref 80–100)
METAMYELOCYTES # FLD: 1 % — HIGH
MICROCYTES BLD QL: SLIGHT — SIGNIFICANT CHANGE UP
MONOCYTES NFR BLD AUTO: 14 % — SIGNIFICANT CHANGE UP (ref 2–14)
MYELOCYTES NFR BLD: 2 % — HIGH
NEUTROPHILS NFR BLD AUTO: 64 % — SIGNIFICANT CHANGE UP (ref 43–77)
NEUTS BAND # BLD: 6 % — SIGNIFICANT CHANGE UP
NEUTS VAC BLD QL SMEAR: PRESENT — SIGNIFICANT CHANGE UP
ORGANISM # SPEC MICROSCOPIC CNT: SIGNIFICANT CHANGE UP
PLAT MORPH BLD: (no result)
PLATELET # BLD AUTO: 168 K/UL — SIGNIFICANT CHANGE UP (ref 150–400)
PLATELET # BLD AUTO: 176 K/UL — SIGNIFICANT CHANGE UP (ref 150–400)
PLATELET # BLD AUTO: 189 K/UL — SIGNIFICANT CHANGE UP (ref 150–400)
PLATELET CLUMP BLD QL SMEAR: PRESENT
POIKILOCYTOSIS BLD QL AUTO: SLIGHT — SIGNIFICANT CHANGE UP
POLYCHROMASIA BLD QL SMEAR: SLIGHT — SIGNIFICANT CHANGE UP
POTASSIUM SERPL-MCNC: 2.9 MMOL/L — CRITICAL LOW (ref 3.5–5.3)
POTASSIUM SERPL-MCNC: 2.9 MMOL/L — CRITICAL LOW (ref 3.5–5.3)
POTASSIUM SERPL-MCNC: 3.4 MMOL/L — LOW (ref 3.5–5.3)
POTASSIUM SERPL-SCNC: 2.9 MMOL/L — CRITICAL LOW (ref 3.5–5.3)
POTASSIUM SERPL-SCNC: 2.9 MMOL/L — CRITICAL LOW (ref 3.5–5.3)
POTASSIUM SERPL-SCNC: 3.4 MMOL/L — LOW (ref 3.5–5.3)
RBC # BLD: 2.03 M/UL — LOW (ref 4.2–5.8)
RBC # BLD: 2.39 M/UL — LOW (ref 4.2–5.8)
RBC # BLD: 2.46 M/UL — LOW (ref 4.2–5.8)
RBC # BLD: 2.73 M/UL — LOW (ref 4.2–5.8)
RBC # FLD: 16.6 % — SIGNIFICANT CHANGE UP (ref 10.3–16.9)
RBC # FLD: 18.9 % — HIGH (ref 10.3–16.9)
RBC # FLD: 19.3 % — HIGH (ref 10.3–16.9)
RBC BLD AUTO: (no result)
RETICS/RBC NFR: 2.3 % — SIGNIFICANT CHANGE UP (ref 0.5–2.5)
SODIUM SERPL-SCNC: 141 MMOL/L — SIGNIFICANT CHANGE UP (ref 135–145)
SODIUM SERPL-SCNC: 141 MMOL/L — SIGNIFICANT CHANGE UP (ref 135–145)
SODIUM SERPL-SCNC: 142 MMOL/L — SIGNIFICANT CHANGE UP (ref 135–145)
SPECIMEN SOURCE: SIGNIFICANT CHANGE UP
SPHEROCYTES BLD QL SMEAR: SLIGHT — SIGNIFICANT CHANGE UP
WBC # BLD: 8.4 K/UL — SIGNIFICANT CHANGE UP (ref 3.8–10.5)
WBC # BLD: 9.6 K/UL — SIGNIFICANT CHANGE UP (ref 3.8–10.5)
WBC # BLD: 9.7 K/UL — SIGNIFICANT CHANGE UP (ref 3.8–10.5)
WBC # FLD AUTO: 8.4 K/UL — SIGNIFICANT CHANGE UP (ref 3.8–10.5)
WBC # FLD AUTO: 9.6 K/UL — SIGNIFICANT CHANGE UP (ref 3.8–10.5)
WBC # FLD AUTO: 9.7 K/UL — SIGNIFICANT CHANGE UP (ref 3.8–10.5)

## 2017-10-15 PROCEDURE — 99233 SBSQ HOSP IP/OBS HIGH 50: CPT | Mod: GC

## 2017-10-15 PROCEDURE — 93010 ELECTROCARDIOGRAM REPORT: CPT

## 2017-10-15 PROCEDURE — 71010: CPT | Mod: 26

## 2017-10-15 PROCEDURE — 99233 SBSQ HOSP IP/OBS HIGH 50: CPT

## 2017-10-15 RX ORDER — POTASSIUM CHLORIDE 20 MEQ
10 PACKET (EA) ORAL
Qty: 0 | Refills: 0 | Status: COMPLETED | OUTPATIENT
Start: 2017-10-15 | End: 2017-10-15

## 2017-10-15 RX ORDER — PANTOPRAZOLE SODIUM 20 MG/1
40 TABLET, DELAYED RELEASE ORAL EVERY 12 HOURS
Qty: 0 | Refills: 0 | Status: DISCONTINUED | OUTPATIENT
Start: 2017-10-15 | End: 2017-10-19

## 2017-10-15 RX ORDER — POTASSIUM CHLORIDE 20 MEQ
40 PACKET (EA) ORAL EVERY 4 HOURS
Qty: 0 | Refills: 0 | Status: COMPLETED | OUTPATIENT
Start: 2017-10-15 | End: 2017-10-15

## 2017-10-15 RX ADMIN — Medication 3 MILLILITER(S): at 14:10

## 2017-10-15 RX ADMIN — Medication 3 MILLILITER(S): at 10:18

## 2017-10-15 RX ADMIN — Medication 100 MILLIEQUIVALENT(S): at 16:05

## 2017-10-15 RX ADMIN — NAFCILLIN 200 GRAM(S): 10 INJECTION, POWDER, FOR SOLUTION INTRAVENOUS at 19:21

## 2017-10-15 RX ADMIN — SIMETHICONE 80 MILLIGRAM(S): 80 TABLET, CHEWABLE ORAL at 19:22

## 2017-10-15 RX ADMIN — Medication 3 MILLIGRAM(S): at 22:04

## 2017-10-15 RX ADMIN — Medication 40 MILLIEQUIVALENT(S): at 18:06

## 2017-10-15 RX ADMIN — Medication 100 MILLIEQUIVALENT(S): at 15:21

## 2017-10-15 RX ADMIN — Medication 20 MILLIGRAM(S): at 05:48

## 2017-10-15 RX ADMIN — PANTOPRAZOLE SODIUM 40 MILLIGRAM(S): 20 TABLET, DELAYED RELEASE ORAL at 14:10

## 2017-10-15 RX ADMIN — ATORVASTATIN CALCIUM 20 MILLIGRAM(S): 80 TABLET, FILM COATED ORAL at 22:04

## 2017-10-15 RX ADMIN — Medication 3 MILLILITER(S): at 07:07

## 2017-10-15 RX ADMIN — PANTOPRAZOLE SODIUM 40 MILLIGRAM(S): 20 TABLET, DELAYED RELEASE ORAL at 03:06

## 2017-10-15 RX ADMIN — Medication 20 MILLIGRAM(S): at 18:06

## 2017-10-15 RX ADMIN — Medication 40 MILLIEQUIVALENT(S): at 22:04

## 2017-10-15 RX ADMIN — NAFCILLIN 200 GRAM(S): 10 INJECTION, POWDER, FOR SOLUTION INTRAVENOUS at 11:18

## 2017-10-15 RX ADMIN — Medication 3 MILLILITER(S): at 03:06

## 2017-10-15 RX ADMIN — Medication 3 MILLILITER(S): at 23:17

## 2017-10-15 RX ADMIN — Medication 81 MILLIGRAM(S): at 11:18

## 2017-10-15 RX ADMIN — NAFCILLIN 200 GRAM(S): 10 INJECTION, POWDER, FOR SOLUTION INTRAVENOUS at 07:08

## 2017-10-15 RX ADMIN — Medication 40 MILLIEQUIVALENT(S): at 14:09

## 2017-10-15 RX ADMIN — TAMSULOSIN HYDROCHLORIDE 0.4 MILLIGRAM(S): 0.4 CAPSULE ORAL at 22:04

## 2017-10-15 RX ADMIN — SIMETHICONE 80 MILLIGRAM(S): 80 TABLET, CHEWABLE ORAL at 12:23

## 2017-10-15 RX ADMIN — Medication 100 MILLIEQUIVALENT(S): at 14:09

## 2017-10-15 RX ADMIN — Medication 3 MILLILITER(S): at 18:06

## 2017-10-15 RX ADMIN — NAFCILLIN 200 GRAM(S): 10 INJECTION, POWDER, FOR SOLUTION INTRAVENOUS at 03:06

## 2017-10-15 RX ADMIN — NAFCILLIN 200 GRAM(S): 10 INJECTION, POWDER, FOR SOLUTION INTRAVENOUS at 16:05

## 2017-10-15 RX ADMIN — Medication 10 MILLIGRAM(S): at 22:32

## 2017-10-15 NOTE — PROGRESS NOTE ADULT - SUBJECTIVE AND OBJECTIVE BOX
INTERVAL HPI/OVERNIGHT EVENTS: Hgb dropped to 6.5 on serial CBC. On exam patient's stool was Guaiac positive w/o bright red blood on SHARIF. Patient transfused 1 U PRBC for hgb <7 - f/u CBC at 10 am. Pt developed some difficulty breathing on NC at 6 L w/ O2 saturations around 88%. Placed on venturi mask w/ pulse ox 95-99%.     SUBJECTIVE: Patient seen and examined at bedside. No acute complaints. Patient denies bright red blood per rectum, melanotic BM. No further bleeding from glaser catheter. Venturi mask exchanged for NC after evaluation of NC showed leaky connection to wall O2. Patient saturating well, speaking in full sentences w/o accessory muscle use.     OBJECTIVE:    VITAL SIGNS:  ICU Vital Signs Last 24 Hrs  T(C): 36.9 (15 Oct 2017 06:00), Max: 37.6 (14 Oct 2017 13:26)  T(F): 98.4 (15 Oct 2017 06:00), Max: 99.7 (14 Oct 2017 13:26)  HR: 74 (15 Oct 2017 09:00) (66 - 94)  BP: 98/49 (15 Oct 2017 09:00) (92/55 - 120/57)  BP(mean): 76 (15 Oct 2017 06:00) (76 - 77)  ABP: --  ABP(mean): --  RR: 25 (15 Oct 2017 09:00) (14 - 29)  SpO2: 93% (15 Oct 2017 09:00) (83% - 99%)        10-14 @ :01  -  10-15 @ 07:00  --------------------------------------------------------  IN: 540 mL / OUT: 2300 mL / NET: -1760 mL    10-15 @ :  -  10-15 @ 09:17  --------------------------------------------------------  IN: 0 mL / OUT: 700 mL / NET: -700 mL      CAPILLARY BLOOD GLUCOSE      POCT Blood Glucose.: 144 mg/dL (13 Oct 2017 11:13)      PHYSICAL EXAM:    General: NAD, resting comfortably in bed  HEENT: NC/AT; PERRL, clear conjunctiva, MMM  Neck: supple, no JVD  Respiratory: decreased air entry at bases; crackles at bases b/l. Poor inspiratory effort. Speaking in full sentences - no accessory muscle use.   Cardiovascular: +S1/S2; RRR, no m/r/g  Abdomen: soft, distended, tympanic, non tender, no rebound tenderness/guarding. No palpable masses or organomegaly.  : glaser catheter in place, no blood from urethral meatus   Extremities: WWP, pitting edema   Vascular: 2+ peripheral pulses radial/dp/pt  Skin: normal color and turgor; no rash  Neurological:     MEDICATIONS:  MEDICATIONS  (STANDING):  ALBUTerol/ipratropium for Nebulization 3 milliLiter(s) Nebulizer every 4 hours  aspirin  chewable 81 milliGRAM(s) Oral daily  atorvastatin 20 milliGRAM(s) Oral at bedtime  dextrose 5%. 1000 milliLiter(s) (50 mL/Hr) IV Continuous <Continuous>  furosemide   Injectable 20 milliGRAM(s) IV Push two times a day  melatonin 3 milliGRAM(s) Oral at bedtime  nafcillin  IVPB 2 Gram(s) IV Intermittent every 4 hours  nafcillin  IVPB      pantoprazole  Injectable 40 milliGRAM(s) IV Push every 12 hours  polyethylene glycol 3350 17 Gram(s) Oral two times a day  tamsulosin 0.4 milliGRAM(s) Oral at bedtime    MEDICATIONS  (PRN):  acetaminophen  Suppository 650 milliGRAM(s) Rectal every 6 hours PRN For Temp greater than 38 C (100.4 F)  bisacodyl Suppository 10 milliGRAM(s) Rectal daily PRN Constipation  guaiFENesin    Syrup 100 milliGRAM(s) Oral every 6 hours PRN Cough  simethicone 80 milliGRAM(s) Chew two times a day PRN Gas      ALLERGIES:  Allergies    No Known Allergies    Intolerances        LABS:                        6.5    x     )-----------( x        ( 15 Oct 2017 03:14 )             20.5     10-14    139  |  97  |  37<H>  ----------------------------<  122<H>  3.7   |  31  |  1.49<H>    Ca    8.6      14 Oct 2017 05:55  Mg     1.9     10-14        Urinalysis Basic - ( 13 Oct 2017 10:28 )    Color: Red / Appearance: SL CLOUDY / S.010 / pH: x  Gluc: x / Ketone: NEGATIVE  / Bili: Negative / Urobili: 0.2 E.U./dL   Blood: x / Protein: 100 mg/dL / Nitrite: NEGATIVE   Leuk Esterase: NEGATIVE / RBC: Many /HPF / WBC 5-10 /HPF   Sq Epi: x / Non Sq Epi: x / Bacteria: Present /HPF        RADIOLOGY & ADDITIONAL TESTS: Reviewed.    ASSESSMENT:     PLAN:    FEN - no IVF; replete lytes prn; NPO    PPx - HSQ    CODE - FULL.    DISPO - continue telemetry monitoring in ICU-step down level of care on 7[facility]. INTERVAL HPI/OVERNIGHT EVENTS: Hgb dropped to 6.5 on serial CBC. On exam patient's stool was Guaiac positive w/o bright red blood on SHARIF. Patient transfused 1 U PRBC for hgb <7 - f/u CBC at 10 am. Pt developed some difficulty breathing on NC at 6 L w/ O2 saturations around 88%. Placed on venturi mask w/ pulse ox 95-99%.     SUBJECTIVE: Patient seen and examined at bedside. No acute complaints. Patient denies bright red blood per rectum, melanotic BM. No further bleeding from glaser catheter. Venturi mask exchanged for NC after evaluation of NC showed leaky connection to wall O2. Patient saturating well, speaking in full sentences w/o accessory muscle use.     OBJECTIVE:    VITAL SIGNS:  ICU Vital Signs Last 24 Hrs  T(C): 36.9 (15 Oct 2017 06:00), Max: 37.6 (14 Oct 2017 13:26)  T(F): 98.4 (15 Oct 2017 06:00), Max: 99.7 (14 Oct 2017 13:26)  HR: 74 (15 Oct 2017 09:00) (66 - 94)  BP: 98/49 (15 Oct 2017 09:00) (92/55 - 120/57)  BP(mean): 76 (15 Oct 2017 06:00) (76 - 77)  ABP: --  ABP(mean): --  RR: 25 (15 Oct 2017 09:00) (14 - 29)  SpO2: 93% (15 Oct 2017 09:00) (83% - 99%)        10-14 @ :01  -  10-15 @ 07:00  --------------------------------------------------------  IN: 540 mL / OUT: 2300 mL / NET: -1760 mL    10-15 @ :  -  10-15 @ 09:17  --------------------------------------------------------  IN: 0 mL / OUT: 700 mL / NET: -700 mL      CAPILLARY BLOOD GLUCOSE      POCT Blood Glucose.: 144 mg/dL (13 Oct 2017 11:13)      PHYSICAL EXAM:    General: NAD, resting comfortably in bed  HEENT: NC/AT; PERRL, clear conjunctiva, MMM  Neck: supple, no JVD  Respiratory: decreased air entry at bases; crackles at bases b/l. Poor inspiratory effort. Speaking in full sentences - no accessory muscle use.   Cardiovascular: +S1/S2; RRR, no m/r/g  Abdomen: soft, distended, tympanic, non tender, no rebound tenderness/guarding. No palpable masses or organomegaly.  : glaser catheter in place, no blood from urethral meatus   Extremities: WWP, pitting edema to upper calf b/l   Vascular: 2+ peripheral pulses radial/dp/pt  Skin: normal color and turgor; no rash  Neurological: A& O x3 no focal deficits    MEDICATIONS:  MEDICATIONS  (STANDING):  ALBUTerol/ipratropium for Nebulization 3 milliLiter(s) Nebulizer every 4 hours  aspirin  chewable 81 milliGRAM(s) Oral daily  atorvastatin 20 milliGRAM(s) Oral at bedtime  dextrose 5%. 1000 milliLiter(s) (50 mL/Hr) IV Continuous <Continuous>  furosemide   Injectable 20 milliGRAM(s) IV Push two times a day  melatonin 3 milliGRAM(s) Oral at bedtime  nafcillin  IVPB 2 Gram(s) IV Intermittent every 4 hours  nafcillin  IVPB      pantoprazole  Injectable 40 milliGRAM(s) IV Push every 12 hours  polyethylene glycol 3350 17 Gram(s) Oral two times a day  tamsulosin 0.4 milliGRAM(s) Oral at bedtime    MEDICATIONS  (PRN):  acetaminophen  Suppository 650 milliGRAM(s) Rectal every 6 hours PRN For Temp greater than 38 C (100.4 F)  bisacodyl Suppository 10 milliGRAM(s) Rectal daily PRN Constipation  guaiFENesin    Syrup 100 milliGRAM(s) Oral every 6 hours PRN Cough  simethicone 80 milliGRAM(s) Chew two times a day PRN Gas      ALLERGIES:  Allergies    No Known Allergies    Intolerances        LABS:                        6.5    x     )-----------( x        ( 15 Oct 2017 03:14 )             20.5     10-14    139  |  97  |  37<H>  ----------------------------<  122<H>  3.7   |  31  |  1.49<H>    Ca    8.6      14 Oct 2017 05:55  Mg     1.9     10-14        Urinalysis Basic - ( 13 Oct 2017 10:28 )    Color: Red / Appearance: SL CLOUDY / S.010 / pH: x  Gluc: x / Ketone: NEGATIVE  / Bili: Negative / Urobili: 0.2 E.U./dL   Blood: x / Protein: 100 mg/dL / Nitrite: NEGATIVE   Leuk Esterase: NEGATIVE / RBC: Many /HPF / WBC 5-10 /HPF   Sq Epi: x / Non Sq Epi: x / Bacteria: Present /HPF        RADIOLOGY & ADDITIONAL TESTS: Reviewed.

## 2017-10-15 NOTE — PROGRESS NOTE ADULT - ASSESSMENT
Assessment	  73 yo male with hx of HTN, COPD, Prostate CA (s/p radiation and seed 2013 on leuprolide outpatient) who presents with worsening of SOB and an episode of unwitnessed syncope found to have severe sepsis with gram + cocci in clusters identified as S. aureus and ATN.  Hospital course complicated by rapid response for hypotension, unresponsive to painful stimuli and was transferred to MICU for septic shock.  Septic shock has resolved but pt has had persistent MSSA bacteremia with resolving respiratory failure and ATN.    ID  #Sepsis w/ persistent MSSA bacteremia  - patient w/o leukocytosis, fever, w/ stable BP  - persistent bacteremia with MSSA; continue nafcillin (will need 4 weeks abx per ID recs, first day was 10/10, will likely be d/c with PICC line)  - Gallium scan negative for focal areas of infection, KELLY negative for endocarditis  - blood cx w/ no growth to date  - Double lumen PICC to be placed today by Dr. Ibarra  - ID following; f/u recs    Respiratory   #Acute hypoxic Respiratory Failure-resolved  -likely underlying COPD w/ septic shock during rapid response on 5 LA  -Consider pulm consult for obstructive sleep apnea to be worked up outpatient       #COPD  - c/w duonebs q 4   - encourage ambulation and incentive spirometry.       Cardiovascular  #New onset paroxysmal A.fib Vs MAT:  -in NSR  - Goal < 110 bpm   -F/U further cardiologist recs    #HFrEF  -TTE shows HFrEF with EF 45%, right atrial dilatation, septal wall motion abnormalities and mild hypokinesis of left ventricle. Unable to visualize any vegetations or R heart structures. Unable to calculate pulmonary a pressures.   - c/w ASA + lipitor  - pt hypervolemic on exam, improved today c/w lasix.   - consider metoprolol and lisinopril as tolerated once bleeding controlled.   - strict I/Os, daily weights, fluid restriction 1.5L  - acute onset of HFrEF; ischemia workup outpatient    #NSTEMI :  - EKG positive for ST depression in anteroseptal leads, Echo positive for septal hypokinesis, Troponin 0.06 -->0.10-->0.19-->0.23-->0.17-->0.15-->0.44-->0.54-->0.56-->0.51 (peaked), likely probably due to demand ischemia from hypoxia exacerbated by renal insufficiency (cr. 2.07 on admission).       Renal/  ATN-resolved  - ATN on admission w/ Cr now at baseline.     Hematuria w/ urinary retention  - [place] bleeding from urethral meatus. Urology consulted for glaser placement. Immediately drained 800 cc of bloody urine. Irrigation showed no clots in bladder.   - Hematuria likely due to underlying prostate malignancy w/ recent glaser placement. Glaser possibly due to prostate inflammation/bleed.   - maintain glaser, TOV as tolerated this weekend. c/w tamsulosin .4 mg to increase prior to glaser removal   - UA negative  - unclear if anemia 2/2 hematuria vs GI bleed given positive guaiac.     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  -Thrombocytopenia resolved     Skin    #Shingles  Finished course of valtrex    GI    #ileus -resolved  - c/w PO agents for constipation    # Possible upper GI bleed  - patient with acute blood loss anemia requiring transfusion of 1 U PRBC   - Guaiac positive; GI consult today  - possible stress ulcer given recent septic shock and admission to ICU   - PICC placement for access, maintain type and screen, NPO pending GI eval  - PPI BID     Endo     discontinued ISS as patient not requiring coverage    F no IVF  E- replete lytes prn  N- NPO    PPx - On ASA 81mg, SCD     CODE - DNR not DNI    Dispo: 7 [facility] Assessment	  73 yo male with hx of HTN, COPD, Prostate CA (s/p radiation and seed 2013 on leuprolide outpatient) who presents with worsening of SOB and an episode of unwitnessed syncope found to have severe sepsis with gram + cocci in clusters identified as S. aureus and ATN.  Hospital course complicated by rapid response for hypotension, unresponsive to painful stimuli and was transferred to MICU for septic shock.  Septic shock has resolved but pt has had persistent MSSA bacteremia with resolving respiratory failure and ATN.    ID  #Sepsis w/ persistent MSSA bacteremia  - patient w/o leukocytosis, fever, w/ stable BP  - persistent bacteremia with MSSA; continue nafcillin (will need 4 weeks abx per ID recs, first day was 10/10, will likely be d/c with PICC line)  - Gallium scan negative for focal areas of infection, KELLY negative for endocarditis  - blood cx w/ no growth to date  - Double lumen PICC to be placed today by Dr. Ibarra  - ID following; f/u recs    Respiratory   #Acute hypoxic Respiratory Failure-resolved  -likely underlying COPD w/ septic shock during rapid response on 5 LA  -Consider pulm consult for obstructive sleep apnea to be worked up outpatient       #COPD  - c/w duonebs q 4   - encourage ambulation and incentive spirometry.       Cardiovascular  #New onset paroxysmal A.fib Vs MAT:  -in NSR  - Goal < 110 bpm   -F/U further cardiologist recs    #HFrEF  -TTE shows HFrEF with EF 45%, right atrial dilatation, septal wall motion abnormalities and mild hypokinesis of left ventricle. Unable to visualize any vegetations or R heart structures. Unable to calculate pulmonary a pressures.   - c/w ASA + lipitor  - pt hypervolemic on exam, improved today c/w lasix.   - consider metoprolol and lisinopril as tolerated once bleeding controlled.   - strict I/Os, daily weights, fluid restriction 1.5L  - acute onset of HFrEF; ischemia workup outpatient    #NSTEMI :  - EKG positive for ST depression in anteroseptal leads, Echo positive for septal hypokinesis, Troponin 0.06 -->0.10-->0.19-->0.23-->0.17-->0.15-->0.44-->0.54-->0.56-->0.51 (peaked), likely probably due to demand ischemia from hypoxia exacerbated by renal insufficiency (cr. 2.07 on admission).       Renal/  ATN-resolved  - ATN on admission w/ Cr now at baseline.     Hematuria w/ urinary retention  - Hematuria likely due to underlying prostate malignancy w/ recent glaser placement - possibly due to prostate inflammation/bleed.   - maintain glaser, TOV as tolerated tomorrow.  - unclear if anemia 2/2 hematuria vs GI bleed given positive guaiac.   - c/w tamsulosin .4     Heme/onc  #Prostate ca w/ mets to spine  Per oncologist continue to hold home meds.  -Thrombocytopenia resolved     Skin    #Shingles  Finished course of valtrex    GI    #ileus -resolved  - c/w PO agents for constipation    # Possible upper GI bleed  - patient with acute blood loss anemia requiring transfusion of 1 U PRBC   - Guaiac positive; GI consult today. Pt not currently amenable to endoscopy, if bleed worsens patient will reconsider.   - possible stress ulcer given recent septic shock and admission to ICU   - PICC placement for access, maintain type and screen  - PPI BID     Endo     discontinued ISS as patient not requiring coverage    F no IVF  E- replete lytes prn  N- dash diet w/ 1.5 L fluid restriction     PPx - On ASA 81mg, SCD     CODE - DNR not DNI    Dispo: 7 La

## 2017-10-15 NOTE — PROGRESS NOTE ADULT - ASSESSMENT
IMPRESSION:  From ID standpoint, patient is stable.  Blood cultures negative    Recommend:  1.  Continue nafcillin for now.  Will need 4 weeks IV antibiotics.  Will switch to Cefazolin on discharge for easier dosing

## 2017-10-15 NOTE — PROGRESS NOTE ADULT - SUBJECTIVE AND OBJECTIVE BOX
Medicine requested a peripherally inserted central venous catheter to accommodate therapy for the continuing antibiotic therapy as well as for the GI bleeding. The history of staph sepsis, pneumonia,  and prostate carcinoma is followed along with the cxr and data. Ultrasound guidance will be used for the suitable basilic/ brachial veins. The procedure was explained for consent.

## 2017-10-15 NOTE — PROGRESS NOTE ADULT - ASSESSMENT
A/recs:  1) acute hypoxic respiratory failure with elevated tn and abnormal septal wall motion on tte in addition to rv dilatation and abnormal ecg; left heart failure (ef=45% by tte); now with worsening bilat le edema  a - nstemi versus demand ischemia   b - monitoring on asa/statin   c - consider ace-i when acute issues stabilize  d - 0>i as tolerated; strict i/o and daily weights  e - rec daily ecg  f - tte remains pending  g - consider cath  - elevated risks noted christopher for pci and anti-plt rx - will discuss case with Dr. Gentile of Interventional Cardiology    2) paroxysmal atrial fibrillation  a - telemetry - note unwitnessed syncope prior to admission  b - bb still not started (note possible copd exacerbation this admission)    3) htn - monitor    4) sepsis due to pna per [facility] with bacteremia   -please proceed with tte; consider repeat [place] if indicated    4) possible copd exacerbation - per [facility]    5) acute renal failure - resolved    6) normocytic anemia and thrombocytopenia  -pt refused upper endoscopy despite risks, benefits, and alternatives in setting of melena; questions answered.      7) shingles    8) prostate ca - per hem-onc and gu    9) maintain K>4, Mg>2    10) palliative care following  discussed with 7 [facility] Housestaheidi

## 2017-10-15 NOTE — PROGRESS NOTE ADULT - ASSESSMENT
The patient has become more anemic and has been found to have blood in his stool. I concur with blood transfusion and a GI evaluation.

## 2017-10-15 NOTE — PROGRESS NOTE ADULT - SUBJECTIVE AND OBJECTIVE BOX
The patient is a bit upset this am as he was told yesterday that he was going to be moved but it didn't happen. He only was able to get to the chair but unable to ambulate in the halls. He is receiving a blood transfusion this am. He has been found to have blood in his stool. He reports that his appetite has been good and that he is eating well.    CHEMOTHERAPY REGIMEN:        Day:                          Diet:  Protocol:                                    IVF:      MEDICATIONS  (STANDING):  ALBUTerol/ipratropium for Nebulization 3 milliLiter(s) Nebulizer every 4 hours  aspirin  chewable 81 milliGRAM(s) Oral daily  atorvastatin 20 milliGRAM(s) Oral at bedtime  dextrose 5%. 1000 milliLiter(s) (50 mL/Hr) IV Continuous <Continuous>  furosemide   Injectable 20 milliGRAM(s) IV Push two times a day  melatonin 3 milliGRAM(s) Oral at bedtime  nafcillin  IVPB 2 Gram(s) IV Intermittent every 4 hours  nafcillin  IVPB      pantoprazole  Injectable 40 milliGRAM(s) IV Push every 12 hours  polyethylene glycol 3350 17 Gram(s) Oral two times a day  tamsulosin 0.4 milliGRAM(s) Oral at bedtime    MEDICATIONS  (PRN):  acetaminophen  Suppository 650 milliGRAM(s) Rectal every 6 hours PRN For Temp greater than 38 C (100.4 F)  bisacodyl Suppository 10 milliGRAM(s) Rectal daily PRN Constipation  guaiFENesin    Syrup 100 milliGRAM(s) Oral every 6 hours PRN Cough  simethicone 80 milliGRAM(s) Chew two times a day PRN Gas      Allergies    No Known Allergies    Intolerances        DVT Prophylaxis: [ ] YES [x ] NO      Antibiotics: [x ] YES [ ] NO    Pain Scale (1-10):       Location:    Vital Signs Last 24 Hrs  T(C): 36.9 (15 Oct 2017 06:00), Max: 37.6 (14 Oct 2017 13:26)  T(F): 98.4 (15 Oct 2017 06:00), Max: 99.7 (14 Oct 2017 13:26)  HR: 74 (15 Oct 2017 05:00) (66 - 94)  BP: 111/53 (15 Oct 2017 06:00) (92/55 - 120/57)  BP(mean): 76 (15 Oct 2017 06:00) (76 - 77)  RR: 14 (15 Oct 2017 06:00) (14 - 29)  SpO2: 83% (15 Oct 2017 06:00) (83% - 99%)    Drug Dosing Weight  Height (cm): 172.72 (05 Oct 2017 01:27)  Weight (kg): 103.4 (12 Oct 2017 09:45)  BMI (kg/m2): 34.7 (12 Oct 2017 09:45)  BSA (m2): 2.16 (12 Oct 2017 09:45)    PHYSICAL EXAM:      Constitutional: comfortable but a bit irritable.   Eyes: conjunctiva pink.  ENMT: NC in place. Buccal mucosa moist.  Neck: no masses palp.  Respiratory: chest clear.  Cardiovascular: S1>S2 at apex. Rhythm is irregular.   Gastrointestinal: distended but soft and nontender on palp. Bowel sounds present.  Genitourinary: urinary cath remains in place.  Rectal: found to be heme positive.  Extremities: leg edema persists.  Neurological: no gross focal deficits.  Skin: warm and dry.  Lymph Nodes: none palp.  Musculoskeletal: full ROM.  Psychiatric: affect normal.      URINARY CATHETER: [x ] YES [ ] NO     LABS:  CBC Full  -  ( 15 Oct 2017 03:14 )  WBC Count : x  Hemoglobin : 6.5 g/dL  Hematocrit : 20.5 %  Platelet Count - Automated : x  Mean Cell Volume : x  Mean Cell Hemoglobin : x  Mean Cell Hemoglobin Concentration : x  Auto Neutrophil # : x  Auto Lymphocyte # : x  Auto Monocyte # : x  Auto Eosinophil # : x  Auto Basophil # : x  Auto Neutrophil % : x  Auto Lymphocyte % : x  Auto Monocyte % : x  Auto Eosinophil % : x  Auto Basophil % : x    10-14    139  |  97  |  37<H>  ----------------------------<  122<H>  3.7   |  31  |  1.49<H>    Ca    8.6      14 Oct 2017 05:55  Mg     1.9     10-14        Urinalysis Basic - ( 13 Oct 2017 10:28 )    Color: Red / Appearance: SL CLOUDY / S.010 / pH: x  Gluc: x / Ketone: NEGATIVE  / Bili: Negative / Urobili: 0.2 E.U./dL   Blood: x / Protein: 100 mg/dL / Nitrite: NEGATIVE   Leuk Esterase: NEGATIVE / RBC: Many /HPF / WBC 5-10 /HPF   Sq Epi: x / Non Sq Epi: x / Bacteria: Present /HPF        CULTURES:    RADIOLOGY & ADDITIONAL STUDIES:

## 2017-10-15 NOTE — CONSULT NOTE ADULT - SUBJECTIVE AND OBJECTIVE BOX
HPI:  72 year old M with a PMHx of HTN, COPD, Prostate CA (2013, s/p radiation and seed placement, on leuprolide with a recent elevation of PSA and imaging showing bone mets) who was referred from his heme/onc doctors office 10/4/17 for evaluation of worsening of SOB and episode syncope.   Pt was admitted for severe sepsis w/ CATALINA and possible pneumonia. Pt empirically treated for CAP with broad spectrum abx, and BIPAP. Patient was transferred to MICU and started on peripheral levophed for closer monitoring after he was found to have removed his BIPAP and was in respiratory distress, with associated hypotension. He was later on intubated that PM for airway protection. In the MICU had OGT placed for decompression of previous ileus seen and abdominal distention. Blood Cx came back positive for MSSA w/ persistent bacteremia - last blood cx NGTD drawn on 10/10. Patient transitioned to nafcillin for MSSA coverage.  He was transferred to stepdown yesterday.  Overnite patient noted to have dark stools, and rectal exam revealed melenic stool per resident, and drop in Hgb. He was transfused 1 unit prbc over nite.  Patient currently denies bleeding, melena, n/v/d, abdominal pain, fever, chills.    EGD - never had  Colonoscopy - unable to recall when last one was        PAST MEDICAL & SURGICAL HISTORY:  COPD (chronic obstructive pulmonary disease)  Hypertension  Obstructive sleep apnea syndrome  Prostate cancer metastatic to bone  PC (prostate cancer): with seed placement  A fib      REVIEW OF SYSTEMS  Apart from items noted in the HPI A 10point ROS was negative    MEDICATIONS  (STANDING):  ALBUTerol/ipratropium for Nebulization 3 milliLiter(s) Nebulizer every 4 hours  aspirin  chewable 81 milliGRAM(s) Oral daily  atorvastatin 20 milliGRAM(s) Oral at bedtime  dextrose 5%. 1000 milliLiter(s) (50 mL/Hr) IV Continuous <Continuous>  furosemide   Injectable 20 milliGRAM(s) IV Push two times a day  melatonin 3 milliGRAM(s) Oral at bedtime  nafcillin  IVPB 2 Gram(s) IV Intermittent every 4 hours  nafcillin  IVPB      pantoprazole  Injectable 40 milliGRAM(s) IV Push every 12 hours  polyethylene glycol 3350 17 Gram(s) Oral two times a day  tamsulosin 0.4 milliGRAM(s) Oral at bedtime    MEDICATIONS  (PRN):  acetaminophen  Suppository 650 milliGRAM(s) Rectal every 6 hours PRN For Temp greater than 38 C (100.4 F)  bisacodyl Suppository 10 milliGRAM(s) Rectal daily PRN Constipation  guaiFENesin    Syrup 100 milliGRAM(s) Oral every 6 hours PRN Cough  simethicone 80 milliGRAM(s) Chew two times a day PRN Gas      Allergies  No Known Allergies    Intolerances    SOCIAL HISTORY:  Denies toxic or illicit habits    FAMILY HISTORY:  No pertinent family history in first degree relatives      Vital Signs Last 24 Hrs  T(C): 37.2 (15 Oct 2017 09:34), Max: 37.6 (14 Oct 2017 13:26)  T(F): 99 (15 Oct 2017 09:34), Max: 99.7 (14 Oct 2017 13:26)  HR: 74 (15 Oct 2017 09:00) (66 - 94)  BP: 98/49 (15 Oct 2017 09:00) (92/55 - 120/57)  BP(mean): 76 (15 Oct 2017 06:00) (76 - 77)  RR: 25 (15 Oct 2017 09:00) (14 - 29)  SpO2: 93% (15 Oct 2017 09:00) (83% - 99%)    PHYSICAL EXAM:  GEN - elderly M lying in bed in no distress, anicteric, afebrile, + conjunctival pallor  Eyes: ROSALINA  Respiratory: fair air entry  Cardiovascular: s1 s2 no M irregular  Gastrointestinal: distended, soft, tympanitic, BS+, NT, NR, NG, no masses or organs palpated  Rectal: deferred at patients request  Extremities: ++pitting LE edema  Neurological: AAOx3 non focal  Skin: intact, ecchymotic areas on upper extremities    LABS:                  6.5    x     )-----------( x        ( 15 Oct 2017 03:14 )             20.5     10-14    139  |  97  |  37<H>  ----------------------------<  122<H>  3.7   |  31  |  1.49<H>    Ca    8.6      14 Oct 2017 05:55  Mg     1.9     10-14      RADIOLOGY & ADDITIONAL STUDIES:

## 2017-10-15 NOTE — PROGRESS NOTE ADULT - SUBJECTIVE AND OBJECTIVE BOX
INTERVAL HPI/OVERNIGHT EVENTS:    Patient was seen and examined.  Events noted.  Now with anemia s/p prbc transfussion    CONSTITUTIONAL:  Negative fever or chills, feels well, good appetite  EYES:  Negative  blurry vision or double vision  CARDIOVASCULAR:  Negative for chest pain or palpitations  RESPIRATORY:  Negative for cough, wheezing,  + SOB   GASTROINTESTINAL:  Negative for nausea, vomiting, diarrhea, constipation, or abdominal pain  GENITOURINARY:  Negative frequency, urgency or dysuria  NEUROLOGIC:  No headache, confusion, dizziness, lightheadedness      ANTIBIOTICS/RELEVANT:    MEDICATIONS  (STANDING):  ALBUTerol/ipratropium for Nebulization 3 milliLiter(s) Nebulizer every 4 hours  aspirin  chewable 81 milliGRAM(s) Oral daily  atorvastatin 20 milliGRAM(s) Oral at bedtime  dextrose 5%. 1000 milliLiter(s) (50 mL/Hr) IV Continuous <Continuous>  furosemide   Injectable 20 milliGRAM(s) IV Push two times a day  melatonin 3 milliGRAM(s) Oral at bedtime  nafcillin  IVPB 2 Gram(s) IV Intermittent every 4 hours  nafcillin  IVPB      pantoprazole  Injectable 40 milliGRAM(s) IV Push every 12 hours  polyethylene glycol 3350 17 Gram(s) Oral two times a day  tamsulosin 0.4 milliGRAM(s) Oral at bedtime    MEDICATIONS  (PRN):  acetaminophen  Suppository 650 milliGRAM(s) Rectal every 6 hours PRN For Temp greater than 38 C (100.4 F)  bisacodyl Suppository 10 milliGRAM(s) Rectal daily PRN Constipation  guaiFENesin    Syrup 100 milliGRAM(s) Oral every 6 hours PRN Cough  simethicone 80 milliGRAM(s) Chew two times a day PRN Gas        Vital Signs Last 24 Hrs  T(C): 37.2 (15 Oct 2017 09:34), Max: 37.6 (14 Oct 2017 13:26)  T(F): 99 (15 Oct 2017 09:34), Max: 99.7 (14 Oct 2017 13:26)  HR: 74 (15 Oct 2017 09:00) (66 - 92)  BP: 98/49 (15 Oct 2017 09:00) (92/55 - 112/54)  BP(mean): 76 (15 Oct 2017 06:00) (76 - 77)  RR: 25 (15 Oct 2017 09:00) (14 - 29)  SpO2: 93% (15 Oct 2017 09:00) (83% - 99%)    PHYSICAL EXAM:  Constitutional: chronically ill appearing  Eyes:MEHUL, EOMI  Ear/Nose/Throat: no oral lesion, no sinus tenderness on percussion	  Neck:no JVD, no lymphadenopathy, supple  Respiratory: + crackles  Cardiovascular: S1S2 RRR, no murmurs  Gastrointestinal:soft, (+) BS, no HSM  Extremities:no edema  Vascular: DP Pulse:	right normal; left normal      LABS:                        7.1    9.6   )-----------( 189      ( 15 Oct 2017 12:00 )             22.7     10-14    139  |  97  |  37<H>  ----------------------------<  122<H>  3.7   |  31  |  1.49<H>    Ca    8.6      14 Oct 2017 05:55  Mg     1.9     10-14            MICROBIOLOGY:  Culture - Blood (10.10.17 @ 17:30)    Specimen Source: .Blood Blood    Culture Results:   No growth at 4 days.        RADIOLOGY & ADDITIONAL STUDIES:

## 2017-10-15 NOTE — PROGRESS NOTE ADULT - ATTENDING COMMENTS
Repeat Hb 7.1 from 6.5 after 1 unit pRBC and will transfuse another unit now. Pt is reluctant to have EGD but will agree if absolutely necessary. Will continue ASA now in face of recent NSTEMI but may need to hold and even give platelets if Hb continues to drop. OOB and asymptomatic with O2 sat 97% on 6 L NCO2. PICC today and continue abx. Will continue diuresis along with transfusion unless becomes unstable.

## 2017-10-15 NOTE — CONSULT NOTE ADULT - ASSESSMENT
72 year old M with a PMHx of HTN, COPD, Prostate CA (2013, s/p radiation and seed placement, on leuprolide with a recent elevation of PSA and imaging showing bone mets) who was referred from his heme/onc doctors office 10/4/17 for evaluation of worsening of SOB and episode syncope. Admitted for severe sepsis w/ CATALINA and possible pneumonia, Rx in ICU with intubation, levophed, and broad spectrum abx, now on nafcillin for MSSA bacteremia. Who developed melena overnite and drop in Hgb.    # Melena/Anemia -   - likely 2/2 UGIB - from stress related PUD  - 2 large bore IV  - transfuse to keep Hgb >7  - IV PPI BID  - will benefit from an upper endoscopy - but patient is currently refusing any procedures at this time and wants to follow up as an outpatient once he is discharged  - continue all other Mx per primary team    GI will sign off for now - please reconsult us once patient is amenable to having a procedure  Discussed with attending Dr Romano

## 2017-10-15 NOTE — PROGRESS NOTE ADULT - PROBLEM SELECTOR PLAN 2
Heme positive stools. Would have GI see. Anemia is also due to frequent blood draws and underlying metastatic prostate cancer to bone.

## 2017-10-15 NOTE — CHART NOTE - NSCHARTNOTEFT_GEN_A_CORE
Patient with 2AM surveillance CBC to assess for possible need for transfusion. CBC showed H/H 6.2/19.9 (down from 7.1/22.8 earlier in the evening). Patient seen and evaluated at bedside. No complaints of dizziness, HA, lightheadedness, CP, SOB, nausea, abdominal pain. Patient reports having few bowel movements that were his "normal" earlier in the day. VS: T 98.9F, /62, HR 74, RR 16, SpO2 99% on BIPAP 10/5. On exam, patient awake and alert, mentating appropriately, no acute distress. No conjunctival pallor. Lungs clear to auscultation. Abdomen obese but soft, nontender, +BS in all quadrants, few scattered bruises, but no large ecchymotic patch. Glaser draining clear, yellow urine. Guaiac positive on rectal exam.     A/P: 72M with PMHx of HTN, COPD, prostate cancer (s/p radiation and seed 2013 on Leuprolide outpatient) who presented with worsening of SOB and an episode of unwitnessed syncope found to have severe sepsis with bacteremia which later evolved to septic shock and respiratory failure requiring intubation and pressure support, now resolved. Hospital course complicated by downtrending hemoglobin.     #Anemia: Patient with downtrending hemoglobin over the last few days. Per records, patient with some degree of anemia at baseline likely due to chronic inflammation and history of metastatic disease. However, Hgb 8.6 --> 6.5 on most recent labs this evening. Etiology unclear but includes  source (patient found to have urinary retention on 10/13 s/p glaser placement, and initially with maroon colored urine likely due to bleeding superficial vessels in bladder from overdistention, now resolved and patient with clear yellow urine; this can be due to initial bleed). Guaiac positive on rectal exam, suggestive of possible UGIB. Less likely RP bleed as patient HD stable, without tenderness. Can also consider sequelae of chronic inflammation and metastatic disease vs over-phlebotomizing patient.   - will transfuse 1U pRBC (transfusion consent in chart)  - start Pantoprazole 40mg IVP Q12hrs   - NPO for now  - call GI in the AM or sooner if patient decompensates   - maintain active T/S  - f/u post-transfusion CBC

## 2017-10-15 NOTE — PROGRESS NOTE ADULT - SUBJECTIVE AND OBJECTIVE BOX
Cardiology for Preister    cc: follow-up cardiology evaluation and management of left heart failure    interval - no cp or acute dyspnea; events noted - now s/p prbc after h/h drop    PAST MEDICAL & SURGICAL HISTORY:  COPD (chronic obstructive pulmonary disease)  Hypertension  Obstructive sleep apnea syndrome  Prostate cancer metastatic to bone  PC (prostate cancer): with seed placement    MEDICATIONS  (STANDING):  ALBUTerol/ipratropium for Nebulization 3 milliLiter(s) Nebulizer every 4 hours  aspirin  chewable 81 milliGRAM(s) Oral daily  atorvastatin 20 milliGRAM(s) Oral at bedtime  dextrose 5%. 1000 milliLiter(s) (50 mL/Hr) IV Continuous <Continuous>  furosemide   Injectable 20 milliGRAM(s) IV Push two times a day  melatonin 3 milliGRAM(s) Oral at bedtime  nafcillin  IVPB 2 Gram(s) IV Intermittent every 4 hours  nafcillin  IVPB      pantoprazole  Injectable 40 milliGRAM(s) IV Push every 12 hours  polyethylene glycol 3350 17 Gram(s) Oral two times a day  potassium chloride    Tablet ER 40 milliEquivalent(s) Oral every 4 hours  tamsulosin 0.4 milliGRAM(s) Oral at bedtime    MEDICATIONS  (PRN):  acetaminophen  Suppository 650 milliGRAM(s) Rectal every 6 hours PRN For Temp greater than 38 C (100.4 F)  bisacodyl Suppository 10 milliGRAM(s) Rectal daily PRN Constipation  guaiFENesin    Syrup 100 milliGRAM(s) Oral every 6 hours PRN Cough  simethicone 80 milliGRAM(s) Chew two times a day PRN Gas    Vital Signs Last 24 Hrs  T(C): 36.8 (15 Oct 2017 14:28), Max: 37.2 (15 Oct 2017 01:00)  T(F): 98.2 (15 Oct 2017 14:28), Max: 99 (15 Oct 2017 09:34)  HR: 75 (15 Oct 2017 21:08) (66 - 86)  BP: 114/55 (15 Oct 2017 21:08) (94/51 - 114/55)  BP(mean): 79 (15 Oct 2017 21:08) (74 - 79)  RR: 25 (15 Oct 2017 21:08) (14 - 29)  SpO2: 95% (15 Oct 2017 21:08) (83% - 99%)    physical exam  nad  oob to chair   no carotid bruit  decreased breath sounds bases bilat  rr; s1/s2 present   soft abd  bilat le edema present bilat  ue pulses present     I&O's Detail    14 Oct 2017 07:01  -  15 Oct 2017 07:00  --------------------------------------------------------  IN:    Oral Fluid: 540 mL  Total IN: 540 mL    OUT:    Indwelling Catheter - Urethral: 2300 mL  Total OUT: 2300 mL    Total NET: -1760 mL      15 Oct 2017 07:01  -  15 Oct 2017 21:50  --------------------------------------------------------  IN:  Total IN: 0 mL    OUT:    Indwelling Catheter - Urethral: 825 mL    Voided: 500 mL  Total OUT: 1325 mL    Total NET: -1325 mL                                           8.2    8.4   )-----------( 168      ( 15 Oct 2017 18:27 )             26.2   10-15    142  |  97  |  36<H>  ----------------------------<  128<H>  3.4<L>   |  31  |  1.32<H>    Ca    8.5      15 Oct 2017 18:27  Mg     1.9     10-14      I&O's Summary    14 Oct 2017 07:01  -  15 Oct 2017 07:00  --------------------------------------------------------  IN: 540 mL / OUT: 2300 mL / NET: -1760 mL    15 Oct 2017 07:01  -  15 Oct 2017 21:50  --------------------------------------------------------  IN: 0 mL / OUT: 1325 mL / NET: -1325 mL    10- ecg reviewed        < from: KELLY w/Doppler (10.09.17 @ 13:35  EXAM:  ESOPHAGEAL ECHO (CARDIOL)                          *** ADDENDUM 10/09/2017  ***    Patient Height: 172.0 cm  Patient Weight: 109.0 kg  BSA: 2.2 m^2  Interpretation Summary  Normal left ventricular size and wall thickness.The left ventricular  ejection   fraction is estimated to be 40-45%The right ventricle is dilated.The   right   ventricular systolic function is mildly reduced.The left atrium is   dilated.The   right atrium is dilated.Thickening and calcification of the aortic   valveNo   aortic regurgitation noted.Mitral valve thickening noted.There is mild to   moderate mitral regurgitation.There is trace tricuspid   regurgitation.There was   insufficient TR detected from which to calculate pulmonary artery   systolic   pressure. No pulmonic regurgitation noted.There is aortic root   sclerosis/calcification.Mild atherosclerotic plaque(s) in the aortic   arch.Mild   atherosclerotic plaque(s) in the descending aorta.There is no pericardial   effusion.No discrete vegetations noted.    < end of copied text >    < from: Xray Chest 1 View AP -PORTABLE-Routine (10.09.17 @ 05:37) >  EXAM:  XR CHEST 1 VIEW PORT ROUTINE                          PROCEDURE DATE:  10/09/2017                     INTERPRETATION:    Portable AP Radiograph dated 10/9/2017 5:37 AM    CLINICAL INFORMATION: 72 years, Male, Intubated, PNA    PRIOR STUDIES:October 8, 2017    FINDINGS: Support tubes and lines are unchanged. Left pleural effusion is   stable. Right pleural effusion has increased. Pulmonary vascular   congestion has increased.    IMPRESSION:  Interval increase of right pleural effusion and pulmonary vascular   congestion.            "Thank you for the opportunity to participate in the care of this   patient."        KATHY PABLO M.D., RADIOLOGY ATTENDING  This document has been electronically signed. Oct  9 2017  8:33AM    < end of copied text >

## 2017-10-16 LAB
ANION GAP SERPL CALC-SCNC: 14 MMOL/L — SIGNIFICANT CHANGE UP (ref 5–17)
BLD GP AB SCN SERPL QL: NEGATIVE — SIGNIFICANT CHANGE UP
BUN SERPL-MCNC: 32 MG/DL — HIGH (ref 7–23)
CALCIUM SERPL-MCNC: 8.5 MG/DL — SIGNIFICANT CHANGE UP (ref 8.4–10.5)
CHLORIDE SERPL-SCNC: 98 MMOL/L — SIGNIFICANT CHANGE UP (ref 96–108)
CO2 SERPL-SCNC: 31 MMOL/L — SIGNIFICANT CHANGE UP (ref 22–31)
CREAT SERPL-MCNC: 1.27 MG/DL — SIGNIFICANT CHANGE UP (ref 0.5–1.3)
GLUCOSE SERPL-MCNC: 113 MG/DL — HIGH (ref 70–99)
HCT VFR BLD CALC: 23.9 % — LOW (ref 39–50)
HCT VFR BLD CALC: 28.2 % — LOW (ref 39–50)
HGB BLD-MCNC: 7.6 G/DL — LOW (ref 13–17)
HGB BLD-MCNC: 8.6 G/DL — LOW (ref 13–17)
MAGNESIUM SERPL-MCNC: 1.8 MG/DL — SIGNIFICANT CHANGE UP (ref 1.6–2.6)
MCHC RBC-ENTMCNC: 29.9 PG — SIGNIFICANT CHANGE UP (ref 27–34)
MCHC RBC-ENTMCNC: 31.8 G/DL — LOW (ref 32–36)
MCV RBC AUTO: 94.1 FL — SIGNIFICANT CHANGE UP (ref 80–100)
PLATELET # BLD AUTO: 166 K/UL — SIGNIFICANT CHANGE UP (ref 150–400)
POTASSIUM SERPL-MCNC: 3.4 MMOL/L — LOW (ref 3.5–5.3)
POTASSIUM SERPL-SCNC: 3.4 MMOL/L — LOW (ref 3.5–5.3)
RBC # BLD: 2.54 M/UL — LOW (ref 4.2–5.8)
RBC # FLD: 18.8 % — HIGH (ref 10.3–16.9)
RH IG SCN BLD-IMP: POSITIVE — SIGNIFICANT CHANGE UP
SODIUM SERPL-SCNC: 143 MMOL/L — SIGNIFICANT CHANGE UP (ref 135–145)
WBC # BLD: 6.6 K/UL — SIGNIFICANT CHANGE UP (ref 3.8–10.5)
WBC # FLD AUTO: 6.6 K/UL — SIGNIFICANT CHANGE UP (ref 3.8–10.5)

## 2017-10-16 PROCEDURE — 99233 SBSQ HOSP IP/OBS HIGH 50: CPT | Mod: GC

## 2017-10-16 PROCEDURE — 71010: CPT | Mod: 26

## 2017-10-16 PROCEDURE — 93306 TTE W/DOPPLER COMPLETE: CPT | Mod: 26

## 2017-10-16 RX ORDER — POTASSIUM CHLORIDE 20 MEQ
40 PACKET (EA) ORAL ONCE
Qty: 0 | Refills: 0 | Status: COMPLETED | OUTPATIENT
Start: 2017-10-16 | End: 2017-10-16

## 2017-10-16 RX ORDER — MAGNESIUM SULFATE 500 MG/ML
2 VIAL (ML) INJECTION ONCE
Qty: 0 | Refills: 0 | Status: COMPLETED | OUTPATIENT
Start: 2017-10-16 | End: 2017-10-16

## 2017-10-16 RX ORDER — POTASSIUM CHLORIDE 20 MEQ
10 PACKET (EA) ORAL
Qty: 0 | Refills: 0 | Status: COMPLETED | OUTPATIENT
Start: 2017-10-16 | End: 2017-10-16

## 2017-10-16 RX ADMIN — NAFCILLIN 200 GRAM(S): 10 INJECTION, POWDER, FOR SOLUTION INTRAVENOUS at 08:00

## 2017-10-16 RX ADMIN — ATORVASTATIN CALCIUM 20 MILLIGRAM(S): 80 TABLET, FILM COATED ORAL at 23:03

## 2017-10-16 RX ADMIN — Medication 100 MILLIEQUIVALENT(S): at 08:45

## 2017-10-16 RX ADMIN — NAFCILLIN 200 GRAM(S): 10 INJECTION, POWDER, FOR SOLUTION INTRAVENOUS at 16:48

## 2017-10-16 RX ADMIN — NAFCILLIN 200 GRAM(S): 10 INJECTION, POWDER, FOR SOLUTION INTRAVENOUS at 11:09

## 2017-10-16 RX ADMIN — Medication 50 GRAM(S): at 08:45

## 2017-10-16 RX ADMIN — SIMETHICONE 80 MILLIGRAM(S): 80 TABLET, CHEWABLE ORAL at 19:57

## 2017-10-16 RX ADMIN — NAFCILLIN 200 GRAM(S): 10 INJECTION, POWDER, FOR SOLUTION INTRAVENOUS at 19:00

## 2017-10-16 RX ADMIN — Medication 3 MILLILITER(S): at 07:00

## 2017-10-16 RX ADMIN — Medication 3 MILLILITER(S): at 03:39

## 2017-10-16 RX ADMIN — Medication 100 UNIT(S): at 11:09

## 2017-10-16 RX ADMIN — PANTOPRAZOLE SODIUM 40 MILLIGRAM(S): 20 TABLET, DELAYED RELEASE ORAL at 14:00

## 2017-10-16 RX ADMIN — Medication 40 MILLIEQUIVALENT(S): at 08:45

## 2017-10-16 RX ADMIN — Medication 100 MILLIEQUIVALENT(S): at 11:09

## 2017-10-16 RX ADMIN — Medication 100 MILLIEQUIVALENT(S): at 13:00

## 2017-10-16 RX ADMIN — Medication 20 MILLIGRAM(S): at 16:48

## 2017-10-16 RX ADMIN — Medication 3 MILLIGRAM(S): at 23:04

## 2017-10-16 RX ADMIN — NAFCILLIN 200 GRAM(S): 10 INJECTION, POWDER, FOR SOLUTION INTRAVENOUS at 00:00

## 2017-10-16 RX ADMIN — NAFCILLIN 200 GRAM(S): 10 INJECTION, POWDER, FOR SOLUTION INTRAVENOUS at 04:00

## 2017-10-16 RX ADMIN — Medication 3 MILLILITER(S): at 11:09

## 2017-10-16 RX ADMIN — Medication 20 MILLIGRAM(S): at 06:41

## 2017-10-16 RX ADMIN — Medication 3 MILLILITER(S): at 14:00

## 2017-10-16 RX ADMIN — Medication 3 MILLILITER(S): at 23:05

## 2017-10-16 RX ADMIN — PANTOPRAZOLE SODIUM 40 MILLIGRAM(S): 20 TABLET, DELAYED RELEASE ORAL at 06:41

## 2017-10-16 RX ADMIN — Medication 3 MILLILITER(S): at 19:00

## 2017-10-16 RX ADMIN — TAMSULOSIN HYDROCHLORIDE 0.4 MILLIGRAM(S): 0.4 CAPSULE ORAL at 23:04

## 2017-10-16 RX ADMIN — NAFCILLIN 200 GRAM(S): 10 INJECTION, POWDER, FOR SOLUTION INTRAVENOUS at 23:04

## 2017-10-16 RX ADMIN — Medication 81 MILLIGRAM(S): at 11:09

## 2017-10-16 NOTE — PROGRESS NOTE ADULT - SUBJECTIVE AND OBJECTIVE BOX
INTERVAL HPI/OVERNIGHT EVENTS:      ANTIBIOTICS/RELEVANT:  nafcillin  IVPB 2 Gram(s) IV Intermittent every 4 hours  nafcillin  IVPB        MEDICATIONS  (STANDING):  ALBUTerol/ipratropium for Nebulization 3 milliLiter(s) Nebulizer every 4 hours  aspirin  chewable 81 milliGRAM(s) Oral daily  atorvastatin 20 milliGRAM(s) Oral at bedtime  dextrose 5%. 1000 milliLiter(s) (50 mL/Hr) IV Continuous <Continuous>  furosemide   Injectable 20 milliGRAM(s) IV Push two times a day  heparin  flush 100 Units/mL Injectable 100 Unit(s) IV Push every other day  melatonin 3 milliGRAM(s) Oral at bedtime  nafcillin  IVPB 2 Gram(s) IV Intermittent every 4 hours  nafcillin  IVPB      pantoprazole  Injectable 40 milliGRAM(s) IV Push every 12 hours  polyethylene glycol 3350 17 Gram(s) Oral two times a day  tamsulosin 0.4 milliGRAM(s) Oral at bedtime    MEDICATIONS  (PRN):  acetaminophen  Suppository 650 milliGRAM(s) Rectal every 6 hours PRN For Temp greater than 38 C (100.4 F)  bisacodyl Suppository 10 milliGRAM(s) Rectal daily PRN Constipation  guaiFENesin    Syrup 100 milliGRAM(s) Oral every 6 hours PRN Cough  simethicone 80 milliGRAM(s) Chew two times a day PRN Gas    Vital Signs Last 24 Hrs  T(C): 36.8 (16 Oct 2017 13:10), Max: 37 (15 Oct 2017 22:30)  T(F): 98.2 (16 Oct 2017 13:10), Max: 98.6 (15 Oct 2017 22:30)  HR: 68 (16 Oct 2017 11:10) (68 - 78)  BP: 126/59 (16 Oct 2017 11:10) (94/51 - 126/59)  BP(mean): 85 (16 Oct 2017 11:10) (72 - 85)  RR: 22 (16 Oct 2017 11:10) (19 - 25)  SpO2: 96% (16 Oct 2017 11:10) (94% - 98%)      LABS:                        7.6    6.6   )-----------( 166      ( 16 Oct 2017 06:24 )             23.9     10-16    143  |  98  |  32<H>  ----------------------------<  113<H>  3.4<L>   |  31  |  1.27    Ca    8.5      16 Oct 2017 06:24  Mg     1.8     10-16      MICROBIOLOGY:    Culture - Blood (collected 10-10-17 @ 17:30)  Source: .Blood Blood  Final Report (10-15-17 @ 18:01):    No growth at 5 days.    Culture - Blood (collected 10-10-17 @ 17:30)  Source: .Blood Blood  Final Report (10-15-17 @ 18:01):    No growth at 5 days.    Culture - Blood (collected 10-09-17 @ 13:45)  Source: .Blood Blood  Gram Stain (10-10-17 @ 11:02):    Aerobic Bottle: Gram Positive Cocci in Clusters    Result called to and read back by_ Ms. KIA Bolaños RN  10/10/2017 11:01:23  Preliminary Report (10-13-17 @ 12:14):    Growth in aerobic bottle: Staphylococcus aureus  Organism: Staphylococcus aureus (10-13-17 @ 12:13)  Organism: Staphylococcus aureus (10-13-17 @ 12:13)      -  Cefazolin: S <=4      -  Clindamycin: S <=0.5      -  Erythromycin: S <=0.5      -  Linezolid: S 4      -  Oxacillin: S 0.5      -  Penicillin: R >8      -  RIF- Rifampin: S <=1      -  Trimethoprim/Sulfamethoxazole: S <=0.5/9.5      -  Vancomycin: S 2      Method Type: DILMA    Culture - Blood (collected 10-09-17 @ 13:45)  Source: .Blood Blood  Gram Stain (10-10-17 @ 12:00):    Aerobic Bottle: Gram Positive Cocci in Clusters    Result called to and read back by_ Ms. KIA Bolaños RN  10/10/2017 11:01:23  Preliminary Report (10-13-17 @ 12:15):    Growth in aerobic bottle: Staphylococcus aureus  Organism: Staphylococcus aureus (10-13-17 @ 12:15)  Organism: Staphylococcus aureus (10-13-17 @ 12:15)      -  Cefazolin: S <=4      -  Clindamycin: S <=0.5      -  Erythromycin: S <=0.5      -  Linezolid: S 4      -  Oxacillin: S 0.5      -  Penicillin: R >8      -  RIF- Rifampin: S <=1      -  Trimethoprim/Sulfamethoxazole: S <=0.5/9.5      -  Vancomycin: S 2      Method Type: DILMA    Culture - Blood (collected 10-07-17 @ 17:25)  Source: .Blood Blood  Gram Stain (10-09-17 @ 10:34):    Aerobic Bottle: Gram Positive Cocci in Clusters    Result called to and read back by_ Ms. KIA Bolaños  10/09/2017 10:31:58  Final Report (10-15-17 @ 16:02):    Growth in aerobic bottle: Staphylococcus aureus    Anaerobic Bottle: No growth  Organism: Staphylococcus aureus (10-15-17 @ 16:02)  Organism: Staphylococcus aureus (10-15-17 @ 16:02)      -  Cefazolin: S <=4      -  Clindamycin: S <=0.5      -  Erythromycin: S <=0.5      -  Linezolid: S 4      -  Oxacillin: S 0.5      -  Penicillin: R >8      -  RIF- Rifampin: S <=1      -  Trimethoprim/Sulfamethoxazole: S <=0.5/9.5      -  Vancomycin: S 2      Method Type: DILMA    Culture - Blood (collected 10-07-17 @ 17:25)  Source: .Blood Blood  Final Report (10-12-17 @ 18:01):    No growth at 5 days.    Culture - Sputum (collected 10-07-17 @ 13:36)  Source: .Sputum Sputum  Gram Stain (10-07-17 @ 15:00):    Moderate epithelial cells    Numerous White blood cells    Moderate Gram positive cocci in pairs, chains and clusters    Rare Yeast  Final Report (10-09-17 @ 11:58):    Normal Respiratory Elmira present    Culture - Blood (collected 10-06-17 @ 19:38)  Source: .Blood Blood  Gram Stain (10-07-17 @ 13:42):    Aerobic Bottle: Gram Positive Cocci in Clusters    Result called to and read back by_ Jerrell Hill RN (7 EA) 10/07/2017 13:41  Final Report (10-15-17 @ 16:01):    Growth in aerobic bottle: Staphylococcus aureus    Anaerobic Bottle: No growth  Organism: Staphylococcus aureus (10-15-17 @ 16:01)  Organism: Staphylococcus aureus (10-15-17 @ 16:01)      -  Cefazolin: S <=4      -  Clindamycin: S <=0.5      -  Erythromycin: S <=0.5      -  Linezolid: S 4      -  Oxacillin: S 0.5      -  Penicillin: R >8      -  RIF- Rifampin: S <=1      -  Trimethoprim/Sulfamethoxazole: S <=0.5/9.5      -  Vancomycin: S 2      Method Type: DILMA    Culture - Blood (collected 10-06-17 @ 19:38)  Source: .Blood Blood  Gram Stain (10-07-17 @ 13:44):    Aerobic Bottle: Gram Positive Cocci in Clusters    Result called to and read back by_ Jerrell Hill RN (7 EA) 10/07/2017 13:41  Final Report (10-15-17 @ 16:02):    Growth in aerobic bottle: Staphylococcus aureus    Anaerobic Bottle: No growth  Organism: Staphylococcus aureus (10-15-17 @ 16:02)  Organism: Staphylococcus aureus (10-15-17 @ 16:02)      -  Cefazolin: S <=4      -  Clindamycin: S <=0.5      -  Erythromycin: S <=0.5      -  Linezolid: S 4      -  Oxacillin: S 0.5      -  Penicillin: R >8      -  RIF- Rifampin: S <=1      -  Trimethoprim/Sulfamethoxazole: S <=0.5/9.5      -  Vancomycin: S 2      Method Type: DILMA    RADIOLOGY & ADDITIONAL STUDIES: reviewed INTERVAL HPI/OVERNIGHT EVENTS: R PICC line placed    Subjective: Pt seen and examined at bedside.  States that he feels better than he has in months.  In process of trial of void (failed over weekend.)  Denies F/C, abd/suprapubic tenderness, cough, SOB, no complaints.    ANTIBIOTICS/RELEVANT:  nafcillin  IVPB 2 Gram(s) IV Intermittent every 4 hours  nafcillin  IVPB        MEDICATIONS  (STANDING):  ALBUTerol/ipratropium for Nebulization 3 milliLiter(s) Nebulizer every 4 hours  aspirin  chewable 81 milliGRAM(s) Oral daily  atorvastatin 20 milliGRAM(s) Oral at bedtime  dextrose 5%. 1000 milliLiter(s) (50 mL/Hr) IV Continuous <Continuous>  furosemide   Injectable 20 milliGRAM(s) IV Push two times a day  heparin  flush 100 Units/mL Injectable 100 Unit(s) IV Push every other day  melatonin 3 milliGRAM(s) Oral at bedtime  nafcillin  IVPB 2 Gram(s) IV Intermittent every 4 hours  nafcillin  IVPB      pantoprazole  Injectable 40 milliGRAM(s) IV Push every 12 hours  polyethylene glycol 3350 17 Gram(s) Oral two times a day  tamsulosin 0.4 milliGRAM(s) Oral at bedtime    MEDICATIONS  (PRN):  acetaminophen  Suppository 650 milliGRAM(s) Rectal every 6 hours PRN For Temp greater than 38 C (100.4 F)  bisacodyl Suppository 10 milliGRAM(s) Rectal daily PRN Constipation  guaiFENesin    Syrup 100 milliGRAM(s) Oral every 6 hours PRN Cough  simethicone 80 milliGRAM(s) Chew two times a day PRN Gas    Vital Signs Last 24 Hrs  T(C): 36.8 (16 Oct 2017 13:10), Max: 37 (15 Oct 2017 22:30)  T(F): 98.2 (16 Oct 2017 13:10), Max: 98.6 (15 Oct 2017 22:30)  HR: 68 (16 Oct 2017 11:10) (68 - 78)  BP: 126/59 (16 Oct 2017 11:10) (94/51 - 126/59)  BP(mean): 85 (16 Oct 2017 11:10) (72 - 85)  RR: 22 (16 Oct 2017 11:10) (19 - 25)  SpO2: 96% (16 Oct 2017 11:10) (94% - 98%)    PHYSICAL EXAM:  Constitutional: AAOx3, NAD on 3L NC  Eyes: ROSALINA, EOMI  Ear/Nose/Throat: no oral lesion, MMM	  Neck: no JVD, no lymphadenopathy  Respiratory: decreased breath sounds throughout, no WRR  Cardiovascular: S1S2 RRR, no MRG  Gastrointestinal: soft, (+) BS  Extremities: 3+ pitting edema to knees  Vascular: distal pulses intact  Skin: dermatomal herpetic lesions on L chest wall fully crusted      LABS:                        7.6    6.6   )-----------( 166      ( 16 Oct 2017 06:24 )             23.9     10-16    143  |  98  |  32<H>  ----------------------------<  113<H>  3.4<L>   |  31  |  1.27    Ca    8.5      16 Oct 2017 06:24  Mg     1.8     10-16      MICROBIOLOGY:    Culture - Blood (collected 10-10-17 @ 17:30)  Source: .Blood Blood  Final Report (10-15-17 @ 18:01):    No growth at 5 days.    Culture - Blood (collected 10-10-17 @ 17:30)  Source: .Blood Blood  Final Report (10-15-17 @ 18:01):    No growth at 5 days.    Culture - Blood (collected 10-09-17 @ 13:45)  Source: .Blood Blood  Gram Stain (10-10-17 @ 11:02):    Aerobic Bottle: Gram Positive Cocci in Clusters    Result called to and read back by_ Ms. KIA Bolaños RN  10/10/2017 11:01:23  Preliminary Report (10-13-17 @ 12:14):    Growth in aerobic bottle: Staphylococcus aureus  Organism: Staphylococcus aureus (10-13-17 @ 12:13)  Organism: Staphylococcus aureus (10-13-17 @ 12:13)      -  Cefazolin: S <=4      -  Clindamycin: S <=0.5      -  Erythromycin: S <=0.5      -  Linezolid: S 4      -  Oxacillin: S 0.5      -  Penicillin: R >8      -  RIF- Rifampin: S <=1      -  Trimethoprim/Sulfamethoxazole: S <=0.5/9.5      -  Vancomycin: S 2      Method Type: DILMA    Culture - Blood (collected 10-09-17 @ 13:45)  Source: .Blood Blood  Gram Stain (10-10-17 @ 12:00):    Aerobic Bottle: Gram Positive Cocci in Clusters    Result called to and read back by_ Ms. KIA Bolaños RN  10/10/2017 11:01:23  Preliminary Report (10-13-17 @ 12:15):    Growth in aerobic bottle: Staphylococcus aureus  Organism: Staphylococcus aureus (10-13-17 @ 12:15)  Organism: Staphylococcus aureus (10-13-17 @ 12:15)      -  Cefazolin: S <=4      -  Clindamycin: S <=0.5      -  Erythromycin: S <=0.5      -  Linezolid: S 4      -  Oxacillin: S 0.5      -  Penicillin: R >8      -  RIF- Rifampin: S <=1      -  Trimethoprim/Sulfamethoxazole: S <=0.5/9.5      -  Vancomycin: S 2      Method Type: DILMA    Culture - Blood (collected 10-07-17 @ 17:25)  Source: .Blood Blood  Gram Stain (10-09-17 @ 10:34):    Aerobic Bottle: Gram Positive Cocci in Clusters    Result called to and read back by_ Ms. KIA Bolaños  10/09/2017 10:31:58  Final Report (10-15-17 @ 16:02):    Growth in aerobic bottle: Staphylococcus aureus    Anaerobic Bottle: No growth  Organism: Staphylococcus aureus (10-15-17 @ 16:02)  Organism: Staphylococcus aureus (10-15-17 @ 16:02)      -  Cefazolin: S <=4      -  Clindamycin: S <=0.5      -  Erythromycin: S <=0.5      -  Linezolid: S 4      -  Oxacillin: S 0.5      -  Penicillin: R >8      -  RIF- Rifampin: S <=1      -  Trimethoprim/Sulfamethoxazole: S <=0.5/9.5      -  Vancomycin: S 2      Method Type: DILMA    Culture - Blood (collected 10-07-17 @ 17:25)  Source: .Blood Blood  Final Report (10-12-17 @ 18:01):    No growth at 5 days.    Culture - Sputum (collected 10-07-17 @ 13:36)  Source: .Sputum Sputum  Gram Stain (10-07-17 @ 15:00):    Moderate epithelial cells    Numerous White blood cells    Moderate Gram positive cocci in pairs, chains and clusters    Rare Yeast  Final Report (10-09-17 @ 11:58):    Normal Respiratory [place] present    Culture - Blood (collected 10-06-17 @ 19:38)  Source: .Blood Blood  Gram Stain (10-07-17 @ 13:42):    Aerobic Bottle: Gram Positive Cocci in Clusters    Result called to and read back by_ Jerrell Hill RN (7 EA) 10/07/2017 13:41  Final Report (10-15-17 @ 16:01):    Growth in aerobic bottle: Staphylococcus aureus    Anaerobic Bottle: No growth  Organism: Staphylococcus aureus (10-15-17 @ 16:01)  Organism: Staphylococcus aureus (10-15-17 @ 16:01)      -  Cefazolin: S <=4      -  Clindamycin: S <=0.5      -  Erythromycin: S <=0.5      -  Linezolid: S 4      -  Oxacillin: S 0.5      -  Penicillin: R >8      -  RIF- Rifampin: S <=1      -  Trimethoprim/Sulfamethoxazole: S <=0.5/9.5      -  Vancomycin: S 2      Method Type: DILMA    Culture - Blood (collected 10-06-17 @ 19:38)  Source: .Blood Blood  Gram Stain (10-07-17 @ 13:44):    Aerobic Bottle: Gram Positive Cocci in Clusters    Result called to and read back by_ Jerrell Hill RN (7 EA) 10/07/2017 13:41  Final Report (10-15-17 @ 16:02):    Growth in aerobic bottle: Staphylococcus aureus    Anaerobic Bottle: No growth  Organism: Staphylococcus aureus (10-15-17 @ 16:02)  Organism: Staphylococcus aureus (10-15-17 @ 16:02)      -  Cefazolin: S <=4      -  Clindamycin: S <=0.5      -  Erythromycin: S <=0.5      -  Linezolid: S 4      -  Oxacillin: S 0.5      -  Penicillin: R >8      -  RIF- Rifampin: S <=1      -  Trimethoprim/Sulfamethoxazole: S <=0.5/9.5      -  Vancomycin: S 2      Method Type: DILMA    RADIOLOGY & ADDITIONAL STUDIES: reviewed    < from: Echocardiogram (10.16.17 @ 10:31) >  Interpretation Summary  The left ventricle is mildly dilated.Hypokinesis of the basal inferior and   septal region.The left ventricular ejection fraction is estimated to be   45-50%The mitral inflow pattern is consistent with impaired left   ventricular   relaxation with mildly elevated left atrial pressure (8-14mmHg).  The   left   atrial size is normal. The right atrium is dilated.The right ventricle is   moderately dilated. The right ventricular systolic function is mildly   reduced.    There is mild aortic valve thickening.There is mild mitral valve   thickening.   There is mild mitral regurgitation.There is trace tricuspid   regurgitation.There was insufficient TR detected from which to calculate   pulmonary artery systolic pressure.  No aortic root dilatation.The   inferior   vena cava is normal in size (<2.1 cm) with normal inspiratory collapse   (>50%)   consistent with normal right atrial pressure.  There is no pericardial   effusion.    < end of copied text >

## 2017-10-16 NOTE — PROCEDURE NOTE - NSINFORMCONSENT_GEN_A_CORE
This was an emergent procedure.
Benefits, risks, and possible complications of procedure explained to patient/caregiver who verbalized understanding and gave verbal consent.
Benefits, risks, and possible complications of procedure explained to patient/caregiver who verbalized understanding and gave written consent.
Benefits, risks, and possible complications of procedure explained to patient/caregiver who verbalized understanding and gave written consent.

## 2017-10-16 NOTE — PROVIDER CONTACT NOTE (OTHER) - SITUATION
removed Saldana at 11AM. Voided 3x each 100ml to 125ml. pt still felt distended. Bladder scanned >402ml. Dr Rodriguez notified.  He notified urology PA who will speak to the resident.

## 2017-10-16 NOTE — PROVIDER CONTACT NOTE (OTHER) - ACTION/TREATMENT ORDERED:
waiting for decision by urology to place Saldana. pt with h/o BPH.  Last Saldana placed was placed by urology

## 2017-10-16 NOTE — PROCEDURE NOTE - NSPOSTCAREGUIDE_GEN_A_CORE
Care for catheter as per unit/ICU protocols
Verbal/written post procedure instructions were given to patient/caregiver
Care for catheter as per unit/ICU protocols

## 2017-10-16 NOTE — PROGRESS NOTE ADULT - SUBJECTIVE AND OBJECTIVE BOX
Cardiology for Preister    cc: follow-up cardiology evaluation and management of left heart failure    interval - no cp; reports feeling well    PAST MEDICAL & SURGICAL HISTORY:  COPD (chronic obstructive pulmonary disease)  Hypertension  Obstructive sleep apnea syndrome  Prostate cancer metastatic to bone  PC (prostate cancer): with seed placement    MEDICATIONS  (STANDING):  ALBUTerol/ipratropium for Nebulization 3 milliLiter(s) Nebulizer every 4 hours  aspirin  chewable 81 milliGRAM(s) Oral daily  atorvastatin 20 milliGRAM(s) Oral at bedtime  dextrose 5%. 1000 milliLiter(s) (50 mL/Hr) IV Continuous <Continuous>  furosemide   Injectable 20 milliGRAM(s) IV Push two times a day  heparin  flush 100 Units/mL Injectable 100 Unit(s) IV Push every other day  melatonin 3 milliGRAM(s) Oral at bedtime  nafcillin  IVPB 2 Gram(s) IV Intermittent every 4 hours  nafcillin  IVPB      pantoprazole  Injectable 40 milliGRAM(s) IV Push every 12 hours  polyethylene glycol 3350 17 Gram(s) Oral two times a day  tamsulosin 0.4 milliGRAM(s) Oral at bedtime    MEDICATIONS  (PRN):  acetaminophen  Suppository 650 milliGRAM(s) Rectal every 6 hours PRN For Temp greater than 38 C (100.4 F)  bisacodyl Suppository 10 milliGRAM(s) Rectal daily PRN Constipation  guaiFENesin    Syrup 100 milliGRAM(s) Oral every 6 hours PRN Cough  simethicone 80 milliGRAM(s) Chew two times a day PRN Gas      ICU Vital Signs Last 24 Hrs  T(C): 36 (16 Oct 2017 21:33), Max: 36.9 (16 Oct 2017 06:26)  T(F): 96.8 (16 Oct 2017 21:33), Max: 98.5 (16 Oct 2017 09:16)  HR: 66 (16 Oct 2017 18:05) (64 - 78)  BP: 128/60 (16 Oct 2017 18:05) (99/53 - 131/60)  BP(mean): 87 (16 Oct 2017 18:05) (72 - 87)  ABP: --  ABP(mean): --  RR: 31 (16 Oct 2017 18:05) (19 - 31)  SpO2: 99% (16 Oct 2017 18:05) (96% - 99%)      physical exam  nad  supine  no jvd/hjr  decreased breath sounds  rr; s1/s2 present; no rub  soft abd; nd  edema improving  warm ue and le     I&O's Detail    15 Oct 2017 07:01  -  16 Oct 2017 07:00  --------------------------------------------------------  IN:    Oral Fluid: 150 mL    Solution: 100 mL  Total IN: 250 mL    OUT:    Indwelling Catheter - Urethral: 2375 mL    Voided: 500 mL  Total OUT: 2875 mL    Total NET: -2625 mL      16 Oct 2017 07:01  -  16 Oct 2017 22:34  --------------------------------------------------------  IN:    IV PiggyBack: 300 mL    Solution: 50 mL    Solution: 300 mL  Total IN: 650 mL    OUT:    Indwelling Catheter - Urethral: 1200 mL    Ureteral Catheter: 900 mL    Voided: 775 mL  Total OUT: 2875 mL    Total NET: -2225 mL                          8.6    x     )-----------( x        ( 16 Oct 2017 18:59 )             28.2   10-16    143  |  98  |  32<H>  ----------------------------<  113<H>  3.4<L>   |  31  |  1.27    Ca    8.5      16 Oct 2017 06:24  Mg     1.8     10-16      I&O's Summary    15 Oct 2017 07:01  -  16 Oct 2017 07:00  --------------------------------------------------------  IN: 250 mL / OUT: 2875 mL / NET: -2625 mL    16 Oct 2017 07:01  -  16 Oct 2017 22:35  --------------------------------------------------------  < from: Echocardiogram (10.16.17 @ 10:31) >    EXAM:  ECHOCARDIOGRAM (CARDIOL)                          PROCEDURE DATE:  10/16/2017                        INTERPRETATION:  Patient Height: 172.0 cm  Patient Weight: 103.0 kg  Heart Rate: 74 bpm  Systolic Pressure: 140 mmHg  Diastolic Pressure: 70mmHg  BSA: 2.2 m^2  Interpretation Summary  The left ventricle is mildly dilated.Hypokinesis of the basal inferior and   septal region.The left ventricular ejection fraction is estimated to be   45-50%The mitral inflow pattern is consistent with impaired left   ventricular   relaxation with mildly elevated left atrial pressure (8-14mmHg).  The   left   atrial size is normal. The right atrium is dilated.The right ventricle is   moderately dilated. The right ventricular systolic function is mildly   reduced.    There is mild aortic valve thickening.There is mild mitral valve   thickening.   There is mild mitral regurgitation.There is trace tricuspid   regurgitation.There was insufficient TR detected from which to calculate   pulmonary artery systolic pressure.  No aortic root dilatation.The   inferior   vena cava is normal in size (<2.1 cm) with normal inspiratory collapse   (>50%)   consistent with normal right atrial pressure.  There is no pericardial   effusion.    < end of copied text >  IN: 650 mL / OUT: 2875 mL / NET: -2225 mL          < from: Xray Chest 1 View AP -PORTABLE-Routine (10.09.17 @ 05:37) >  EXAM:  XR CHEST 1 VIEW PORT ROUTINE                          PROCEDURE DATE:  10/09/2017                     INTERPRETATION:    Portable AP Radiograph dated 10/9/2017 5:37 AM    CLINICAL INFORMATION: 72 years, Male, Intubated, PNA    PRIOR STUDIES:October 8, 2017    FINDINGS: Support tubes and lines are unchanged. Left pleural effusion is   stable. Right pleural effusion has increased. Pulmonary vascular   congestion has increased.    IMPRESSION:  Interval increase of right pleural effusion and pulmonary vascular   congestion.            "Thank you for the opportunity to participate in the care of this   patient."        KATHY PABLO M.D., RADIOLOGY ATTENDING  This document has been electronically signed. Oct  9 2017  8:33AM    < end of copied text >

## 2017-10-16 NOTE — PROGRESS NOTE ADULT - SUBJECTIVE AND OBJECTIVE BOX
Patient seen and examined at bedside. Urine remains clear. Patient originally went into clot retention after orginal Saldana was removed. Saldana replaced by urology and irrigated, now clear with no clots. Patient did not have voiding dysfunction prior to hospitalization Can TOV when appropriate as per primary team. Prostate CA is being followed by oncologist only. If patient is agreeable he can follow up with Dr. Hester for urologic monitoring.    ICU Vital Signs Last 24 Hrs  T(C): 36.9 (16 Oct 2017 09:16), Max: 37 (15 Oct 2017 22:30)  T(F): 98.5 (16 Oct 2017 09:16), Max: 98.6 (15 Oct 2017 22:30)  HR: 70 (16 Oct 2017 09:15) (68 - 78)  BP: 102/55 (16 Oct 2017 08:41) (94/51 - 114/55)  BP(mean): 77 (16 Oct 2017 08:41) (72 - 80)  ABP: --  ABP(mean): --  RR: 19 (16 Oct 2017 09:15) (19 - 25)  SpO2: 97% (16 Oct 2017 09:15) (94% - 98%)                          7.6    6.6   )-----------( 166      ( 16 Oct 2017 06:24 )             23.9     16 Oct 2017 06:24    143    |  98     |  32     ----------------------------<  113    3.4     |  31     |  1.27     Ca    8.5        16 Oct 2017 06:24  Mg     1.8       16 Oct 2017 06:24

## 2017-10-16 NOTE — PROGRESS NOTE ADULT - ASSESSMENT
Assessment	  71 yo male with hx of HTN, COPD, Prostate CA (s/p radiation and seed 2013 on leuprolide outpatient) who presents with worsening of SOB and an episode of unwitnessed syncope found to have severe sepsis with gram + cocci in clusters identified as S. aureus and ATN.  Hospital course complicated by rapid response for hypotension, unresponsive to painful stimuli and was transferred to MICU for septic shock.  Septic shock has resolved but pt has had persistent MSSA bacteremia with resolving respiratory failure and ATN.    ID  #Sepsis w/ persistent MSSA bacteremia  - patient w/o leukocytosis, fever, w/ stable BP  - c/w nafcillin 2 q 6  - Gallium scan negative for focal areas of infection, KELLY negative for endocarditis  - repeat TTE today to assess worsening valvular disease; if nl KELLY not required.   - blood cx w/ no growth to date  - Double lumen PICC to be placed today by Dr. Ibarra  - ID following; f/u recs    Respiratory   #Acute hypoxic Respiratory Failure-resolved  -likely underlying COPD w/ septic shock during rapid response on 5 LA  - work up sleep apnea outpatient      #COPD  - c/w duonebs q 4   - encourage ambulation and incentive spirometry.       Cardiovascular  #New onset paroxysmal A.fib Vs MAT:  -in NSR  - Goal < 110 bpm   -F/U further cardiologist recs    #HFrEF  -TTE shows HFrEF with EF 45%, right atrial dilatation, septal wall motion abnormalities and mild hypokinesis of left ventricle. Unable to visualize any vegetations or R heart structures. Unable to calculate pulmonary a pressures.   - c/w ASA + lipitor  - c/w lasix (2.6 L daily out yesterday)  - consider metoprolol and lisinopril as tolerated once bleeding controlled and BP allows  - strict I/Os, daily weights, fluid restriction 1.5L  - acute onset of HFrEF; ischemia workup outpatient    #NSTEMI :  - EKG positive for ST depression in anteroseptal leads, Echo positive for septal hypokinesis, Troponin 0.06 -->0.10-->0.19-->0.23-->0.17-->0.15-->0.44-->0.54-->0.56-->0.51 (peaked), likely probably due to demand ischemia from hypoxia exacerbated by renal insufficiency (cr. 2.07 on admission).   - c/w ASA    Renal/  ATN-resolved  - ATN on admission w/ Cr now at baseline.     CATALINA   - brief rise in Cr over weekend likely 2/2 obstructive uropathy when pt was retaining, now resolved.     Hematuria w/ urinary retention  - Hematuria likely due to underlying prostate malignancy w/ recent glaser placement - possibly due to prostate inflammation/bleed.   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  -Thrombocytopenia resolved     Skin    #Shingles  Finished course of valtrex    GI    #ileus -resolved  - c/w PO agents for constipation    # Possible upper GI bleed  - patient with acute blood loss anemia requiring transfusion of 1 U PRBC   - Pt not currently amenable to endoscopy, if bleed worsens patient will reconsider.   - possible stress ulcer given recent septic shock and admission to ICU   - PICC for access, maintain type and screen  - PPI BID   - hemoglobin goal 8 hgb given recent NSTEMI     Endo     discontinued ISS as patient not requiring coverage    F no IVF  E- replete lytes prn  N- dash diet w/ 1.5 L fluid restriction     PPx - On ASA 81mg, SCD     CODE - DNR not DNI    Dispo: 7 La

## 2017-10-16 NOTE — PROGRESS NOTE ADULT - ATTENDING COMMENTS
Agree with above.  Patient clinically improved.  Blood cultures negative.  Will need 4 weeks of IV antibiotics.  He can be switched to Ancef 2 grams IV q8hrs on discharge to complete the course.

## 2017-10-16 NOTE — PROGRESS NOTE ADULT - SUBJECTIVE AND OBJECTIVE BOX
Feeling better this am after blood transfusion yesterday. He offers no complaints.    CHEMOTHERAPY REGIMEN:        Day:                          Diet:  Protocol:                                    IVF:      MEDICATIONS  (STANDING):  ALBUTerol/ipratropium for Nebulization 3 milliLiter(s) Nebulizer every 4 hours  aspirin  chewable 81 milliGRAM(s) Oral daily  atorvastatin 20 milliGRAM(s) Oral at bedtime  dextrose 5%. 1000 milliLiter(s) (50 mL/Hr) IV Continuous <Continuous>  furosemide   Injectable 20 milliGRAM(s) IV Push two times a day  heparin  flush 100 Units/mL Injectable 100 Unit(s) IV Push every other day  melatonin 3 milliGRAM(s) Oral at bedtime  nafcillin  IVPB 2 Gram(s) IV Intermittent every 4 hours  nafcillin  IVPB      pantoprazole  Injectable 40 milliGRAM(s) IV Push every 12 hours  polyethylene glycol 3350 17 Gram(s) Oral two times a day  tamsulosin 0.4 milliGRAM(s) Oral at bedtime    MEDICATIONS  (PRN):  acetaminophen  Suppository 650 milliGRAM(s) Rectal every 6 hours PRN For Temp greater than 38 C (100.4 F)  bisacodyl Suppository 10 milliGRAM(s) Rectal daily PRN Constipation  guaiFENesin    Syrup 100 milliGRAM(s) Oral every 6 hours PRN Cough  simethicone 80 milliGRAM(s) Chew two times a day PRN Gas      Allergies    No Known Allergies    Intolerances        DVT Prophylaxis: [ ] YES [x ] NO      Antibiotics: [x ] YES [ ] NO    Pain Scale (1-10):       Location:    Vital Signs Last 24 Hrs  T(C): 36.9 (16 Oct 2017 06:26), Max: 37.2 (15 Oct 2017 09:34)  T(F): 98.4 (16 Oct 2017 06:26), Max: 99 (15 Oct 2017 09:34)  HR: 72 (16 Oct 2017 05:41) (68 - 78)  BP: 110/56 (16 Oct 2017 05:41) (94/51 - 114/55)  BP(mean): 80 (16 Oct 2017 05:41) (72 - 80)  RR: 21 (16 Oct 2017 05:41) (21 - 25)  SpO2: 98% (16 Oct 2017 05:41) (93% - 99%)    Drug Dosing Weight  Height (cm): 172.72 (05 Oct 2017 01:27)  Weight (kg): 103.4 (12 Oct 2017 09:45)  BMI (kg/m2): 34.7 (12 Oct 2017 09:45)  BSA (m2): 2.16 (12 Oct 2017 09:45)    PHYSICAL EXAM:      Constitutional: feeling better.  Eyes: conjunctiva pink.  ENMT: NC in place. Buccal mucosa moist.  Neck: no masses.  Respiratory: transient wheezing on inspiration.  Cardiovascular: S1>S2 at apex. RSR.  Gastrointestinal: distended, soft, nontender, active bowel sounds.  Genitourinary: urinary cath remains in place.  Extremities: some leg edema persists.  Neurological: no gross focal deficits.  Skin: warm and dry.  Lymph Nodes: none palp.  Musculoskeletal: full ROM.  Psychiatric: affect normal.        URINARY CATHETER: [x ] YES [ ] NO     LABS:  CBC Full  -  ( 15 Oct 2017 18:27 )  WBC Count : 8.4 K/uL  Hemoglobin : 8.2 g/dL  Hematocrit : 26.2 %  Platelet Count - Automated : 168 K/uL  Mean Cell Volume : 96.0 fL  Mean Cell Hemoglobin : 30.0 pg  Mean Cell Hemoglobin Concentration : 31.3 g/dL  Auto Neutrophil # : x  Auto Lymphocyte # : x  Auto Monocyte # : x  Auto Eosinophil # : x  Auto Basophil # : x  Auto Neutrophil % : x  Auto Lymphocyte % : x  Auto Monocyte % : x  Auto Eosinophil % : x  Auto Basophil % : x    10-15    142  |  97  |  36<H>  ----------------------------<  128<H>  3.4<L>   |  31  |  1.32<H>    Ca    8.5      15 Oct 2017 18:27            CULTURES:    RADIOLOGY & ADDITIONAL STUDIES:

## 2017-10-16 NOTE — PROCEDURE NOTE - NSINDICATIONS_GEN_A_CORE
respiratory distress
arterial puncture to obtain ABG's/monitoring purposes/critical patient
venous access/critical illness
venous access/venous blood sampling/antibiotic therapy

## 2017-10-16 NOTE — PROCEDURE NOTE - ADDITIONAL PROCEDURE DETAILS
ports aspirate and flush easily; catheter length is 36cm.; biopatch is applied.
cvp is elevated; equal breath sounds; biopatch is applied.

## 2017-10-16 NOTE — PROCEDURE NOTE - NSPOSTPRCRAD_GEN_A_CORE
chest radiograph/postion of catheter/catheter tip in the SVC
post-procedure radiography performed/cxr is pending

## 2017-10-16 NOTE — PROCEDURE NOTE - NSPROCDETAILS_GEN_ALL_CORE
patient pre-oxygenated, tube inserted, placement confirmed/connected to ventilator
lumen(s) aspirated and flushed/sterile dressing applied/guidewire recovered/ultrasound guidance/sterile technique, catheter placed
connected to a pressurized flush line/hemostasis with direct pressure, dressing applied/sutured in place/all materials/supplies accounted for at end of procedure/location identified, draped/prepped, sterile technique used, needle inserted/introduced/positive blood return obtained via catheter/Seldinger technique
ultrasound guidance/supine position/sterile dressing applied/sterile technique, catheter placed/location identified, draped/prepped, sterile technique used

## 2017-10-16 NOTE — PROGRESS NOTE ADULT - ATTENDING COMMENTS
Stools were heme positive. Monitor blood counts and watch for bleeding. The patient is anxious to get up and move about.

## 2017-10-16 NOTE — PROGRESS NOTE ADULT - ASSESSMENT
IMPRESSION:  From ID standpoint, patient is stable.  Blood cultures negative.    Recommend:  1.  Continue nafcillin for now.  Will need 4 weeks IV antibiotics.  Will switch to Cefazolin on discharge for easier dosing IMPRESSION:  From ID standpoint, patient is stable.  Blood cultures negative. Repeat TTE today without evidence of endocarditis, will defer need for further imaging to cardiology.    Recommend:  1. Continue nafcillin for now.    2. Will need 4 weeks IV antibiotics from time blood cultures cleared  3. Cefazolin 2g IV q8 on discharge (1 dose prior to discharge to ensure it is well-tolerated.)    The patient can follow up with Dr. Clemente in the office in 2 weeks to determine if any changes in antibiotics are necessary. Patient should have weekly CBC, CMP. For an appointment the patient or primary team can call [phone], option #2.

## 2017-10-16 NOTE — PROGRESS NOTE ADULT - ASSESSMENT
A/recs:  1) acute hypoxic respiratory failure with elevated tn and abnormal septal wall motion on tte in addition to rv dilatation and abnormal ecg; left heart failure (ef=45% by tte); now with worsening bilat le edema  a - nstemi versus demand ischemia   b - asa/statin   c - bb if no contraindications; consider ace-i after pt tolerating bb  d - rec o>i as tolerated  e - check daily ecg  f - note wma on tte  g - communicated with Dr. Gentile of Interventional Cardiology - plan for cardiac cta when stable    2) paroxysmal atrial fibrillation  a - telemetry - syncope prior to admission  b - bb as above    3) htn - avoid labile bp    4) sepsis due to pna per [facility] with bacteremia   -check with id if [place] indicated    4) possible copd exacerbation - per [facility]    5) acute renal failure - resolved    6) normocytic anemia and thrombocytopenia  -per primary team, gi, and hem-onc     7) shingles    8) prostate ca - per hem-onc and gu    9) maintain K>4, Mg>2    10) palliative care following  discussed with 7 Lachman Housestaff

## 2017-10-17 LAB
ANION GAP SERPL CALC-SCNC: 12 MMOL/L — SIGNIFICANT CHANGE UP (ref 5–17)
BUN SERPL-MCNC: 22 MG/DL — SIGNIFICANT CHANGE UP (ref 7–23)
CALCIUM SERPL-MCNC: 8.7 MG/DL — SIGNIFICANT CHANGE UP (ref 8.4–10.5)
CHLORIDE SERPL-SCNC: 97 MMOL/L — SIGNIFICANT CHANGE UP (ref 96–108)
CO2 SERPL-SCNC: 31 MMOL/L — SIGNIFICANT CHANGE UP (ref 22–31)
CREAT SERPL-MCNC: 1.15 MG/DL — SIGNIFICANT CHANGE UP (ref 0.5–1.3)
CULTURE RESULTS: SIGNIFICANT CHANGE UP
CULTURE RESULTS: SIGNIFICANT CHANGE UP
GLUCOSE SERPL-MCNC: 107 MG/DL — HIGH (ref 70–99)
HCT VFR BLD CALC: 25 % — LOW (ref 39–50)
HCT VFR BLD CALC: 27.2 % — LOW (ref 39–50)
HGB BLD-MCNC: 7.8 G/DL — LOW (ref 13–17)
HGB BLD-MCNC: 8.5 G/DL — LOW (ref 13–17)
MAGNESIUM SERPL-MCNC: 2.1 MG/DL — SIGNIFICANT CHANGE UP (ref 1.6–2.6)
MCHC RBC-ENTMCNC: 31 PG — SIGNIFICANT CHANGE UP (ref 27–34)
MCHC RBC-ENTMCNC: 31.2 G/DL — LOW (ref 32–36)
MCV RBC AUTO: 99.2 FL — SIGNIFICANT CHANGE UP (ref 80–100)
ORGANISM # SPEC MICROSCOPIC CNT: SIGNIFICANT CHANGE UP
PLATELET # BLD AUTO: 138 K/UL — LOW (ref 150–400)
POTASSIUM SERPL-MCNC: 3.4 MMOL/L — LOW (ref 3.5–5.3)
POTASSIUM SERPL-SCNC: 3.4 MMOL/L — LOW (ref 3.5–5.3)
RBC # BLD: 2.52 M/UL — LOW (ref 4.2–5.8)
RBC # FLD: 17.8 % — HIGH (ref 10.3–16.9)
SODIUM SERPL-SCNC: 140 MMOL/L — SIGNIFICANT CHANGE UP (ref 135–145)
SPECIMEN SOURCE: SIGNIFICANT CHANGE UP
SPECIMEN SOURCE: SIGNIFICANT CHANGE UP
WBC # BLD: 5.6 K/UL — SIGNIFICANT CHANGE UP (ref 3.8–10.5)
WBC # FLD AUTO: 5.6 K/UL — SIGNIFICANT CHANGE UP (ref 3.8–10.5)

## 2017-10-17 PROCEDURE — 99233 SBSQ HOSP IP/OBS HIGH 50: CPT

## 2017-10-17 PROCEDURE — 99233 SBSQ HOSP IP/OBS HIGH 50: CPT | Mod: GC

## 2017-10-17 RX ORDER — TAMSULOSIN HYDROCHLORIDE 0.4 MG/1
0.8 CAPSULE ORAL AT BEDTIME
Qty: 0 | Refills: 0 | Status: DISCONTINUED | OUTPATIENT
Start: 2017-10-17 | End: 2017-11-03

## 2017-10-17 RX ORDER — POTASSIUM CHLORIDE 20 MEQ
40 PACKET (EA) ORAL EVERY 4 HOURS
Qty: 0 | Refills: 0 | Status: COMPLETED | OUTPATIENT
Start: 2017-10-17 | End: 2017-10-17

## 2017-10-17 RX ORDER — TAMSULOSIN HYDROCHLORIDE 0.4 MG/1
0.4 CAPSULE ORAL ONCE
Qty: 0 | Refills: 0 | Status: COMPLETED | OUTPATIENT
Start: 2017-10-17 | End: 2017-10-17

## 2017-10-17 RX ORDER — NYSTATIN CREAM 100000 [USP'U]/G
1 CREAM TOPICAL
Qty: 0 | Refills: 0 | Status: DISCONTINUED | OUTPATIENT
Start: 2017-10-17 | End: 2017-11-03

## 2017-10-17 RX ADMIN — Medication 3 MILLILITER(S): at 16:37

## 2017-10-17 RX ADMIN — Medication 3 MILLILITER(S): at 19:36

## 2017-10-17 RX ADMIN — Medication 40 MILLIEQUIVALENT(S): at 13:45

## 2017-10-17 RX ADMIN — Medication 40 MILLIEQUIVALENT(S): at 10:09

## 2017-10-17 RX ADMIN — Medication 20 MILLIGRAM(S): at 19:36

## 2017-10-17 RX ADMIN — Medication 3 MILLILITER(S): at 11:31

## 2017-10-17 RX ADMIN — PANTOPRAZOLE SODIUM 40 MILLIGRAM(S): 20 TABLET, DELAYED RELEASE ORAL at 13:46

## 2017-10-17 RX ADMIN — NAFCILLIN 200 GRAM(S): 10 INJECTION, POWDER, FOR SOLUTION INTRAVENOUS at 07:40

## 2017-10-17 RX ADMIN — PANTOPRAZOLE SODIUM 40 MILLIGRAM(S): 20 TABLET, DELAYED RELEASE ORAL at 04:03

## 2017-10-17 RX ADMIN — TAMSULOSIN HYDROCHLORIDE 0.8 MILLIGRAM(S): 0.4 CAPSULE ORAL at 22:17

## 2017-10-17 RX ADMIN — NYSTATIN CREAM 1 APPLICATION(S): 100000 CREAM TOPICAL at 19:37

## 2017-10-17 RX ADMIN — Medication 40 MILLIEQUIVALENT(S): at 19:36

## 2017-10-17 RX ADMIN — Medication 20 MILLIGRAM(S): at 06:06

## 2017-10-17 RX ADMIN — TAMSULOSIN HYDROCHLORIDE 0.4 MILLIGRAM(S): 0.4 CAPSULE ORAL at 13:28

## 2017-10-17 RX ADMIN — Medication 3 MILLILITER(S): at 07:40

## 2017-10-17 RX ADMIN — NAFCILLIN 200 GRAM(S): 10 INJECTION, POWDER, FOR SOLUTION INTRAVENOUS at 04:03

## 2017-10-17 RX ADMIN — NAFCILLIN 200 GRAM(S): 10 INJECTION, POWDER, FOR SOLUTION INTRAVENOUS at 16:37

## 2017-10-17 RX ADMIN — Medication 3 MILLIGRAM(S): at 22:34

## 2017-10-17 RX ADMIN — Medication 81 MILLIGRAM(S): at 11:31

## 2017-10-17 RX ADMIN — Medication 3 MILLILITER(S): at 04:03

## 2017-10-17 RX ADMIN — NAFCILLIN 200 GRAM(S): 10 INJECTION, POWDER, FOR SOLUTION INTRAVENOUS at 11:32

## 2017-10-17 RX ADMIN — ATORVASTATIN CALCIUM 20 MILLIGRAM(S): 80 TABLET, FILM COATED ORAL at 22:17

## 2017-10-17 RX ADMIN — NAFCILLIN 200 GRAM(S): 10 INJECTION, POWDER, FOR SOLUTION INTRAVENOUS at 21:08

## 2017-10-17 RX ADMIN — Medication 3 MILLILITER(S): at 23:28

## 2017-10-17 NOTE — PROGRESS NOTE ADULT - SUBJECTIVE AND OBJECTIVE BOX
INTERVAL HPI/OVERNIGHT EVENTS: Pt w/ -600 throughout the night, no complaints of abdominal pain, dysuria, frequency.    SUBJECTIVE: Patient seen and examined at bedside. Continued to have -600. No acute complaints. No suprapubic discomfort. Pt denies CP/SOB/urinary frequency/urgency/dysuria.     OBJECTIVE:    VITAL SIGNS:  ICU Vital Signs Last 24 Hrs  T(C): 36.9 (17 Oct 2017 09:10), Max: 37.1 (17 Oct 2017 01:03)  T(F): 98.4 (17 Oct 2017 09:10), Max: 98.8 (17 Oct 2017 01:03)  HR: 67 (17 Oct 2017 08:53) (62 - 71)  BP: 110/57 (17 Oct 2017 08:53) (109/53 - 131/60)  BP(mean): 76 (17 Oct 2017 05:58) (76 - 87)  ABP: --  ABP(mean): --  RR: 16 (17 Oct 2017 08:53) (15 - 31)  SpO2: 92% (17 Oct 2017 08:53) (92% - 99%)        10-16 @ 07:01  -  10-17 @ 07:00  --------------------------------------------------------  IN: 650 mL / OUT: 3675 mL / NET: -3025 mL    10-17 @ 07:01  -  10-17 @ 10:35  --------------------------------------------------------  IN: 0 mL / OUT: 500 mL / NET: -500 mL      CAPILLARY BLOOD GLUCOSE          PHYSICAL EXAM:    General: NAD, resting comfortably in chair on 2 L NC  HEENT: NC/AT; PERRL, clear conjunctiva, MMM  Neck: supple, no JVD  Respiratory: crackles b/l at bases, poor inspiratory effort  Cardiovascular: +S1/S2; RRR, no m/r/g  Abdomen: soft, NT/ND; +BS x4, no suprapubic tenderness, dull to percussion, obese abdomen  Extremities: WWP, b/l 3+ edema to knee  Vascular: 2+ peripheral pulses radial/dp/pt  Skin: normal color and turgor; no rash  Neurological: a&ox3, no focal deficits    MEDICATIONS:  MEDICATIONS  (STANDING):  ALBUTerol/ipratropium for Nebulization 3 milliLiter(s) Nebulizer every 4 hours  aspirin  chewable 81 milliGRAM(s) Oral daily  atorvastatin 20 milliGRAM(s) Oral at bedtime  dextrose 5%. 1000 milliLiter(s) (50 mL/Hr) IV Continuous <Continuous>  furosemide   Injectable 20 milliGRAM(s) IV Push two times a day  heparin  flush 100 Units/mL Injectable 100 Unit(s) IV Push every other day  melatonin 3 milliGRAM(s) Oral at bedtime  nafcillin  IVPB 2 Gram(s) IV Intermittent every 4 hours  nafcillin  IVPB      nystatin Powder 1 Application(s) Topical two times a day  pantoprazole  Injectable 40 milliGRAM(s) IV Push every 12 hours  polyethylene glycol 3350 17 Gram(s) Oral two times a day  potassium chloride    Tablet ER 40 milliEquivalent(s) Oral every 4 hours  tamsulosin 0.4 milliGRAM(s) Oral at bedtime    MEDICATIONS  (PRN):  acetaminophen  Suppository 650 milliGRAM(s) Rectal every 6 hours PRN For Temp greater than 38 C (100.4 F)  bisacodyl Suppository 10 milliGRAM(s) Rectal daily PRN Constipation  guaiFENesin    Syrup 100 milliGRAM(s) Oral every 6 hours PRN Cough  simethicone 80 milliGRAM(s) Chew two times a day PRN Gas      ALLERGIES:  Allergies    No Known Allergies    Intolerances        LABS:                        7.8    5.6   )-----------( 138      ( 17 Oct 2017 06:20 )             25.0     10-17    140  |  97  |  22  ----------------------------<  107<H>  3.4<L>   |  31  |  1.15    Ca    8.7      17 Oct 2017 06:20  Mg     2.1     10-17            RADIOLOGY & ADDITIONAL TESTS: Reviewed.

## 2017-10-17 NOTE — PROGRESS NOTE ADULT - ASSESSMENT
72 y.o gentleman with h/o metastatic prostate ca to bones s/p RT with seed implants and on leuprolide sent from Oncologist's office for unwitnessed syncopal episode, SOB, and productive cough, found to be septic from pna and MSSA bacteremia and ATN with respiratory failure requiring intubation, now tolerating NC, seen initially as an ICU trigger for an active stage IV malignancy, clinically improved overall with need for 4wks IV abx via PICC

## 2017-10-17 NOTE — PROGRESS NOTE ADULT - ASSESSMENT
A/recs:  1) acute hypoxic respiratory failure with elevated tn and abnormal septal wall motion on tte in addition to rv dilatation and abnormal ecg this admission; left heart failure (ef=45% by tte) - now with anemia/melena with plan for urgent to emergent egd in am  a - probable nstemi earlier this admission (downtrend tn at last check); no cp  b - remains off bb due to copd exacerbation  c - chf clinically improving  d - no absolute cardiac contraindications to egd in am:  -avoid labile bp  -continuous telemetry  -check post-procedure ecg and tn - Dr. Gentile of Interventional Cardiology aware of events and agreed to assist if urgent or emergent catheterization indicated    2) paroxysmal atrial fibrillation  a - telemetry - syncope prior to admission  b - monitor    3) htn - monitor    4) sepsis due to pna per [facility] with bacteremia     5) possible copd exacerbation - per [facility]; bb held    6) acute renal failure - resolved    7) normocytic anemia and thrombocytopenia  -egd as above    8) shingles    9) prostate ca - hem-onc and gu following    10) K>4, Mg>2    11) palliative care following  discussed with Dr. Gutierres ([facility]) and Dr. Gentile (Interventional Cardiology) earlier today  discussed with Dr. Lima and 7 Lachman Housesta now  Patient aware of hospital events including tn elevation, wma on TTE, and MI.  Risks, benefits, alternatives of above plan reviewed extensively with patient including risks of stroke, heart attack, or event death.  Questions answered.  Patient agreed to proceed with EGD.

## 2017-10-17 NOTE — PROGRESS NOTE ADULT - SUBJECTIVE AND OBJECTIVE BOX
Resting comfortably. The patient offers no complaints.    CHEMOTHERAPY REGIMEN:        Day:                          Diet:  Protocol:                                    IVF:      MEDICATIONS  (STANDING):  ALBUTerol/ipratropium for Nebulization 3 milliLiter(s) Nebulizer every 4 hours  aspirin  chewable 81 milliGRAM(s) Oral daily  atorvastatin 20 milliGRAM(s) Oral at bedtime  dextrose 5%. 1000 milliLiter(s) (50 mL/Hr) IV Continuous <Continuous>  furosemide   Injectable 20 milliGRAM(s) IV Push two times a day  heparin  flush 100 Units/mL Injectable 100 Unit(s) IV Push every other day  melatonin 3 milliGRAM(s) Oral at bedtime  nafcillin  IVPB 2 Gram(s) IV Intermittent every 4 hours  nafcillin  IVPB      pantoprazole  Injectable 40 milliGRAM(s) IV Push every 12 hours  polyethylene glycol 3350 17 Gram(s) Oral two times a day  tamsulosin 0.4 milliGRAM(s) Oral at bedtime    MEDICATIONS  (PRN):  acetaminophen  Suppository 650 milliGRAM(s) Rectal every 6 hours PRN For Temp greater than 38 C (100.4 F)  bisacodyl Suppository 10 milliGRAM(s) Rectal daily PRN Constipation  guaiFENesin    Syrup 100 milliGRAM(s) Oral every 6 hours PRN Cough  simethicone 80 milliGRAM(s) Chew two times a day PRN Gas      Allergies    No Known Allergies    Intolerances        DVT Prophylaxis: [ ] YES [ ] NO      Antibiotics: [ ] YES [ ] NO    Pain Scale (1-10):       Location:    Vital Signs Last 24 Hrs  T(C): 37.1 (17 Oct 2017 01:03), Max: 37.1 (17 Oct 2017 01:03)  T(F): 98.8 (17 Oct 2017 01:03), Max: 98.8 (17 Oct 2017 01:03)  HR: 66 (17 Oct 2017 05:52) (62 - 72)  BP: 119/55 (17 Oct 2017 01:24) (102/55 - 131/60)  BP(mean): 79 (17 Oct 2017 01:24) (77 - 87)  RR: 15 (17 Oct 2017 01:24) (15 - 31)  SpO2: 96% (17 Oct 2017 05:52) (95% - 99%)    Drug Dosing Weight  Height (cm): 172.72 (05 Oct 2017 01:27)  Weight (kg): 103.4 (12 Oct 2017 09:45)  BMI (kg/m2): 34.7 (12 Oct 2017 09:45)  BSA (m2): 2.16 (12 Oct 2017 09:45)    PHYSICAL EXAM:      Constitutional: feels well.  Eyes: conjunctiva pink.  ENMT: NC in place. Buccal mucosa moist.  Neck: no masses.  Respiratory: some high pitched wheezing on inspiration.  Cardiovascular: S1>S2 at apex. RSR.  Gastrointestinal: soft, nontender, active bowel sounds. No palp masses.  Genitourinary: voiding without difficulty.  Extremities: leg edema persists.  Neurological: no gross focal deficits.  Skin: warm and dry.   Lymph Nodes: none palp.  Musculoskeletal: full ROM.  Psychiatric: affect normal.        URINARY CATHETER: [ ] YES [ ] NO     LABS:  CBC Full  -  ( 16 Oct 2017 18:59 )  WBC Count : x  Hemoglobin : 8.6 g/dL  Hematocrit : 28.2 %  Platelet Count - Automated : x  Mean Cell Volume : x  Mean Cell Hemoglobin : x  Mean Cell Hemoglobin Concentration : x  Auto Neutrophil # : x  Auto Lymphocyte # : x  Auto Monocyte # : x  Auto Eosinophil # : x  Auto Basophil # : x  Auto Neutrophil % : x  Auto Lymphocyte % : x  Auto Monocyte % : x  Auto Eosinophil % : x  Auto Basophil % : x    10-16    143  |  98  |  32<H>  ----------------------------<  113<H>  3.4<L>   |  31  |  1.27    Ca    8.5      16 Oct 2017 06:24  Mg     1.8     10-16            CULTURES:    RADIOLOGY & ADDITIONAL STUDIES:

## 2017-10-17 NOTE — PROGRESS NOTE ADULT - SUBJECTIVE AND OBJECTIVE BOX
Cardiology for [facility]    cc: follow-up cardiology evaluation and management of left heart failure    interval - no new complaints; events noted; pt reports dark stools earlier    PAST MEDICAL & SURGICAL HISTORY:  COPD (chronic obstructive pulmonary disease)  Hypertension  Obstructive sleep apnea syndrome  Prostate cancer metastatic to bone  PC (prostate cancer): with seed placement    MEDICATIONS  (STANDING):  ALBUTerol/ipratropium for Nebulization 3 milliLiter(s) Nebulizer every 4 hours  aspirin  chewable 81 milliGRAM(s) Oral daily  atorvastatin 20 milliGRAM(s) Oral at bedtime  dextrose 5%. 1000 milliLiter(s) (50 mL/Hr) IV Continuous <Continuous>  furosemide   Injectable 20 milliGRAM(s) IV Push two times a day  heparin  flush 100 Units/mL Injectable 100 Unit(s) IV Push every other day  melatonin 3 milliGRAM(s) Oral at bedtime  nafcillin  IVPB 2 Gram(s) IV Intermittent every 4 hours  nafcillin  IVPB      nystatin Powder 1 Application(s) Topical two times a day  pantoprazole  Injectable 40 milliGRAM(s) IV Push every 12 hours  polyethylene glycol 3350 17 Gram(s) Oral two times a day  tamsulosin 0.8 milliGRAM(s) Oral at bedtime    MEDICATIONS  (PRN):  acetaminophen  Suppository 650 milliGRAM(s) Rectal every 6 hours PRN For Temp greater than 38 C (100.4 F)  bisacodyl Suppository 10 milliGRAM(s) Rectal daily PRN Constipation  guaiFENesin    Syrup 100 milliGRAM(s) Oral every 6 hours PRN Cough  simethicone 80 milliGRAM(s) Chew two times a day PRN Gas    Vital Signs Last 24 Hrs  T(C): 37.1 (17 Oct 2017 21:14), Max: 37.1 (17 Oct 2017 01:03)  T(F): 98.7 (17 Oct 2017 21:14), Max: 98.8 (17 Oct 2017 01:03)  HR: 71 (17 Oct 2017 22:22) (66 - 88)  BP: 118/56 (17 Oct 2017 22:22) (98/50 - 123/56)  BP(mean): 80 (17 Oct 2017 22:22) (69 - 81)  RR: 18 (17 Oct 2017 21:08) (15 - 31)  SpO2: 97% (17 Oct 2017 22:22) (92% - 99%)    physical exam  nad  anicteric  mmm  no jvd/hjr  decreased breath sounds bilat  rr; s1/s2 present; no new murmur  soft abd  bilat le edema  le pulses present bilat     I&O's Detail    16 Oct 2017 07:01  -  17 Oct 2017 07:00  --------------------------------------------------------  IN:    IV PiggyBack: 300 mL    Solution: 50 mL    Solution: 300 mL  Total IN: 650 mL    OUT:    Indwelling Catheter - Urethral: 1200 mL    Ureteral Catheter: 1100 mL    Voided: 1375 mL  Total OUT: 3675 mL    Total NET: -3025 mL      17 Oct 2017 07:01  -  17 Oct 2017 23:10  --------------------------------------------------------  IN:    Solution: 100 mL  Total IN: 100 mL    OUT:    Voided: 1150 mL  Total OUT: 1150 mL    Total NET: -1050 mL                                           8.5    x     )-----------( x        ( 17 Oct 2017 19:02 )             27.2   10-17    140  |  97  |  22  ----------------------------<  107<H>  3.4<L>   |  31  |  1.15    Ca    8.7      17 Oct 2017 06:20  Mg     2.1     10-17          I&O's Summary    16 Oct 2017 07:01  -  17 Oct 2017 07:00  --------------------------------------------------------  IN: 650 mL / OUT: 3675 mL / NET: -3025 mL    17 Oct 2017 07:01  -  17 Oct 2017 23:10  --------------------------------------------------------  IN: 100 mL / OUT: 1150 mL / NET: -1050 mL      < from: Echocardiogram (10.16.17 @ 10:31) >    EXAM:  ECHOCARDIOGRAM (CARDIOL)                          PROCEDURE DATE:  10/16/2017                        INTERPRETATION:  Patient Height: 172.0 cm  Patient Weight: 103.0 kg  Heart Rate: 74 bpm  Systolic Pressure: 140 mmHg  Diastolic Pressure: 70mmHg  BSA: 2.2 m^2  Interpretation Summary  The left ventricle is mildly dilated.Hypokinesis of the basal inferior and   septal region.The left ventricular ejection fraction is estimated to be   45-50%The mitral inflow pattern is consistent with impaired left   ventricular   relaxation with mildly elevated left atrial pressure (8-14mmHg).  The   left   atrial size is normal. The right atrium is dilated.The right ventricle is   moderately dilated. The right ventricular systolic function is mildly   reduced.    There is mild aortic valve thickening.There is mild mitral valve   thickening.   There is mild mitral regurgitation.There is trace tricuspid   regurgitation.There was insufficient TR detected from which to calculate   pulmonary artery systolic pressure.  No aortic root dilatation.The   inferior   vena cava is normal in size (<2.1 cm) with normal inspiratory collapse   (>50%)   consistent with normal right atrial pressure.  There is no pericardial   effusion.    < end of copied text >  IN: 650 mL / OUT: 2875 mL / NET: -2225 mL          < from: Xray Chest 1 View AP -PORTABLE-Routine (10.09.17 @ 05:37) >  EXAM:  XR CHEST 1 VIEW PORT ROUTINE                          PROCEDURE DATE:  10/09/2017                     INTERPRETATION:    Portable AP Radiograph dated 10/9/2017 5:37 AM    CLINICAL INFORMATION: 72 years, Male, Intubated, PNA    PRIOR STUDIES:October 8, 2017    FINDINGS: Support tubes and lines are unchanged. Left pleural effusion is   stable. Right pleural effusion has increased. Pulmonary vascular   congestion has increased.    IMPRESSION:  Interval increase of right pleural effusion and pulmonary vascular   congestion.            "Thank you for the opportunity to participate in the care of this   patient."        KATHY PABLO M.D., RADIOLOGY ATTENDING  This document has been electronically signed. Oct  9 2017  8:33AM    < end of copied text >

## 2017-10-17 NOTE — PROGRESS NOTE ADULT - PROBLEM SELECTOR PLAN 3
Will cont. to follow for support.  DNR with a trial of intubation if needed    -As discussed during the palliative IDT meeting and as identified during the patients PSSA screening the patient would benefit from: Music Therapy, Nutrition, P.T. Pt to have continual access to supportive services during rest of hospital stay as the patient/family deems necessary ie. Chaplaincy, Massage Therapy, Music Therapy, Pt/family supportive services, Palliative SW, etc. Pt continues to decline  visits

## 2017-10-17 NOTE — PROGRESS NOTE ADULT - SUBJECTIVE AND OBJECTIVE BOX
TEDDY GUZMAN   MRN-[id number]         CC: Patient is a 72y old  Male who presents with a chief complaint of SOB, syncope (05 Oct 2017 01:42) Reports satisfaction with decrease oxygen needs on nasal cannula, but is frustrated with still having to stay in hospital for medical issues, anxious to go home and start cooking    HPI:  This is a 72 year old male with a PMHx of HTN, COPD, Prostate CA (2013, s/p radiation and seed placement, on leuprolide with a recent elevation of PSA and imaging showing bone mets) presents from his heme/on office with worsening of SOB and and episode syncope in the morning. The patient states that this morning, he was waking up from a nap and upon getting up from his bed, he felt unwell, dizzy, and lightheaded and then woke up on the ground. He states that he lost consciousness for about 5 seconds before he came too. He hit the left side of his head on the ground, but did not feel dizzy, lightheaded, nauseous after the event. He denies any chest pain, palpitations, diaphoresis or changes in vision prior or after the event as well. He got up and went to his doctors office. He notes that he has had several episodes like this in the past and they occur when he is getting up from his bed or up from a chair. He notes that he usually feels dizzy or lightheaded, but has never had any other symptoms. At his doctors office, he was told about the elevated PSA and the bone mets on imaging. It was at the office that his doctor noticed his increased SOB and sent him to the ED.     The pt notes that he has had SOB since early September with associated productive cough with yellow sputum. The pt states that he went to go see his PCP, Dr. Glass who gave him amoxicillin for bronchitis. The pt states that he felt better and that his breathing and cough seemed improved to him over the past month and did not realize the worsening of his respiratory status until today at his doctors office. The patient denies any fever, chills, n/v/d/c, abdominal pain, hematochezia, melena, hematuria, dysuria, urinary frequency, urinary urgency, numbness or tingling in his extremities. The pt notes that he has nocturia, however this has been chronic in nature as well as worsening of LE edema since the SOB began.    In ED: Vitals: 98.9, HR 73, BP 99/63, RR 24, 96%RA. Presented with leukocytosis and given vanc/zosyn in ED. Pt received about 1L ns( ordered for 2 but second was not given). Pt desaturated in the ED and was placed on BiPAP (05 Oct 2017 01:42)    ROS:  UNABLE TO OBTAIN  due to:    DYSPNEA (Y/N):	n  N/V (Y/N): n	  SECRETIONS (Y/N): n	  AGITATION (Y/N): n  PAIN(Y/N): n       -Provocation/Palliation:     -Quality/Quantity:     -Radiating:     -Severity:     -Timing/Frequency:     -Impact on ADLs:     OTHER REVIEW OF SYSTEMS:  ALLERGIES:  No Known Allergies    OPIATE NAÏVE (Y/N): y  -iStop reviewed (Y/N): Y    MEDICATIONS: reviewed  MEDICATIONS  (STANDING):  ALBUTerol/ipratropium for Nebulization 3 milliLiter(s) Nebulizer every 4 hours  aspirin  chewable 81 milliGRAM(s) Oral daily  atorvastatin 20 milliGRAM(s) Oral at bedtime  dextrose 5%. 1000 milliLiter(s) (50 mL/Hr) IV Continuous <Continuous>  furosemide   Injectable 20 milliGRAM(s) IV Push two times a day  heparin  flush 100 Units/mL Injectable 100 Unit(s) IV Push every other day  melatonin 3 milliGRAM(s) Oral at bedtime  nafcillin  IVPB 2 Gram(s) IV Intermittent every 4 hours  nafcillin  IVPB      nystatin Powder 1 Application(s) Topical two times a day  pantoprazole  Injectable 40 milliGRAM(s) IV Push every 12 hours  polyethylene glycol 3350 17 Gram(s) Oral two times a day  potassium chloride    Tablet ER 40 milliEquivalent(s) Oral every 4 hours  tamsulosin 0.8 milliGRAM(s) Oral at bedtime    MEDICATIONS  (PRN):  acetaminophen  Suppository 650 milliGRAM(s) Rectal every 6 hours PRN For Temp greater than 38 C (100.4 F)  bisacodyl Suppository 10 milliGRAM(s) Rectal daily PRN Constipation  guaiFENesin    Syrup 100 milliGRAM(s) Oral every 6 hours PRN Cough  simethicone 80 milliGRAM(s) Chew two times a day PRN Gas    PHYSICAL EXAM:  T(C): 36.9 (10-17-17 @ 09:10), Max: 37.1 (10-17-17 @ 01:03)  T(F): 98.4 (10-17-17 @ 09:10), Max: 98.8 (10-17-17 @ 01:03)  HR: 66 (10-17-17 @ 13:30) (62 - 71)  BP: 98/50 (10-17-17 @ 13:30) (98/50 - 128/60)  RR: 16 (10-17-17 @ 13:30) (15 - 31)  SpO2: 95% (10-17-17 @ 13:30) (92% - 99%)    GENERAL:  Sitting up in chair, reading on his laptop, appearing frustrated  HEENT: atraumatic, anicteric, no nasal discharge, moist mucous membranes     	         CVS: SR on ECG          	  RESP: 2LNC, resp even and not labored        	  GI: protuberant            	  : voids           	  MUSC:  no edema     	  NEURO: no apparent focal deficits     	  PSYCH: frustrated               LABS: reviewed                        7.8    5.6   )-----------( 138      ( 17 Oct 2017 06:20 )             25.0     10-17    140  |  97  |  22  ----------------------------<  107<H>  3.4<L>   |  31  |  1.15    Ca    8.7      17 Oct 2017 06:20  Mg     2.1     10-17    IMAGING: reviewed  < from: Xray Chest 1 View AP- PORTABLE-Urgent (10.16.17 @ 03:21) >  EXAM:  XR CHEST 1 VIEW PORT URGENT                        PROCEDURE DATE:  10/16/2017    < from: Xray Chest 1 View AP- PORTABLE-Urgent (10.16.17 @ 03:21) >  FINDINGS: Portable view of the chest is compared to 10/15/2017 and   demonstrates midline trachea. Right-sided central venous catheter with   the tip in the SVC. Discoid atelectasis within the left lower lobe is   intervally improved. Small residual layering bilateral pleural effusions.   Normal heart size.No consolidation. No pneumothorax.     < end of copied text >    ADVANCE DIRECTIVES: DNR  Decision maker:  Legal surrogate:    PSYCHOSOCIAL-SPIRITUAL ASSESSMENT:  Reviewed  Care plan unchanged    GOALS OF CARE DISCUSSION:  Palliative care info/counseling provided	       See previous Palliative Medicine Note  Documentation of GOC: home    AGENCY CHOICE DISCUSSED:     Home care          REFERRALS:	   Unit SW/Case Mgmt  Patient/Family Support  Massage Therapy  Music Therapy  Hospice  Nutrition  PT/OT    [ ]CRITICAL CARE TIME PROVIDED TO UNSTABLE PT W/ ORGAN FAILURE            [x]> 50% OF THE TIME SPENT IN COUNSELING AND COORDINATING CARE     [ ]PROLONGED SERVICE       FACE TO FACE: [x]PT     [ ]PT & FAMILY    Start:               End:  	       Minutes:

## 2017-10-17 NOTE — PROGRESS NOTE ADULT - ASSESSMENT
Assessment	  73 yo male with hx of HTN, COPD, Prostate CA (s/p radiation and seed 2013 on leuprolide outpatient) who presents with worsening of SOB and an episode of unwitnessed syncope found to have severe sepsis with gram + cocci in clusters identified as S. aureus and ATN.  Hospital course complicated by rapid response for hypotension, unresponsive to painful stimuli and was transferred to MICU for septic shock.  Septic shock has resolved but pt has had persistent MSSA bacteremia with resolving respiratory failure and ATN.    ID  #Sepsis w/ persistent MSSA bacteremia  - patient w/o leukocytosis, fever, w/ stable BP  - c/w nafcillin 2 q 6  - Gallium scan negative for focal areas of infection, KELLY negative for endocarditis. TTE w/o evidence of new vegetations or worsening valve thickening.   - blood cx w/ no growth to date  - ID following; f/u recs    Respiratory   #Acute hypoxic Respiratory Failure-resolved  -likely underlying COPD w/ septic shock during rapid response on 5 LA  - work up sleep apnea outpatient      #COPD  - c/w duonebs q 4   - encourage ambulation and incentive spirometry.       Cardiovascular  #New onset paroxysmal A.fib Vs MAT:  -in NSR  - Goal < 110 bpm   -F/U further cardiologist recs    #HFrEF  -TTE shows HFrEF with EF 45%, right atrial dilatation, septal wall motion abnormalities and mild hypokinesis of left ventricle. Unable to visualize any vegetations or R heart structures. Unable to calculate pulmonary a pressures.   - c/w ASA + lipitor  - c/w lasix   - consider metoprolol and lisinopril as tolerated once bleeding controlled and BP allows  - strict I/Os, daily weights, fluid restriction 1.5L  - acute onset of HFrEF; ischemia workup outpatient    #NSTEMI :  - EKG positive for ST depression in anteroseptal leads, Echo positive for septal hypokinesis, Troponin 0.06 -->0.10-->0.19-->0.23-->0.17-->0.15-->0.44-->0.54-->0.56-->0.51 (peaked), likely probably due to demand ischemia from hypoxia exacerbated by renal insufficiency (cr. 2.07 on admission).   - c/w ASA    Renal/  ATN-resolved  - ATN on admission w/ Cr now at baseline.     CATALINA   - brief rise in Cr over weekend likely 2/2 obstructive uropathy when pt was retaining, now resolved.     Hematuria w/ urinary retention  - Hematuria likely due to underlying prostate malignancy w/ recent glaser placement - possibly due to prostate inflammation/bleed.   - remove glaser today; TOV   - increase tamsulosin today, monitor BP    Heme/onc  #Prostate ca w/ mets to spine  Per oncologist continue to hold home meds.  -Thrombocytopenia resolved     GI    #ileus -resolved  - c/w PO agents for constipation    # Possible upper GI bleed  - patient with acute blood loss anemia requiring transfusion of 1 U PRBC   - Pt not currently amenable to endoscopy, if bleed worsens patient will reconsider.   - possible stress ulcer given recent septic shock and admission to ICU   - PICC for access, maintain type and screen  - PPI BID   - hemoglobin goal 8 hgb given recent NSTEMI     Endo     discontinued ISS as patient not requiring coverage    F no IVF  E- replete lytes prn  N- dash diet w/ 1.5 L fluid restriction     PPx - On ASA 81mg, SCD     CODE - DNR not DNI    Dispo: 7 [place]

## 2017-10-17 NOTE — PROGRESS NOTE ADULT - ASSESSMENT
71 YO M h/o HTN, COPD, Prostate CA (2013, s/p radiation and seed placement, on leuprolide with a recent elevation of PSA and imaging showing bone mets) who was referred from his heme/onc doctors office 10/4/17 for evaluation of worsening of SOB and episode syncope. Admitted for severe sepsis w/ CATALINA and possible pneumonia, Rx in ICU with intubation, levophed, and broad spectrum abx, now on nafcillin for MSSA bacteremia of unclear etiology. Who developed worsening anemia and melena, now resolving.     # Melena/Anemia -   - likely 2/2 UGIB; suspect PUD which may be stress related d/t recent ICU stay vs gastritis vs AVM; however, initial GI bleed may be resolving as his H/H is stable and he has brown stool on rectal exam  - Pt is now amenable to endoscopic evaluation  - Please obtain cardiac clearance prior to endoscopy  - Will schedule EGD pending cardiology evaluation  - Continue to monitor H/H Q8-12 H; transfuse to keep Hgb >8 given recent NSTEMI  - C/w IV PPI BID    GI will follow 73 YO M h/o HTN, COPD, Prostate CA (2013, s/p radiation and seed placement, on leuprolide with a recent elevation of PSA and imaging showing bone mets) who was referred from his heme/onc doctors office 10/4/17 for evaluation of worsening of SOB and episode syncope. Admitted for severe sepsis w/ CATALINA and possible pneumonia, Rx in ICU with intubation, levophed, and broad spectrum abx, now on nafcillin for MSSA bacteremia of unclear etiology. Who developed worsening anemia and melena, now resolving.     # Melena/Anemia -   - likely 2/2 UGIB; suspect PUD which may be stress related d/t recent ICU stay vs gastritis vs AVM; however, initial GI bleed may be resolving as his H/H is stable and he has brown stool on rectal exam  - Pt is now amenable to endoscopic evaluation  - Please obtain cardiac clearance prior to endoscopy  - Will schedule EGD pending cardiology evaluation; please keep pt NPO p MN for possible add on  - Continue to monitor H/H Q8-12 H; transfuse to keep Hgb >8 given recent NSTEMI  - C/w IV PPI BID    GI will follow

## 2017-10-17 NOTE — CHART NOTE - NSCHARTNOTEFT_GEN_A_CORE
Admitting Diagnosis:   71 yo male with hx of HTN, COPD, Prostate CA (s/p radiation and seed 2013 on leuprolide outpatient) who presents with worsening of SOB and an episode of unwitnessed syncope found to have severe sepsis with gram + cocci in clusters identified as S. aureus and ATN.  Hospital course complicated by rapid response for hypotension, unresponsive to painful stimuli and was transferred to MICU for septic shock.  Septic shock has resolved but pt has had persistent MSSA bacteremia with resolving respiratory failure and ATN.    PAST MEDICAL & SURGICAL HISTORY:  COPD (chronic obstructive pulmonary disease)  Hypertension  Obstructive sleep apnea syndrome  Prostate cancer metastatic to bone  PC (prostate cancer): with seed placement    Current Nutrition Order:  DASH- 1500 mL fluid restriction.     PO Intake: Good (%) [ X  ]  Fair (50-75%) [   ] Poor (<25%) [   ]    GI Issues: Last BM 10/16- noted melena documented in EMR. Pt reports regular BMs.     Pain: None reported    Skin Integrity: Intact    Labs:   10-17    140  |  97  |  22  ----------------------------<  107<H>  3.4<L>   |  31  |  1.15    Ca    8.7      17 Oct 2017 06:20  Mg     2.1     10-17    Medications:  MEDICATIONS  (STANDING):  ALBUTerol/ipratropium for Nebulization 3 milliLiter(s) Nebulizer every 4 hours  aspirin  chewable 81 milliGRAM(s) Oral daily  atorvastatin 20 milliGRAM(s) Oral at bedtime  dextrose 5%. 1000 milliLiter(s) (50 mL/Hr) IV Continuous <Continuous>  furosemide   Injectable 20 milliGRAM(s) IV Push two times a day  heparin  flush 100 Units/mL Injectable 100 Unit(s) IV Push every other day  melatonin 3 milliGRAM(s) Oral at bedtime  nafcillin  IVPB 2 Gram(s) IV Intermittent every 4 hours  nafcillin  IVPB      nystatin Powder 1 Application(s) Topical two times a day  pantoprazole  Injectable 40 milliGRAM(s) IV Push every 12 hours  polyethylene glycol 3350 17 Gram(s) Oral two times a day  potassium chloride    Tablet ER 40 milliEquivalent(s) Oral every 4 hours  tamsulosin 0.4 milliGRAM(s) Oral at bedtime    MEDICATIONS  (PRN):  acetaminophen  Suppository 650 milliGRAM(s) Rectal every 6 hours PRN For Temp greater than 38 C (100.4 F)  bisacodyl Suppository 10 milliGRAM(s) Rectal daily PRN Constipation  guaiFENesin    Syrup 100 milliGRAM(s) Oral every 6 hours PRN Cough  simethicone 80 milliGRAM(s) Chew two times a day PRN Gas    Weight:  109 kg 10/5  103.4 kg 10/12    Weight Change: 6 kg weight loss over hospital course. 6% body weight loss since admission.     Estimated energy needs using 70 kg IBW:  25-30 kcal/kg (4588-9942 kcal).   1.3-1.5 g/kg ( g protein).   30-35 mL/kg (0156-8620 mL fluid).      Subjective: Pt reports appetite has improved since last visit. States tolerating meals well.     Previous Nutrition Diagnosis: Increased nutrient needs RT catabolic state AEB need for 1.3-1.5 g/kg protein.     Active [  X ]  Resolved [   ]    Goal: Continue to meet % of nutrition needs via PO diet.     Recommendations:   1. Encourage PO intake.  2. [place] pt preferences.   3. Trend weights.     Education: Adequate PO intake, pt states understanding    Risk Level: High [   ] Moderate [ X  ] Low [   ]

## 2017-10-17 NOTE — PROGRESS NOTE ADULT - ATTENDING COMMENTS
H/H stable after transfusion. Kidney function tests are also improved. The patient is encouraged to be up OOB and to ambulate as much as possible.

## 2017-10-17 NOTE — PROGRESS NOTE ADULT - SUBJECTIVE AND OBJECTIVE BOX
GI Reconsult Note    Pt seen and examined at bedside. Pt is now amenable to endoscopic evaluation of previous anemia. Pt states that he is having 2-3 dark BMs per day. He reports that his SOB is improved from admission, however, worse than his baseline. Denies fever, chills, CP, abdominal pain, hematemesis, nausea, vomiting, hematochezia, or diarrhea.    Allergies    No Known Allergies    Intolerances    MEDICATIONS:  MEDICATIONS  (STANDING):  ALBUTerol/ipratropium for Nebulization 3 milliLiter(s) Nebulizer every 4 hours  aspirin  chewable 81 milliGRAM(s) Oral daily  atorvastatin 20 milliGRAM(s) Oral at bedtime  dextrose 5%. 1000 milliLiter(s) (50 mL/Hr) IV Continuous <Continuous>  furosemide   Injectable 20 milliGRAM(s) IV Push two times a day  heparin  flush 100 Units/mL Injectable 100 Unit(s) IV Push every other day  melatonin 3 milliGRAM(s) Oral at bedtime  nafcillin  IVPB 2 Gram(s) IV Intermittent every 4 hours  nafcillin  IVPB      nystatin Powder 1 Application(s) Topical two times a day  pantoprazole  Injectable 40 milliGRAM(s) IV Push every 12 hours  polyethylene glycol 3350 17 Gram(s) Oral two times a day  potassium chloride    Tablet ER 40 milliEquivalent(s) Oral every 4 hours  tamsulosin 0.8 milliGRAM(s) Oral at bedtime    MEDICATIONS  (PRN):  acetaminophen  Suppository 650 milliGRAM(s) Rectal every 6 hours PRN For Temp greater than 38 C (100.4 F)  bisacodyl Suppository 10 milliGRAM(s) Rectal daily PRN Constipation  guaiFENesin    Syrup 100 milliGRAM(s) Oral every 6 hours PRN Cough  simethicone 80 milliGRAM(s) Chew two times a day PRN Gas      Vital Signs Last 24 Hrs  T(C): 36.9 (17 Oct 2017 09:10), Max: 37.1 (17 Oct 2017 01:03)  T(F): 98.4 (17 Oct 2017 09:10), Max: 98.8 (17 Oct 2017 01:03)  HR: 66 (17 Oct 2017 13:30) (62 - 71)  BP: 98/50 (17 Oct 2017 13:30) (98/50 - 128/60)  BP(mean): 69 (17 Oct 2017 13:30) (69 - 87)  RR: 16 (17 Oct 2017 13:30) (15 - 31)  SpO2: 95% (17 Oct 2017 13:30) (92% - 99%)    10-16 @ 07:01  -  10-17 @ 07:00  --------------------------------------------------------  IN: 650 mL / OUT: 3675 mL / NET: -3025 mL    10-17 @ 07:01  -  10-17 @ 17:06  --------------------------------------------------------  IN: 0 mL / OUT: 620 mL / NET: -620 mL    PHYSICAL EXAM:    General: Well developed; well nourished; in no acute distress; speaking in full sentences  HEENT: MMM, conjunctiva and sclera clear  Neck: Supple  Gastrointestinal: Soft non-tender, obese, distended; No rebound or guarding  Rectal: No external hemorrhoids, No palpable masses; Brown stool in the rectal vault  Extremities: Hyperpigmentation of B/L LE; 2+ pitting edema B/L LE  Skin: Warm and dry. No obvious rash    LABS:  CBC Full  -  ( 17 Oct 2017 06:20 )  WBC Count : 5.6 K/uL  Hemoglobin : 7.8 g/dL  Hematocrit : 25.0 %  Platelet Count - Automated : 138 K/uL  Mean Cell Volume : 99.2 fL  Mean Cell Hemoglobin : 31.0 pg  Mean Cell Hemoglobin Concentration : 31.2 g/dL    10-17    140  |  97  |  22  ----------------------------<  107<H>  3.4<L>   |  31  |  1.15    Ca    8.7      17 Oct 2017 06:20  Mg     2.1     10-17    RADIOLOGY & ADDITIONAL STUDIES:  Xray Chest 1 View AP- PORTABLE-Urgent (10.16.17 @ 03:21)  FINDINGS: Portable view of the chest is compared to 10/15/2017 and   demonstrates midline trachea. Right-sided central venous catheter with   the tip in the SVC. Discoid atelectasis within the left lower lobe is   intervally improved. Small residual layering bilateral pleural effusions.   Normal heart size.No consolidation. No pneumothorax.

## 2017-10-18 ENCOUNTER — RESULT REVIEW (OUTPATIENT)
Age: 72
End: 2017-10-18

## 2017-10-18 LAB
ANION GAP SERPL CALC-SCNC: 14 MMOL/L — SIGNIFICANT CHANGE UP (ref 5–17)
BUN SERPL-MCNC: 21 MG/DL — SIGNIFICANT CHANGE UP (ref 7–23)
CALCIUM SERPL-MCNC: 8.8 MG/DL — SIGNIFICANT CHANGE UP (ref 8.4–10.5)
CHLORIDE SERPL-SCNC: 98 MMOL/L — SIGNIFICANT CHANGE UP (ref 96–108)
CO2 SERPL-SCNC: 28 MMOL/L — SIGNIFICANT CHANGE UP (ref 22–31)
CREAT SERPL-MCNC: 1.16 MG/DL — SIGNIFICANT CHANGE UP (ref 0.5–1.3)
GLUCOSE SERPL-MCNC: 115 MG/DL — HIGH (ref 70–99)
HCT VFR BLD CALC: 25.1 % — LOW (ref 39–50)
HCT VFR BLD CALC: 26.6 % — LOW (ref 39–50)
HGB BLD-MCNC: 7.9 G/DL — LOW (ref 13–17)
HGB BLD-MCNC: 8.3 G/DL — LOW (ref 13–17)
MAGNESIUM SERPL-MCNC: 1.9 MG/DL — SIGNIFICANT CHANGE UP (ref 1.6–2.6)
MCHC RBC-ENTMCNC: 30.4 PG — SIGNIFICANT CHANGE UP (ref 27–34)
MCHC RBC-ENTMCNC: 30.6 PG — SIGNIFICANT CHANGE UP (ref 27–34)
MCHC RBC-ENTMCNC: 31.2 G/DL — LOW (ref 32–36)
MCHC RBC-ENTMCNC: 31.5 G/DL — LOW (ref 32–36)
MCV RBC AUTO: 97.3 FL — SIGNIFICANT CHANGE UP (ref 80–100)
MCV RBC AUTO: 97.4 FL — SIGNIFICANT CHANGE UP (ref 80–100)
PLATELET # BLD AUTO: 156 K/UL — SIGNIFICANT CHANGE UP (ref 150–400)
PLATELET # BLD AUTO: 178 K/UL — SIGNIFICANT CHANGE UP (ref 150–400)
POTASSIUM SERPL-MCNC: 3.6 MMOL/L — SIGNIFICANT CHANGE UP (ref 3.5–5.3)
POTASSIUM SERPL-SCNC: 3.6 MMOL/L — SIGNIFICANT CHANGE UP (ref 3.5–5.3)
RBC # BLD: 2.58 M/UL — LOW (ref 4.2–5.8)
RBC # BLD: 2.73 M/UL — LOW (ref 4.2–5.8)
RBC # FLD: 17.3 % — HIGH (ref 10.3–16.9)
RBC # FLD: 17.6 % — HIGH (ref 10.3–16.9)
SODIUM SERPL-SCNC: 140 MMOL/L — SIGNIFICANT CHANGE UP (ref 135–145)
TROPONIN T SERPL-MCNC: 0.04 NG/ML — HIGH (ref 0–0.01)
WBC # BLD: 6.6 K/UL — SIGNIFICANT CHANGE UP (ref 3.8–10.5)
WBC # BLD: 7.4 K/UL — SIGNIFICANT CHANGE UP (ref 3.8–10.5)
WBC # FLD AUTO: 6.6 K/UL — SIGNIFICANT CHANGE UP (ref 3.8–10.5)
WBC # FLD AUTO: 7.4 K/UL — SIGNIFICANT CHANGE UP (ref 3.8–10.5)

## 2017-10-18 PROCEDURE — 93010 ELECTROCARDIOGRAM REPORT: CPT

## 2017-10-18 PROCEDURE — 99233 SBSQ HOSP IP/OBS HIGH 50: CPT | Mod: GC

## 2017-10-18 RX ORDER — LISINOPRIL 2.5 MG/1
2.5 TABLET ORAL DAILY
Qty: 0 | Refills: 0 | Status: DISCONTINUED | OUTPATIENT
Start: 2017-10-18 | End: 2017-10-25

## 2017-10-18 RX ORDER — POTASSIUM CHLORIDE 20 MEQ
10 PACKET (EA) ORAL
Qty: 0 | Refills: 0 | Status: COMPLETED | OUTPATIENT
Start: 2017-10-18 | End: 2017-10-18

## 2017-10-18 RX ADMIN — PANTOPRAZOLE SODIUM 40 MILLIGRAM(S): 20 TABLET, DELAYED RELEASE ORAL at 03:03

## 2017-10-18 RX ADMIN — Medication 3 MILLILITER(S): at 03:03

## 2017-10-18 RX ADMIN — PANTOPRAZOLE SODIUM 40 MILLIGRAM(S): 20 TABLET, DELAYED RELEASE ORAL at 16:32

## 2017-10-18 RX ADMIN — Medication 81 MILLIGRAM(S): at 11:43

## 2017-10-18 RX ADMIN — NAFCILLIN 200 GRAM(S): 10 INJECTION, POWDER, FOR SOLUTION INTRAVENOUS at 13:22

## 2017-10-18 RX ADMIN — Medication 100 UNIT(S): at 11:43

## 2017-10-18 RX ADMIN — TAMSULOSIN HYDROCHLORIDE 0.8 MILLIGRAM(S): 0.4 CAPSULE ORAL at 21:41

## 2017-10-18 RX ADMIN — Medication 3 MILLILITER(S): at 21:41

## 2017-10-18 RX ADMIN — Medication 20 MILLIGRAM(S): at 06:57

## 2017-10-18 RX ADMIN — ATORVASTATIN CALCIUM 20 MILLIGRAM(S): 80 TABLET, FILM COATED ORAL at 21:41

## 2017-10-18 RX ADMIN — Medication 100 MILLIEQUIVALENT(S): at 11:42

## 2017-10-18 RX ADMIN — Medication 3 MILLILITER(S): at 16:32

## 2017-10-18 RX ADMIN — Medication 20 MILLIGRAM(S): at 17:17

## 2017-10-18 RX ADMIN — Medication 100 MILLIEQUIVALENT(S): at 12:44

## 2017-10-18 RX ADMIN — NAFCILLIN 200 GRAM(S): 10 INJECTION, POWDER, FOR SOLUTION INTRAVENOUS at 17:17

## 2017-10-18 RX ADMIN — LISINOPRIL 2.5 MILLIGRAM(S): 2.5 TABLET ORAL at 19:13

## 2017-10-18 RX ADMIN — NYSTATIN CREAM 1 APPLICATION(S): 100000 CREAM TOPICAL at 17:17

## 2017-10-18 RX ADMIN — NAFCILLIN 200 GRAM(S): 10 INJECTION, POWDER, FOR SOLUTION INTRAVENOUS at 21:41

## 2017-10-18 RX ADMIN — Medication 100 MILLIEQUIVALENT(S): at 08:10

## 2017-10-18 RX ADMIN — NAFCILLIN 200 GRAM(S): 10 INJECTION, POWDER, FOR SOLUTION INTRAVENOUS at 01:30

## 2017-10-18 RX ADMIN — NAFCILLIN 200 GRAM(S): 10 INJECTION, POWDER, FOR SOLUTION INTRAVENOUS at 09:22

## 2017-10-18 RX ADMIN — NAFCILLIN 200 GRAM(S): 10 INJECTION, POWDER, FOR SOLUTION INTRAVENOUS at 04:08

## 2017-10-18 NOTE — PROGRESS NOTE ADULT - SUBJECTIVE AND OBJECTIVE BOX
INTERVAL HPI/OVERNIGHT EVENTS: Patient o/n with PVR of 500-700 but w/o complaints of discomfort or feeling urge to urinate.     SUBJECTIVE: Patient seen and examined at bedside. Complaining of urge to urinate. No other acute complaints.     OBJECTIVE:    VITAL SIGNS:  ICU Vital Signs Last 24 Hrs  T(C): 37 (18 Oct 2017 09:32), Max: 37.1 (17 Oct 2017 21:14)  T(F): 98.6 (18 Oct 2017 09:32), Max: 98.7 (17 Oct 2017 21:14)  HR: 92 (18 Oct 2017 08:36) (62 - 96)  BP: 135/75 (18 Oct 2017 08:36) (98/50 - 140/69)  BP(mean): 98 (18 Oct 2017 08:36) (69 - 98)  ABP: --  ABP(mean): --  RR: 28 (18 Oct 2017 08:36) (13 - 31)  SpO2: 93% (18 Oct 2017 08:36) (93% - 100%)        10-17 @ 07:01  -  10-18 @ 07:00  --------------------------------------------------------  IN: 600 mL / OUT: 1400 mL / NET: -800 mL    10-18 @ 07:01  -  10-18 @ 10:38  --------------------------------------------------------  IN: 200 mL / OUT: 930 mL / NET: -730 mL      CAPILLARY BLOOD GLUCOSE          PHYSICAL EXAM:    General: NAD, no acute complaints   HEENT: NC/AT; PERRL, clear conjunctiva, MMM  Neck: supple, no JVD  Respiratory: crackles b/l   Cardiovascular: +S1/S2; RRR, no m/r/g  Abdomen: soft, non tender, distended abdomen - dull to percussion. suprapubic tenderness, palpable bladder. +BS x4  Extremities: WWP, 2+ peripheral pulses b/l; b/l pitting edema to knee  Skin: normal color and turgor; no rash  Neurological: a&ox3    MEDICATIONS:  MEDICATIONS  (STANDING):  ALBUTerol/ipratropium for Nebulization 3 milliLiter(s) Nebulizer every 4 hours  aspirin  chewable 81 milliGRAM(s) Oral daily  atorvastatin 20 milliGRAM(s) Oral at bedtime  dextrose 5%. 1000 milliLiter(s) (50 mL/Hr) IV Continuous <Continuous>  furosemide   Injectable 20 milliGRAM(s) IV Push two times a day  heparin  flush 100 Units/mL Injectable 100 Unit(s) IV Push every other day  melatonin 3 milliGRAM(s) Oral at bedtime  nafcillin  IVPB 2 Gram(s) IV Intermittent every 4 hours  nafcillin  IVPB      nystatin Powder 1 Application(s) Topical two times a day  pantoprazole  Injectable 40 milliGRAM(s) IV Push every 12 hours  polyethylene glycol 3350 17 Gram(s) Oral two times a day  potassium chloride  10 mEq/100 mL IVPB 10 milliEquivalent(s) IV Intermittent every 1 hour  tamsulosin 0.8 milliGRAM(s) Oral at bedtime    MEDICATIONS  (PRN):  acetaminophen  Suppository 650 milliGRAM(s) Rectal every 6 hours PRN For Temp greater than 38 C (100.4 F)  bisacodyl Suppository 10 milliGRAM(s) Rectal daily PRN Constipation  guaiFENesin    Syrup 100 milliGRAM(s) Oral every 6 hours PRN Cough  simethicone 80 milliGRAM(s) Chew two times a day PRN Gas      ALLERGIES:  Allergies    No Known Allergies    Intolerances        LABS:                        7.9    6.6   )-----------( 156      ( 18 Oct 2017 06:41 )             25.1     10-18    140  |  98  |  21  ----------------------------<  115<H>  3.6   |  28  |  1.16    Ca    8.8      18 Oct 2017 06:41  Mg     1.9     10-18            RADIOLOGY & ADDITIONAL TESTS: Reviewed.

## 2017-10-18 NOTE — PROGRESS NOTE ADULT - ASSESSMENT
A/recs:  1) acute hypoxic respiratory failure with elevated tn and abnormal septal wall motion on tte in addition to rv dilatation and abnormal ecg this admission; left heart failure (ef=45% by tte) - now with anemia/melena with plan for urgent to emergent egd in am  a - probable nstemi earlier this admission - repeat tn to confirm downtrend post-egd  b - check with ccm per bb use  c - chf - stric i/o and daily weights  d - per Dr. Gentile of Interventional Cardiology - recommend cardiac cta prior to discharge    2) paroxysmal atrial fibrillation  a - telemetry - syncope prior to admission    3) htn - avoid labile bp    4) sepsis due to pna per ccm with bacteremia  -rec confirm with id no plan for repeat [place]    5) possible copd exacerbation     6) acute renal failure - resolved    7) normocytic anemia and thrombocytopenia  -f/u gi recs post-egd    8) shingles    9) prostate ca - hem-onc and gu following  -glaser re-inserted    10) K>4, Mg>2    11) palliative care following  discussed with 7 lachman housestaheidi

## 2017-10-18 NOTE — PROGRESS NOTE ADULT - SUBJECTIVE AND OBJECTIVE BOX
Cardiology for [facility]    cc: follow-up cardiology evaluation and management of left heart failure    interval - events noted; no cp now    PAST MEDICAL & SURGICAL HISTORY:  COPD (chronic obstructive pulmonary disease)  Hypertension  Obstructive sleep apnea syndrome  Prostate cancer metastatic to bone  PC (prostate cancer): with seed placement    MEDICATIONS  (STANDING):  ALBUTerol/ipratropium for Nebulization 3 milliLiter(s) Nebulizer every 4 hours  aspirin  chewable 81 milliGRAM(s) Oral daily  atorvastatin 20 milliGRAM(s) Oral at bedtime  dextrose 5%. 1000 milliLiter(s) (50 mL/Hr) IV Continuous <Continuous>  furosemide   Injectable 20 milliGRAM(s) IV Push two times a day  heparin  flush 100 Units/mL Injectable 100 Unit(s) IV Push every other day  lisinopril 2.5 milliGRAM(s) Oral daily  melatonin 3 milliGRAM(s) Oral at bedtime  nafcillin  IVPB 2 Gram(s) IV Intermittent every 4 hours  nafcillin  IVPB      nystatin Powder 1 Application(s) Topical two times a day  pantoprazole  Injectable 40 milliGRAM(s) IV Push every 12 hours  polyethylene glycol 3350 17 Gram(s) Oral two times a day  tamsulosin 0.8 milliGRAM(s) Oral at bedtime    MEDICATIONS  (PRN):  acetaminophen  Suppository 650 milliGRAM(s) Rectal every 6 hours PRN For Temp greater than 38 C (100.4 F)  bisacodyl Suppository 10 milliGRAM(s) Rectal daily PRN Constipation  guaiFENesin    Syrup 100 milliGRAM(s) Oral every 6 hours PRN Cough  simethicone 80 milliGRAM(s) Chew two times a day PRN Gas    Vital Signs Last 24 Hrs  T(C): 36.9 (18 Oct 2017 21:45), Max: 37.2 (18 Oct 2017 18:23)  T(F): 98.5 (18 Oct 2017 21:45), Max: 99 (18 Oct 2017 18:23)  HR: 72 (18 Oct 2017 20:15) (62 - 96)  BP: 108/55 (18 Oct 2017 20:15) (108/55 - 140/69)  BP(mean): 76 (18 Oct 2017 20:15) (76 - 98)  RR: 20 (18 Oct 2017 20:15) (13 - 30)  SpO2: 94% (18 Oct 2017 20:15) (93% - 100%)    physical exam  nad  oob to chair  breath sounds present bilat  rr; s1/s2 present  soft abd; nt  le edema present  warm ue and le bilat     I&O's Detail    17 Oct 2017 07:01  -  18 Oct 2017 07:00  --------------------------------------------------------  IN:    Solution: 600 mL  Total IN: 600 mL    OUT:    Voided: 1400 mL  Total OUT: 1400 mL    Total NET: -800 mL      18 Oct 2017 07:01  -  18 Oct 2017 22:45  --------------------------------------------------------  IN:    IV PiggyBack: 100 mL    Oral Fluid: 580 mL    Solution: 100 mL    Solution: 100 mL  Total IN: 880 mL    OUT:    Indwelling Catheter - Urethral: 1950 mL    Voided: 180 mL  Total OUT: 2130 mL    Total NET: -1250 mL                                8.3    7.4   )-----------( 178      ( 18 Oct 2017 17:49 )             26.6   10-18    140  |  98  |  21  ----------------------------<  115<H>  3.6   |  28  |  1.16    Ca    8.8      18 Oct 2017 06:41  Mg     1.9     10-18    Troponin T, Serum (10.18.17 @ 16:56)    Troponin T, Serum: 0.04: Reference interval for troponin T is </= 0.01 ng/mL which includes the  99th percentile of a healthy population. Troponin T results are not  interchangeable with troponin I results. ng/mL          I&O's Summary    17 Oct 2017 07:01  -  18 Oct 2017 07:00  --------------------------------------------------------  IN: 600 mL / OUT: 1400 mL / NET: -800 mL    18 Oct 2017 07:01  -  18 Oct 2017 22:45  --------------------------------------------------------  IN: 880 mL / OUT: 2130 mL / NET: -1250 mL    10- ecg reviewed    < from: Echocardiogram (10.16.17 @ 10:31) >    EXAM:  ECHOCARDIOGRAM (CARDIOL)                          PROCEDURE DATE:  10/16/2017                        INTERPRETATION:  Patient Height: 172.0 cm  Patient Weight: 103.0 kg  Heart Rate: 74 bpm  Systolic Pressure: 140 mmHg  Diastolic Pressure: 70mmHg  BSA: 2.2 m^2  Interpretation Summary  The left ventricle is mildly dilated.Hypokinesis of the basal inferior and   septal region.The left ventricular ejection fraction is estimated to be   45-50%The mitral inflow pattern is consistent with impaired left   ventricular   relaxation with mildly elevated left atrial pressure (8-14mmHg).  The   left   atrial size is normal. The right atrium is dilated.The right ventricle is   moderately dilated. The right ventricular systolic function is mildly   reduced.    There is mild aortic valve thickening.There is mild mitral valve   thickening.   There is mild mitral regurgitation.There is trace tricuspid   regurgitation.There was insufficient TR detected from which to calculate   pulmonary artery systolic pressure.  No aortic root dilatation.The   inferior   vena cava is normal in size (<2.1 cm) with normal inspiratory collapse   (>50%)   consistent with normal right atrial pressure.  There is no pericardial   effusion.    < end of copied text >  IN: 650 mL / OUT: 2875 mL / NET: -2225 mL          < from: Xray Chest 1 View AP -PORTABLE-Routine (10.09.17 @ 05:37) >  EXAM:  XR CHEST 1 VIEW PORT ROUTINE                          PROCEDURE DATE:  10/09/2017                     INTERPRETATION:    Portable AP Radiograph dated 10/9/2017 5:37 AM    CLINICAL INFORMATION: 72 years, Male, Intubated, PNA    PRIOR STUDIES:October 8, 2017    FINDINGS: Support tubes and lines are unchanged. Left pleural effusion is   stable. Right pleural effusion has increased. Pulmonary vascular   congestion has increased.    IMPRESSION:  Interval increase of right pleural effusion and pulmonary vascular   congestion.            "Thank you for the opportunity to participate in the care of this   patient."        KATHY PABLO M.D., RADIOLOGY ATTENDING  This document has been electronically signed. Oct  9 2017  8:33AM    < end of copied text >

## 2017-10-18 NOTE — PROGRESS NOTE ADULT - SUBJECTIVE AND OBJECTIVE BOX
The patient reports having dark stools yesterday. He continues to have abdominal distention and states that he feels uncomfortably full. He is awaiting an endoscopy.    CHEMOTHERAPY REGIMEN:        Day:                          Diet:  Protocol:                                    IVF:      MEDICATIONS  (STANDING):  ALBUTerol/ipratropium for Nebulization 3 milliLiter(s) Nebulizer every 4 hours  aspirin  chewable 81 milliGRAM(s) Oral daily  atorvastatin 20 milliGRAM(s) Oral at bedtime  dextrose 5%. 1000 milliLiter(s) (50 mL/Hr) IV Continuous <Continuous>  furosemide   Injectable 20 milliGRAM(s) IV Push two times a day  heparin  flush 100 Units/mL Injectable 100 Unit(s) IV Push every other day  melatonin 3 milliGRAM(s) Oral at bedtime  nafcillin  IVPB 2 Gram(s) IV Intermittent every 4 hours  nafcillin  IVPB      nystatin Powder 1 Application(s) Topical two times a day  pantoprazole  Injectable 40 milliGRAM(s) IV Push every 12 hours  polyethylene glycol 3350 17 Gram(s) Oral two times a day  tamsulosin 0.8 milliGRAM(s) Oral at bedtime    MEDICATIONS  (PRN):  acetaminophen  Suppository 650 milliGRAM(s) Rectal every 6 hours PRN For Temp greater than 38 C (100.4 F)  bisacodyl Suppository 10 milliGRAM(s) Rectal daily PRN Constipation  guaiFENesin    Syrup 100 milliGRAM(s) Oral every 6 hours PRN Cough  simethicone 80 milliGRAM(s) Chew two times a day PRN Gas      Allergies    No Known Allergies    Intolerances        DVT Prophylaxis: [ ] YES [x ] NO      Antibiotics: [x ] YES [ ] NO    Pain Scale (1-10):       Location:    Vital Signs Last 24 Hrs  T(C): 36.6 (18 Oct 2017 06:00), Max: 37.1 (17 Oct 2017 21:14)  T(F): 97.9 (18 Oct 2017 06:00), Max: 98.7 (17 Oct 2017 21:14)  HR: 64 (18 Oct 2017 06:06) (62 - 88)  BP: 112/56 (18 Oct 2017 06:06) (98/50 - 140/69)  BP(mean): 80 (18 Oct 2017 06:06) (69 - 98)  RR: 20 (18 Oct 2017 06:06) (13 - 31)  SpO2: 96% (18 Oct 2017 06:06) (92% - 100%)    Drug Dosing Weight  Height (cm): 172.72 (05 Oct 2017 01:27)  Weight (kg): 103.4 (12 Oct 2017 09:45)  BMI (kg/m2): 34.7 (12 Oct 2017 09:45)  BSA (m2): 2.16 (12 Oct 2017 09:45)    PHYSICAL EXAM:      Constitutional: complains of abdominal fullness.  Eyes: conjunctiva pink.  ENMT: NC in place. Buccal mucosa moist.  Neck: no masses palp.  Breasts: no masses or tenderness.  Respiratory: chest is clear.  Cardiovascular: S1>S2 at apex. RSR.  Gastrointestinal: distended, soft, bowel sounds present. He is uncomfortable on his left side on palpation.  Genitourinary: voiding without difficulty.  Extremities: leg edema present.  Neurological: no gross focal deficits.  Skin: warm and dry.  Lymph Nodes: none palp.  Musculoskeletal: full ROM.  Psychiatric: affect normal.        URINARY CATHETER: [ ] YES [x ] NO     LABS:  CBC Full  -  ( 17 Oct 2017 19:02 )  WBC Count : x  Hemoglobin : 8.5 g/dL  Hematocrit : 27.2 %  Platelet Count - Automated : x  Mean Cell Volume : x  Mean Cell Hemoglobin : x  Mean Cell Hemoglobin Concentration : x  Auto Neutrophil # : x  Auto Lymphocyte # : x  Auto Monocyte # : x  Auto Eosinophil # : x  Auto Basophil # : x  Auto Neutrophil % : x  Auto Lymphocyte % : x  Auto Monocyte % : x  Auto Eosinophil % : x  Auto Basophil % : x    10-17    140  |  97  |  22  ----------------------------<  107<H>  3.4<L>   |  31  |  1.15    Ca    8.7      17 Oct 2017 06:20  Mg     2.1     10-17            CULTURES:    RADIOLOGY & ADDITIONAL STUDIES:

## 2017-10-18 NOTE — PROGRESS NOTE ADULT - ASSESSMENT
Assessment	  71 yo male with hx of HTN, COPD, Prostate CA (s/p radiation and seed 2013 on leuprolide outpatient) who presents with worsening of SOB and an episode of unwitnessed syncope found to have severe sepsis with gram + cocci in clusters identified as S. aureus and ATN.  Hospital course complicated by rapid response for hypotension, unresponsive to painful stimuli and was transferred to MICU for septic shock.  Septic shock has resolved but pt has had persistent MSSA bacteremia with resolving respiratory failure and ATN.    ID  #Sepsis w/ persistent MSSA bacteremia  - patient w/o leukocytosis, fever, w/ stable BP  - c/w nafcillin 2 q 6  - Gallium scan negative for focal areas of infection, KELLY negative for endocarditis. Repeat TTE w/o evidence of new vegetations or worsening valve thickening.   - blood cx w/ no growth to date  - ID following; f/u recs    Respiratory   #Acute hypoxic Respiratory Failure-resolved  -likely underlying COPD w/ septic shock during rapid response on 5 LA  - work up sleep apnea outpatient      #COPD  - c/w duonebs q 4   - encourage ambulation and incentive spirometry.       Cardiovascular  #New onset paroxysmal A.fib Vs MAT:  -in NSR  - Goal < 110 bpm   -F/U further cardiologist recs    #HFrEF  -TTE shows HFrEF with EF 45%, right atrial dilatation, septal wall motion abnormalities and mild hypokinesis of left ventricle. Unable to visualize any vegetations or R heart structures. Unable to calculate pulmonary a pressures.   - c/w ASA + lipitor  - c/w lasix   - consider metoprolol and lisinopril as tolerated once bleeding controlled and BP allows  - strict I/Os, daily weights, fluid restriction 1.5L  - acute onset of HFrEF; ischemia workup outpatient as condition stabilizes     #NSTEMI :  - EKG positive for ST depression in anteroseptal leads, Echo positive for septal hypokinesis, Troponin 0.06 -->0.10-->0.19-->0.23-->0.17-->0.15-->0.44-->0.54-->0.56-->0.51 (peaked), likely probably due to demand ischemia from hypoxia exacerbated by renal insufficiency (cr. 2.07 on admission).   - c/w ASA    Renal/  ATN-resolved  - ATN on admission w/ Cr now at baseline.     Hematuria w/ urinary retention  - Hematuria likely due to underlying prostate malignancy w/ recent glaser placement - possibly due to prostate inflammation/bleed.   - Glaser placed today drained 700 cc urine. Pt will require glaser for at least 3-4 days to follow up outpatient with urology   - c/w tamsulosin     Heme/onc  #Prostate ca w/ mets to spine  Per oncologist continue to hold home meds.  -Thrombocytopenia resolved     GI    #ileus -resolved  - c/w PO agents for constipation    # Possible upper GI bleed  - Endoscopy showed pigmented ulcer   - PICC for access, maintain type and screen  - PPI BID   - hemoglobin goal 8 hgb given recent NSTEMI     Endo     discontinued ISS as patient not requiring coverage    F no IVF  E- replete lytes prn  N- dash diet w/ 1.5 L fluid restriction     PPx - On ASA 81mg, SCD     CODE - DNR not DNI    Dispo: 7 [place]

## 2017-10-19 ENCOUNTER — TRANSCRIPTION ENCOUNTER (OUTPATIENT)
Age: 72
End: 2017-10-19

## 2017-10-19 PROBLEM — C61 MALIGNANT NEOPLASM OF PROSTATE: Chronic | Status: ACTIVE | Noted: 2017-10-04

## 2017-10-19 LAB
BLD GP AB SCN SERPL QL: NEGATIVE — SIGNIFICANT CHANGE UP
HCT VFR BLD CALC: 25.3 % — LOW (ref 39–50)
HGB BLD-MCNC: 8.5 G/DL — LOW (ref 13–17)
MCHC RBC-ENTMCNC: 33.2 PG — SIGNIFICANT CHANGE UP (ref 27–34)
MCHC RBC-ENTMCNC: 33.6 G/DL — SIGNIFICANT CHANGE UP (ref 32–36)
MCV RBC AUTO: 98.8 FL — SIGNIFICANT CHANGE UP (ref 80–100)
PLATELET # BLD AUTO: 145 K/UL — LOW (ref 150–400)
RBC # BLD: 2.56 M/UL — LOW (ref 4.2–5.8)
RBC # BLD: 2.56 M/UL — LOW (ref 4.2–5.8)
RBC # FLD: 17.4 % — HIGH (ref 10.3–16.9)
RETICS/RBC NFR: 3.4 % — HIGH (ref 0.5–2.5)
RH IG SCN BLD-IMP: POSITIVE — SIGNIFICANT CHANGE UP
WBC # BLD: 6.2 K/UL — SIGNIFICANT CHANGE UP (ref 3.8–10.5)
WBC # FLD AUTO: 6.2 K/UL — SIGNIFICANT CHANGE UP (ref 3.8–10.5)

## 2017-10-19 PROCEDURE — 99233 SBSQ HOSP IP/OBS HIGH 50: CPT | Mod: GC

## 2017-10-19 RX ORDER — POLYETHYLENE GLYCOL 3350 17 G/17G
17 POWDER, FOR SOLUTION ORAL DAILY
Qty: 0 | Refills: 0 | Status: DISCONTINUED | OUTPATIENT
Start: 2017-10-19 | End: 2017-10-30

## 2017-10-19 RX ORDER — PANTOPRAZOLE SODIUM 20 MG/1
40 TABLET, DELAYED RELEASE ORAL
Qty: 0 | Refills: 0 | Status: DISCONTINUED | OUTPATIENT
Start: 2017-10-19 | End: 2017-10-27

## 2017-10-19 RX ORDER — CEFAZOLIN SODIUM 1 G
2000 VIAL (EA) INJECTION EVERY 8 HOURS
Qty: 0 | Refills: 0 | Status: DISCONTINUED | OUTPATIENT
Start: 2017-10-19 | End: 2017-10-26

## 2017-10-19 RX ADMIN — TAMSULOSIN HYDROCHLORIDE 0.8 MILLIGRAM(S): 0.4 CAPSULE ORAL at 22:25

## 2017-10-19 RX ADMIN — NYSTATIN CREAM 1 APPLICATION(S): 100000 CREAM TOPICAL at 05:49

## 2017-10-19 RX ADMIN — PANTOPRAZOLE SODIUM 40 MILLIGRAM(S): 20 TABLET, DELAYED RELEASE ORAL at 04:15

## 2017-10-19 RX ADMIN — NAFCILLIN 200 GRAM(S): 10 INJECTION, POWDER, FOR SOLUTION INTRAVENOUS at 08:27

## 2017-10-19 RX ADMIN — Medication 3 MILLILITER(S): at 19:03

## 2017-10-19 RX ADMIN — PANTOPRAZOLE SODIUM 40 MILLIGRAM(S): 20 TABLET, DELAYED RELEASE ORAL at 14:59

## 2017-10-19 RX ADMIN — Medication 81 MILLIGRAM(S): at 11:15

## 2017-10-19 RX ADMIN — Medication 3 MILLILITER(S): at 14:59

## 2017-10-19 RX ADMIN — ATORVASTATIN CALCIUM 20 MILLIGRAM(S): 80 TABLET, FILM COATED ORAL at 22:25

## 2017-10-19 RX ADMIN — LISINOPRIL 2.5 MILLIGRAM(S): 2.5 TABLET ORAL at 05:46

## 2017-10-19 RX ADMIN — NAFCILLIN 200 GRAM(S): 10 INJECTION, POWDER, FOR SOLUTION INTRAVENOUS at 11:22

## 2017-10-19 RX ADMIN — Medication 3 MILLILITER(S): at 07:03

## 2017-10-19 RX ADMIN — NYSTATIN CREAM 1 APPLICATION(S): 100000 CREAM TOPICAL at 17:02

## 2017-10-19 RX ADMIN — NAFCILLIN 200 GRAM(S): 10 INJECTION, POWDER, FOR SOLUTION INTRAVENOUS at 04:15

## 2017-10-19 RX ADMIN — NAFCILLIN 200 GRAM(S): 10 INJECTION, POWDER, FOR SOLUTION INTRAVENOUS at 00:01

## 2017-10-19 RX ADMIN — Medication 3 MILLIGRAM(S): at 22:25

## 2017-10-19 RX ADMIN — Medication 100 MILLIGRAM(S): at 19:03

## 2017-10-19 RX ADMIN — Medication 20 MILLIGRAM(S): at 05:46

## 2017-10-19 RX ADMIN — NAFCILLIN 200 GRAM(S): 10 INJECTION, POWDER, FOR SOLUTION INTRAVENOUS at 14:59

## 2017-10-19 RX ADMIN — Medication 3 MILLILITER(S): at 04:15

## 2017-10-19 RX ADMIN — Medication 3 MILLILITER(S): at 11:15

## 2017-10-19 NOTE — DISCHARGE NOTE ADULT - ADDITIONAL INSTRUCTIONS
You have an appointment with Dr. Clemente, the infectious disease doctor seeing you in the hospital, for 11/2 at 1 PM. You have a follow up appointment with Dr. Bojorquez, the gastroenterologist involved in your endoscopy, on 11/13 at 2:15 PM. You have a follow up with Dr BARRERA, of the urology team, on XXXX. Please call your primary care physician Dr. Kade Glass to arrange a follow up appointment within 2 weeks of your discharge to go over your medications and everything that occurred during this hospitalization. You have an appointment with Dr. Clemente, the infectious disease doctor seeing you in the hospital, for 11/2 at 1 PM. You have a follow up appointment with Dr. Bojorquez, the gastroenterologist involved in your endoscopy, on 11/13 at 2:15 PM. Please call Dr Avila, of the urology team, to schedule an appointment to assess your glaser. If you are unable to have an appointment please call [phone] to schedule an appointment with the urology clinic next Friday. Please call your primary care physician Dr. Kade Glass to arrange a follow up appointment within 2 weeks of your discharge to go over your medications and everything that occurred during this hospitalization. You have an appointment with Dr. Clemente, the infectious disease doctor seeing you in the hospital, on 11/2 at 1 PM, if you need to reschedule this appointment please call his office.     Please follow up with your urologist Dr. Chappell within 1-2 weeks, you can call her office to make an appointment.     Please follow up with Dr. Bojorquez, the gastroenterologist who saw you in the hospital, within 1-2 weeks. You can call his office to make an appointment.    Please call your primary care physician Dr. Kade Glass to arrange a follow up appointment within 2 weeks of your discharge to go over your medications and everything that occurred during this hospitalization. Please follow up with Dr. Clemente Monday Nov 6 at 11:00 AM.    Please follow up with your urologist Dr. Chappell within 1-2 weeks, you can call her office to make an appointment.     Please follow up with Dr. Bojorquez, the gastroenterologist who saw you in the hospital, within 1-2 weeks. You can call his office to make an appointment.    Please call your primary care physician Dr. Kade Glass to arrange a follow up appointment within 2 weeks of your discharge to go over your medications and everything that occurred during this hospitalization.

## 2017-10-19 NOTE — DISCHARGE NOTE ADULT - CARE PROVIDER_API CALL
Gregg Clemente), Internal Medicine  [address] [address]  Phone: [phone]  Fax: [phone]    Matias Bojorquez), Gastroenterology; Internal Medicine  [address] [address]  Phone: [phone]  Fax: [phone]    Pascual Linares), Hematology; Internal Medicine; Medical Oncology  [address] [place] [address]  Phone: [phone]  Fax: [phone] Gregg Clemente), Internal Medicine  [address] [address]  Phone: [phone]  Fax: [phone]    Matias Bojorquez), Gastroenterology; Internal Medicine  [address] [address]  Phone: [phone]  Fax: [phone]    Pascual Linares (MD), Hematology; Internal Medicine; Medical Oncology  [address] [address]  Phone: [phone]  Fax: [phone]    Alexandra Avila), Urology  [address] [address]  Phone: [phone]  Fax: [phone]

## 2017-10-19 NOTE — PROGRESS NOTE ADULT - SUBJECTIVE AND OBJECTIVE BOX
INTERVAL HPI/OVERNIGHT EVENTS: Pt glaser w/ clot obstruction w/ urinary retention of 1L. Retention relieved w/ irrigation of glaser. Otherwise NILDA.    SUBJECTIVE: Patient seen and examined at bedside. No acute complaints. Patient resting comfortably in chair.     OBJECTIVE:    VITAL SIGNS:  ICU Vital Signs Last 24 Hrs  T(C): 37 (19 Oct 2017 09:10), Max: 37.2 (18 Oct 2017 18:23)  T(F): 98.6 (19 Oct 2017 09:10), Max: 99 (18 Oct 2017 18:23)  HR: 82 (19 Oct 2017 08:38) (60 - 86)  BP: 96/65 (19 Oct 2017 08:20) (96/65 - 134/62)  BP(mean): 72 (19 Oct 2017 04:10) (72 - 89)  ABP: --  ABP(mean): --  RR: 24 (19 Oct 2017 08:38) (16 - 30)  SpO2: 97% (19 Oct 2017 08:38) (92% - 98%)        10-18 @ 07:01  -  10-19 @ 07:00  --------------------------------------------------------  IN: 1180 mL / OUT: 3230 mL / NET: -2050 mL      CAPILLARY BLOOD GLUCOSE          PHYSICAL EXAM:    General: NAD, well groomed, speaking in full sentences  HEENT: NC/AT; PERRL, clear conjunctiva, MMM  Neck: supple, no JVD  Respiratory: crackles b/l at bases, no use of accessory muscles, no wheeze  Cardiovascular: +S1/S2; RRR, no m/r/g  Abdomen: soft, NT/ND; +BS x4, no palpable masses or organomegaly  Extremities: WWP, b/l 3+ LE edema to upper thigh  Vascular: 2+ peripheral pulses b/l  Skin: normal color and turgor  Neurological: a&ox3    MEDICATIONS:  MEDICATIONS  (STANDING):  ALBUTerol/ipratropium for Nebulization 3 milliLiter(s) Nebulizer every 4 hours  aspirin  chewable 81 milliGRAM(s) Oral daily  atorvastatin 20 milliGRAM(s) Oral at bedtime  dextrose 5%. 1000 milliLiter(s) (50 mL/Hr) IV Continuous <Continuous>  furosemide   Injectable 20 milliGRAM(s) IV Push two times a day  heparin  flush 100 Units/mL Injectable 100 Unit(s) IV Push every other day  lisinopril 2.5 milliGRAM(s) Oral daily  melatonin 3 milliGRAM(s) Oral at bedtime  nafcillin  IVPB 2 Gram(s) IV Intermittent every 4 hours  nafcillin  IVPB      nystatin Powder 1 Application(s) Topical two times a day  pantoprazole  Injectable 40 milliGRAM(s) IV Push every 12 hours  polyethylene glycol 3350 17 Gram(s) Oral two times a day  tamsulosin 0.8 milliGRAM(s) Oral at bedtime    MEDICATIONS  (PRN):  acetaminophen  Suppository 650 milliGRAM(s) Rectal every 6 hours PRN For Temp greater than 38 C (100.4 F)  bisacodyl Suppository 10 milliGRAM(s) Rectal daily PRN Constipation  guaiFENesin    Syrup 100 milliGRAM(s) Oral every 6 hours PRN Cough  simethicone 80 milliGRAM(s) Chew two times a day PRN Gas      ALLERGIES:  Allergies    No Known Allergies    Intolerances        LABS:                        8.5    6.2   )-----------( 145      ( 19 Oct 2017 06:57 )             25.3     10-18    140  |  98  |  21  ----------------------------<  115<H>  3.6   |  28  |  1.16    Ca    8.8      18 Oct 2017 06:41  Mg     1.9     10-18            RADIOLOGY & ADDITIONAL TESTS: Reviewed.

## 2017-10-19 NOTE — DISCHARGE NOTE ADULT - PROVIDER TOKENS
TOKEN:'[id number]:'[id number]:'[id number]' TOKEN:'[id number]:'[id number]:'[id number]:'[id number]'

## 2017-10-19 NOTE — DISCHARGE NOTE ADULT - HOSPITAL COURSE
72 year old male with a PMHx of HTN, COPD, Prostate CA (2013, s/p radiation and seed placement, on leuprolide with a recent elevation of PSA and imaging showing bone mets) presents from his heme/on office with worsening of SOB and episode syncope the morning of admission 10/4. Pt was admitted for severe sepsis w/ CATALINA and possible pneumonia. Pt empirically treated for CAP w/ ceftriaxone/azithromycin/vancomycin and placed on BiPAP. Rapid response was called 10/5 for respiratory failure and hypotension once it was discovered that the patient removed his BiPaP on the floor. Patient stepped up to the MICU and started on peripheral levophed for closer monitoring. Patient intubated that PM for airway protection. In the MICU had OGT, right IJ with left A-line placed. Pt's ABX broadened to Zosyn and Vancomycin (dosed by level). OG tube was placed for decompression of previous ileus seen and abdominal distention. Blood Cx came back positive for MSSA w/ persistent bacteremia. Patient transitioned to nafcillin for MSSA coverage. Pt also had an ECHO (TTE) which showed HFrEF with EF 45%, right atrial dilatation, septal wall motion abnormalities and mild hypokinesis of left ventricle. KELLY which came back negative for any vegetation and repeat TTE negative for vegetations or worsening valvular dysfunction. Troponin positive w/ EKG changes concerning for demand ischemia in setting of septic shock. 10/6 patient came off propofol and levophed. He was successfully extubated to nasal cannula. Patient was transferred to [facility] for further monitoring and to work on discharge planning. While on [facility] pt developing upper GI bleed requiring transfusion of 3 U PRBC w/ endoscopy showing pigmented spot. Treatement with BID protonix initiated and patient to follow up with GI on November 13 for further management. Additionally patient developed hematuria and urinary retention likely 2/2 to traumatic glaser catheter w/ friable prostate due to prostate CA. Patient initially had glaser removed once starting flomax but had repeat episode of urinary retention. Glaser was placed and follow up with urology was made to assess when to remove glaser after discharge.  Patient evaluated by physical therapy who recommended that he go home with home PT. When ambulating w/o oxygen patient's saturations dropped as low as 86% and arrangements were made for patient to have home oxygen. After discussion with patient's cardiologist the decision was made to proceed with work up for ischemic heart disease as an outpatient. Patient was discharged w/ follow up plans and medications to home. At time of discharge patient was hemodynamically stable and saturating well on nasal cannula. 72 year old male with a PMHx of HTN, COPD, Prostate CA (2013, s/p radiation and seed placement, on leuprolide with a recent elevation of PSA and imaging showing bone mets) presents from his heme/on office with worsening of SOB and episode syncope the morning of admission 10/4. Pt was admitted for severe sepsis w/ CATALINA and possible pneumonia. Pt empirically treated for CAP w/ ceftriaxone/azithromycin/vancomycin and placed on BiPAP. Rapid response was called 10/5 for respiratory failure and hypotension once it was discovered that the patient removed his BiPaP on the floor. Patient stepped up to the MICU and started on peripheral levophed for closer monitoring. Patient intubated that PM for airway protection. In the MICU had OGT, right IJ with left A-line placed. Pt's ABX broadened to Zosyn and Vancomycin (dosed by level). OG tube was placed for decompression of previous ileus seen and abdominal distention. Blood Cx came back positive for MSSA w/ persistent bacteremia. Patient transitioned to nafcillin for MSSA coverage. Pt also had an ECHO (TTE) which showed HFrEF with EF 45%, right atrial dilatation, septal wall motion abnormalities and mild hypokinesis of left ventricle. KELLY which came back negative for any vegetation and repeat TTE negative for vegetations or worsening valvular dysfunction. Troponin positive w/ EKG changes concerning for demand ischemia in setting of septic shock. 10/6 patient came off propofol and levophed. He was successfully extubated to nasal cannula. Patient was transferred to [facility] for further monitoring and to work on discharge planning. While on [facility] pt developing upper GI bleed requiring transfusion of 3 U PRBC w/ endoscopy showing pigmented spot. Treatement with BID protonix initiated and patient to follow up with GI on November 13 for further management. Additionally patient developed hematuria and urinary retention likely 2/2 to traumatic glaser catheter w/ friable prostate due to prostate CA. Patient initially had glaser removed once starting flomax but had repeat episode of urinary retention. Glaser was placed and follow up with urology was made to assess when to remove glaser after discharge.  Patient evaluated by physical therapy who recommended that he go home with home PT. When ambulating w/o oxygen patient's saturations dropped as low as 86% and arrangements were made for patient to have home oxygen. After discussion with patient's cardiologist the decision was made to proceed with work up for ischemic heart disease as an outpatient. Patient was discharged w/ follow up plans and medications to [facility]. At time of discharge patient was hemodynamically stable and saturating well on nasal cannula. 72 year old male with a PMHx of HTN, COPD, Prostate CA (2013, s/p radiation and seed placement, on leuprolide with a recent elevation of PSA and imaging showing bone mets) presents from his heme/on office with worsening of SOB and episode syncope the morning of admission 10/4. Pt was admitted for severe sepsis w/ CATALINA and possible pneumonia. Pt empirically treated for CAP w/ ceftriaxone/azithromycin/vancomycin and placed on BiPAP. Rapid response was called 10/5 for respiratory failure and hypotension once it was discovered that the patient removed his BiPaP on the floor. Patient stepped up to the MICU and started on peripheral levophed for closer monitoring. Patient intubated that PM for airway protection. In the MICU had OGT, right IJ with left A-line placed. Pt's ABX broadened to Zosyn and Vancomycin (dosed by level). OG tube was placed for decompression of previous ileus seen and abdominal distention. Blood Cx came back positive for MSSA w/ persistent bacteremia. Patient transitioned to nafcillin for MSSA coverage. Pt also had an ECHO (TTE) which showed HFrEF with EF 45%, right atrial dilatation, septal wall motion abnormalities and mild hypokinesis of left ventricle. KELLY which came back negative for any vegetation and repeat TTE negative for vegetations or worsening valvular dysfunction. Troponin positive w/ EKG changes concerning for demand ischemia in setting of septic shock. After discussion with patient's cardiologist the decision was made to proceed with work up for ischemic heart disease as an outpatient. Patient was successfully extubated to NC and transferred to [facility] for further monitoring and to work on discharge planning. While on [facility] pt developed upper GI bleed requiring transfusion of 3 U PRBC w/ endoscopy showing pigmented spot. Treatement with BID protonix initiated and patient to follow up with GI as an outpatient for further management. Additionally patient developed hematuria and urinary retention likely 2/2 to traumatic glaser catheter w/ friable prostate due to prostate CA. Patient initially had glaser removed once starting flomax but had repeat episode of urinary retention requiring Glaser placement. He was stepped down to RMF while awaiting RJ placement but developed increased bleeding from Glaser requiring blood transfusions and blood clots resulting in multiple episode of Glaser obstruction. He was also found to have a UTI with urine cx positive for pseudomonas, abx were broadened from cefazolin to cefepime to cover both MSSA bacteremia and pseudomonas UTI. Urology performed cystoscopy and cauterized bleeding from prostate. Post-op course was complicated by difficult extubation and patient went to MICU. There he was extubated and then was stable for RMF. On RMF, he had an episode of abdominal bloating that resolved without intervention. His glaser was removed and he passed TOV. Patient was HD stable and ready for discharge to [facility]. 72 year old male with a PMHx of HTN, COPD, Prostate CA (2013, s/p radiation and seed placement, on leuprolide with a recent elevation of PSA and imaging showing bone mets) presents from his heme/on office with worsening of SOB and episode syncope the morning of admission 10/4. Pt was admitted for severe sepsis w/ CATALINA and possible pneumonia. Pt empirically treated for CAP w/ ceftriaxone/azithromycin/vancomycin and placed on BiPAP. Rapid response was called 10/5 for respiratory failure and hypotension once it was discovered that the patient removed his BiPaP on the floor. Patient stepped up to the MICU and started on peripheral levophed for closer monitoring. Patient intubated that PM for airway protection. In the MICU had OGT, right IJ with left A-line placed. Pt's ABX broadened to Zosyn and Vancomycin (dosed by level). OG tube was placed for decompression of previous ileus seen and abdominal distention. Blood Cx came back positive for MSSA w/ persistent bacteremia. Patient transitioned to nafcillin for MSSA coverage. Pt also had an ECHO (TTE) which showed HFrEF with EF 45%, right atrial dilatation, septal wall motion abnormalities and mild hypokinesis of left ventricle. KELLY which came back negative for any vegetation and repeat TTE negative for vegetations or worsening valvular dysfunction. Troponin positive w/ EKG changes concerning for demand ischemia in setting of septic shock. After discussion with patient's cardiologist the decision was made to proceed with work up for ischemic heart disease as an outpatient. Patient was successfully extubated to NC and transferred to [facility] for further monitoring and to work on discharge planning. While on [facility] pt developed upper GI bleed requiring transfusion of 3 U PRBC w/ endoscopy showing pigmented spot. Treatement with BID protonix initiated and patient to follow up with GI as an outpatient for further management. Additionally patient developed hematuria and urinary retention likely 2/2 to traumatic glaser catheter w/ friable prostate due to prostate CA. Patient initially had glaser removed once starting flomax but had repeat episode of urinary retention requiring Glaser placement. He was stepped down to RMF while awaiting RJ placement but developed increased bleeding from Glaser requiring blood transfusions and blood clots resulting in multiple episode of Glaser obstruction. He was also found to have a UTI with urine cx positive for pseudomonas, abx were broadened from cefazolin to cefepime to cover both MSSA bacteremia and pseudomonas UTI. Urology performed cystoscopy and cauterized bleeding from prostate. Post-op course was complicated by difficult extubation and patient went to MICU. There he was extubated and then was stable for RMF. On RMF, he had an episode of abdominal bloating that resolved without intervention. His glaser was removed and he passed TOV. Patient was HD stable and ready for discharge to [facility].  Patient discharged with cefepime to be continued to 11/7.  Patient aspirin restarted.

## 2017-10-19 NOTE — PROGRESS NOTE ADULT - SUBJECTIVE AND OBJECTIVE BOX
Cardiology for Preister    cc: follow-up cardiology evaluation and management of left heart failure    interval - no new complaints    PAST MEDICAL & SURGICAL HISTORY:  COPD (chronic obstructive pulmonary disease)  Hypertension  Obstructive sleep apnea syndrome  Prostate cancer metastatic to bone  PC (prostate cancer): with seed placement    MEDICATIONS  (STANDING):  ALBUTerol/ipratropium for Nebulization 3 milliLiter(s) Nebulizer every 4 hours  aspirin  chewable 81 milliGRAM(s) Oral daily  atorvastatin 20 milliGRAM(s) Oral at bedtime  ceFAZolin   IVPB 2000 milliGRAM(s) IV Intermittent every 8 hours  dextrose 5%. 1000 milliLiter(s) (50 mL/Hr) IV Continuous <Continuous>  furosemide   Injectable 20 milliGRAM(s) IV Push two times a day  heparin  flush 100 Units/mL Injectable 100 Unit(s) IV Push every other day  lisinopril 2.5 milliGRAM(s) Oral daily  melatonin 3 milliGRAM(s) Oral at bedtime  nystatin Powder 1 Application(s) Topical two times a day  pantoprazole    Tablet 40 milliGRAM(s) Oral two times a day before meals  tamsulosin 0.8 milliGRAM(s) Oral at bedtime    MEDICATIONS  (PRN):  acetaminophen  Suppository 650 milliGRAM(s) Rectal every 6 hours PRN For Temp greater than 38 C (100.4 F)  bisacodyl Suppository 10 milliGRAM(s) Rectal daily PRN Constipation  guaiFENesin    Syrup 100 milliGRAM(s) Oral every 6 hours PRN Cough  polyethylene glycol 3350 17 Gram(s) Oral daily PRN Constipation  simethicone 80 milliGRAM(s) Chew two times a day PRN Gas      Vital Signs Last 24 Hrs  T(C): 37.1 (19 Oct 2017 22:35), Max: 37.2 (19 Oct 2017 18:25)  T(F): 98.8 (19 Oct 2017 22:35), Max: 99 (19 Oct 2017 18:25)  HR: 72 (19 Oct 2017 20:40) (60 - 82)  BP: 101/50 (19 Oct 2017 20:40) (88/51 - 120/59)  BP(mean): 70 (19 Oct 2017 20:40) (63 - 85)  RR: 26 (19 Oct 2017 20:40) (16 - 32)  SpO2: 95% (19 Oct 2017 20:40) (92% - 98%)    physical exam  nad  supine  anciteric  decreased breath sounds at bases  rr; s1/s2 present; rr; no rub  soft abd; bowel sounds present  bilat le edema improving  bilat le pulse present                            8.5    6.2   )-----------( 145      ( 19 Oct 2017 06:57 )             25.3   10-18    140  |  98  |  21  ----------------------------<  115<H>  3.6   |  28  |  1.16    Ca    8.8      18 Oct 2017 06:41  Mg     1.9     10-18      troponin not repeated    I&O's Summary    18 Oct 2017 07:01  -  19 Oct 2017 07:00  --------------------------------------------------------  IN: 1180 mL / OUT: 3230 mL / NET: -2050 mL    19 Oct 2017 07:01  -  19 Oct 2017 23:36  --------------------------------------------------------  IN: 250 mL / OUT: 675 mL / NET: -425 mL            < from: Echocardiogram (10.16.17 @ 10:31) >    EXAM:  ECHOCARDIOGRAM (CARDIOL)                          PROCEDURE DATE:  10/16/2017                        INTERPRETATION:  Patient Height: 172.0 cm  Patient Weight: 103.0 kg  Heart Rate: 74 bpm  Systolic Pressure: 140 mmHg  Diastolic Pressure: 70mmHg  BSA: 2.2 m^2  Interpretation Summary  The left ventricle is mildly dilated.Hypokinesis of the basal inferior and   septal region.The left ventricular ejection fraction is estimated to be   45-50%The mitral inflow pattern is consistent with impaired left   ventricular   relaxation with mildly elevated left atrial pressure (8-14mmHg).  The   left   atrial size is normal. The right atrium is dilated.The right ventricle is   moderately dilated. The right ventricular systolic function is mildly   reduced.    There is mild aortic valve thickening.There is mild mitral valve   thickening.   There is mild mitral regurgitation.There is trace tricuspid   regurgitation.There was insufficient TR detected from which to calculate   pulmonary artery systolic pressure.  No aortic root dilatation.The   inferior   vena cava is normal in size (<2.1 cm) with normal inspiratory collapse   (>50%)   consistent with normal right atrial pressure.  There is no pericardial   effusion.    < end of copied text >  IN: 650 mL / OUT: 2875 mL / NET: -2225 mL          < from: Xray Chest 1 View AP -PORTABLE-Routine (10.09.17 @ 05:37) >  EXAM:  XR CHEST 1 VIEW PORT ROUTINE                          PROCEDURE DATE:  10/09/2017                     INTERPRETATION:    Portable AP Radiograph dated 10/9/2017 5:37 AM    CLINICAL INFORMATION: 72 years, Male, Intubated, PNA    PRIOR STUDIES:October 8, 2017    FINDINGS: Support tubes and lines are unchanged. Left pleural effusion is   stable. Right pleural effusion has increased. Pulmonary vascular   congestion has increased.    IMPRESSION:  Interval increase of right pleural effusion and pulmonary vascular   congestion.            "Thank you for the opportunity to participate in the care of this   patient."        KATHY PABLO M.D., RADIOLOGY ATTENDING  This document has been electronically signed. Oct  9 2017  8:33AM    < end of copied text >

## 2017-10-19 NOTE — PROGRESS NOTE ADULT - SUBJECTIVE AND OBJECTIVE BOX
On interval followup, the right arm PICC site is clean, dry, non-inflamed and non-tender. T 99 range. Notes and cultures are followed.

## 2017-10-19 NOTE — PROGRESS NOTE ADULT - ASSESSMENT
Assessment	  71 yo male with hx of HTN, COPD, Prostate CA (s/p radiation and seed 2013 on leuprolide outpatient) who presents with worsening of SOB and an episode of unwitnessed syncope found to have severe sepsis with gram + cocci in clusters identified as S. aureus and ATN.  Hospital course complicated by rapid response for hypotension, unresponsive to painful stimuli and was transferred to MICU for septic shock.  Septic shock has resolved but pt has had persistent MSSA bacteremia with resolving respiratory failure and ATN.    ID  #Sepsis w/ persistent MSSA bacteremia  - patient w/o leukocytosis, fever, w/ stable BP  - Sepsis resolved  - c/w nafcillin 2 q 6  - Gallium scan negative for focal areas of infection, KELLY negative for endocarditis. Repeat TTE w/o evidence of new vegetations or worsening valve thickening.   - blood cx w/ no growth to date  - pt for discharge tomorrow w/ cefazolin 2 g q 8 to complete 4 wk course. Patient will follow up with Dr. Clemente outpatient for further monitoring of his bacteremia     Respiratory     # Possible sleep apnea  - work up sleep apnea outpatient w/ sleep study     #COPD  - c/w duonebs q 4   - encourage ambulation and incentive spirometry.   - Patient desaturated while walking yesterday to 86% w/o oxygen     Cardiovascular  #New onset paroxysmal A.fib Vs MAT:  - pt has remained in NSR since admission to [facility]. To be followed up with outpatient cardiologist for further monitoring.    #HFrEF  -TTE shows HFrEF with EF 45%, right atrial dilatation, septal wall motion abnormalities and mild hypokinesis of left ventricle. Unable to visualize any vegetations or R heart structures. Unable to calculate pulmonary a pressures.   - c/w ASA + lipitor  - c/w lasix   - consider metoprolol and lisinopril as tolerated once bleeding controlled and BP allows  - strict I/Os, daily weights, fluid restriction 1.5L  - acute onset of HFrEF; discussed case with Dr. Glass who agrees that patient should have ischemia workup outpatient for acute HFrEF. Repeat echo to be done to assess for improvement in LV function.     #NSTEMI :  - EKG positive for ST depression in anteroseptal leads, Echo positive for septal hypokinesis, troponin +.   - Ischemia likely demand ischemia due to severe sepsis  - c/w ASA    Renal/  ATN-resolved  - ATN on admission w/ Cr now at baseline.     Hematuria w/ urinary retention  - Hematuria likely due to underlying prostate malignancy w/ recent glaser placement - possibly due to prostate inflammation/bleed.   - rentention this am due to clot, relieved with saline flush. To evaluate glaser prior to discharge tomorrow to ensure flow.   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  -Thrombocytopenia resolved     GI    #ileus -resolved  - c/w PO agents for constipation    # Pigmented spot  - bleed likely due to ulcer now healed, H+H have been stable.  - PPI BID   - hemoglobin goal 8 hgb given recent NSTEMI   - daily cbc while pt admitted  - to follow up with Dr. Mayo outpatient on discharge      F no IVF  E- replete lytes prn  N- dash diet w/ 1.5 L fluid restriction     PPx - On ASA 81mg, SCD     CODE - DNR not DNI    Dispo: [facility]

## 2017-10-19 NOTE — DISCHARGE NOTE ADULT - MEDICATION SUMMARY - MEDICATIONS TO STOP TAKING
I will STOP taking the medications listed below when I get home from the hospital:    triamterene-hydrochlorothiazide 37.5mg-25mg oral capsule  -- 1 cap(s) by mouth once a day    losartan-hydrochlorothiazide 50mg-12.5mg oral tablet  -- 1 tab(s) by mouth once a day

## 2017-10-19 NOTE — DISCHARGE NOTE ADULT - INSTRUCTIONS
Please restrict your drinking to approximately 1-2 L. Please eat a diet low in salt and try to limit your salt to 1 gram per day.

## 2017-10-19 NOTE — PROGRESS NOTE ADULT - ASSESSMENT
A/recs:  1) acute hypoxic respiratory failure with elevated tn and abnormal septal wall motion on tte in addition to rv dilatation and abnormal ecg this admission; left heart failure (ef=45% by tte) - s/p egd on 10- for anemia/melena  a - probable nstemi earlier this admission - recommend repeat tn now  b - on ace-i per primary team  b - rec bb if no contraindications  c - chf - o>i as tolerated  d - per Dr. Gentile of Interventional Cardiology - recommend cardiac cta prior to discharge - with plan for possible discharge 10-, discussed risks, benefits, alternatives of delaying ischemia evaluation until after discharge with patient; questions answered.  I recommend cardiac cta prior to discharge.  Patient aware of my recommendation.    2) paroxysmal atrial fibrillation  a - telemetry - syncope prior to admission  b - consider outpatient ambulatory telemetry    3) htn - monitor on rx    4) sepsis due to pna per ccm with bacteremia - antb per id and primary team    5) possible copd exacerbation - per ccm    6) acute renal failure - resolved    7) normocytic anemia and thrombocytopenia  -follow-up gi     8) shingles    9) prostate ca - hem-onc and gu following    10) K>4, Mg>2    11) palliative care following  if discharged 10-, rec Cardiology follow-up less than 1 week  discussed with 7 lachman housestaff

## 2017-10-19 NOTE — DISCHARGE NOTE ADULT - CARE PROVIDERS DIRECT ADDRESSES
,DirectAddress_Unknown,[email or web address],DirectAddress_Unknown ,DirectAddress_Unknown,[email or web address],DirectAddress_Unknown,DirectAddress_Unknown

## 2017-10-19 NOTE — PROGRESS NOTE ADULT - ASSESSMENT
71 YO M h/o HTN, COPD, Prostate CA (2013, s/p radiation and seed placement, on leuprolide with a recent elevation of PSA and imaging showing bone mets) who was referred from his heme/onc doctors office 10/4/17 for evaluation of worsening of SOB and episode syncope. Admitted for severe sepsis w/ CATALINA and possible pneumonia, Rx in ICU with intubation, levophed, and broad spectrum abx, now on nafcillin for MSSA bacteremia of unclear etiology. Who developed worsening anemia and melena, now resolving.     # Melena/Anemia 2/2 gastric ulcer with pigmented spot  - Melenic BMs overnight may be residual as pt is HD stable and H/H is uptrending  - Continue to monitor H/H, transfuse Hgb >8  - Follow-up pathology  - Change to protonix 40 mg PO BID x 4-6 weeks, and follow-up as outpatient for re-evaluation   - Avoid NSAIDs if possible  - Pt with multiple BMs overnight, recommend changing Miralax to PRN    GI will sign off at this time, please reconsult if further questions

## 2017-10-19 NOTE — PROGRESS NOTE ADULT - SUBJECTIVE AND OBJECTIVE BOX
Resting comfortably this am. S/P endoscopy yesterday. He reports the finding of an ulcer. Abdomen does not hurt him. Eating.     CHEMOTHERAPY REGIMEN:        Day:                          Diet:  Protocol:                                    IVF:      MEDICATIONS  (STANDING):  ALBUTerol/ipratropium for Nebulization 3 milliLiter(s) Nebulizer every 4 hours  aspirin  chewable 81 milliGRAM(s) Oral daily  atorvastatin 20 milliGRAM(s) Oral at bedtime  dextrose 5%. 1000 milliLiter(s) (50 mL/Hr) IV Continuous <Continuous>  furosemide   Injectable 20 milliGRAM(s) IV Push two times a day  heparin  flush 100 Units/mL Injectable 100 Unit(s) IV Push every other day  lisinopril 2.5 milliGRAM(s) Oral daily  melatonin 3 milliGRAM(s) Oral at bedtime  nafcillin  IVPB 2 Gram(s) IV Intermittent every 4 hours  nafcillin  IVPB      nystatin Powder 1 Application(s) Topical two times a day  pantoprazole  Injectable 40 milliGRAM(s) IV Push every 12 hours  polyethylene glycol 3350 17 Gram(s) Oral two times a day  tamsulosin 0.8 milliGRAM(s) Oral at bedtime    MEDICATIONS  (PRN):  acetaminophen  Suppository 650 milliGRAM(s) Rectal every 6 hours PRN For Temp greater than 38 C (100.4 F)  bisacodyl Suppository 10 milliGRAM(s) Rectal daily PRN Constipation  guaiFENesin    Syrup 100 milliGRAM(s) Oral every 6 hours PRN Cough  simethicone 80 milliGRAM(s) Chew two times a day PRN Gas      Allergies    No Known Allergies    Intolerances        DVT Prophylaxis: [ ] YES [x ] NO      Antibiotics: [ x] YES [ ] NO    Pain Scale (1-10):       Location:    Vital Signs Last 24 Hrs  T(C): 36.7 (19 Oct 2017 00:35), Max: 37.2 (18 Oct 2017 18:23)  T(F): 98 (19 Oct 2017 00:35), Max: 99 (18 Oct 2017 18:23)  HR: 60 (19 Oct 2017 04:10) (60 - 96)  BP: 107/50 (19 Oct 2017 04:10) (107/50 - 135/75)  BP(mean): 72 (19 Oct 2017 04:10) (72 - 98)  RR: 16 (19 Oct 2017 04:10) (16 - 30)  SpO2: 98% (19 Oct 2017 04:10) (93% - 98%)    Drug Dosing Weight  Height (cm): 172.72 (05 Oct 2017 01:27)  Weight (kg): 103.4 (12 Oct 2017 09:45)  BMI (kg/m2): 34.7 (12 Oct 2017 09:45)  BSA (m2): 2.16 (12 Oct 2017 09:45)    PHYSICAL EXAM:      Constitutional: feeling better,  Eyes: conjunctiva pink.  ENMT: buccal mucosa moist.   Neck: no masses.  Respiratory: chest clear.  Cardiovascular: S1>S2 at apex. RSR.  Gastrointestinal: distended, soft, nontender on palp.  Genitourinary: cath back in place.  Extremities: some leg edema.  Neurological: no gross focal deficits.  Skin: warm and dry.  Lymph Nodes: none palp.  Musculoskeletal: full ROM.  Psychiatric: affect normal.        URINARY CATHETER: [x ] YES [ ] NO     LABS:  CBC Full  -  ( 18 Oct 2017 17:49 )  WBC Count : 7.4 K/uL  Hemoglobin : 8.3 g/dL  Hematocrit : 26.6 %  Platelet Count - Automated : 178 K/uL  Mean Cell Volume : 97.4 fL  Mean Cell Hemoglobin : 30.4 pg  Mean Cell Hemoglobin Concentration : 31.2 g/dL  Auto Neutrophil # : x  Auto Lymphocyte # : x  Auto Monocyte # : x  Auto Eosinophil # : x  Auto Basophil # : x  Auto Neutrophil % : x  Auto Lymphocyte % : x  Auto Monocyte % : x  Auto Eosinophil % : x  Auto Basophil % : x    10-18    140  |  98  |  21  ----------------------------<  115<H>  3.6   |  28  |  1.16    Ca    8.8      18 Oct 2017 06:41  Mg     1.9     10-18            CULTURES:    RADIOLOGY & ADDITIONAL STUDIES:

## 2017-10-19 NOTE — DISCHARGE NOTE ADULT - PATIENT PORTAL LINK FT
“You can access the [facility] Patient Portal, offered by [facility], by registering with the following website: [email or web address]”

## 2017-10-19 NOTE — DISCHARGE NOTE ADULT - CARE PLAN
Principal Discharge DX:	Septic shock  Goal:	Resolution of your infection  Instructions for follow-up, activity and diet:	You had an infection in your blood which caused you to have very low blood pressures and difficulty breathing. We gave you antibiotics to treat your infection and have seen that the infection in your blood has cleared. We did multiple tests to try and determine the source of your infection but were unable to locate any source. Due to this we need for you to continue taking IV antibiotics. We have arranged for you to get home antibiotics and the company responsible will teach you how to use and care for the equipment. You have an appointment with Dr. Clemente to follow up on your progress with the antibiotics and to ensure that you are doing well.  Secondary Diagnosis:	Acute CHF  Instructions for follow-up, activity and diet:	You had an acute worsening of your heart function during your hospitalization. This is most likely due to the septic shock which you experienced. We started medications in the hospital to support your heart and to reduce the amount of fluid you retain. Please take all of your medications as prescribed and limit the amount of fluid/salt like we discussed.  Secondary Diagnosis:	COPD (chronic obstructive pulmonary disease)  Instructions for follow-up, activity and diet:	You had a history of emphysema prior to this admission. You had an acute worsening of your lung function likely due to the stress placed on your body by this hospitalization. You were found to require oxygen to move around - we have arranged for you to have oxygen delivered to your house. Please use the oxygen when feeling short of breath to help support your breathing. Do not use oxygen around open flames as it could cause serious burns.  Secondary Diagnosis:	Essential hypertension  Instructions for follow-up, activity and diet:	You had this condition prior to coming to the hospital. Please follow up with your primary care physician for further management of this condition.  Secondary Diagnosis:	Prostate cancer metastatic to bone  Instructions for follow-up, activity and diet:	You had this condition prior to coming to the hospital. Please follow up with your primary care physician for further management of this condition.  Secondary Diagnosis:	Urinary retention  Instructions for follow-up, activity and diet:	You were found to be retaining large volumes of urine during this hospitalization requiring us to place a catheter into your penis to help you urinate. We attempted to remove the catheter but you began to retain urine again. This condition is most likely due to the size and composition of your prostate. You have a follow up appointment with urology to potentially remove the catheter or discuss future care. Please continue to take your flomax as prescribed.  Secondary Diagnosis:	Upper GI bleed  Instructions for follow-up, activity and diet:	You had bleeding which required transfusion of blood products. Your blood hemoglobin levels will recover in time. Please continue to take your protonix twice a day until you are able to see Dr. Bojorquez on your scheduled appointment time. If you develop worsening bleeding or notice any new bleeding please call the office to discuss it with Dr. Bojorquez. If you develop large volumes of bleeding please go to the nearest emergency room to be evaluated as this may be a sign that your ulcer has worsened or you have developed a new ulcer. Principal Discharge DX:	Septic shock  Goal:	Resolution of your infection  Instructions for follow-up, activity and diet:	You had an infection in your blood with staphylococcus aureus which caused you to have very low blood pressures and difficulty breathing. We gave you antibiotics to treat your infection and have seen that the infection in your blood has cleared. We did multiple tests to try and determine the source of your infection but were unable to locate any source. Due to this we need for you to continue taking IV antibiotics at rehab. Later in your hospital course you were found to have a urinary tract infection with pseudomonas, however there was no evidence of this bacteria getting into your bloodstream. We changed your antibiotics to cover both infections. Please continue taking Cefepime 2g IV every 12 hours, to complete a course of 4 weeks your tentative end date is Nov. 7th.  You have an appointment with Dr. Clemente to follow up on your progress with the antibiotics and to ensure that you are doing well on Nov. 2nd, please call his office to reschedule this appointment if necessary.  Secondary Diagnosis:	Acute CHF  Instructions for follow-up, activity and diet:	You had an acute worsening of your heart function during your hospitalization. This is most likely due to the septic shock which you experienced. We started medications in the hospital to support your heart and to reduce the amount of fluid you retain. Please take all of your medications as prescribed and limit the amount of fluid/salt like we discussed. Please follow up with your cardiologist after discharge for further management.    #NSTEMI (Non-ST elevation myocardial infarction)- During admission you had evidence of having ischemia of your heart, this was most likley due to increased demand in the setting of infection. Please follow up with your cardiologist after discharge for further work up.  Secondary Diagnosis:	COPD (chronic obstructive pulmonary disease)  Instructions for follow-up, activity and diet:	You had a history of emphysema prior to this admission. You had an acute worsening of your lung function likely due to the stress placed on your body by this hospitalization. You were found to require oxygen to move around, please continue using oxygen at rehab and wean as tolerated.     #MARIA ESTHER (obstructive sleep apnea)- It is suspected that you have sleep apnea, please follow up with your primary care doctor for further work-up as an outpatient. In the meantime, please continue using CPAP at night.  Secondary Diagnosis:	Essential hypertension  Instructions for follow-up, activity and diet:	You had this condition prior to coming to the hospital. Please follow up with your primary care physician for further management of this condition.  Secondary Diagnosis:	Prostate cancer metastatic to bone  Instructions for follow-up, activity and diet:	You had this condition prior to coming to the hospital. Please follow up with your primary care physician for further management of this condition.  Secondary Diagnosis:	Upper GI bleed  Instructions for follow-up, activity and diet:	You had bleeding which required transfusion of blood products.  Please continue to take your protonix twice daily until you follow up with Dr. Bojorquez. If you develop worsening bleeding or notice any new bleeding please call the office to discuss it with Dr. Bojorquez. If you develop large volumes of bleeding please go to the nearest emergency room to be evaluated as this may be a sign that your ulcer has worsened or you have developed a new ulcer.  Secondary Diagnosis:	Urinary retention  Instructions for follow-up, activity and diet:	You had urinary retention during hospitalization that required a urinary catheter. You then developed bleeding and clots that obstructed the urinary catheter. You were taken to the OR by urology and were found to be bleeding from your prostate, this was cauterized and the bleeding resolved. We were then able to take out the catheter and you were able to urinate. If you start to feel fullness and are unable to urinate you may need the catheter replaced. Please follow up with Dr. Chappell for further management. Principal Discharge DX:	Septic shock  Goal:	Resolution of your infection  Instructions for follow-up, activity and diet:	You had an infection in your blood with staphylococcus aureus which caused you to have very low blood pressures and difficulty breathing. We gave you antibiotics to treat your infection.  Due to this we need for you to continue taking IV antibiotics at rehab. Later in your hospital course you were found to have a urinary tract infection with pseudomonas, however there was no evidence of this bacteria getting into your bloodstream. We changed your antibiotics to cover both infections. Please continue taking Cefepime 2g IV every 12 hours, to complete a course of 4 weeks your end date is Nov. 7th. Please follow up Nov 6 marcelo [place]and  Secondary Diagnosis:	Acute CHF  Instructions for follow-up, activity and diet:	You had an acute worsening of your heart function during your hospitalization. This is most likely due to the septic shock which you experienced. We started medications in the hospital to support your heart and to reduce the amount of fluid you retain. Please take all of your medications as prescribed and limit the amount of fluid/salt like we discussed. Please follow up with your cardiologist after discharge for further management.    #NSTEMI (Non-ST elevation myocardial infarction)- During admission you had evidence of having ischemia of your heart, this was most likley due to increased demand in the setting of infection. Please follow up with your cardiologist after discharge for further work up.  Secondary Diagnosis:	COPD (chronic obstructive pulmonary disease)  Instructions for follow-up, activity and diet:	You had a history of emphysema prior to this admission. You had an acute worsening of your lung function likely due to the stress placed on your body by this hospitalization. You were found to require oxygen to move around, please continue using oxygen at rehab and wean as tolerated.     #MARIA ESTHER (obstructive sleep apnea)- It is suspected that you have sleep apnea, please follow up with your primary care doctor for further work-up as an outpatient. In the meantime, please continue using CPAP at night.  Secondary Diagnosis:	Essential hypertension  Instructions for follow-up, activity and diet:	You had this condition prior to coming to the hospital. Please follow up with your primary care physician for further management of this condition.  Secondary Diagnosis:	Prostate cancer metastatic to bone  Instructions for follow-up, activity and diet:	You had this condition prior to coming to the hospital. Please follow up with your primary care physician for further management of this condition.  Secondary Diagnosis:	Upper GI bleed  Instructions for follow-up, activity and diet:	You had bleeding which required transfusion of blood products.  If you develop worsening bleeding or notice any new bleeding please call the office to discuss it with Dr. Bojorquez. If you develop large volumes of bleeding please go to the nearest emergency room to be evaluated as this may be a sign that your ulcer has worsened or you have developed a new ulcer.  Secondary Diagnosis:	Urinary retention  Instructions for follow-up, activity and diet:	If you start to feel fullness please follow up with Dr. Chappell for further management.

## 2017-10-19 NOTE — DISCHARGE NOTE ADULT - PLAN OF CARE
Resolution of your infection You had an infection in your blood which caused you to have very low blood pressures and difficulty breathing. We gave you antibiotics to treat your infection and have seen that the infection in your blood has cleared. We did multiple tests to try and determine the source of your infection but were unable to locate any source. Due to this we need for you to continue taking IV antibiotics. We have arranged for you to get home antibiotics and the company responsible will teach you how to use and care for the equipment. You have an appointment with Dr. Clemente to follow up on your progress with the antibiotics and to ensure that you are doing well. You had an acute worsening of your heart function during your hospitalization. This is most likely due to the septic shock which you experienced. We started medications in the hospital to support your heart and to reduce the amount of fluid you retain. Please take all of your medications as prescribed and limit the amount of fluid/salt like we discussed. You had a history of emphysema prior to this admission. You had an acute worsening of your lung function likely due to the stress placed on your body by this hospitalization. You were found to require oxygen to move around - we have arranged for you to have oxygen delivered to your house. Please use the oxygen when feeling short of breath to help support your breathing. Do not use oxygen around open flames as it could cause serious burns. You had this condition prior to coming to the hospital. Please follow up with your primary care physician for further management of this condition. You were found to be retaining large volumes of urine during this hospitalization requiring us to place a catheter into your penis to help you urinate. We attempted to remove the catheter but you began to retain urine again. This condition is most likely due to the size and composition of your prostate. You have a follow up appointment with urology to potentially remove the catheter or discuss future care. Please continue to take your flomax as prescribed. You had bleeding which required transfusion of blood products. Your blood hemoglobin levels will recover in time. Please continue to take your protonix twice a day until you are able to see Dr. Bojorquez on your scheduled appointment time. If you develop worsening bleeding or notice any new bleeding please call the office to discuss it with Dr. Bojorquez. If you develop large volumes of bleeding please go to the nearest emergency room to be evaluated as this may be a sign that your ulcer has worsened or you have developed a new ulcer. You had an infection in your blood with staphylococcus aureus which caused you to have very low blood pressures and difficulty breathing. We gave you antibiotics to treat your infection and have seen that the infection in your blood has cleared. We did multiple tests to try and determine the source of your infection but were unable to locate any source. Due to this we need for you to continue taking IV antibiotics at rehab. Later in your hospital course you were found to have a urinary tract infection with pseudomonas, however there was no evidence of this bacteria getting into your bloodstream. We changed your antibiotics to cover both infections. Please continue taking Cefepime 2g IV every 12 hours, to complete a course of 4 weeks your tentative end date is Nov. 7th.  You have an appointment with Dr. Clemente to follow up on your progress with the antibiotics and to ensure that you are doing well on Nov. 2nd, please call his office to reschedule this appointment if necessary. You had an acute worsening of your heart function during your hospitalization. This is most likely due to the septic shock which you experienced. We started medications in the hospital to support your heart and to reduce the amount of fluid you retain. Please take all of your medications as prescribed and limit the amount of fluid/salt like we discussed. Please follow up with your cardiologist after discharge for further management.    #NSTEMI (Non-ST elevation myocardial infarction)- During admission you had evidence of having ischemia of your heart, this was most likley due to increased demand in the setting of infection. Please follow up with your cardiologist after discharge for further work up. You had a history of emphysema prior to this admission. You had an acute worsening of your lung function likely due to the stress placed on your body by this hospitalization. You were found to require oxygen to move around, please continue using oxygen at rehab and wean as tolerated.     #MARIA ESTHER (obstructive sleep apnea)- It is suspected that you have sleep apnea, please follow up with your primary care doctor for further work-up as an outpatient. In the meantime, please continue using CPAP at night. You had bleeding which required transfusion of blood products.  Please continue to take your protonix twice daily until you follow up with Dr. Bojorquez. If you develop worsening bleeding or notice any new bleeding please call the office to discuss it with Dr. Bojorquez. If you develop large volumes of bleeding please go to the nearest emergency room to be evaluated as this may be a sign that your ulcer has worsened or you have developed a new ulcer. You had urinary retention during hospitalization that required a urinary catheter. You then developed bleeding and clots that obstructed the urinary catheter. You were taken to the OR by urology and were found to be bleeding from your prostate, this was cauterized and the bleeding resolved. We were then able to take out the catheter and you were able to urinate. If you start to feel fullness and are unable to urinate you may need the catheter replaced. Please follow up with Dr. Chappell for further management. You had an infection in your blood with staphylococcus aureus which caused you to have very low blood pressures and difficulty breathing. We gave you antibiotics to treat your infection.  Due to this we need for you to continue taking IV antibiotics at rehab. Later in your hospital course you were found to have a urinary tract infection with pseudomonas, however there was no evidence of this bacteria getting into your bloodstream. We changed your antibiotics to cover both infections. Please continue taking Cefepime 2g IV every 12 hours, to complete a course of 4 weeks your end date is Nov. 7th. Please follow up Nov 6 wit  You had bleeding which required transfusion of blood products.  If you develop worsening bleeding or notice any new bleeding please call the office to discuss it with Dr. Bojorquez. If you develop large volumes of bleeding please go to the nearest emergency room to be evaluated as this may be a sign that your ulcer has worsened or you have developed a new ulcer. If you start to feel fullness please follow up with Dr. Chappell for further management.

## 2017-10-19 NOTE — DISCHARGE NOTE ADULT - MEDICATION SUMMARY - MEDICATIONS TO TAKE
I will START or STAY ON the medications listed below when I get home from the hospital:    finasteride 5 mg oral tablet  -- 1 tab(s) by mouth once a day  -- Indication: For Prostate CA    aspirin 81 mg oral tablet  -- 1 tab(s) by mouth once a day  -- Indication: For NSTEMI (non-ST elevated myocardial infarction)    tamsulosin 0.4 mg oral capsule  -- 2 cap(s) by mouth once a day (at bedtime)  -- Indication: For Prostate CA    atorvastatin 80 mg oral tablet  -- 1 tab(s) by mouth once a day (at bedtime)  -- Indication: For HLD    ipratropium-albuterol 0.5 mg-2.5 mg/3 mLinhalation solution  -- 3 milliliter(s) inhaled every 6 hours, As needed, Shortness of Breath and/or Wheezing  -- Indication: For COPD (chronic obstructive pulmonary disease)    cefepime  -- 2000 milligram(s) intravenous 2 times a day  -- Indication: For Bacteremia    nystatin 100,000 units/g topical powder  -- 1 application on skin 2 times a day  -- Indication: For Prophylactic measure    furosemide 40 mg oral tablet  -- 1 tab(s) by mouth every 12 hours  -- Indication: For CHF (congestive heart failure)    guaiFENesin 100 mg/5 mL oral liquid  -- 10 milliliter(s) by mouth every 6 hours, As needed, Cough  -- Indication: For COPD (chronic obstructive pulmonary disease)    docusate sodium 100 mg oral capsule  -- 1 cap(s) by mouth once a day  -- Indication: For COnstipation    polyethylene glycol 3350 oral powder for reconstitution  -- 17 gram(s) by mouth 2 times a day  -- Indication: For COnstipation    senna oral tablet  -- 2 tab(s) by mouth once a day (at bedtime)  -- Indication: For COnstipation    lactulose 10 g/15 mL oral syrup  -- 30 milliliter(s) by mouth once a day  -- Indication: For COnstipation    simethicone 80 mg oral tablet, chewable  -- 1 tab(s) by mouth 3 times a day, As needed, Gas  -- Indication: For COnstipation    pantoprazole 40 mg oral delayed release tablet  -- 1 tab(s) by mouth 2 times a day (before meals)  -- Indication: For Gerd

## 2017-10-19 NOTE — PROGRESS NOTE ADULT - SUBJECTIVE AND OBJECTIVE BOX
Pt seen and examined at bedside. NILDA overnight. Pt states that he had 2-3 dark BMs yesterday, tolerating PO diet. Denies fever, chills, CP, SOB, abdominal pain, nausea, vomiting, hematochezia.     Allergies    No Known Allergies    Intolerances    MEDICATIONS:  MEDICATIONS  (STANDING):  ALBUTerol/ipratropium for Nebulization 3 milliLiter(s) Nebulizer every 4 hours  aspirin  chewable 81 milliGRAM(s) Oral daily  atorvastatin 20 milliGRAM(s) Oral at bedtime  dextrose 5%. 1000 milliLiter(s) (50 mL/Hr) IV Continuous <Continuous>  furosemide   Injectable 20 milliGRAM(s) IV Push two times a day  heparin  flush 100 Units/mL Injectable 100 Unit(s) IV Push every other day  lisinopril 2.5 milliGRAM(s) Oral daily  melatonin 3 milliGRAM(s) Oral at bedtime  nafcillin  IVPB 2 Gram(s) IV Intermittent every 4 hours  nafcillin  IVPB      nystatin Powder 1 Application(s) Topical two times a day  pantoprazole  Injectable 40 milliGRAM(s) IV Push every 12 hours  polyethylene glycol 3350 17 Gram(s) Oral two times a day  tamsulosin 0.8 milliGRAM(s) Oral at bedtime    MEDICATIONS  (PRN):  acetaminophen  Suppository 650 milliGRAM(s) Rectal every 6 hours PRN For Temp greater than 38 C (100.4 F)  bisacodyl Suppository 10 milliGRAM(s) Rectal daily PRN Constipation  guaiFENesin    Syrup 100 milliGRAM(s) Oral every 6 hours PRN Cough  simethicone 80 milliGRAM(s) Chew two times a day PRN Gas      Vital Signs Last 24 Hrs  T(C): 37 (19 Oct 2017 09:10), Max: 37.2 (18 Oct 2017 18:23)  T(F): 98.6 (19 Oct 2017 09:10), Max: 99 (18 Oct 2017 18:23)  HR: 82 (19 Oct 2017 08:38) (60 - 86)  BP: 96/65 (19 Oct 2017 08:20) (96/65 - 134/62)  BP(mean): 72 (19 Oct 2017 04:10) (72 - 89)  RR: 24 (19 Oct 2017 08:38) (16 - 30)  SpO2: 97% (19 Oct 2017 08:38) (92% - 98%)    10-18 @ 07:01  -  10-19 @ 07:00  --------------------------------------------------------  IN: 1180 mL / OUT: 3230 mL / NET: -2050 mL    PHYSICAL EXAM:    General: Well developed; well nourished; in no acute distress  HEENT: MMM, conjunctiva and sclera clear  Gastrointestinal: Soft non-tender, obese, distended; No rebound or guarding  Extremities: Hyperpigmentation of B/L LE; 2+ pitting edema B/L LE  Skin: Warm and dry. No obvious rash    LABS:  CBC Full  -  ( 19 Oct 2017 06:57 )  WBC Count : 6.2 K/uL  Hemoglobin : 8.5 g/dL  Hematocrit : 25.3 %  Platelet Count - Automated : 145 K/uL  Mean Cell Volume : 98.8 fL  Mean Cell Hemoglobin : 33.2 pg  Mean Cell Hemoglobin Concentration : 33.6 g/dL    10-18    140  |  98  |  21  ----------------------------<  115<H>  3.6   |  28  |  1.16    Ca    8.8      18 Oct 2017 06:41  Mg     1.9     10-18    RADIOLOGY & ADDITIONAL STUDIES: No new radiologic studies

## 2017-10-19 NOTE — DISCHARGE NOTE ADULT - SECONDARY DIAGNOSIS.
Acute CHF COPD (chronic obstructive pulmonary disease) Essential hypertension Prostate cancer metastatic to bone Urinary retention Upper GI bleed

## 2017-10-20 DIAGNOSIS — K92.2 GASTROINTESTINAL HEMORRHAGE, UNSPECIFIED: ICD-10-CM

## 2017-10-20 DIAGNOSIS — I50.9 HEART FAILURE, UNSPECIFIED: ICD-10-CM

## 2017-10-20 DIAGNOSIS — A41.9 SEPSIS, UNSPECIFIED ORGANISM: ICD-10-CM

## 2017-10-20 DIAGNOSIS — N17.0 ACUTE KIDNEY FAILURE WITH TUBULAR NECROSIS: ICD-10-CM

## 2017-10-20 LAB
HCT VFR BLD CALC: 25.2 % — LOW (ref 39–50)
HGB BLD-MCNC: 7.9 G/DL — LOW (ref 13–17)
MCHC RBC-ENTMCNC: 30.5 PG — SIGNIFICANT CHANGE UP (ref 27–34)
MCHC RBC-ENTMCNC: 31.3 G/DL — LOW (ref 32–36)
MCV RBC AUTO: 97.3 FL — SIGNIFICANT CHANGE UP (ref 80–100)
PLATELET # BLD AUTO: 150 K/UL — SIGNIFICANT CHANGE UP (ref 150–400)
RBC # BLD: 2.59 M/UL — LOW (ref 4.2–5.8)
RBC # FLD: 17.2 % — HIGH (ref 10.3–16.9)
SURGICAL PATHOLOGY STUDY: SIGNIFICANT CHANGE UP
WBC # BLD: 7.5 K/UL — SIGNIFICANT CHANGE UP (ref 3.8–10.5)
WBC # FLD AUTO: 7.5 K/UL — SIGNIFICANT CHANGE UP (ref 3.8–10.5)

## 2017-10-20 PROCEDURE — 99233 SBSQ HOSP IP/OBS HIGH 50: CPT | Mod: GC

## 2017-10-20 PROCEDURE — 99232 SBSQ HOSP IP/OBS MODERATE 35: CPT

## 2017-10-20 RX ORDER — FUROSEMIDE 40 MG
40 TABLET ORAL EVERY 12 HOURS
Qty: 0 | Refills: 0 | Status: DISCONTINUED | OUTPATIENT
Start: 2017-10-21 | End: 2017-11-03

## 2017-10-20 RX ORDER — ATORVASTATIN CALCIUM 80 MG/1
80 TABLET, FILM COATED ORAL AT BEDTIME
Qty: 0 | Refills: 0 | Status: DISCONTINUED | OUTPATIENT
Start: 2017-10-20 | End: 2017-11-03

## 2017-10-20 RX ADMIN — PANTOPRAZOLE SODIUM 40 MILLIGRAM(S): 20 TABLET, DELAYED RELEASE ORAL at 16:47

## 2017-10-20 RX ADMIN — ATORVASTATIN CALCIUM 80 MILLIGRAM(S): 80 TABLET, FILM COATED ORAL at 23:07

## 2017-10-20 RX ADMIN — Medication 3 MILLILITER(S): at 06:07

## 2017-10-20 RX ADMIN — Medication 3 MILLILITER(S): at 16:47

## 2017-10-20 RX ADMIN — Medication 3 MILLILITER(S): at 12:19

## 2017-10-20 RX ADMIN — Medication 20 MILLIGRAM(S): at 06:06

## 2017-10-20 RX ADMIN — Medication 100 UNIT(S): at 12:18

## 2017-10-20 RX ADMIN — Medication 3 MILLILITER(S): at 23:06

## 2017-10-20 RX ADMIN — TAMSULOSIN HYDROCHLORIDE 0.8 MILLIGRAM(S): 0.4 CAPSULE ORAL at 23:06

## 2017-10-20 RX ADMIN — Medication 81 MILLIGRAM(S): at 12:18

## 2017-10-20 RX ADMIN — PANTOPRAZOLE SODIUM 40 MILLIGRAM(S): 20 TABLET, DELAYED RELEASE ORAL at 06:06

## 2017-10-20 RX ADMIN — LISINOPRIL 2.5 MILLIGRAM(S): 2.5 TABLET ORAL at 06:06

## 2017-10-20 RX ADMIN — Medication 100 MILLIGRAM(S): at 18:22

## 2017-10-20 RX ADMIN — Medication 100 MILLIGRAM(S): at 02:19

## 2017-10-20 RX ADMIN — Medication 100 MILLIGRAM(S): at 12:19

## 2017-10-20 RX ADMIN — Medication 3 MILLIGRAM(S): at 23:10

## 2017-10-20 NOTE — PROGRESS NOTE ADULT - SUBJECTIVE AND OBJECTIVE BOX
Cardiology for Preister    cc: follow-up cardiology evaluation and management of left heart failure    interval -events noted; now 4 uris; no cp    PAST MEDICAL & SURGICAL HISTORY:  COPD (chronic obstructive pulmonary disease)  Hypertension  Obstructive sleep apnea syndrome  Prostate cancer metastatic to bone  PC (prostate cancer): with seed placement    MEDICATIONS  (STANDING):  ALBUTerol/ipratropium for Nebulization 3 milliLiter(s) Nebulizer every 4 hours  aspirin  chewable 81 milliGRAM(s) Oral daily  atorvastatin 80 milliGRAM(s) Oral at bedtime  ceFAZolin   IVPB 2000 milliGRAM(s) IV Intermittent every 8 hours  dextrose 5%. 1000 milliLiter(s) (50 mL/Hr) IV Continuous <Continuous>  heparin  flush 100 Units/mL Injectable 100 Unit(s) IV Push every other day  lisinopril 2.5 milliGRAM(s) Oral daily  melatonin 3 milliGRAM(s) Oral at bedtime  nystatin Powder 1 Application(s) Topical two times a day  pantoprazole    Tablet 40 milliGRAM(s) Oral two times a day before meals  tamsulosin 0.8 milliGRAM(s) Oral at bedtime    MEDICATIONS  (PRN):  polyethylene glycol 3350 17 Gram(s) Oral daily PRN Constipation    Vital Signs Last 24 Hrs  T(C): 36.6 (20 Oct 2017 20:48), Max: 37.1 (19 Oct 2017 22:35)  T(F): 97.8 (20 Oct 2017 20:48), Max: 98.8 (19 Oct 2017 22:35)  HR: 66 (20 Oct 2017 20:48) (66 - 80)  BP: 116/70 (20 Oct 2017 20:48) (98/58 - 128/51)  BP(mean): 74 (20 Oct 2017 09:35) (73 - 75)  RR: 20 (20 Oct 2017 20:48) (18 - 22)  SpO2: 99% (20 Oct 2017 20:48) (94% - 100%)    physical exam  nad  supine  mmm  bilat breath sounds present  rr; s1/s2 present  soft abd; nt  le edema present  warm le   red urine in glaser                                       7.9    7.5   )-----------( 150      ( 20 Oct 2017 06:25 )             25.2   repeat tn not completed    I&O's Summary    19 Oct 2017 07:01  -  20 Oct 2017 07:00  --------------------------------------------------------  IN: 250 mL / OUT: 675 mL / NET: -425 mL    20 Oct 2017 07:01  -  20 Oct 2017 22:13  --------------------------------------------------------  IN: 0 mL / OUT: 400 mL / NET: -400 mL                < from: Echocardiogram (10.16.17 @ 10:31) >    EXAM:  ECHOCARDIOGRAM (CARDIOL)                          PROCEDURE DATE:  10/16/2017                        INTERPRETATION:  Patient Height: 172.0 cm  Patient Weight: 103.0 kg  Heart Rate: 74 bpm  Systolic Pressure: 140 mmHg  Diastolic Pressure: 70mmHg  BSA: 2.2 m^2  Interpretation Summary  The left ventricle is mildly dilated.Hypokinesis of the basal inferior and   septal region.The left ventricular ejection fraction is estimated to be   45-50%The mitral inflow pattern is consistent with impaired left   ventricular   relaxation with mildly elevated left atrial pressure (8-14mmHg).  The   left   atrial size is normal. The right atrium is dilated.The right ventricle is   moderately dilated. The right ventricular systolic function is mildly   reduced.    There is mild aortic valve thickening.There is mild mitral valve   thickening.   There is mild mitral regurgitation.There is trace tricuspid   regurgitation.There was insufficient TR detected from which to calculate   pulmonary artery systolic pressure.  No aortic root dilatation.The   inferior   vena cava is normal in size (<2.1 cm) with normal inspiratory collapse   (>50%)   consistent with normal right atrial pressure.  There is no pericardial   effusion.    < end of copied text >  IN: 650 mL / OUT: 2875 mL / NET: -2225 mL          < from: Xray Chest 1 View AP -PORTABLE-Routine (10.09.17 @ 05:37) >  EXAM:  XR CHEST 1 VIEW PORT ROUTINE                          PROCEDURE DATE:  10/09/2017                     INTERPRETATION:    Portable AP Radiograph dated 10/9/2017 5:37 AM    CLINICAL INFORMATION: 72 years, Male, Intubated, PNA    PRIOR STUDIES:October 8, 2017    FINDINGS: Support tubes and lines are unchanged. Left pleural effusion is   stable. Right pleural effusion has increased. Pulmonary vascular   congestion has increased.    IMPRESSION:  Interval increase of right pleural effusion and pulmonary vascular   congestion.            "Thank you for the opportunity to participate in the care of this   patient."        KATHY PABLO M.D., RADIOLOGY ATTENDING  This document has been electronically signed. Oct  9 2017  8:33AM    < end of copied text >

## 2017-10-20 NOTE — PROGRESS NOTE ADULT - ASSESSMENT
71 yo male with hx of HTN, COPD, Prostate CA (s/p radiation and seed 2013 on leuprolide outpatient) who presents with worsening of SOB and an episode of unwitnessed syncope found to have severe sepsis with gram + cocci in clusters identified as S. aureus and ATN.  Hospital course complicated by rapid response for hypotension, unresponsive to painful stimuli and was transferred to MICU for septic shock.  Septic shock has resolved but pt has had persistent MSSA bacteremia with resolving respiratory failure and ATN.

## 2017-10-20 NOTE — CHART NOTE - NSCHARTNOTEFT_GEN_A_CORE
Notified by nursing that blood was draining in the folley. At bedside visible blood clots in the tubing. glaser drained with 60 cc of sterile water with clots aspirated, an additional 60 cc infused in the folley but subsequently unable to draw back. Urology consulted for possible clots in glaser and evaluation for continuous irrigation.

## 2017-10-20 NOTE — PROGRESS NOTE ADULT - PROBLEM SELECTOR PLAN 6
# Pigmented spot seen on EGD  - bleed likely due to ulcer now healed, H+H have been stable.  - PPI BID oral  - hemoglobin goal 8 hgb given recent NSTEMI   - daily cbc while pt admitted  - to follow up with Dr. Mayo outpatient on discharge    #ileus -resolved  - c/w PO agents for constipation

## 2017-10-20 NOTE — PROGRESS NOTE ADULT - SUBJECTIVE AND OBJECTIVE BOX
HC    72 year old male with a PMHx of HTN, COPD, Prostate CA (2013, s/p radiation and seed placement, on leuprolide with a recent elevation of PSA and imaging showing bone mets) presents from his heme/on office with worsening of SOB and episode syncope the morning of admission 10/4. Pt was admitted for severe sepsis w/ CATALINA and possible pneumonia. Pt empirically treated for CAP w/ ceftriaxone/azithromycin/vancomycin and placed on BiPAP. Rapid response was called 10/5 for respiratory failure and hypotension once it was discovered that the patient removed his BiPaP on the floor. Patient stepped up to the MICU and started on peripheral levophed for closer monitoring. Patient intubated that PM for airway protection. In the MICU had OGT, right IJ with left A-line placed. Pt's ABX broadened to Zosyn and Vancomycin (dosed by level). OG tube was placed for decompression of previous ileus seen and abdominal distention. Blood Cx came back positive for MSSA w/ persistent bacteremia. Patient transitioned to nafcillin for MSSA coverage. Pt also had an ECHO (TTE) which showed HFrEF with EF 45%, right atrial dilatation, septal wall motion abnormalities and mild hypokinesis of left ventricle. KELLY which came back negative for any vegetation and repeat TTE negative for vegetations or worsening valvular dysfunction. Troponin positive w/ EKG changes concerning for demand ischemia in setting of septic shock. 10/6 patient came off propofol and levophed. He was successfully extubated to nasal cannula. Patient was transferred to [facility] for further monitoring and to work on discharge planning. While on [facility] pt developing upper GI bleed requiring transfusion of 3 U PRBC w/ endoscopy showing pigmented spot. Treatement with BID protonix initiated and patient to follow up with GI on November 13 for further management. Additionally patient developed hematuria and urinary retention likely 2/2 to traumatic glaser catheter w/ friable prostate due to prostate CA. Patient initially had glaser removed once starting flomax but had repeat episode of urinary retention. Glaser was placed and follow up with urology was made to assess when to remove glaser after discharge.  Patient evaluated by physical therapy who recommended that he go home with home PT. When ambulating w/o oxygen patient's saturations dropped as low as 86% and arrangements were made for patient to have home oxygen. After discussion with patient's cardiologist the decision was made to proceed with work up for ischemic heart disease as an outpatient. Patient was discharged w/ follow up plans and medications to [facility]. At time of discharge patient was hemodynamically stable and saturating well on nasal cannula. Patient stepped down for arrangement for RJ placement.     INTERVAL HPI/OVERNIGHT EVENTS: NILDA.     SUBJECTIVE: Patient seen and examined at bedside. No acute complaints. Arrangements made for discharge today but due to clinical staff and family concerns discharge was stopped. Patient now amenable for RJ placement.     OBJECTIVE:    VITAL SIGNS:  ICU Vital Signs Last 24 Hrs  T(C): 37.1 (20 Oct 2017 09:49), Max: 37.2 (19 Oct 2017 18:25)  T(F): 98.7 (20 Oct 2017 09:49), Max: 99 (19 Oct 2017 18:25)  HR: 76 (20 Oct 2017 09:35) (67 - 76)  BP: 105/51 (20 Oct 2017 09:35) (88/51 - 128/51)  BP(mean): 74 (20 Oct 2017 09:35) (63 - 75)  ABP: --  ABP(mean): --  RR: 18 (20 Oct 2017 09:35) (18 - 32)  SpO2: 96% (20 Oct 2017 09:35) (94% - 100%)        10-19 @ 07:01  -  10-20 @ 07:00  --------------------------------------------------------  IN: 250 mL / OUT: 675 mL / NET: -425 mL      PHYSICAL EXAM:    General: NAD, resting comfortably in chair. Speaking in full sentences, on NC.   HEENT: NC/AT; PERRL, clear conjunctiva, MMM  Neck: supple, no JVD  Respiratory: minimal crackles b/l at bases w/ interval improvement. Breath sounds throughout, diminished inspiratory effort.   Cardiovascular: +S1/S2; RRR, no m/r/g  Abdomen: soft, NT/ND; +BS x4, no palpable masses or organomegaly. Obese.   : glaser catheter in place draining clear urine w/o clot. No blood/pus/discharge from urethral meatus.   Extremities: WWP, b/l 2+ edema to knee b/l   Vascular: 2+ peripheral pulses b/l  Skin: well healed rash on r chest wall  Neurological: a&ox3, no focal deficits    MEDICATIONS:  MEDICATIONS  (STANDING):  ALBUTerol/ipratropium for Nebulization 3 milliLiter(s) Nebulizer every 4 hours  aspirin  chewable 81 milliGRAM(s) Oral daily  atorvastatin 20 milliGRAM(s) Oral at bedtime  ceFAZolin   IVPB 2000 milliGRAM(s) IV Intermittent every 8 hours  dextrose 5%. 1000 milliLiter(s) (50 mL/Hr) IV Continuous <Continuous>  heparin  flush 100 Units/mL Injectable 100 Unit(s) IV Push every other day  lisinopril 2.5 milliGRAM(s) Oral daily  melatonin 3 milliGRAM(s) Oral at bedtime  nystatin Powder 1 Application(s) Topical two times a day  pantoprazole    Tablet 40 milliGRAM(s) Oral two times a day before meals  tamsulosin 0.8 milliGRAM(s) Oral at bedtime    MEDICATIONS  (PRN):  acetaminophen  Suppository 650 milliGRAM(s) Rectal every 6 hours PRN For Temp greater than 38 C (100.4 F)  bisacodyl Suppository 10 milliGRAM(s) Rectal daily PRN Constipation  guaiFENesin    Syrup 100 milliGRAM(s) Oral every 6 hours PRN Cough  polyethylene glycol 3350 17 Gram(s) Oral daily PRN Constipation  simethicone 80 milliGRAM(s) Chew two times a day PRN Gas      ALLERGIES:  Allergies    No Known Allergies    Intolerances        LABS:                        7.9    7.5   )-----------( 150      ( 20 Oct 2017 06:25 )             25.2       RADIOLOGY & ADDITIONAL TESTS: Reviewed. HC    72 year old male with a PMHx of HTN, COPD, Prostate CA (2013, s/p radiation and seed placement, on leuprolide with a recent elevation of PSA and imaging showing bone mets) presents from his heme/on office with worsening of SOB and episode syncope the morning of admission 10/4. Pt was admitted for severe sepsis w/ CATALINA and possible pneumonia. Pt empirically treated for CAP w/ ceftriaxone/azithromycin/vancomycin and placed on BiPAP. Rapid response was called 10/5 for respiratory failure and hypotension once it was discovered that the patient removed his BiPaP on the floor. Patient stepped up to the MICU and started on peripheral levophed for closer monitoring. Patient intubated that PM for airway protection. In the MICU had OGT, right IJ with left A-line placed. Pt's ABX broadened to Zosyn and Vancomycin (dosed by level). OG tube was placed for decompression of previous ileus seen and abdominal distention. Blood Cx came back positive for MSSA w/ persistent bacteremia. Patient transitioned to nafcillin for MSSA coverage. Pt also had an ECHO (TTE) which showed HFrEF with EF 45%, right atrial dilatation, septal wall motion abnormalities and mild hypokinesis of left ventricle. KELLY which came back negative for any vegetation and repeat TTE negative for vegetations or worsening valvular dysfunction. Troponin positive w/ EKG changes concerning for demand ischemia in setting of septic shock. 10/6 patient came off propofol and levophed. He was successfully extubated to nasal cannula. Patient was transferred to [facility] for further monitoring and to work on discharge planning. While on [facility] pt developing upper GI bleed requiring transfusion of 3 U PRBC w/ endoscopy showing pigmented spot. Treatement with BID protonix initiated and patient to follow up with GI on November 13 for further management. Additionally patient developed hematuria and urinary retention likely 2/2 to traumatic glaser catheter w/ friable prostate due to prostate CA. Patient initially had glaser removed once starting flomax but had repeat episode of urinary retention. Glaser was placed and follow up with urology was made to assess when to remove glaser after discharge.  Patient evaluated by physical therapy who recommended that he go to [facility] but pt wanted to go home with home PT. When ambulating w/o oxygen patient's saturations dropped as low as 86% and arrangements were made for patient to have home oxygen. After discussion with patient's cardiologist the decision was made to proceed with work up for ischemic heart disease as an outpatient. Patient was discharged w/ follow up plans and medications to [facility]. At time of discharge patient was hemodynamically stable and saturating well on nasal cannula. Patient stepped down for arrangement for RJ placement.     INTERVAL HPI/OVERNIGHT EVENTS: NILDA.     SUBJECTIVE: Patient seen and examined at bedside. No acute complaints. Arrangements made for discharge today but due to clinical staff and family concerns discharge was stopped. Patient now amenable for RJ placement.     OBJECTIVE:    VITAL SIGNS:  ICU Vital Signs Last 24 Hrs  T(C): 37.1 (20 Oct 2017 09:49), Max: 37.2 (19 Oct 2017 18:25)  T(F): 98.7 (20 Oct 2017 09:49), Max: 99 (19 Oct 2017 18:25)  HR: 76 (20 Oct 2017 09:35) (67 - 76)  BP: 105/51 (20 Oct 2017 09:35) (88/51 - 128/51)  BP(mean): 74 (20 Oct 2017 09:35) (63 - 75)  ABP: --  ABP(mean): --  RR: 18 (20 Oct 2017 09:35) (18 - 32)  SpO2: 96% (20 Oct 2017 09:35) (94% - 100%)        10-19 @ 07:01  -  10-20 @ 07:00  --------------------------------------------------------  IN: 250 mL / OUT: 675 mL / NET: -425 mL      PHYSICAL EXAM:    General: NAD, resting comfortably in chair. Speaking in full sentences, on NC.   HEENT: NC/AT; PERRL, clear conjunctiva, MMM  Neck: supple, no JVD  Respiratory: minimal crackles b/l at bases w/ interval improvement. Breath sounds throughout, diminished inspiratory effort.   Cardiovascular: +S1/S2; RRR, no m/r/g  Abdomen: soft, NT/ND; +BS x4, no palpable masses or organomegaly. Obese.   : glaser catheter in place draining clear urine w/o clot. No blood/pus/discharge from urethral meatus.   Extremities: WWP, b/l 2+ edema to knee b/l   Vascular: 2+ peripheral pulses b/l  Skin: well healed rash on r chest wall  Neurological: a&ox3, no focal deficits    MEDICATIONS:  MEDICATIONS  (STANDING):  ALBUTerol/ipratropium for Nebulization 3 milliLiter(s) Nebulizer every 4 hours  aspirin  chewable 81 milliGRAM(s) Oral daily  atorvastatin 20 milliGRAM(s) Oral at bedtime  ceFAZolin   IVPB 2000 milliGRAM(s) IV Intermittent every 8 hours  dextrose 5%. 1000 milliLiter(s) (50 mL/Hr) IV Continuous <Continuous>  heparin  flush 100 Units/mL Injectable 100 Unit(s) IV Push every other day  lisinopril 2.5 milliGRAM(s) Oral daily  melatonin 3 milliGRAM(s) Oral at bedtime  nystatin Powder 1 Application(s) Topical two times a day  pantoprazole    Tablet 40 milliGRAM(s) Oral two times a day before meals  tamsulosin 0.8 milliGRAM(s) Oral at bedtime    MEDICATIONS  (PRN):  acetaminophen  Suppository 650 milliGRAM(s) Rectal every 6 hours PRN For Temp greater than 38 C (100.4 F)  bisacodyl Suppository 10 milliGRAM(s) Rectal daily PRN Constipation  guaiFENesin    Syrup 100 milliGRAM(s) Oral every 6 hours PRN Cough  polyethylene glycol 3350 17 Gram(s) Oral daily PRN Constipation  simethicone 80 milliGRAM(s) Chew two times a day PRN Gas      ALLERGIES:  Allergies    No Known Allergies    Intolerances        LABS:                        7.9    7.5   )-----------( 150      ( 20 Oct 2017 06:25 )             25.2       RADIOLOGY & ADDITIONAL TESTS: Reviewed.

## 2017-10-20 NOTE — PROGRESS NOTE ADULT - PROBLEM SELECTOR PLAN 7
F no IVF  E- replete lytes prn  N- dash diet w/ 1.5 L fluid restriction     PPx - On ASA 81mg, SCD     CODE - DNR not DNI    Dispo: [facility] for RJ placement

## 2017-10-20 NOTE — PROGRESS NOTE ADULT - PROBLEM SELECTOR PLAN 1
Sepsis w/ persistent MSSA bacteremia, now resolved. Patient currently w/o leukocytosis, fever, w/ stable BP.  - c/w cefazolin 2 g q8   - Gallium scan negative for focal areas of infection, KELLY negative for endocarditis. Repeat TTE w/o evidence of new vegetations or worsening valve thickening.   - blood cx w/ no growth to date  - Patient will follow up with Dr. Clemente outpatient for further monitoring of his bacteremia Sepsis w/ persistent MSSA bacteremia, now resolved. Patient currently w/o leukocytosis, fever, w/ stable BP.  - c/w cefazolin 2 g q8 4 weeks total course.   - Gallium scan negative for focal areas of infection, KELLY negative for endocarditis. Repeat TTE w/o evidence of new vegetations or worsening valve thickening.   - blood cx w/ no growth to date  - Patient will follow up with Dr. Clemente outpatient for further monitoring of his bacteremia

## 2017-10-20 NOTE — PROGRESS NOTE ADULT - PROBLEM SELECTOR PLAN 3
HFrEF: TTE shows HFrEF with EF 45%, right atrial dilatation, septal wall motion abnormalities and mild hypokinesis of left ventricle. Unable to visualize any vegetations or R heart structures. Unable to calculate pulmonary a pressures.   - c/w ASA + lipitor  - d/c lasix, redose as needed  - consider metoprolol and lisinopril as tolerated once bleeding controlled and BP allows  - strict I/Os, daily weights, fluid restriction 1.5L  - acute onset of HFrEF; discussed case with Dr. Glass who agrees that patient should have ischemia workup outpatient for acute HFrEF and repeat echo to be done to assess for improvement in LV function.     #New onset paroxysmal A.fib Vs MAT:  - pt has remained in NSR since admission to 7 [facility]. To be followed up with outpatient cardiologist for further monitoring    #NSTEMI :  - EKG positive for ST depression in anteroseptal leads, Echo positive for septal hypokinesis, troponin +.   - Ischemia likely demand ischemia due to severe sepsis  - c/w ASA HFrEF: TTE shows HFrEF with EF 45%, right atrial dilatation, septal wall motion abnormalities and mild hypokinesis of left ventricle. Unable to visualize any vegetations or R heart structures. Unable to calculate pulmonary a pressures.   - c/w ASA + lipitor  - Lasix 40mg PO BID, give IV if needed.   - consider metoprolol and lisinopril as tolerated once bleeding controlled and BP allows  - strict I/Os, daily weights, fluid restriction 1.5L  - acute onset of HFrEF; discussed case with Dr. Glass who agrees that patient should have ischemia workup outpatient for acute HFrEF and repeat echo to be done to assess for improvement in LV function.     #New onset paroxysmal A.fib Vs MAT:  - pt has remained in NSR since admission to 7 [facility]. To be followed up with outpatient cardiologist for further monitoring    #NSTEMI :  - EKG positive for ST depression in anteroseptal leads, Echo positive for septal hypokinesis, troponin +.   - Ischemia likely demand ischemia due to severe sepsis  - c/w ASA HFrEF: TTE shows HFrEF with EF 45%, right atrial dilatation, septal wall motion abnormalities and mild hypokinesis of left ventricle. Unable to visualize any vegetations or R heart structures. Unable to calculate pulmonary a pressures.   - c/w ASA + lipitor  - Lasix 40mg PO BID, give IV if needed.   -Continue lisinopril 2.5 daily  -Consider starting metoprolol as tolerated once BP allows  - strict I/Os, daily weights, fluid restriction 1.5L  - acute onset of HFrEF; discussed case with Dr. Glass who agrees that patient should have ischemia workup outpatient for acute HFrEF and repeat echo to be done to assess for improvement in LV function.     #New onset paroxysmal A.fib Vs MAT:  - pt has remained in NSR since admission to 7 [facility]. To be followed up with outpatient cardiologist for further monitoring    #NSTEMI :  - EKG positive for ST depression in anteroseptal leads, Echo positive for septal hypokinesis, troponin +.   - Ischemia likely demand ischemia due to severe sepsis  - c/w ASA HFrEF: TTE shows HFrEF with EF 45%, right atrial dilatation, septal wall motion abnormalities and mild hypokinesis of left ventricle. Unable to visualize any vegetations or R heart structures. Unable to calculate pulmonary a pressures.   - c/w ASA + lipitor  - Lasix 40mg PO BID, give IV if needed.   -Continue lisinopril 2.5 daily  -Consider starting metoprolol as tolerated once BP allows  - strict I/Os, daily weights, fluid restriction 1.5L  - acute onset of HFrEF; discussed case with Dr. Glass who agrees that patient should have ischemia workup outpatient for acute HFrEF and repeat echo to be done to assess for improvement in LV function.     #New onset paroxysmal A.fib Vs MAT:  - pt has remained in NSR since admission to 7 [facility]. To be followed up with outpatient cardiologist for further monitoring    #NSTEMI :  - EKG positive for ST depression in anteroseptal leads, Echo positive for septal hypokinesis, troponin +.   - Ischemia likely demand ischemia due to severe sepsis  - c/w ASA  -Additional ischemic work-up as outpatient after discharge.

## 2017-10-20 NOTE — PROGRESS NOTE ADULT - PROBLEM SELECTOR PLAN 2
- c/w duonebs q 4   - encourage ambulation and incentive spirometry.   - Patient desaturated while walking to 86% w/o oxygen     # Possible sleep apnea  - work up sleep apnea outpatient w/ sleep study   - CPAP at night

## 2017-10-20 NOTE — PROGRESS NOTE ADULT - ASSESSMENT
Assessment	  73 yo male with hx of HTN, COPD, Prostate CA (s/p radiation and seed 2013 on leuprolide outpatient) who presents with worsening of SOB and an episode of unwitnessed syncope found to have severe sepsis with gram + cocci in clusters identified as S. aureus and ATN.  Hospital course complicated by rapid response for hypotension, unresponsive to painful stimuli and was transferred to MICU for septic shock.  Septic shock has resolved but pt has had persistent MSSA bacteremia with resolving respiratory failure and ATN.    ID  #Sepsis w/ persistent MSSA bacteremia  - patient w/o leukocytosis, fever, w/ stable BP  - Sepsis resolved  - c/w cefazolin 2 g q8   - Gallium scan negative for focal areas of infection, KELLY negative for endocarditis. Repeat TTE w/o evidence of new vegetations or worsening valve thickening.   - blood cx w/ no growth to date  - Patient will follow up with Dr. Clemente outpatient for further monitoring of his bacteremia     Respiratory     # Possible sleep apnea  - work up sleep apnea outpatient w/ sleep study     #COPD  - c/w duonebs q 4   - encourage ambulation and incentive spirometry.   - Patient desaturated while walking to 86% w/o oxygen     Cardiovascular  #New onset paroxysmal A.fib Vs MAT:  - pt has remained in NSR since admission to 7 la. To be followed up with outpatient cardiologist for further monitoring.    #HFrEF  -TTE shows HFrEF with EF 45%, right atrial dilatation, septal wall motion abnormalities and mild hypokinesis of left ventricle. Unable to visualize any vegetations or R heart structures. Unable to calculate pulmonary a pressures.   - c/w ASA + lipitor  - d/c lasix, redose as needed  - consider metoprolol and lisinopril as tolerated once bleeding controlled and BP allows  - strict I/Os, daily weights, fluid restriction 1.5L  - acute onset of HFrEF; discussed case with Dr. Glass who agrees that patient should have ischemia workup outpatient for acute HFrEF. Repeat echo to be done to assess for improvement in LV function.     #NSTEMI :  - EKG positive for ST depression in anteroseptal leads, Echo positive for septal hypokinesis, troponin +.   - Ischemia likely demand ischemia due to severe sepsis  - c/w ASA    Renal/  ATN-resolved  - ATN on admission w/ Cr now at baseline.     Hematuria w/ urinary retention  - Hematuria likely due to underlying prostate malignancy w/ recent glaser placement - possibly due to prostate inflammation/bleed.   - glaser w/o interruptions in flow, maintain glaser to be evaluated by urology once discharged.   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  -Thrombocytopenia resolved     GI    #ileus -resolved  - c/w PO agents for constipation    # Pigmented spot  - bleed likely due to ulcer now healed, H+H have been stable.  - PPI BID oral  - hemoglobin goal 8 hgb given recent NSTEMI   - daily cbc while pt admitted  - to follow up with Dr. Mayo outpatient on discharge      F no IVF  E- replete lytes prn  N- dash diet w/ 1.5 L fluid restriction     PPx - On ASA 81mg, SCD     CODE - DNR not DNI    Dispo: [facility] for RJ placement

## 2017-10-20 NOTE — PROGRESS NOTE ADULT - PROBLEM SELECTOR PLAN 5
#Prostate ca w/ mets to spine  Per oncologist continue to hold home meds.  -Thrombocytopenia resolved

## 2017-10-20 NOTE — PROGRESS NOTE ADULT - ASSESSMENT
A/recs:  1) acute hypoxic respiratory failure with elevated tn and abnormal septal wall motion on tte in addition to rv dilatation and abnormal ecg this admission; left heart failure (ef=45% by tte) - s/p egd on 10- for anemia/melena  a - nstemi earlier this admission - recommend repeat tn now (post-egd)  b - on asa/statin/ace-i  b - rec check with pulm re bb with copd  c -   d - per Dr. Gentile of Interventional Cardiology - recommend cardiac cta prior to discharge    2) paroxysmal atrial fibrillation  a - now off tele    3) htn - avoid labile bp    4) sepsis due to pna per ccm with bacteremia    5) possible copd exacerbation - rec pulm follow-up    6) acute renal failure - resolved    7) normocytic anemia and thrombocytopenia    8) shingles    9) prostate ca - now with karla hematuria - rec gu follow-up    10) K>4, Mg>2    11) palliative care following  discussed with housestaff

## 2017-10-20 NOTE — PROGRESS NOTE ADULT - PROBLEM SELECTOR PLAN 4
ATN-resolved  - ATN on admission w/ Cr now at baseline.     Hematuria w/ urinary retention  - Hematuria likely due to underlying prostate malignancy w/ recent glaser placement - possibly due to prostate inflammation/bleed.   - glaser w/o interruptions in flow, maintain glaser to be evaluated by urology once discharged.   - c/w tamsulosin .8 mg

## 2017-10-20 NOTE — PROGRESS NOTE ADULT - SUBJECTIVE AND OBJECTIVE BOX
PGY-1 Transfer Note--[address] [facility] to [address] [place] [address] Course: 72 year old male with a PMHx of HTN, COPD, Prostate CA (2013, s/p radiation and seed placement, on leuprolide with a recent elevation of PSA and imaging showing bone mets) presents from his heme/on office with worsening of SOB and episode syncope the morning of admission 10/4. Pt was admitted for severe sepsis w/ CATALINA and possible pneumonia. Pt empirically treated for CAP w/ ceftriaxone/azithromycin/vancomycin and placed on BiPAP. Rapid response was called 10/5 for respiratory failure and hypotension once it was discovered that the patient removed his BiPaP on the floor. Patient stepped up to the MICU and started on peripheral levophed for closer monitoring. Patient intubated that PM for airway protection. In the MICU had OGT, right IJ with left A-line placed. Pt's ABX broadened to Zosyn and Vancomycin (dosed by level). OG tube was placed for decompression of previous ileus seen and abdominal distention. Blood Cx came back positive for MSSA w/ persistent bacteremia. Patient transitioned to nafcillin for MSSA coverage. Pt also had an ECHO (TTE) which showed HFrEF with EF 45%, right atrial dilatation, septal wall motion abnormalities and mild hypokinesis of left ventricle. KELLY which came back negative for any vegetation and repeat TTE negative for vegetations or worsening valvular dysfunction. Troponin positive w/ EKG changes concerning for demand ischemia in setting of septic shock. 10/6 patient came off propofol and levophed. He was successfully extubated to nasal cannula. Patient was transferred to [facility] for further monitoring and to work on discharge planning. While on 7 [facility] pt developing upper GI bleed requiring transfusion of 3 U PRBC w/ endoscopy showing pigmented spot. Treatement with BID protonix initiated and patient to follow up with GI on November 13 for further management. Additionally patient developed hematuria and urinary retention likely 2/2 to traumatic glaser catheter w/ friable prostate due to prostate CA. Patient initially had glaser removed once starting flomax but had repeat episode of urinary retention. Glaser was placed and follow up with urology was made to assess when to remove glaser after discharge.  Patient evaluated by physical therapy who recommended that he go to [facility] but pt wanted to go home with home PT. When ambulating w/o oxygen patient's saturations dropped as low as 86% and arrangements were made for patient to have home oxygen. After discussion with patient's cardiologist the decision was made to proceed with work up for ischemic heart disease as an outpatient. Patient was discharged w/ follow up plans and medications to [facility]. At time of discharge patient was hemodynamically stable and saturating well on nasal cannula. Patient stepped down for arrangement for RJ placement.     SUBJECTIVE / INTERVAL HPI: Patient seen and examined at bedside. He reports feeling well, denies any f/c, CP, SOB, or abdominal pain. He reports his BM are still dark, but denies any bright red blood (also reports being color blind).    VITAL SIGNS:  Vital Signs Last 24 Hrs  T(C): 36.8 (20 Oct 2017 13:55), Max: 37.2 (19 Oct 2017 18:25)  T(F): 98.3 (20 Oct 2017 13:55), Max: 99 (19 Oct 2017 18:25)  HR: 76 (20 Oct 2017 09:35) (67 - 76)  BP: 105/51 (20 Oct 2017 09:35) (89/50 - 128/51)  BP(mean): 74 (20 Oct 2017 09:35) (70 - 75)  RR: 18 (20 Oct 2017 09:35) (18 - 32)  SpO2: 96% (20 Oct 2017 09:35) (94% - 100%)    PHYSICAL EXAM:    General: WDWN, sitting up in chair in NAD  HEENT: NC/AT; anicteric sclera; MMM  Neck: supple  Cardiovascular: +S1/S2; RRR; no mr/r/g.   Respiratory: Bibasilar rales, no wheezing or rhonchi. Non-labored breathing on NC.   Gastrointestinal: soft, NT/ND; no guarding, +BS  Extremities: WWP; 2+ edema to knee. + chronic venous stasis changes of b/l LE.     MEDICATIONS:  MEDICATIONS  (STANDING):  ALBUTerol/ipratropium for Nebulization 3 milliLiter(s) Nebulizer every 4 hours  aspirin  chewable 81 milliGRAM(s) Oral daily  atorvastatin 80 milliGRAM(s) Oral at bedtime  ceFAZolin   IVPB 2000 milliGRAM(s) IV Intermittent every 8 hours  dextrose 5%. 1000 milliLiter(s) (50 mL/Hr) IV Continuous <Continuous>  heparin  flush 100 Units/mL Injectable 100 Unit(s) IV Push every other day  lisinopril 2.5 milliGRAM(s) Oral daily  melatonin 3 milliGRAM(s) Oral at bedtime  nystatin Powder 1 Application(s) Topical two times a day  pantoprazole    Tablet 40 milliGRAM(s) Oral two times a day before meals  tamsulosin 0.8 milliGRAM(s) Oral at bedtime    MEDICATIONS  (PRN):  polyethylene glycol 3350 17 Gram(s) Oral daily PRN Constipation      ALLERGIES:  Allergies    No Known Allergies    Intolerances      LABS:                        7.9    7.5   )-----------( 150      ( 20 Oct 2017 06:25 )             25.2           RADIOLOGY & ADDITIONAL TESTS: Reviewed. PGY-1 Transfer Note--7 [facility] to [address] [place] [address] Course: 72 year old male with a PMHx of HTN, COPD, Prostate CA (2013, s/p radiation and seed placement, on leuprolide with a recent elevation of PSA and imaging showing bone mets) presents from his heme/on office with worsening of SOB and episode syncope the morning of admission 10/4. Pt was admitted for severe sepsis w/ CATALINA and possible pneumonia. Pt empirically treated for CAP w/ ceftriaxone/azithromycin/vancomycin and placed on BiPAP. Rapid response was called 10/5 for respiratory failure and hypotension once it was discovered that the patient removed his BiPaP on the floor. Patient stepped up to the MICU and started on peripheral levophed for closer monitoring. Patient intubated that PM for airway protection. In the MICU had OGT, right IJ with left A-line placed. Pt's ABX broadened to Zosyn and Vancomycin (dosed by level). OG tube was placed for decompression of previous ileus seen and abdominal distention. Blood Cx came back positive for MSSA w/ persistent bacteremia. Patient transitioned to nafcillin for MSSA coverage. Pt also had an ECHO (TTE) which showed HFrEF with EF 45%, right atrial dilatation, septal wall motion abnormalities and mild hypokinesis of left ventricle. KELLY which came back negative for any vegetation and repeat TTE negative for vegetations or worsening valvular dysfunction. Troponin positive w/ EKG changes concerning for demand ischemia in setting of septic shock. 10/6 patient came off propofol and levophed. He was successfully extubated to nasal cannula. Patient was transferred to [facility] for further monitoring and to work on discharge planning. While on 7 La pt developing upper GI bleed requiring transfusion of 3 U PRBC w/ endoscopy showing pigmented spot. Treatement with BID protonix initiated and patient to follow up with GI on November 13 for further management. Additionally patient developed hematuria and urinary retention likely 2/2 to traumatic glaser catheter w/ friable prostate due to prostate CA. Patient initially had glaser removed once starting flomax but had repeat episode of urinary retention. Glaser was placed and follow up with urology was made to assess when to remove glaser after discharge.  Patient evaluated by physical therapy who recommended that he go to [facility] but pt wanted to go home with home PT. When ambulating w/o oxygen patient's saturations dropped as low as 86% and arrangements were made for patient to have home oxygen. After discussion with patient's cardiologist the decision was made to proceed with work up for ischemic heart disease as an outpatient. However patient changed mind and decided on RJ after all. Patient stepped down for arrangement for RJ placement at [facility] pending insurance authorization.     SUBJECTIVE / INTERVAL HPI: Patient seen and examined at bedside. He reports feeling well, denies any f/c, CP, SOB, or abdominal pain. He reports his BM are still dark, but denies any bright red blood (also reports being color blind).    VITAL SIGNS:  Vital Signs Last 24 Hrs  T(C): 36.8 (20 Oct 2017 13:55), Max: 37.2 (19 Oct 2017 18:25)  T(F): 98.3 (20 Oct 2017 13:55), Max: 99 (19 Oct 2017 18:25)  HR: 76 (20 Oct 2017 09:35) (67 - 76)  BP: 105/51 (20 Oct 2017 09:35) (89/50 - 128/51)  BP(mean): 74 (20 Oct 2017 09:35) (70 - 75)  RR: 18 (20 Oct 2017 09:35) (18 - 32)  SpO2: 96% (20 Oct 2017 09:35) (94% - 100%)    PHYSICAL EXAM:    General: WDWN, sitting up in chair in NAD  HEENT: NC/AT; anicteric sclera; MMM  Neck: supple  Cardiovascular: +S1/S2; RRR; no mr/r/g.   Respiratory: Bibasilar rales, no wheezing or rhonchi. Non-labored breathing on NC.   Gastrointestinal: soft, NT/ND; no guarding, +BS  Extremities: WWP; 2+ edema to knee. + chronic venous stasis changes of b/l LE.     MEDICATIONS:  MEDICATIONS  (STANDING):  ALBUTerol/ipratropium for Nebulization 3 milliLiter(s) Nebulizer every 4 hours  aspirin  chewable 81 milliGRAM(s) Oral daily  atorvastatin 80 milliGRAM(s) Oral at bedtime  ceFAZolin   IVPB 2000 milliGRAM(s) IV Intermittent every 8 hours  dextrose 5%. 1000 milliLiter(s) (50 mL/Hr) IV Continuous <Continuous>  heparin  flush 100 Units/mL Injectable 100 Unit(s) IV Push every other day  lisinopril 2.5 milliGRAM(s) Oral daily  melatonin 3 milliGRAM(s) Oral at bedtime  nystatin Powder 1 Application(s) Topical two times a day  pantoprazole    Tablet 40 milliGRAM(s) Oral two times a day before meals  tamsulosin 0.8 milliGRAM(s) Oral at bedtime    MEDICATIONS  (PRN):  polyethylene glycol 3350 17 Gram(s) Oral daily PRN Constipation      ALLERGIES:  Allergies    No Known Allergies    Intolerances      LABS:                        7.9    7.5   )-----------( 150      ( 20 Oct 2017 06:25 )             25.2           RADIOLOGY & ADDITIONAL TESTS: Reviewed.

## 2017-10-20 NOTE — PROGRESS NOTE ADULT - ATTENDING COMMENTS
Pt seen and examined, no complaints, VSS, exam as above, labs reviewed.  Agree with plan as above. For RJ.

## 2017-10-21 LAB
ANION GAP SERPL CALC-SCNC: 15 MMOL/L — SIGNIFICANT CHANGE UP (ref 5–17)
BUN SERPL-MCNC: 16 MG/DL — SIGNIFICANT CHANGE UP (ref 7–23)
CALCIUM SERPL-MCNC: 9.3 MG/DL — SIGNIFICANT CHANGE UP (ref 8.4–10.5)
CHLORIDE SERPL-SCNC: 96 MMOL/L — SIGNIFICANT CHANGE UP (ref 96–108)
CO2 SERPL-SCNC: 31 MMOL/L — SIGNIFICANT CHANGE UP (ref 22–31)
CREAT SERPL-MCNC: 1.1 MG/DL — SIGNIFICANT CHANGE UP (ref 0.5–1.3)
GLUCOSE SERPL-MCNC: 107 MG/DL — HIGH (ref 70–99)
HCT VFR BLD CALC: 25.2 % — LOW (ref 39–50)
HGB BLD-MCNC: 7.8 G/DL — LOW (ref 13–17)
MAGNESIUM SERPL-MCNC: 2 MG/DL — SIGNIFICANT CHANGE UP (ref 1.6–2.6)
MCHC RBC-ENTMCNC: 30.5 PG — SIGNIFICANT CHANGE UP (ref 27–34)
MCHC RBC-ENTMCNC: 31 G/DL — LOW (ref 32–36)
MCV RBC AUTO: 98.4 FL — SIGNIFICANT CHANGE UP (ref 80–100)
PLATELET # BLD AUTO: 155 K/UL — SIGNIFICANT CHANGE UP (ref 150–400)
POTASSIUM SERPL-MCNC: 3.3 MMOL/L — LOW (ref 3.5–5.3)
POTASSIUM SERPL-SCNC: 3.3 MMOL/L — LOW (ref 3.5–5.3)
RBC # BLD: 2.56 M/UL — LOW (ref 4.2–5.8)
RBC # FLD: 17.3 % — HIGH (ref 10.3–16.9)
SODIUM SERPL-SCNC: 142 MMOL/L — SIGNIFICANT CHANGE UP (ref 135–145)
WBC # BLD: 7.2 K/UL — SIGNIFICANT CHANGE UP (ref 3.8–10.5)
WBC # FLD AUTO: 7.2 K/UL — SIGNIFICANT CHANGE UP (ref 3.8–10.5)

## 2017-10-21 PROCEDURE — 99233 SBSQ HOSP IP/OBS HIGH 50: CPT

## 2017-10-21 RX ORDER — POTASSIUM CHLORIDE 20 MEQ
40 PACKET (EA) ORAL EVERY 4 HOURS
Qty: 0 | Refills: 0 | Status: COMPLETED | OUTPATIENT
Start: 2017-10-21 | End: 2017-10-21

## 2017-10-21 RX ORDER — SIMETHICONE 80 MG/1
80 TABLET, CHEWABLE ORAL THREE TIMES A DAY
Qty: 0 | Refills: 0 | Status: DISCONTINUED | OUTPATIENT
Start: 2017-10-21 | End: 2017-11-03

## 2017-10-21 RX ORDER — POTASSIUM CHLORIDE 20 MEQ
40 PACKET (EA) ORAL EVERY 4 HOURS
Qty: 0 | Refills: 0 | Status: DISCONTINUED | OUTPATIENT
Start: 2017-10-21 | End: 2017-10-21

## 2017-10-21 RX ADMIN — NYSTATIN CREAM 1 APPLICATION(S): 100000 CREAM TOPICAL at 18:51

## 2017-10-21 RX ADMIN — Medication 81 MILLIGRAM(S): at 11:14

## 2017-10-21 RX ADMIN — Medication 3 MILLILITER(S): at 18:50

## 2017-10-21 RX ADMIN — Medication 40 MILLIGRAM(S): at 06:23

## 2017-10-21 RX ADMIN — Medication 3 MILLILITER(S): at 11:15

## 2017-10-21 RX ADMIN — Medication 40 MILLIEQUIVALENT(S): at 13:42

## 2017-10-21 RX ADMIN — PANTOPRAZOLE SODIUM 40 MILLIGRAM(S): 20 TABLET, DELAYED RELEASE ORAL at 16:50

## 2017-10-21 RX ADMIN — ATORVASTATIN CALCIUM 80 MILLIGRAM(S): 80 TABLET, FILM COATED ORAL at 22:08

## 2017-10-21 RX ADMIN — NYSTATIN CREAM 1 APPLICATION(S): 100000 CREAM TOPICAL at 07:03

## 2017-10-21 RX ADMIN — PANTOPRAZOLE SODIUM 40 MILLIGRAM(S): 20 TABLET, DELAYED RELEASE ORAL at 06:23

## 2017-10-21 RX ADMIN — Medication 3 MILLILITER(S): at 13:43

## 2017-10-21 RX ADMIN — Medication 3 MILLILITER(S): at 22:08

## 2017-10-21 RX ADMIN — LISINOPRIL 2.5 MILLIGRAM(S): 2.5 TABLET ORAL at 06:23

## 2017-10-21 RX ADMIN — Medication 100 MILLIGRAM(S): at 18:50

## 2017-10-21 RX ADMIN — SIMETHICONE 80 MILLIGRAM(S): 80 TABLET, CHEWABLE ORAL at 15:19

## 2017-10-21 RX ADMIN — Medication 40 MILLIEQUIVALENT(S): at 18:50

## 2017-10-21 RX ADMIN — Medication 100 MILLIGRAM(S): at 11:15

## 2017-10-21 RX ADMIN — Medication 3 MILLILITER(S): at 02:22

## 2017-10-21 RX ADMIN — TAMSULOSIN HYDROCHLORIDE 0.8 MILLIGRAM(S): 0.4 CAPSULE ORAL at 22:08

## 2017-10-21 RX ADMIN — Medication 40 MILLIGRAM(S): at 18:50

## 2017-10-21 RX ADMIN — Medication 100 MILLIGRAM(S): at 02:22

## 2017-10-21 NOTE — PROGRESS NOTE ADULT - SUBJECTIVE AND OBJECTIVE BOX
Cardiology for Preister    cc: follow-up cardiology evaluation and management of left heart failure    interval - no complaints    PAST MEDICAL & SURGICAL HISTORY:  COPD (chronic obstructive pulmonary disease)  Hypertension  Obstructive sleep apnea syndrome  Prostate cancer metastatic to bone  PC (prostate cancer): with seed placement    MEDICATIONS  (STANDING):  ALBUTerol/ipratropium for Nebulization 3 milliLiter(s) Nebulizer every 4 hours  aspirin  chewable 81 milliGRAM(s) Oral daily  atorvastatin 80 milliGRAM(s) Oral at bedtime  ceFAZolin   IVPB 2000 milliGRAM(s) IV Intermittent every 8 hours  dextrose 5%. 1000 milliLiter(s) (50 mL/Hr) IV Continuous <Continuous>  furosemide    Tablet 40 milliGRAM(s) Oral every 12 hours  heparin  flush 100 Units/mL Injectable 100 Unit(s) IV Push every other day  lisinopril 2.5 milliGRAM(s) Oral daily  melatonin 3 milliGRAM(s) Oral at bedtime  nystatin Powder 1 Application(s) Topical two times a day  pantoprazole    Tablet 40 milliGRAM(s) Oral two times a day before meals  tamsulosin 0.8 milliGRAM(s) Oral at bedtime    MEDICATIONS  (PRN):  polyethylene glycol 3350 17 Gram(s) Oral daily PRN Constipation  simethicone 80 milliGRAM(s) Chew three times a day PRN Gas    Vital Signs Last 24 Hrs  T(C): 36.4 (21 Oct 2017 20:41), Max: 37.4 (21 Oct 2017 17:35)  T(F): 97.6 (21 Oct 2017 20:41), Max: 99.4 (21 Oct 2017 17:35)  HR: 68 (21 Oct 2017 20:41) (65 - 76)  BP: 110/70 (21 Oct 2017 20:41) (102/64 - 110/70)  BP(mean): --  RR: 18 (21 Oct 2017 20:41) (16 - 20)  SpO2: 97% (21 Oct 2017 20:41) (97% - 100%)    physical exam  nad  supine  anicteric  decreased breath sounds bases  rr; s1/s2 present; no new murmur  soft abd; nd  le edema present  le pulses present      labs                        7.8    7.2   )-----------( 155      ( 21 Oct 2017 08:27 )             25.2   10-21    142  |  96  |  16  ----------------------------<  107<H>  3.3<L>   |  31  |  1.10    Ca    9.3      21 Oct 2017 08:27  Mg     2.0     10-21        I&O's Summary    20 Oct 2017 07:01  -  21 Oct 2017 07:00  --------------------------------------------------------  IN: 100 mL / OUT: 950 mL / NET: -850 mL    21 Oct 2017 07:01  -  21 Oct 2017 22:13  --------------------------------------------------------  IN: 100 mL / OUT: 900 mL / NET: -800 mL      < from: Echocardiogram (10.16.17 @ 10:31) >    EXAM:  ECHOCARDIOGRAM (CARDIOL)                          PROCEDURE DATE:  10/16/2017                        INTERPRETATION:  Patient Height: 172.0 cm  Patient Weight: 103.0 kg  Heart Rate: 74 bpm  Systolic Pressure: 140 mmHg  Diastolic Pressure: 70mmHg  BSA: 2.2 m^2  Interpretation Summary  The left ventricle is mildly dilated.Hypokinesis of the basal inferior and   septal region.The left ventricular ejection fraction is estimated to be   45-50%The mitral inflow pattern is consistent with impaired left   ventricular   relaxation with mildly elevated left atrial pressure (8-14mmHg).  The   left   atrial size is normal. The right atrium is dilated.The right ventricle is   moderately dilated. The right ventricular systolic function is mildly   reduced.    There is mild aortic valve thickening.There is mild mitral valve   thickening.   There is mild mitral regurgitation.There is trace tricuspid   regurgitation.There was insufficient TR detected from which to calculate   pulmonary artery systolic pressure.  No aortic root dilatation.The   inferior   vena cava is normal in size (<2.1 cm) with normal inspiratory collapse   (>50%)   consistent with normal right atrial pressure.  There is no pericardial   effusion.    < end of copied text >  IN: 650 mL / OUT: 2875 mL / NET: -2225 mL          < from: Xray Chest 1 View AP -PORTABLE-Routine (10.09.17 @ 05:37) >  EXAM:  XR CHEST 1 VIEW PORT ROUTINE                          PROCEDURE DATE:  10/09/2017                     INTERPRETATION:    Portable AP Radiograph dated 10/9/2017 5:37 AM    CLINICAL INFORMATION: 72 years, Male, Intubated, PNA    PRIOR STUDIES:October 8, 2017    FINDINGS: Support tubes and lines are unchanged. Left pleural effusion is   stable. Right pleural effusion has increased. Pulmonary vascular   congestion has increased.    IMPRESSION:  Interval increase of right pleural effusion and pulmonary vascular   congestion.            "Thank you for the opportunity to participate in the care of this   patient."        KATHY PABLO M.D., RADIOLOGY ATTENDING  This document has been electronically signed. Oct  9 2017  8:33AM    < end of copied text >

## 2017-10-21 NOTE — PROGRESS NOTE ADULT - PROBLEM SELECTOR PLAN 1
Likely multifactorial, eg. bleeding from bladder and the GI tract, underling infection, prostate cancer involving bone marrow.

## 2017-10-21 NOTE — PROGRESS NOTE ADULT - SUBJECTIVE AND OBJECTIVE BOX
Patient is a 72y old  Male who presents with a chief complaint of Septic shock due to bactermemia of unclear etiology, possibly endocarditis.     INTERVAL HPI/OVERNIGHT EVENTS: none, pt reports feeling well, good appetite,     Review of Systems: 12 point review of systems otherwise negative  ( - )fevers/chills  ( - ) dyspnea  ( - ) cough  ( - ) chest pain  ( - ) palpatations  ( - ) dizziness/lightheadedness  ( - ) nausea/vomiting  ( - ) abd pain  ( - ) diarrhea  ( - ) melena  ( - ) hematochezia  ( - ) dysuria  ( - ) hematuria  ( - ) leg swelling  ( -) calf tenderness  ( - ) motor weakness  ( - ) extremity numbness  ( - ) back pain  ( + ) tolerating POs  ( + ) BM    MEDICATIONS  (STANDING):  ALBUTerol/ipratropium for Nebulization 3 milliLiter(s) Nebulizer every 4 hours  aspirin  chewable 81 milliGRAM(s) Oral daily  atorvastatin 80 milliGRAM(s) Oral at bedtime  ceFAZolin   IVPB 2000 milliGRAM(s) IV Intermittent every 8 hours  dextrose 5%. 1000 milliLiter(s) (50 mL/Hr) IV Continuous <Continuous>  furosemide    Tablet 40 milliGRAM(s) Oral every 12 hours  heparin  flush 100 Units/mL Injectable 100 Unit(s) IV Push every other day  lisinopril 2.5 milliGRAM(s) Oral daily  melatonin 3 milliGRAM(s) Oral at bedtime  nystatin Powder 1 Application(s) Topical two times a day  pantoprazole    Tablet 40 milliGRAM(s) Oral two times a day before meals  potassium chloride   Powder 40 milliEquivalent(s) Oral every 4 hours  tamsulosin 0.8 milliGRAM(s) Oral at bedtime    MEDICATIONS  (PRN):  polyethylene glycol 3350 17 Gram(s) Oral daily PRN Constipation      Allergies    No Known Allergies    Intolerances          Vital Signs Last 24 Hrs  T(C): 36.8 (21 Oct 2017 09:50), Max: 36.8 (20 Oct 2017 17:30)  T(F): 98.2 (21 Oct 2017 09:50), Max: 98.2 (20 Oct 2017 17:30)  HR: 76 (21 Oct 2017 09:50) (66 - 76)  BP: 102/64 (21 Oct 2017 09:50) (98/58 - 116/70)  BP(mean): --  RR: 19 (21 Oct 2017 09:50) (19 - 22)  SpO2: 98% (21 Oct 2017 09:50) (96% - 99%)  CAPILLARY BLOOD GLUCOSE          10-20 @ 07:01  -  10-21 @ 07:00  --------------------------------------------------------  IN: 100 mL / OUT: 950 mL / NET: -850 mL        Physical Exam:    Daily     Daily   General:  Well appearing, NAD, not cachetic  HEENT:  Nonicteric, PERRLA  CV:  RRR, no murmur, no JVD  Lungs:  CTA B/L, no wheezes, rales, rhonchi  Abdomen:  Soft, non-tender, no distended, positive BS, no hepatosplenomegaly  Extremities:  2+ pulses, no c/c, 3+ lower extremity edema bilaterally  Skin:  Warm and dry, no rashes  :  No glaser  Neuro:  AAOx3, non-focal, CN II-XII grossly intact  No Restraints    LABS:                        7.8    7.2   )-----------( 155      ( 21 Oct 2017 08:27 )             25.2     10-21    142  |  96  |  16  ----------------------------<  107<H>  3.3<L>   |  31  |  1.10    Ca    9.3      21 Oct 2017 08:27  Mg     2.0     10-21

## 2017-10-21 NOTE — PROGRESS NOTE ADULT - ATTENDING COMMENTS
The patient is now on a regular floor. He offers no new complaints. He is encouraged to eat and to be OOB.

## 2017-10-21 NOTE — PROGRESS NOTE ADULT - ATTENDING COMMENTS
71 yo M with presenting for septic shock with bacteremia.    Pt will need 4-6 weeks IV abx per ID service,  Can continue ancef  pt has hypokalemia, repleted with oral supplements.   pt has prostate CA, requires glaser  pt has COPD -will continue duonebs  pt has CHF with EF of 45-50%, will continue lasix, lisinopril, metoprolol.  will consider IV lasix today for lower extremity edema  pt has regular heart rate today, afib currently rate controlled  regular diet,

## 2017-10-21 NOTE — PROGRESS NOTE ADULT - ASSESSMENT
A/recs:  1) acute hypoxic respiratory failure with elevated tn and abnormal septal wall motion on tte in addition to rv dilatation and abnormal ecg this admission; left heart failure (ef=45% by tte) - s/p egd on 10- for anemia/melena  a - nstemi earlier this admission - repeat tn still not completed (post-egd)  b - remains on asa/statin/ace-i  c - rec pulm to consider beta-blocker  d - check ecg   e - per Dr. Gentile of Interventional Cardiology - recommend cardiac cta prior to discharge    2) paroxysmal atrial fibrillation  a - monitor    3) htn - monitor on rx    4) sepsis due to pna per ccm with bacteremia - rec ID consult follow-up    5) possible copd exacerbation - rec pulm    6) acute renal failure - resolved    7) normocytic anemia and thrombocytopenia - serial cbc    8) shingles    9) prostate ca - now with karla hematuria - per gu and hem-onc    10) K>4, Mg>2    11) palliative care following

## 2017-10-21 NOTE — PROGRESS NOTE ADULT - SUBJECTIVE AND OBJECTIVE BOX
Now off a monitored unit. Sitting up in the bedside chair. Notes some congestion in his chest. He is able to clear it and the secretions are clear in color. He denies having any pain. A glaser cath remains in place and there is blood in the urine.    CHEMOTHERAPY REGIMEN:        Day:                          Diet:  Protocol:                                    IVF:      MEDICATIONS  (STANDING):  ALBUTerol/ipratropium for Nebulization 3 milliLiter(s) Nebulizer every 4 hours  aspirin  chewable 81 milliGRAM(s) Oral daily  atorvastatin 80 milliGRAM(s) Oral at bedtime  ceFAZolin   IVPB 2000 milliGRAM(s) IV Intermittent every 8 hours  dextrose 5%. 1000 milliLiter(s) (50 mL/Hr) IV Continuous <Continuous>  furosemide    Tablet 40 milliGRAM(s) Oral every 12 hours  heparin  flush 100 Units/mL Injectable 100 Unit(s) IV Push every other day  lisinopril 2.5 milliGRAM(s) Oral daily  melatonin 3 milliGRAM(s) Oral at bedtime  nystatin Powder 1 Application(s) Topical two times a day  pantoprazole    Tablet 40 milliGRAM(s) Oral two times a day before meals  tamsulosin 0.8 milliGRAM(s) Oral at bedtime    MEDICATIONS  (PRN):  polyethylene glycol 3350 17 Gram(s) Oral daily PRN Constipation      Allergies    No Known Allergies    Intolerances        DVT Prophylaxis: [ ] YES [x ] NO      Antibiotics: [x ] YES [ ] NO    Pain Scale (1-10):       Location:    Vital Signs Last 24 Hrs  T(C): 36.7 (21 Oct 2017 05:40), Max: 37.1 (20 Oct 2017 09:49)  T(F): 98.1 (21 Oct 2017 05:40), Max: 98.7 (20 Oct 2017 09:49)  HR: 69 (21 Oct 2017 05:40) (66 - 80)  BP: 109/73 (21 Oct 2017 05:40) (98/58 - 116/70)  BP(mean): 74 (20 Oct 2017 09:35) (74 - 74)  RR: 20 (21 Oct 2017 05:40) (18 - 22)  SpO2: 98% (21 Oct 2017 05:40) (96% - 99%)    Drug Dosing Weight  Height (cm): 172.72 (05 Oct 2017 01:27)  Weight (kg): 103.4 (12 Oct 2017 09:45)  BMI (kg/m2): 34.7 (12 Oct 2017 09:45)  BSA (m2): 2.16 (12 Oct 2017 09:45)    PHYSICAL EXAM:      Constitutional: comfortable.    Eyes: conjunctiva pink.    ENMT: NC in place. Buccal mucosa moist.    Neck: no masses.        Back: no SPT.    Respiratory: decreased breath sound diffusely.    Cardiovascular: S1>S2 at apex. Rhythm is irregular this am.    Gastrointestinal: soft, nontender, active bowel sounds.    Genitourinary: cath remains in place. There is some blood in the urine.          Extremities: leg edema.        Neurological: no gross focal deficits.    Skin: warm and dry.    Lymph Nodes: none palp.    Musculoskeletal: full ROM.    Psychiatric: affect normal.        URINARY CATHETER: [x ] YES [ ] NO     LABS:  CBC Full  -  ( 21 Oct 2017 08:27 )  WBC Count : 7.2 K/uL  Hemoglobin : 7.8 g/dL  Hematocrit : 25.2 %  Platelet Count - Automated : 155 K/uL  Mean Cell Volume : 98.4 fL  Mean Cell Hemoglobin : 30.5 pg  Mean Cell Hemoglobin Concentration : 31.0 g/dL  Auto Neutrophil # : x  Auto Lymphocyte # : x  Auto Monocyte # : x  Auto Eosinophil # : x  Auto Basophil # : x  Auto Neutrophil % : x  Auto Lymphocyte % : x  Auto Monocyte % : x  Auto Eosinophil % : x  Auto Basophil % : x                CULTURES:    RADIOLOGY & ADDITIONAL STUDIES: Now off a monitored unit. Sitting up in the bedside chair. Notes some congestion in his chest. He is able to clear it and the secretions are clear in color. He denies having any pain. A glaser cath remains in place and there is blood in the urine. His stools remain dark.    CHEMOTHERAPY REGIMEN:        Day:                          Diet:  Protocol:                                    IVF:      MEDICATIONS  (STANDING):  ALBUTerol/ipratropium for Nebulization 3 milliLiter(s) Nebulizer every 4 hours  aspirin  chewable 81 milliGRAM(s) Oral daily  atorvastatin 80 milliGRAM(s) Oral at bedtime  ceFAZolin   IVPB 2000 milliGRAM(s) IV Intermittent every 8 hours  dextrose 5%. 1000 milliLiter(s) (50 mL/Hr) IV Continuous <Continuous>  furosemide    Tablet 40 milliGRAM(s) Oral every 12 hours  heparin  flush 100 Units/mL Injectable 100 Unit(s) IV Push every other day  lisinopril 2.5 milliGRAM(s) Oral daily  melatonin 3 milliGRAM(s) Oral at bedtime  nystatin Powder 1 Application(s) Topical two times a day  pantoprazole    Tablet 40 milliGRAM(s) Oral two times a day before meals  tamsulosin 0.8 milliGRAM(s) Oral at bedtime    MEDICATIONS  (PRN):  polyethylene glycol 3350 17 Gram(s) Oral daily PRN Constipation      Allergies    No Known Allergies    Intolerances        DVT Prophylaxis: [ ] YES [x ] NO      Antibiotics: [x ] YES [ ] NO    Pain Scale (1-10):       Location:    Vital Signs Last 24 Hrs  T(C): 36.7 (21 Oct 2017 05:40), Max: 37.1 (20 Oct 2017 09:49)  T(F): 98.1 (21 Oct 2017 05:40), Max: 98.7 (20 Oct 2017 09:49)  HR: 69 (21 Oct 2017 05:40) (66 - 80)  BP: 109/73 (21 Oct 2017 05:40) (98/58 - 116/70)  BP(mean): 74 (20 Oct 2017 09:35) (74 - 74)  RR: 20 (21 Oct 2017 05:40) (18 - 22)  SpO2: 98% (21 Oct 2017 05:40) (96% - 99%)    Drug Dosing Weight  Height (cm): 172.72 (05 Oct 2017 01:27)  Weight (kg): 103.4 (12 Oct 2017 09:45)  BMI (kg/m2): 34.7 (12 Oct 2017 09:45)  BSA (m2): 2.16 (12 Oct 2017 09:45)    PHYSICAL EXAM:      Constitutional: comfortable.  Eyes: conjunctiva pink.  ENMT: NC in place. Buccal mucosa moist.  Neck: no masses.  Back: no SPT.  Respiratory: decreased breath sounds diffusely.  Cardiovascular: S1>S2 at apex. Rhythm is irregular this am.  Gastrointestinal: soft, nontender, active bowel sounds  .Genitourinary: cath remains in place. There is some blood in the urine.  Extremities: leg edema.  Neurological: no gross focal deficits.  Skin: warm and dry.  Lymph Nodes: none palp.  Musculoskeletal: full ROM.  Psychiatric: affect normal.        URINARY CATHETER: [x ] YES [ ] NO     LABS:  CBC Full  -  ( 21 Oct 2017 08:27 )  WBC Count : 7.2 K/uL  Hemoglobin : 7.8 g/dL  Hematocrit : 25.2 %  Platelet Count - Automated : 155 K/uL  Mean Cell Volume : 98.4 fL  Mean Cell Hemoglobin : 30.5 pg  Mean Cell Hemoglobin Concentration : 31.0 g/dL  Auto Neutrophil # : x  Auto Lymphocyte # : x  Auto Monocyte # : x  Auto Eosinophil # : x  Auto Basophil # : x  Auto Neutrophil % : x  Auto Lymphocyte % : x  Auto Monocyte % : x  Auto Eosinophil % : x  Auto Basophil % : x                CULTURES:    RADIOLOGY & ADDITIONAL STUDIES:

## 2017-10-22 LAB
ANION GAP SERPL CALC-SCNC: 14 MMOL/L — SIGNIFICANT CHANGE UP (ref 5–17)
BLD GP AB SCN SERPL QL: NEGATIVE — SIGNIFICANT CHANGE UP
BUN SERPL-MCNC: 15 MG/DL — SIGNIFICANT CHANGE UP (ref 7–23)
CALCIUM SERPL-MCNC: 9.1 MG/DL — SIGNIFICANT CHANGE UP (ref 8.4–10.5)
CHLORIDE SERPL-SCNC: 98 MMOL/L — SIGNIFICANT CHANGE UP (ref 96–108)
CO2 SERPL-SCNC: 28 MMOL/L — SIGNIFICANT CHANGE UP (ref 22–31)
CREAT SERPL-MCNC: 1.08 MG/DL — SIGNIFICANT CHANGE UP (ref 0.5–1.3)
GLUCOSE SERPL-MCNC: 96 MG/DL — SIGNIFICANT CHANGE UP (ref 70–99)
HCT VFR BLD CALC: 23.8 % — LOW (ref 39–50)
HGB BLD-MCNC: 7.2 G/DL — LOW (ref 13–17)
MAGNESIUM SERPL-MCNC: 1.9 MG/DL — SIGNIFICANT CHANGE UP (ref 1.6–2.6)
MCHC RBC-ENTMCNC: 30.1 PG — SIGNIFICANT CHANGE UP (ref 27–34)
MCHC RBC-ENTMCNC: 30.3 G/DL — LOW (ref 32–36)
MCV RBC AUTO: 99.6 FL — SIGNIFICANT CHANGE UP (ref 80–100)
PLATELET # BLD AUTO: 142 K/UL — LOW (ref 150–400)
POTASSIUM SERPL-MCNC: 3.4 MMOL/L — LOW (ref 3.5–5.3)
POTASSIUM SERPL-SCNC: 3.4 MMOL/L — LOW (ref 3.5–5.3)
RBC # BLD: 2.39 M/UL — LOW (ref 4.2–5.8)
RBC # FLD: 17.3 % — HIGH (ref 10.3–16.9)
RH IG SCN BLD-IMP: POSITIVE — SIGNIFICANT CHANGE UP
SODIUM SERPL-SCNC: 140 MMOL/L — SIGNIFICANT CHANGE UP (ref 135–145)
WBC # BLD: 6.8 K/UL — SIGNIFICANT CHANGE UP (ref 3.8–10.5)
WBC # FLD AUTO: 6.8 K/UL — SIGNIFICANT CHANGE UP (ref 3.8–10.5)

## 2017-10-22 PROCEDURE — 99233 SBSQ HOSP IP/OBS HIGH 50: CPT

## 2017-10-22 RX ORDER — POTASSIUM CHLORIDE 20 MEQ
40 PACKET (EA) ORAL ONCE
Qty: 0 | Refills: 0 | Status: COMPLETED | OUTPATIENT
Start: 2017-10-22 | End: 2017-10-22

## 2017-10-22 RX ORDER — POTASSIUM CHLORIDE 20 MEQ
10 PACKET (EA) ORAL
Qty: 0 | Refills: 0 | Status: COMPLETED | OUTPATIENT
Start: 2017-10-22 | End: 2017-10-22

## 2017-10-22 RX ADMIN — Medication 3 MILLILITER(S): at 22:12

## 2017-10-22 RX ADMIN — Medication 100 MILLIEQUIVALENT(S): at 11:34

## 2017-10-22 RX ADMIN — Medication 3 MILLILITER(S): at 07:27

## 2017-10-22 RX ADMIN — Medication 100 MILLIGRAM(S): at 10:25

## 2017-10-22 RX ADMIN — TAMSULOSIN HYDROCHLORIDE 0.8 MILLIGRAM(S): 0.4 CAPSULE ORAL at 22:13

## 2017-10-22 RX ADMIN — Medication 100 MILLIEQUIVALENT(S): at 15:05

## 2017-10-22 RX ADMIN — ATORVASTATIN CALCIUM 80 MILLIGRAM(S): 80 TABLET, FILM COATED ORAL at 22:13

## 2017-10-22 RX ADMIN — NYSTATIN CREAM 1 APPLICATION(S): 100000 CREAM TOPICAL at 18:41

## 2017-10-22 RX ADMIN — Medication 81 MILLIGRAM(S): at 13:57

## 2017-10-22 RX ADMIN — Medication 3 MILLILITER(S): at 18:41

## 2017-10-22 RX ADMIN — Medication 3 MILLILITER(S): at 10:24

## 2017-10-22 RX ADMIN — PANTOPRAZOLE SODIUM 40 MILLIGRAM(S): 20 TABLET, DELAYED RELEASE ORAL at 07:27

## 2017-10-22 RX ADMIN — PANTOPRAZOLE SODIUM 40 MILLIGRAM(S): 20 TABLET, DELAYED RELEASE ORAL at 16:45

## 2017-10-22 RX ADMIN — LISINOPRIL 2.5 MILLIGRAM(S): 2.5 TABLET ORAL at 07:27

## 2017-10-22 RX ADMIN — NYSTATIN CREAM 1 APPLICATION(S): 100000 CREAM TOPICAL at 07:28

## 2017-10-22 RX ADMIN — Medication 40 MILLIEQUIVALENT(S): at 16:45

## 2017-10-22 RX ADMIN — Medication 40 MILLIGRAM(S): at 07:28

## 2017-10-22 RX ADMIN — Medication 100 MILLIGRAM(S): at 18:41

## 2017-10-22 RX ADMIN — Medication 100 MILLIEQUIVALENT(S): at 13:57

## 2017-10-22 RX ADMIN — Medication 40 MILLIGRAM(S): at 18:41

## 2017-10-22 RX ADMIN — Medication 3 MILLILITER(S): at 13:57

## 2017-10-22 RX ADMIN — SIMETHICONE 80 MILLIGRAM(S): 80 TABLET, CHEWABLE ORAL at 16:49

## 2017-10-22 RX ADMIN — Medication 100 UNIT(S): at 18:41

## 2017-10-22 RX ADMIN — Medication 100 MILLIGRAM(S): at 02:45

## 2017-10-22 NOTE — PROGRESS NOTE ADULT - PROBLEM SELECTOR PLAN 1
Sepsis w/ persistent MSSA bacteremia, now resolved. Patient currently w/o leukocytosis, fever, w/ stable BP.  - c/w cefazolin 2 g q8 4 weeks total course.   - Gallium scan negative for focal areas of infection, KELLY negative for endocarditis. Repeat TTE w/o evidence of new vegetations or worsening valve thickening.   - blood cx w/ no growth to date  - Patient will follow up with Dr. Clemente outpatient for further monitoring of his bacteremia

## 2017-10-22 NOTE — PROGRESS NOTE ADULT - PROBLEM SELECTOR PLAN 3
HFrEF: TTE shows HFrEF with EF 45%, right atrial dilatation, septal wall motion abnormalities and mild hypokinesis of left ventricle. Unable to visualize any vegetations or R heart structures. Unable to calculate pulmonary a pressures.   - c/w ASA + lipitor  - Lasix 40mg PO BID, give IV if needed.   -Continue lisinopril 2.5 daily  -Consider starting metoprolol as tolerated once BP allows  - strict I/Os, daily weights, fluid restriction 1.5L  - acute onset of HFrEF; discussed case with Dr. Glass who agrees that patient should have ischemia workup outpatient for acute HFrEF and repeat echo to be done to assess for improvement in LV function.     #New onset paroxysmal A.fib Vs MAT:  - pt has remained in NSR since admission to 7 la. To be followed up with outpatient cardiologist for further monitoring    #NSTEMI :  - EKG positive for ST depression in anteroseptal leads, Echo positive for septal hypokinesis, troponin +.   - Ischemia likely demand ischemia due to severe sepsis  - c/w ASA  -Additional ischemic work-up as outpatient after discharge.

## 2017-10-22 NOTE — PROGRESS NOTE ADULT - SUBJECTIVE AND OBJECTIVE BOX
Cardiology for [facility]    cc: follow-up cardiology evaluation and management of left heart failure    interval - no cp    PAST MEDICAL & SURGICAL HISTORY:  COPD (chronic obstructive pulmonary disease)  Hypertension  Obstructive sleep apnea syndrome  Prostate cancer metastatic to bone  PC (prostate cancer): with seed placement    MEDICATIONS  (STANDING):  ALBUTerol/ipratropium for Nebulization 3 milliLiter(s) Nebulizer every 4 hours  aspirin  chewable 81 milliGRAM(s) Oral daily  atorvastatin 80 milliGRAM(s) Oral at bedtime  ceFAZolin   IVPB 2000 milliGRAM(s) IV Intermittent every 8 hours  dextrose 5%. 1000 milliLiter(s) (50 mL/Hr) IV Continuous <Continuous>  furosemide    Tablet 40 milliGRAM(s) Oral every 12 hours  heparin  flush 100 Units/mL Injectable 100 Unit(s) IV Push every other day  lisinopril 2.5 milliGRAM(s) Oral daily  melatonin 3 milliGRAM(s) Oral at bedtime  nystatin Powder 1 Application(s) Topical two times a day  pantoprazole    Tablet 40 milliGRAM(s) Oral two times a day before meals  tamsulosin 0.8 milliGRAM(s) Oral at bedtime    MEDICATIONS  (PRN):  polyethylene glycol 3350 17 Gram(s) Oral daily PRN Constipation  simethicone 80 milliGRAM(s) Chew three times a day PRN Gas    Vital Signs Last 24 Hrs  T(C): 37.3 (22 Oct 2017 20:52), Max: 37.6 (22 Oct 2017 17:44)  T(F): 99.2 (22 Oct 2017 20:52), Max: 99.7 (22 Oct 2017 17:44)  HR: 69 (22 Oct 2017 20:52) (69 - 84)  BP: 112/65 (22 Oct 2017 20:52) (92/53 - 113/65)  BP(mean): --  RR: 18 (22 Oct 2017 20:52) (16 - 20)  SpO2: 97% (22 Oct 2017 20:52) (94% - 99%)    physical exam  nad  clear sclerae  mmm  breath sounds present bilat  rr; s1/s2 present  soft abd; bowel sounds present  le edema improving  ue and le pulses present bilat      labs                                   7.2    6.8   )-----------( 142      ( 22 Oct 2017 07:55 )             23.8   10-22    140  |  98  |  15  ----------------------------<  96  3.4<L>   |  28  |  1.08    Ca    9.1      22 Oct 2017 07:55  Mg     1.9     10-22        I&O's Summary    21 Oct 2017 07:01  -  22 Oct 2017 07:00  --------------------------------------------------------  IN: 100 mL / OUT: 1350 mL / NET: -1250 mL    22 Oct 2017 07:01  -  22 Oct 2017 21:42  --------------------------------------------------------  IN: 400 mL / OUT: 690 mL / NET: -290 mL          < from: Echocardiogram (10.16.17 @ 10:31) >    EXAM:  ECHOCARDIOGRAM (CARDIOL)                          PROCEDURE DATE:  10/16/2017                        INTERPRETATION:  Patient Height: 172.0 cm  Patient Weight: 103.0 kg  Heart Rate: 74 bpm  Systolic Pressure: 140 mmHg  Diastolic Pressure: 70mmHg  BSA: 2.2 m^2  Interpretation Summary  The left ventricle is mildly dilated.Hypokinesis of the basal inferior and   septal region.The left ventricular ejection fraction is estimated to be   45-50%The mitral inflow pattern is consistent with impaired left   ventricular   relaxation with mildly elevated left atrial pressure (8-14mmHg).  The   left   atrial size is normal. The right atrium is dilated.The right ventricle is   moderately dilated. The right ventricular systolic function is mildly   reduced.    There is mild aortic valve thickening.There is mild mitral valve   thickening.   There is mild mitral regurgitation.There is trace tricuspid   regurgitation.There was insufficient TR detected from which to calculate   pulmonary artery systolic pressure.  No aortic root dilatation.The   inferior   vena cava is normal in size (<2.1 cm) with normal inspiratory collapse   (>50%)   consistent with normal right atrial pressure.  There is no pericardial   effusion.    < end of copied text >  IN: 650 mL / OUT: 2875 mL / NET: -2225 mL          < from: Xray Chest 1 View AP -PORTABLE-Routine (10.09.17 @ 05:37) >  EXAM:  XR CHEST 1 VIEW PORT ROUTINE                          PROCEDURE DATE:  10/09/2017                     INTERPRETATION:    Portable AP Radiograph dated 10/9/2017 5:37 AM    CLINICAL INFORMATION: 72 years, Male, Intubated, PNA    PRIOR STUDIES:October 8, 2017    FINDINGS: Support tubes and lines are unchanged. Left pleural effusion is   stable. Right pleural effusion has increased. Pulmonary vascular   congestion has increased.    IMPRESSION:  Interval increase of right pleural effusion and pulmonary vascular   congestion.            "Thank you for the opportunity to participate in the care of this   patient."        KATHY PABLO M.D., RADIOLOGY ATTENDING  This document has been electronically signed. Oct  9 2017  8:33AM    < end of copied text >

## 2017-10-22 NOTE — PROGRESS NOTE ADULT - PROBLEM SELECTOR PLAN 7
F no IVF  E- replete lytes prn  N- dash diet w/ 1.5 L fluid restriction     PPx - On ASA 81mg, SCD     CODE - DNR not DNI    Dispo: [facility] for RJ placement

## 2017-10-22 NOTE — PROGRESS NOTE ADULT - SUBJECTIVE AND OBJECTIVE BOX
O/N Events: NILDA  Subjective: Patient seen and examined at bedside. He reports having suprapubic fullness, otherwise no complaints. He denies any f/c, dizziness, CP, palpitations, or SOB.    VITALS  Vital Signs Last 24 Hrs  T(C): 36.4 (22 Oct 2017 11:34), Max: 37.4 (21 Oct 2017 17:35)  T(F): 97.6 (22 Oct 2017 11:34), Max: 99.4 (21 Oct 2017 17:35)  HR: 73 (22 Oct 2017 11:34) (65 - 84)  BP: 105/64 (22 Oct 2017 11:34) (92/53 - 113/65)  BP(mean): --  RR: 20 (22 Oct 2017 11:34) (16 - 20)  SpO2: 96% (22 Oct 2017 11:34) (94% - 100%)    I&O's Summary    21 Oct 2017 07:01  -  22 Oct 2017 07:00  --------------------------------------------------------  IN: 100 mL / OUT: 1350 mL / NET: -1250 mL      PHYSICAL EXAM  General: Awake and alert; NAD  Head: NC/AT;   Eyes: anicteric sclera  Neck: Supple;   Respiratory: Bibasilar crackles, improved. No wheezing or rales.   Cardiovascular: Regular rhythm/rate; S1/S2; no gallops or murmurs auscultated  Gastrointestinal: Soft; NTND w/out rebound tenderness or guarding;   Extremities: WWP; 2+ LE edema b/l    MEDICATIONS  (STANDING):  ALBUTerol/ipratropium for Nebulization 3 milliLiter(s) Nebulizer every 4 hours  aspirin  chewable 81 milliGRAM(s) Oral daily  atorvastatin 80 milliGRAM(s) Oral at bedtime  ceFAZolin   IVPB 2000 milliGRAM(s) IV Intermittent every 8 hours  dextrose 5%. 1000 milliLiter(s) (50 mL/Hr) IV Continuous <Continuous>  furosemide    Tablet 40 milliGRAM(s) Oral every 12 hours  heparin  flush 100 Units/mL Injectable 100 Unit(s) IV Push every other day  lisinopril 2.5 milliGRAM(s) Oral daily  melatonin 3 milliGRAM(s) Oral at bedtime  nystatin Powder 1 Application(s) Topical two times a day  pantoprazole    Tablet 40 milliGRAM(s) Oral two times a day before meals  potassium chloride    Tablet ER 40 milliEquivalent(s) Oral once  potassium chloride   Powder 40 milliEquivalent(s) Oral once  tamsulosin 0.8 milliGRAM(s) Oral at bedtime    MEDICATIONS  (PRN):  polyethylene glycol 3350 17 Gram(s) Oral daily PRN Constipation  simethicone 80 milliGRAM(s) Chew three times a day PRN Gas      LABS                        7.2    6.8   )-----------( 142      ( 22 Oct 2017 07:55 )             23.8     10-22    140  |  98  |  15  ----------------------------<  96  3.4<L>   |  28  |  1.08    Ca    9.1      22 Oct 2017 07:55  Mg     1.9     10-22        IMAGING/EKG/ETC: No new imaging.

## 2017-10-22 NOTE — PROGRESS NOTE ADULT - ASSESSMENT
A/recs:  1) acute hypoxic respiratory failure with elevated tn and abnormal septal wall motion on tte in addition to rv dilatation and abnormal ecg this admission; left heart failure (ef=45% by tte) - s/p egd on 10- for anemia/melena  a - nstemi earlier this admission - 2nd trop after egd not completed  b - asa/statin/ace-i  c - not currently on bb  d - recommend 12-lead ecg   e - per Dr. Gentile of Interventional Cardiology - recommend cardiac cta prior to discharge    2) paroxysmal atrial fibrillation  a - check ecg    3) htn - avoid labile bp    4) sepsis due to pna per ccm with bacteremia - id evaluated patient earlier this admission - rec follow-up    5) possible copd exacerbation - rec pulm follow-up to consider bb    6) acute renal failure - resolved    7) normocytic anemia and thrombocytopenia - monitor    8) shingles    9) prostate ca - now with karla hematuria - hem-onc following    10) K>4, Mg>2    11) palliative care following

## 2017-10-23 LAB
ANION GAP SERPL CALC-SCNC: 14 MMOL/L — SIGNIFICANT CHANGE UP (ref 5–17)
APPEARANCE UR: (no result)
APTT BLD: 28.7 SEC — SIGNIFICANT CHANGE UP (ref 27.5–37.4)
BILIRUB UR-MCNC: NEGATIVE — SIGNIFICANT CHANGE UP
BUN SERPL-MCNC: 21 MG/DL — SIGNIFICANT CHANGE UP (ref 7–23)
CALCIUM SERPL-MCNC: 9.5 MG/DL — SIGNIFICANT CHANGE UP (ref 8.4–10.5)
CHLORIDE SERPL-SCNC: 98 MMOL/L — SIGNIFICANT CHANGE UP (ref 96–108)
CO2 SERPL-SCNC: 28 MMOL/L — SIGNIFICANT CHANGE UP (ref 22–31)
COLOR SPEC: (no result)
CREAT SERPL-MCNC: 1.17 MG/DL — SIGNIFICANT CHANGE UP (ref 0.5–1.3)
DIFF PNL FLD: (no result)
FERRITIN SERPL-MCNC: 590.6 NG/ML — HIGH (ref 30–400)
GLUCOSE SERPL-MCNC: 110 MG/DL — HIGH (ref 70–99)
GLUCOSE UR QL: NEGATIVE — SIGNIFICANT CHANGE UP
HAV IGM SER-ACNC: SIGNIFICANT CHANGE UP
HBV CORE IGM SER-ACNC: SIGNIFICANT CHANGE UP
HBV SURFACE AG SER-ACNC: SIGNIFICANT CHANGE UP
HCT VFR BLD CALC: 21.5 % — LOW (ref 39–50)
HCT VFR BLD CALC: 26.5 % — LOW (ref 39–50)
HCV AB S/CO SERPL IA: 0.04 S/CO — SIGNIFICANT CHANGE UP
HCV AB SERPL-IMP: SIGNIFICANT CHANGE UP
HGB BLD-MCNC: 6.6 G/DL — CRITICAL LOW (ref 13–17)
HGB BLD-MCNC: 8 G/DL — LOW (ref 13–17)
HIV 1+2 AB+HIV1 P24 AG SERPL QL IA: SIGNIFICANT CHANGE UP
INR BLD: 1.21 — HIGH (ref 0.88–1.16)
IRON SATN MFR SERPL: 26 % — SIGNIFICANT CHANGE UP (ref 16–55)
IRON SATN MFR SERPL: 52 UG/DL — SIGNIFICANT CHANGE UP (ref 45–165)
KETONES UR-MCNC: NEGATIVE — SIGNIFICANT CHANGE UP
LEUKOCYTE ESTERASE UR-ACNC: (no result)
MAGNESIUM SERPL-MCNC: 1.7 MG/DL — SIGNIFICANT CHANGE UP (ref 1.6–2.6)
MCHC RBC-ENTMCNC: 30 PG — SIGNIFICANT CHANGE UP (ref 27–34)
MCHC RBC-ENTMCNC: 30.2 G/DL — LOW (ref 32–36)
MCHC RBC-ENTMCNC: 30.3 PG — SIGNIFICANT CHANGE UP (ref 27–34)
MCHC RBC-ENTMCNC: 30.7 G/DL — LOW (ref 32–36)
MCV RBC AUTO: 100.4 FL — HIGH (ref 80–100)
MCV RBC AUTO: 97.7 FL — SIGNIFICANT CHANGE UP (ref 80–100)
NITRITE UR-MCNC: POSITIVE
OB PNL STL: NEGATIVE — SIGNIFICANT CHANGE UP
PH UR: 6.5 — SIGNIFICANT CHANGE UP (ref 5–8)
PLATELET # BLD AUTO: 123 K/UL — LOW (ref 150–400)
PLATELET # BLD AUTO: 133 K/UL — LOW (ref 150–400)
POTASSIUM SERPL-MCNC: 3.4 MMOL/L — LOW (ref 3.5–5.3)
POTASSIUM SERPL-SCNC: 3.4 MMOL/L — LOW (ref 3.5–5.3)
PROT UR-MCNC: >=300 MG/DL
PROTHROM AB SERPL-ACNC: 13.5 SEC — HIGH (ref 9.8–12.7)
RBC # BLD: 2.2 M/UL — LOW (ref 4.2–5.8)
RBC # BLD: 2.64 M/UL — LOW (ref 4.2–5.8)
RBC # BLD: 2.64 M/UL — LOW (ref 4.2–5.8)
RBC # FLD: 16.9 % — SIGNIFICANT CHANGE UP (ref 10.3–16.9)
RBC # FLD: 17.2 % — HIGH (ref 10.3–16.9)
RETICS/RBC NFR: 2.6 % — HIGH (ref 0.5–2.5)
SODIUM SERPL-SCNC: 140 MMOL/L — SIGNIFICANT CHANGE UP (ref 135–145)
SP GR SPEC: 1.02 — SIGNIFICANT CHANGE UP (ref 1–1.03)
TIBC SERPL-MCNC: 203 UG/DL — LOW (ref 220–430)
UIBC SERPL-MCNC: 151 UG/DL — SIGNIFICANT CHANGE UP (ref 110–370)
UROBILINOGEN FLD QL: 0.2 E.U./DL — SIGNIFICANT CHANGE UP
WBC # BLD: 7.7 K/UL — SIGNIFICANT CHANGE UP (ref 3.8–10.5)
WBC # BLD: 8.9 K/UL — SIGNIFICANT CHANGE UP (ref 3.8–10.5)
WBC # FLD AUTO: 7.7 K/UL — SIGNIFICANT CHANGE UP (ref 3.8–10.5)
WBC # FLD AUTO: 8.9 K/UL — SIGNIFICANT CHANGE UP (ref 3.8–10.5)

## 2017-10-23 PROCEDURE — 99233 SBSQ HOSP IP/OBS HIGH 50: CPT

## 2017-10-23 RX ORDER — FINASTERIDE 5 MG/1
5 TABLET, FILM COATED ORAL DAILY
Qty: 0 | Refills: 0 | Status: DISCONTINUED | OUTPATIENT
Start: 2017-10-23 | End: 2017-11-03

## 2017-10-23 RX ORDER — POTASSIUM CHLORIDE 20 MEQ
40 PACKET (EA) ORAL ONCE
Qty: 0 | Refills: 0 | Status: COMPLETED | OUTPATIENT
Start: 2017-10-23 | End: 2017-10-23

## 2017-10-23 RX ORDER — POTASSIUM CHLORIDE 20 MEQ
10 PACKET (EA) ORAL
Qty: 0 | Refills: 0 | Status: COMPLETED | OUTPATIENT
Start: 2017-10-23 | End: 2017-10-23

## 2017-10-23 RX ORDER — LIDOCAINE 4 G/100G
1 CREAM TOPICAL
Qty: 0 | Refills: 0 | Status: DISCONTINUED | OUTPATIENT
Start: 2017-10-23 | End: 2017-11-03

## 2017-10-23 RX ADMIN — Medication 100 MILLIEQUIVALENT(S): at 17:47

## 2017-10-23 RX ADMIN — NYSTATIN CREAM 1 APPLICATION(S): 100000 CREAM TOPICAL at 17:24

## 2017-10-23 RX ADMIN — FINASTERIDE 5 MILLIGRAM(S): 5 TABLET, FILM COATED ORAL at 13:32

## 2017-10-23 RX ADMIN — Medication 100 MILLIGRAM(S): at 17:23

## 2017-10-23 RX ADMIN — LISINOPRIL 2.5 MILLIGRAM(S): 2.5 TABLET ORAL at 07:06

## 2017-10-23 RX ADMIN — Medication 40 MILLIEQUIVALENT(S): at 10:17

## 2017-10-23 RX ADMIN — Medication 81 MILLIGRAM(S): at 13:32

## 2017-10-23 RX ADMIN — TAMSULOSIN HYDROCHLORIDE 0.8 MILLIGRAM(S): 0.4 CAPSULE ORAL at 23:00

## 2017-10-23 RX ADMIN — Medication 100 MILLIGRAM(S): at 10:06

## 2017-10-23 RX ADMIN — PANTOPRAZOLE SODIUM 40 MILLIGRAM(S): 20 TABLET, DELAYED RELEASE ORAL at 07:06

## 2017-10-23 RX ADMIN — Medication 3 MILLIGRAM(S): at 23:01

## 2017-10-23 RX ADMIN — LIDOCAINE 1 APPLICATION(S): 4 CREAM TOPICAL at 10:09

## 2017-10-23 RX ADMIN — Medication 100 MILLIEQUIVALENT(S): at 16:26

## 2017-10-23 RX ADMIN — Medication 100 MILLIGRAM(S): at 01:39

## 2017-10-23 RX ADMIN — Medication 40 MILLIGRAM(S): at 17:46

## 2017-10-23 RX ADMIN — Medication 40 MILLIGRAM(S): at 07:07

## 2017-10-23 RX ADMIN — Medication 3 MILLILITER(S): at 10:07

## 2017-10-23 RX ADMIN — NYSTATIN CREAM 1 APPLICATION(S): 100000 CREAM TOPICAL at 07:07

## 2017-10-23 RX ADMIN — PANTOPRAZOLE SODIUM 40 MILLIGRAM(S): 20 TABLET, DELAYED RELEASE ORAL at 16:26

## 2017-10-23 RX ADMIN — Medication 100 MILLIEQUIVALENT(S): at 20:09

## 2017-10-23 RX ADMIN — Medication 3 MILLILITER(S): at 05:36

## 2017-10-23 RX ADMIN — Medication 3 MILLILITER(S): at 17:23

## 2017-10-23 RX ADMIN — ATORVASTATIN CALCIUM 80 MILLIGRAM(S): 80 TABLET, FILM COATED ORAL at 23:00

## 2017-10-23 RX ADMIN — Medication 3 MILLILITER(S): at 13:31

## 2017-10-23 NOTE — PROGRESS NOTE ADULT - ASSESSMENT
The patient has persistent bleeding from his urinary bladder. Would advise his urologist Dr. KODI Baird that he is here and having this problem.

## 2017-10-23 NOTE — PROVIDER CONTACT NOTE (CHANGE IN STATUS NOTIFICATION) - BACKGROUND
Patient with hemoglobin of 6.6. 1 PRBC to be given. 15 minutes into transfusion 100.2 temperature noted.

## 2017-10-23 NOTE — PROGRESS NOTE ADULT - PROBLEM SELECTOR PLAN 7
# Pigmented spot seen on EGD  - bleed likely due to ulcer now healed, hgb again dropping, however guiac negative, most likely 2/2 bleeding from Saldana.   - PPI BID oral  - hemoglobin goal 8 hgb given recent NSTEMI   - daily cbc while pt admitted  - to follow up with Dr. Mayo outpatient on discharge    #ileus -resolved  - c/w PO agents for constipation

## 2017-10-23 NOTE — PROGRESS NOTE ADULT - PROBLEM SELECTOR PLAN 1
Patient dropping hgb to 6.7 today in setting of bleeding/ clots from Saldana catheter. Patient with UGIB on admission, however no recent melena or bloody stools, stool guiac negative.  -Transfuse 1U PRBCS  -F/u iron studies, B12, retic  -Monitor Saldana for bleeding, retention Acute blood loss anemia 2/2 hematuria  Patient dropping hgb to 6.7 today in setting of bleeding/ clots from Saldana catheter. Patient with UGIB on admission, however no recent melena or bloody stools, stool guiac negative.  -Transfuse 1U PRBCS and repeat CBC later today  -F/u iron studies, B12, retic, coags  -Monitor Saldana for bleeding, retention  -  called to see pt for hematuria with clots

## 2017-10-23 NOTE — PROGRESS NOTE ADULT - SUBJECTIVE AND OBJECTIVE BOX
The patient reports having bleeding from his urinary bladder with blood clots for the last three days. He reports that he is otherwise well.    CHEMOTHERAPY REGIMEN:        Day:                          Diet:  Protocol:                                    IVF:      MEDICATIONS  (STANDING):  ALBUTerol/ipratropium for Nebulization 3 milliLiter(s) Nebulizer every 4 hours  aspirin  chewable 81 milliGRAM(s) Oral daily  atorvastatin 80 milliGRAM(s) Oral at bedtime  ceFAZolin   IVPB 2000 milliGRAM(s) IV Intermittent every 8 hours  dextrose 5%. 1000 milliLiter(s) (50 mL/Hr) IV Continuous <Continuous>  furosemide    Tablet 40 milliGRAM(s) Oral every 12 hours  heparin  flush 100 Units/mL Injectable 100 Unit(s) IV Push every other day  lisinopril 2.5 milliGRAM(s) Oral daily  melatonin 3 milliGRAM(s) Oral at bedtime  nystatin Powder 1 Application(s) Topical two times a day  pantoprazole    Tablet 40 milliGRAM(s) Oral two times a day before meals  tamsulosin 0.8 milliGRAM(s) Oral at bedtime    MEDICATIONS  (PRN):  polyethylene glycol 3350 17 Gram(s) Oral daily PRN Constipation  simethicone 80 milliGRAM(s) Chew three times a day PRN Gas      Allergies    No Known Allergies    Intolerances        DVT Prophylaxis: [ ] YES [x ] NO      Antibiotics: [x ] YES [ ] NO    Pain Scale (1-10):       Location:    Vital Signs Last 24 Hrs  T(C): 36.7 (23 Oct 2017 04:55), Max: 37.6 (22 Oct 2017 17:44)  T(F): 98 (23 Oct 2017 04:55), Max: 99.7 (22 Oct 2017 17:44)  HR: 73 (23 Oct 2017 04:55) (69 - 84)  BP: 107/63 (23 Oct 2017 04:55) (92/53 - 112/65)  BP(mean): --  RR: 20 (23 Oct 2017 04:55) (16 - 20)  SpO2: 97% (23 Oct 2017 05:22) (94% - 98%)    Drug Dosing Weight  Height (cm): 172.72 (05 Oct 2017 01:27)  Weight (kg): 103.4 (12 Oct 2017 09:45)  BMI (kg/m2): 34.7 (12 Oct 2017 09:45)  BSA (m2): 2.16 (12 Oct 2017 09:45)    PHYSICAL EXAM:      Constitutional: bleeding from his bladder.  Eyes: conjunctiva pale.  ENMT: buccal mucosa moist.  Neck: no masses palp.  Respiratory: chest clear.  Cardiovascular: S1>S2 at apex. RSR.  Gastrointestinal: soft, nontender, active bowel sounds. No masses palp.  Genitourinary: bright red blood coming from urinary bladder with clots.  Extremities: some persistent leg edema.  Neurological: no gross focal deficits.  Skin: warm and dry.  Lymph Nodes: none palp.  Musculoskeletal: full ROM.  Psychiatric: affect normal.        URINARY CATHETER: [x ] YES [ ] NO     LABS:  CBC Full  -  ( 22 Oct 2017 07:55 )  WBC Count : 6.8 K/uL  Hemoglobin : 7.2 g/dL  Hematocrit : 23.8 %  Platelet Count - Automated : 142 K/uL  Mean Cell Volume : 99.6 fL  Mean Cell Hemoglobin : 30.1 pg  Mean Cell Hemoglobin Concentration : 30.3 g/dL  Auto Neutrophil # : x  Auto Lymphocyte # : x  Auto Monocyte # : x  Auto Eosinophil # : x  Auto Basophil # : x  Auto Neutrophil % : x  Auto Lymphocyte % : x  Auto Monocyte % : x  Auto Eosinophil % : x  Auto Basophil % : x    10-22    140  |  98  |  15  ----------------------------<  96  3.4<L>   |  28  |  1.08    Ca    9.1      22 Oct 2017 07:55  Mg     1.9     10-22            CULTURES:    RADIOLOGY & ADDITIONAL STUDIES:

## 2017-10-23 NOTE — PROGRESS NOTE ADULT - ASSESSMENT
71 yo m with urinary retention  1) Unable to adequately irrigate with 18 Fr Saldana catheter. Replaced with 22 F coude placed. Drained 1.5 L bloody urine with copious clots  2) Catheter irrigated with sterile water, flushed easily - copious clots irrigated  3) f/u UA  4) keep Saldana pending further evaluation

## 2017-10-23 NOTE — PROGRESS NOTE ADULT - SUBJECTIVE AND OBJECTIVE BOX
SUBJECTIVE: Urology was reconsulted for clot retention. Patient currently had 18 Fr Saldana catheter - unable to perform proper irrigation of clot likely due to small caliber of the existing Saldana catheter. Per medical resident, patient has been having clot retention nightly. Previous to hospitalization, patient denies any previous episodes of hematuria, difficulty with urination, or other urinary symptoms.    aspirin  chewable 81 milliGRAM(s) Oral daily  ceFAZolin   IVPB 2000 milliGRAM(s) IV Intermittent every 8 hours  furosemide    Tablet 40 milliGRAM(s) Oral every 12 hours  heparin  flush 100 Units/mL Injectable 100 Unit(s) IV Push every other day  lisinopril 2.5 milliGRAM(s) Oral daily  tamsulosin 0.8 milliGRAM(s) Oral at bedtime      Vital Signs Last 24 Hrs  T(C): 36.7 (23 Oct 2017 04:55), Max: 37.6 (22 Oct 2017 17:44)  T(F): 98 (23 Oct 2017 04:55), Max: 99.7 (22 Oct 2017 17:44)  HR: 73 (23 Oct 2017 04:55) (69 - 84)  BP: 107/63 (23 Oct 2017 04:55) (92/53 - 112/65)  BP(mean): --  RR: 20 (23 Oct 2017 04:55) (16 - 20)  SpO2: 97% (23 Oct 2017 05:22) (94% - 98%)  I&O's Detail    21 Oct 2017 07:01  -  22 Oct 2017 07:00  --------------------------------------------------------  IN:    IV PiggyBack: 100 mL  Total IN: 100 mL    OUT:    Indwelling Catheter - Urethral: 1200 mL    Voided: 150 mL  Total OUT: 1350 mL    Total NET: -1250 mL      22 Oct 2017 07:01  -  23 Oct 2017 06:26  --------------------------------------------------------  IN:    IV PiggyBack: 500 mL  Total IN: 500 mL    OUT:    Indwelling Catheter - Urethral: 690 mL    Ureteral Catheter: 30 mL  Total OUT: 720 mL    Total NET: -220 mL          Gen: NAD  Abd: Soft, +suprapubic distention and TTP  : + blood clot at meatus, non-draining FC, circumcised phallus non TTP, no scrotal TTP      LABS:                        7.2    6.8   )-----------( 142      ( 22 Oct 2017 07:55 )             23.8     10-22    140  |  98  |  15  ----------------------------<  96  3.4<L>   |  28  |  1.08    Ca    9.1      22 Oct 2017 07:55  Mg     1.9     10-22            RADIOLOGY & ADDITIONAL STUDIES:

## 2017-10-23 NOTE — PROGRESS NOTE ADULT - SUBJECTIVE AND OBJECTIVE BOX
Patient seen and examined at bedside. Information from oncologist revealed that patient does follow with urologist, previously Dr. Villaseñor, now Dr. Naomy Chappell. Patient had hematuria over weekend and went into clot retention overnight. Saldana upgradged to 22Fr 3 way (3rd port capped) and clot irrigated. This AM patient feels great, but urine was light wine colored. Clots evacuated and now urine is clear to pink tinge, no sign of active bleeding. Hgb dropped, would transfuse for supportive care. Will monitor urine. If persists bleeding, please make NPO for possible surgical intervention tomorrow or Wednesday. If stays clear will continue conservative management. Please add finasteride 5mg daily as aids in vasoconstriction of any prostatic tissue that remains after chemo/radiation. Discussed with attending.    ICU Vital Signs Last 24 Hrs  T(C): 36.8 (23 Oct 2017 09:10), Max: 37.6 (22 Oct 2017 17:44)  T(F): 98.3 (23 Oct 2017 09:10), Max: 99.7 (22 Oct 2017 17:44)  HR: 77 (23 Oct 2017 09:17) (69 - 77)  BP: 107/60 (23 Oct 2017 09:10) (92/53 - 112/65)  BP(mean): --  ABP: --  ABP(mean): --  RR: 20 (23 Oct 2017 09:17) (16 - 20)  SpO2: 99% (23 Oct 2017 09:17) (96% - 99%)                          6.6    7.7   )-----------( 133      ( 23 Oct 2017 08:16 )             21.5     Plan:  Continue to monitor urine color, irrigate by urology as needed  -add finasteride 5mg daily  -lidocaine jelly to tip of penis for symptom relief of catheter associated discomfort

## 2017-10-23 NOTE — PROGRESS NOTE ADULT - SUBJECTIVE AND OBJECTIVE BOX
OVERNIGHT EVENTS: Patient's Saldana catheter clotted multiple times. Flushed and multiple blood clots removed, but continued to clot. Saldana removed and coude catheter inserted.     SUBJECTIVE / INTERVAL HPI: Patient seen and examined at bedside.     VITAL SIGNS:  Vital Signs Last 24 Hrs  T(C): 37.1 (23 Oct 2017 10:45), Max: 37.6 (22 Oct 2017 17:44)  T(F): 98.7 (23 Oct 2017 10:45), Max: 99.7 (22 Oct 2017 17:44)  HR: 86 (23 Oct 2017 10:45) (69 - 86)  BP: 98/51 (23 Oct 2017 10:45) (98/51 - 112/65)  BP(mean): --  RR: 19 (23 Oct 2017 10:45) (16 - 20)  SpO2: 94% (23 Oct 2017 10:45) (94% - 99%)    PHYSICAL EXAM:    General: WDWN  HEENT: NC/AT; PERRL, anicteric sclera; MMM  Neck: supple  Cardiovascular: +S1/S2; RRR  Respiratory: CTA B/L; no W/R/R  Gastrointestinal: soft, NT/ND; +BSx4  Extremities: WWP; no edema, clubbing or cyanosis  Vascular: 2+ radial, DP/PT pulses B/L  Neurological: AAOx3; no focal deficits    MEDICATIONS:  MEDICATIONS  (STANDING):  ALBUTerol/ipratropium for Nebulization 3 milliLiter(s) Nebulizer every 4 hours  aspirin  chewable 81 milliGRAM(s) Oral daily  atorvastatin 80 milliGRAM(s) Oral at bedtime  ceFAZolin   IVPB 2000 milliGRAM(s) IV Intermittent every 8 hours  dextrose 5%. 1000 milliLiter(s) (50 mL/Hr) IV Continuous <Continuous>  finasteride 5 milliGRAM(s) Oral daily  furosemide    Tablet 40 milliGRAM(s) Oral every 12 hours  heparin  flush 100 Units/mL Injectable 100 Unit(s) IV Push every other day  lisinopril 2.5 milliGRAM(s) Oral daily  melatonin 3 milliGRAM(s) Oral at bedtime  nystatin Powder 1 Application(s) Topical two times a day  pantoprazole    Tablet 40 milliGRAM(s) Oral two times a day before meals  potassium chloride  10 mEq/100 mL IVPB 10 milliEquivalent(s) IV Intermittent every 1 hour  tamsulosin 0.8 milliGRAM(s) Oral at bedtime    MEDICATIONS  (PRN):  lidocaine 2% Gel 1 Application(s) Topical every 3 hours PRN catheter related discomfort at tip of penis  polyethylene glycol 3350 17 Gram(s) Oral daily PRN Constipation  simethicone 80 milliGRAM(s) Chew three times a day PRN Gas      ALLERGIES:  Allergies    No Known Allergies    Intolerances        LABS:                        6.6    7.7   )-----------( 133      ( 23 Oct 2017 08:16 )             21.5     10-23    140  |  98  |  21  ----------------------------<  110<H>  3.4<L>   |  28  |  1.17    Ca    9.5      23 Oct 2017 08:16  Mg     1.7     10-23          CAPILLARY BLOOD GLUCOSE          RADIOLOGY & ADDITIONAL TESTS: Reviewed.    ASSESSMENT:    PLAN: OVERNIGHT EVENTS: Patient's Glaser catheter clotted multiple times. Flushed and multiple blood clots removed, but continued to clot. Glaser removed and coude catheter inserted.     SUBJECTIVE / INTERVAL HPI: Patient seen and examined at bedside. He reports penile pain since new glaser was inserted, worse with movement. Otherwise no complaints. He denies any lightheadedness, CP, SOB, abdominal pain or fevers/chills. He denies any dark or bloody stools.     VITAL SIGNS:  Vital Signs Last 24 Hrs  T(C): 37.1 (23 Oct 2017 10:45), Max: 37.6 (22 Oct 2017 17:44)  T(F): 98.7 (23 Oct 2017 10:45), Max: 99.7 (22 Oct 2017 17:44)  HR: 86 (23 Oct 2017 10:45) (69 - 86)  BP: 98/51 (23 Oct 2017 10:45) (98/51 - 112/65)  BP(mean): --  RR: 19 (23 Oct 2017 10:45) (16 - 20)  SpO2: 94% (23 Oct 2017 10:45) (94% - 99%)    PHYSICAL EXAM:    General: WDWN; laying bed in NAD.   HEENT: NC/AT; anicteric sclera; MMM  Neck: supple  Cardiovascular: +S1/S2; RRR, no m/r/g.  Respiratory: +Bibasilar crackles, no wheezing or rales.   Gastrointestinal: soft, NT/ND; no guarding.  Extremities: WWP; 2+ LE edema, no cyanosis.    MEDICATIONS:  MEDICATIONS  (STANDING):  ALBUTerol/ipratropium for Nebulization 3 milliLiter(s) Nebulizer every 4 hours  aspirin  chewable 81 milliGRAM(s) Oral daily  atorvastatin 80 milliGRAM(s) Oral at bedtime  ceFAZolin   IVPB 2000 milliGRAM(s) IV Intermittent every 8 hours  dextrose 5%. 1000 milliLiter(s) (50 mL/Hr) IV Continuous <Continuous>  finasteride 5 milliGRAM(s) Oral daily  furosemide    Tablet 40 milliGRAM(s) Oral every 12 hours  heparin  flush 100 Units/mL Injectable 100 Unit(s) IV Push every other day  lisinopril 2.5 milliGRAM(s) Oral daily  melatonin 3 milliGRAM(s) Oral at bedtime  nystatin Powder 1 Application(s) Topical two times a day  pantoprazole    Tablet 40 milliGRAM(s) Oral two times a day before meals  potassium chloride  10 mEq/100 mL IVPB 10 milliEquivalent(s) IV Intermittent every 1 hour  tamsulosin 0.8 milliGRAM(s) Oral at bedtime    MEDICATIONS  (PRN):  lidocaine 2% Gel 1 Application(s) Topical every 3 hours PRN catheter related discomfort at tip of penis  polyethylene glycol 3350 17 Gram(s) Oral daily PRN Constipation  simethicone 80 milliGRAM(s) Chew three times a day PRN Gas      ALLERGIES:  Allergies    No Known Allergies    Intolerances        LABS:                        6.6    7.7   )-----------( 133      ( 23 Oct 2017 08:16 )             21.5     10-23    140  |  98  |  21  ----------------------------<  110<H>  3.4<L>   |  28  |  1.17    Ca    9.5      23 Oct 2017 08:16  Mg     1.7     10-23      RADIOLOGY & ADDITIONAL TESTS: Reviewed.

## 2017-10-23 NOTE — PROVIDER CONTACT NOTE (CHANGE IN STATUS NOTIFICATION) - ASSESSMENT
Vital signs as documented in flowsheet. Patient denies shortness of breathing, difficulty breathing or any itchiness.

## 2017-10-23 NOTE — PROGRESS NOTE ADULT - ASSESSMENT
73 yo male with hx of HTN, COPD, Prostate CA (s/p radiation and seed 2013 on leuprolide outpatient) who presents with worsening of SOB and an episode of unwitnessed syncope found to have severe sepsis with gram + cocci in clusters identified as S. aureus and ATN.  Hospital course complicated by rapid response for hypotension, unresponsive to painful stimuli and was transferred to MICU for septic shock.  Septic shock has resolved but pt has had persistent MSSA bacteremia with resolving respiratory failure and ATN. Now with hgb drop to 6.7 in context of bleeding/ blood clots from Saldana.

## 2017-10-23 NOTE — PROGRESS NOTE ADULT - PROBLEM SELECTOR PLAN 2
Sepsis w/ persistent MSSA bacteremia, now resolved. Patient currently w/o leukocytosis, fever, w/ stable BP.  - c/w cefazolin 2 g q8 4 weeks total course (10/19--)  - Gallium scan negative for focal areas of infection, KELLY negative for endocarditis. Repeat TTE w/o evidence of new vegetations or worsening valve thickening.   - blood cx w/ no growth to date  - Patient will follow up with Dr. Clemente outpatient for further monitoring of his bacteremia

## 2017-10-23 NOTE — PROGRESS NOTE ADULT - PROBLEM SELECTOR PLAN 2
The patient receives depot lupron injections every three months and has recently been taken off of bicalutamide.

## 2017-10-23 NOTE — PROGRESS NOTE ADULT - SUBJECTIVE AND OBJECTIVE BOX
Cardiology for Preister    cc: follow-up cardiology evaluation and management of left heart failure    interval - events noted; no new cardiac complaints    PAST MEDICAL & SURGICAL HISTORY:  COPD (chronic obstructive pulmonary disease)  Hypertension  Obstructive sleep apnea syndrome  Prostate cancer metastatic to bone  PC (prostate cancer): with seed placement    MEDICATIONS  (STANDING):  ALBUTerol/ipratropium for Nebulization 3 milliLiter(s) Nebulizer every 4 hours  aspirin  chewable 81 milliGRAM(s) Oral daily  atorvastatin 80 milliGRAM(s) Oral at bedtime  ceFAZolin   IVPB 2000 milliGRAM(s) IV Intermittent every 8 hours  dextrose 5%. 1000 milliLiter(s) (50 mL/Hr) IV Continuous <Continuous>  finasteride 5 milliGRAM(s) Oral daily  furosemide    Tablet 40 milliGRAM(s) Oral every 12 hours  heparin  flush 100 Units/mL Injectable 100 Unit(s) IV Push every other day  lisinopril 2.5 milliGRAM(s) Oral daily  melatonin 3 milliGRAM(s) Oral at bedtime  nystatin Powder 1 Application(s) Topical two times a day  pantoprazole    Tablet 40 milliGRAM(s) Oral two times a day before meals  tamsulosin 0.8 milliGRAM(s) Oral at bedtime    MEDICATIONS  (PRN):  lidocaine 2% Gel 1 Application(s) Topical every 3 hours PRN catheter related discomfort at tip of penis  polyethylene glycol 3350 17 Gram(s) Oral daily PRN Constipation  simethicone 80 milliGRAM(s) Chew three times a day PRN Gas    Vital Signs Last 24 Hrs  T(C): 36.6 (23 Oct 2017 17:46), Max: 37.9 (23 Oct 2017 11:15)  T(F): 97.9 (23 Oct 2017 17:46), Max: 100.2 (23 Oct 2017 11:15)  HR: 74 (23 Oct 2017 22:37) (72 - 89)  BP: 109/65 (23 Oct 2017 17:46) (82/50 - 109/65)  BP(mean): --  RR: 18 (23 Oct 2017 22:37) (18 - 20)  SpO2: 97% (23 Oct 2017 22:37) (94% - 99%)    physical exam  nad  supine  no carotid bruit  bibasilar crackles present; no accessory muscle use  rr; s1/s2 present; no rub  soft abd; nt  bilat le edema  warm le and ue bilat    I&O's Detail    22 Oct 2017 07:01  -  23 Oct 2017 07:00  --------------------------------------------------------  IN:    IV PiggyBack: 500 mL  Total IN: 500 mL    OUT:    Indwelling Catheter - Urethral: 690 mL    Ureteral Catheter: 30 mL  Total OUT: 720 mL    Total NET: -220 mL      23 Oct 2017 07:01  -  23 Oct 2017 22:42  --------------------------------------------------------  IN:    IV PiggyBack: 500 mL  Total IN: 500 mL    OUT:    Indwelling Catheter - Urethral: 600 mL  Total OUT: 600 mL    Total NET: -100 mL      labs                                              8.0    8.9   )-----------( 123      ( 23 Oct 2017 17:15 )             26.5   10-23    140  |  98  |  21  ----------------------------<  110<H>  3.4<L>   |  28  |  1.17    Ca    9.5      23 Oct 2017 08:16  Mg     1.7     10-23        I&O's Summary    22 Oct 2017 07:01  -  23 Oct 2017 07:00  --------------------------------------------------------  IN: 500 mL / OUT: 720 mL / NET: -220 mL    23 Oct 2017 07:01  -  23 Oct 2017 22:42  --------------------------------------------------------  IN: 500 mL / OUT: 600 mL / NET: -100 mL            < from: Echocardiogram (10.16.17 @ 10:31) >    EXAM:  ECHOCARDIOGRAM (CARDIOL)                          PROCEDURE DATE:  10/16/2017                        INTERPRETATION:  Patient Height: 172.0 cm  Patient Weight: 103.0 kg  Heart Rate: 74 bpm  Systolic Pressure: 140 mmHg  Diastolic Pressure: 70mmHg  BSA: 2.2 m^2  Interpretation Summary  The left ventricle is mildly dilated.Hypokinesis of the basal inferior and   septal region.The left ventricular ejection fraction is estimated to be   45-50%The mitral inflow pattern is consistent with impaired left   ventricular   relaxation with mildly elevated left atrial pressure (8-14mmHg).  The   left   atrial size is normal. The right atrium is dilated.The right ventricle is   moderately dilated. The right ventricular systolic function is mildly   reduced.    There is mild aortic valve thickening.There is mild mitral valve   thickening.   There is mild mitral regurgitation.There is trace tricuspid   regurgitation.There was insufficient TR detected from which to calculate   pulmonary artery systolic pressure.  No aortic root dilatation.The   inferior   vena cava is normal in size (<2.1 cm) with normal inspiratory collapse   (>50%)   consistent with normal right atrial pressure.  There is no pericardial   effusion.    < end of copied text >  IN: 650 mL / OUT: 2875 mL / NET: -2225 mL          < from: Xray Chest 1 View AP -PORTABLE-Routine (10.09.17 @ 05:37) >  EXAM:  XR CHEST 1 VIEW PORT ROUTINE                          PROCEDURE DATE:  10/09/2017                     INTERPRETATION:    Portable AP Radiograph dated 10/9/2017 5:37 AM    CLINICAL INFORMATION: 72 years, Male, Intubated, PNA    PRIOR STUDIES:October 8, 2017    FINDINGS: Support tubes and lines are unchanged. Left pleural effusion is   stable. Right pleural effusion has increased. Pulmonary vascular   congestion has increased.    IMPRESSION:  Interval increase of right pleural effusion and pulmonary vascular   congestion.            "Thank you for the opportunity to participate in the care of this   patient."        KATHY PABLO M.D., RADIOLOGY ATTENDING  This document has been electronically signed. Oct  9 2017  8:33AM    < end of copied text >

## 2017-10-23 NOTE — PROGRESS NOTE ADULT - SUBJECTIVE AND OBJECTIVE BOX
Signing off.  Pt seen initially as an ICU trigger for age >80 with stage IV malignancy.  Pt for RJ post hospital d/c.  Pt. seen sitting up in chair appearing comfortable on NC conversing with visitor

## 2017-10-23 NOTE — PROGRESS NOTE ADULT - SUBJECTIVE AND OBJECTIVE BOX
Patient seen and exmained. Continues to have merlot urine with clot. Irrigated to clear and started on slow CBI. Titrate CBI to light pink. Will add on for OR tomorrow for cysto clot evacuation and any interventions necessary. Keep NPO at midnight, CBI, UCx, coags, rpt cbc, chest xray.

## 2017-10-23 NOTE — PROGRESS NOTE ADULT - PROBLEM SELECTOR PLAN 8
F no IVF  E- replete lytes prn  N- dash diet w/ 1.5 L fluid restriction     PPx - On ASA 81mg, SCD     CODE - DNR not DNI    Dispo: [facility] for RJ placement

## 2017-10-23 NOTE — PROGRESS NOTE ADULT - ATTENDING COMMENTS
Pt seen and examined, pt denies c/p, SOB, n/v, change in stools, worsening weakness/dizziness.  VS- BP low- 87/46 x 2 when seen at bedside that then improved to 98/52 while getting prbc.  Exam as above.  Labs reviewed.  71 yo M with above a/p as documented with additions made above.    Pt with acute blood loss anemia from hematuria  -  coming to evaluate as concern for metastatic prostate cancer being the cause of ongoing bleeding  - repeat CBC, coags, and anemia w/o today, FOBT to eval for GI causes as well  - pt is hypotensive with newly dx CHF and anemia- fluid status is difficult as pt needs prbcs and would benefit from more aggressive IVF and prbcs however will have to watch closely in setting of CHF and diurese as tolerates- watching closely  - rest of plan as above  - will re-evaluate throughout the day for above

## 2017-10-24 LAB
ANION GAP SERPL CALC-SCNC: 15 MMOL/L — SIGNIFICANT CHANGE UP (ref 5–17)
APTT BLD: 28.7 SEC — SIGNIFICANT CHANGE UP (ref 27.5–37.4)
BUN SERPL-MCNC: 22 MG/DL — SIGNIFICANT CHANGE UP (ref 7–23)
CALCIUM SERPL-MCNC: 9.2 MG/DL — SIGNIFICANT CHANGE UP (ref 8.4–10.5)
CHLORIDE SERPL-SCNC: 97 MMOL/L — SIGNIFICANT CHANGE UP (ref 96–108)
CO2 SERPL-SCNC: 26 MMOL/L — SIGNIFICANT CHANGE UP (ref 22–31)
CREAT SERPL-MCNC: 1.23 MG/DL — SIGNIFICANT CHANGE UP (ref 0.5–1.3)
GLUCOSE SERPL-MCNC: 108 MG/DL — HIGH (ref 70–99)
HCT VFR BLD CALC: 23.1 % — LOW (ref 39–50)
HCT VFR BLD CALC: 23.6 % — LOW (ref 39–50)
HGB BLD-MCNC: 7.2 G/DL — LOW (ref 13–17)
HGB BLD-MCNC: 7.2 G/DL — LOW (ref 13–17)
INR BLD: 1.23 — HIGH (ref 0.88–1.16)
MAGNESIUM SERPL-MCNC: 1.8 MG/DL — SIGNIFICANT CHANGE UP (ref 1.6–2.6)
MCHC RBC-ENTMCNC: 30.5 G/DL — LOW (ref 32–36)
MCHC RBC-ENTMCNC: 30.5 PG — SIGNIFICANT CHANGE UP (ref 27–34)
MCHC RBC-ENTMCNC: 31 PG — SIGNIFICANT CHANGE UP (ref 27–34)
MCHC RBC-ENTMCNC: 31.2 G/DL — LOW (ref 32–36)
MCV RBC AUTO: 101.7 FL — HIGH (ref 80–100)
MCV RBC AUTO: 97.9 FL — SIGNIFICANT CHANGE UP (ref 80–100)
PLATELET # BLD AUTO: 110 K/UL — LOW (ref 150–400)
PLATELET # BLD AUTO: 125 K/UL — LOW (ref 150–400)
POTASSIUM SERPL-MCNC: 3.5 MMOL/L — SIGNIFICANT CHANGE UP (ref 3.5–5.3)
POTASSIUM SERPL-SCNC: 3.5 MMOL/L — SIGNIFICANT CHANGE UP (ref 3.5–5.3)
PROTHROM AB SERPL-ACNC: 13.7 SEC — HIGH (ref 9.8–12.7)
RBC # BLD: 2.32 M/UL — LOW (ref 4.2–5.8)
RBC # BLD: 2.36 M/UL — LOW (ref 4.2–5.8)
RBC # FLD: 16.8 % — SIGNIFICANT CHANGE UP (ref 10.3–16.9)
RBC # FLD: 17.2 % — HIGH (ref 10.3–16.9)
SODIUM SERPL-SCNC: 138 MMOL/L — SIGNIFICANT CHANGE UP (ref 135–145)
VIT B12 SERPL-MCNC: 677 PG/ML — SIGNIFICANT CHANGE UP (ref 243–894)
WBC # BLD: 5.5 K/UL — SIGNIFICANT CHANGE UP (ref 3.8–10.5)
WBC # BLD: 6.7 K/UL — SIGNIFICANT CHANGE UP (ref 3.8–10.5)
WBC # FLD AUTO: 5.5 K/UL — SIGNIFICANT CHANGE UP (ref 3.8–10.5)
WBC # FLD AUTO: 6.7 K/UL — SIGNIFICANT CHANGE UP (ref 3.8–10.5)

## 2017-10-24 PROCEDURE — 71010: CPT | Mod: 26

## 2017-10-24 PROCEDURE — 99233 SBSQ HOSP IP/OBS HIGH 50: CPT | Mod: GC

## 2017-10-24 RX ORDER — MAGNESIUM SULFATE 500 MG/ML
1 VIAL (ML) INJECTION ONCE
Qty: 0 | Refills: 0 | Status: COMPLETED | OUTPATIENT
Start: 2017-10-24 | End: 2017-10-24

## 2017-10-24 RX ORDER — POTASSIUM CHLORIDE 20 MEQ
40 PACKET (EA) ORAL ONCE
Qty: 0 | Refills: 0 | Status: COMPLETED | OUTPATIENT
Start: 2017-10-24 | End: 2017-10-24

## 2017-10-24 RX ORDER — POTASSIUM CHLORIDE 20 MEQ
40 PACKET (EA) ORAL ONCE
Qty: 0 | Refills: 0 | Status: DISCONTINUED | OUTPATIENT
Start: 2017-10-24 | End: 2017-10-24

## 2017-10-24 RX ADMIN — Medication 40 MILLIGRAM(S): at 17:58

## 2017-10-24 RX ADMIN — Medication 3 MILLILITER(S): at 17:58

## 2017-10-24 RX ADMIN — TAMSULOSIN HYDROCHLORIDE 0.8 MILLIGRAM(S): 0.4 CAPSULE ORAL at 22:18

## 2017-10-24 RX ADMIN — Medication 3 MILLILITER(S): at 22:18

## 2017-10-24 RX ADMIN — Medication 100 MILLIGRAM(S): at 01:25

## 2017-10-24 RX ADMIN — Medication 3 MILLIGRAM(S): at 22:19

## 2017-10-24 RX ADMIN — Medication 3 MILLILITER(S): at 14:03

## 2017-10-24 RX ADMIN — Medication 40 MILLIEQUIVALENT(S): at 10:49

## 2017-10-24 RX ADMIN — NYSTATIN CREAM 1 APPLICATION(S): 100000 CREAM TOPICAL at 17:59

## 2017-10-24 RX ADMIN — Medication 3 MILLILITER(S): at 00:09

## 2017-10-24 RX ADMIN — Medication 3 MILLILITER(S): at 09:10

## 2017-10-24 RX ADMIN — Medication 100 UNIT(S): at 12:50

## 2017-10-24 RX ADMIN — PANTOPRAZOLE SODIUM 40 MILLIGRAM(S): 20 TABLET, DELAYED RELEASE ORAL at 07:12

## 2017-10-24 RX ADMIN — Medication 100 MILLIGRAM(S): at 17:58

## 2017-10-24 RX ADMIN — FINASTERIDE 5 MILLIGRAM(S): 5 TABLET, FILM COATED ORAL at 12:51

## 2017-10-24 RX ADMIN — ATORVASTATIN CALCIUM 80 MILLIGRAM(S): 80 TABLET, FILM COATED ORAL at 22:18

## 2017-10-24 RX ADMIN — Medication 3 MILLILITER(S): at 05:35

## 2017-10-24 RX ADMIN — Medication 40 MILLIGRAM(S): at 07:12

## 2017-10-24 RX ADMIN — Medication 100 MILLIGRAM(S): at 09:10

## 2017-10-24 RX ADMIN — Medication 100 GRAM(S): at 10:49

## 2017-10-24 RX ADMIN — Medication 81 MILLIGRAM(S): at 12:50

## 2017-10-24 RX ADMIN — NYSTATIN CREAM 1 APPLICATION(S): 100000 CREAM TOPICAL at 07:12

## 2017-10-24 NOTE — PROGRESS NOTE ADULT - SUBJECTIVE AND OBJECTIVE BOX
Seen and examined multiple times throughout day starting from 9AM. Stopped CBI. Urine stayed clear to pink in AM. Irrigated a few small clots and urine became clear. Stayed clear/yellow throughout day. Irrigated in afternoon with no clots. Keep CBI off overnight. If clear in AM plan for removal of glaser early in AM with TOV. Hgb stable after morning.     ICU Vital Signs Last 24 Hrs  T(C): 36.7 (24 Oct 2017 17:06), Max: 37.1 (23 Oct 2017 22:57)  T(F): 98.1 (24 Oct 2017 17:06), Max: 98.8 (23 Oct 2017 22:57)  HR: 70 (24 Oct 2017 17:06) (65 - 74)  BP: 105/66 (24 Oct 2017 17:06) (91/55 - 112/71)  BP(mean): --  ABP: --  ABP(mean): --  RR: 18 (24 Oct 2017 17:06) (18 - 20)  SpO2: 97% (24 Oct 2017 17:06) (97% - 100%)                          7.2    5.5   )-----------( 125      ( 24 Oct 2017 14:09 )             23.1

## 2017-10-24 NOTE — PROGRESS NOTE ADULT - PROBLEM SELECTOR PLAN 1
Acute blood loss anemia 2/2 hematuria. Patient dropping hgb to 6.7 10/23 in setting of bleeding/ clots from Saldana catheter. Patient with UGIB on admission, however no recent melena or bloody stools, stool guiac negative. S/p 1 U PRBCs with good response. Iron studies c/w ACD.  -Monitor CBC  -Monitor Saldana for bleeding, retention, now on CBI.  -Urology following, plan to go for cytoscopy today for evaluation.

## 2017-10-24 NOTE — PROGRESS NOTE ADULT - PROBLEM SELECTOR PLAN 8
F no IVF, 250cc bolus PRN  E- replete lytes prn  N- dash diet w/ 1.5 L fluid restriction     PPx - On ASA 81mg, SCD     CODE - DNR not DNI    Dispo: [facility] for RJ placement

## 2017-10-24 NOTE — PROGRESS NOTE ADULT - SUBJECTIVE AND OBJECTIVE BOX
O/N Events: NILDA.    Subjective: Patient seen and examined at bedside. He reports feeling well. No f/c, CP, SOB, abdominal pain, or suprapubic fullness.     VITALS  Vital Signs Last 24 Hrs  T(C): 36.9 (24 Oct 2017 09:09), Max: 37.1 (23 Oct 2017 22:57)  T(F): 98.4 (24 Oct 2017 09:09), Max: 98.8 (23 Oct 2017 22:57)  HR: 71 (24 Oct 2017 09:09) (65 - 74)  BP: 91/55 (24 Oct 2017 09:09) (91/55 - 112/71)  BP(mean): --  RR: 19 (24 Oct 2017 09:09) (18 - 20)  SpO2: 97% (24 Oct 2017 09:09) (97% - 100%)    I&O's Summary    23 Oct 2017 07:01  -  24 Oct 2017 07:00  --------------------------------------------------------  IN: 500 mL / OUT: 600 mL / NET: -100 mL    24 Oct 2017 07:01  -  24 Oct 2017 15:17  --------------------------------------------------------  IN: 100 mL / OUT: 0 mL / NET: 100 mL    PHYSICAL EXAM  General: Awake and alert; NAD  Head: NC/AT;   Eyes: anicteric sclera  Neck: Supple;  Respiratory: Non-labored breathing. +Bi-basilar crackles. No wheezing or rales.   Cardiovascular: Regular rhythm/rate; S1/S2; no gallops or murmurs auscultated  Gastrointestinal: Soft; NTND w/out rebound tenderness or guarding  Extremities: WWP; no edema or cyanosis    MEDICATIONS  (STANDING):  ALBUTerol/ipratropium for Nebulization 3 milliLiter(s) Nebulizer every 4 hours  aspirin  chewable 81 milliGRAM(s) Oral daily  atorvastatin 80 milliGRAM(s) Oral at bedtime  ceFAZolin   IVPB 2000 milliGRAM(s) IV Intermittent every 8 hours  dextrose 5%. 1000 milliLiter(s) (50 mL/Hr) IV Continuous <Continuous>  finasteride 5 milliGRAM(s) Oral daily  furosemide    Tablet 40 milliGRAM(s) Oral every 12 hours  heparin  flush 100 Units/mL Injectable 100 Unit(s) IV Push every other day  lisinopril 2.5 milliGRAM(s) Oral daily  melatonin 3 milliGRAM(s) Oral at bedtime  nystatin Powder 1 Application(s) Topical two times a day  pantoprazole    Tablet 40 milliGRAM(s) Oral two times a day before meals  tamsulosin 0.8 milliGRAM(s) Oral at bedtime    MEDICATIONS  (PRN):  lidocaine 2% Gel 1 Application(s) Topical every 3 hours PRN catheter related discomfort at tip of penis  polyethylene glycol 3350 17 Gram(s) Oral daily PRN Constipation  simethicone 80 milliGRAM(s) Chew three times a day PRN Gas      LABS                        7.2    5.5   )-----------( 125      ( 24 Oct 2017 14:09 )             23.1     10-    138  |  97  |  22  ----------------------------<  108<H>  3.5   |  26  |  1.23    Ca    9.2      24 Oct 2017 07:37  Mg     1.8     10-24        PT/INR - ( 24 Oct 2017 07:37 )   PT: 13.7 sec;   INR: 1.23          PTT - ( 24 Oct 2017 07:37 )  PTT:28.7 sec  Urinalysis Basic - ( 23 Oct 2017 11:13 )    Color: Red / Appearance: Turbid / S.020 / pH: x  Gluc: x / Ketone: NEGATIVE  / Bili: Negative / Urobili: 0.2 E.U./dL   Blood: x / Protein: >=300 mg/dL / Nitrite: POSITIVE   Leuk Esterase: Large / RBC: Many /HPF / WBC > 10 /HPF   Sq Epi: x / Non Sq Epi: 0-5 /HPF / Bacteria: Present /HPF      IMAGING/EKG/ETC- No new imaging. Hospital Course: 72 year old male with a PMHx of HTN, COPD, Prostate CA (, s/p radiation and seed placement, on leuprolide with a recent elevation of PSA and imaging showing bone mets) presents from his heme/on office with worsening of SOB and episode syncope the morning of admission 10/4. Pt was admitted for severe sepsis w/ CATALINA and possible pneumonia. Pt empirically treated for CAP w/ ceftriaxone/azithromycin/vancomycin and placed on BiPAP. Rapid response was called 10/5 for respiratory failure and hypotension once it was discovered that the patient removed his BiPaP on the floor. Patient stepped up to the MICU and started on peripheral levophed for closer monitoring. Patient intubated that PM for airway protection. In the MICU had OGT, right IJ with left A-line placed. Pt's ABX broadened to Zosyn and Vancomycin (dosed by level). OG tube was placed for decompression of previous ileus seen and abdominal distention. Blood Cx came back positive for MSSA w/ persistent bacteremia. Patient transitioned to nafcillin for MSSA coverage. Pt also had an ECHO (TTE) which showed HFrEF with EF 45%, right atrial dilatation, septal wall motion abnormalities and mild hypokinesis of left ventricle. KELLY which came back negative for any vegetation and repeat TTE negative for vegetations or worsening valvular dysfunction. Troponin positive w/ EKG changes concerning for demand ischemia in setting of septic shock. 10/6 patient came off propofol and levophed. He was successfully extubated to nasal cannula. Patient was transferred to [facility] for further monitoring and to work on discharge planning. While on [facility] pt developing upper GI bleed requiring transfusion of 3 U PRBC w/ endoscopy showing pigmented spot. Treatement with BID protonix initiated and patient to follow up with GI on  for further management. Additionally patient developed hematuria and urinary retention likely 2/2 to traumatic glaser catheter w/ friable prostate due to prostate CA. Patient initially had glaser removed once starting flomax but had repeat episode of urinary retention. Glaser was placed and follow up with urology was made to assess when to remove glaser after discharge.  Patient evaluated by physical therapy who recommended that he go to [facility] but pt wanted to go home with home PT. When ambulating w/o oxygen patient's saturations dropped as low as 86% and arrangements were made for patient to have home oxygen. After discussion with patient's cardiologist the decision was made to proceed with work up for ischemic heart disease as an outpatient. However patient changed mind and decided on JR after all. Patient stepped down for arrangement for RJ placement at [facility] pending insurance authorization. On [facility], patient had increased merlot colored bleeding from Glaser and blood clots obstructing Glaser. Glaser was replaced with coude. Hgb dropped to 6.7 and he was transfused 1U PRBCs with appropriate response. He continued to have merlot urine, he was started on CBI and planned for possible cytoscopy by urology.       O/N Events: NILDA.    Subjective: Patient seen and examined at bedside. He reports feeling well. No f/c, CP, SOB, abdominal pain, or suprapubic fullness.     VITALS  Vital Signs Last 24 Hrs  T(C): 36.9 (24 Oct 2017 09:09), Max: 37.1 (23 Oct 2017 22:57)  T(F): 98.4 (24 Oct 2017 09:09), Max: 98.8 (23 Oct 2017 22:57)  HR: 71 (24 Oct 2017 09:09) (65 - 74)  BP: 91/55 (24 Oct 2017 09:09) (91/55 - 112/71)  BP(mean): --  RR: 19 (24 Oct 2017 09:09) (18 - 20)  SpO2: 97% (24 Oct 2017 09:09) (97% - 100%)    I&O's Summary    23 Oct 2017 07:  -  24 Oct 2017 07:00  --------------------------------------------------------  IN: 500 mL / OUT: 600 mL / NET: -100 mL    24 Oct 2017 07:  -  24 Oct 2017 15:17  --------------------------------------------------------  IN: 100 mL / OUT: 0 mL / NET: 100 mL    PHYSICAL EXAM  General: Awake and alert; NAD  Head: NC/AT;   Eyes: anicteric sclera  Neck: Supple;  Respiratory: Non-labored breathing. +Bi-basilar crackles. No wheezing or rales.   Cardiovascular: Regular rhythm/rate; S1/S2; no gallops or murmurs auscultated  Gastrointestinal: Soft; NTND w/out rebound tenderness or guarding  Extremities: WWP; no edema or cyanosis    MEDICATIONS  (STANDING):  ALBUTerol/ipratropium for Nebulization 3 milliLiter(s) Nebulizer every 4 hours  aspirin  chewable 81 milliGRAM(s) Oral daily  atorvastatin 80 milliGRAM(s) Oral at bedtime  ceFAZolin   IVPB 2000 milliGRAM(s) IV Intermittent every 8 hours  dextrose 5%. 1000 milliLiter(s) (50 mL/Hr) IV Continuous <Continuous>  finasteride 5 milliGRAM(s) Oral daily  furosemide    Tablet 40 milliGRAM(s) Oral every 12 hours  heparin  flush 100 Units/mL Injectable 100 Unit(s) IV Push every other day  lisinopril 2.5 milliGRAM(s) Oral daily  melatonin 3 milliGRAM(s) Oral at bedtime  nystatin Powder 1 Application(s) Topical two times a day  pantoprazole    Tablet 40 milliGRAM(s) Oral two times a day before meals  tamsulosin 0.8 milliGRAM(s) Oral at bedtime    MEDICATIONS  (PRN):  lidocaine 2% Gel 1 Application(s) Topical every 3 hours PRN catheter related discomfort at tip of penis  polyethylene glycol 3350 17 Gram(s) Oral daily PRN Constipation  simethicone 80 milliGRAM(s) Chew three times a day PRN Gas      LABS                        7.2    5.5   )-----------( 125      ( 24 Oct 2017 14:09 )             23.1     10-24    138  |  97  |  22  ----------------------------<  108<H>  3.5   |  26  |  1.23    Ca    9.2      24 Oct 2017 07:37  Mg     1.8     10-24        PT/INR - ( 24 Oct 2017 07:37 )   PT: 13.7 sec;   INR: 1.23          PTT - ( 24 Oct 2017 07:37 )  PTT:28.7 sec  Urinalysis Basic - ( 23 Oct 2017 11:13 )    Color: Red / Appearance: Turbid / S.020 / pH: x  Gluc: x / Ketone: NEGATIVE  / Bili: Negative / Urobili: 0.2 E.U./dL   Blood: x / Protein: >=300 mg/dL / Nitrite: POSITIVE   Leuk Esterase: Large / RBC: Many /HPF / WBC > 10 /HPF   Sq Epi: x / Non Sq Epi: 0-5 /HPF / Bacteria: Present /HPF      IMAGING/EKG/ETC- No new imaging.

## 2017-10-24 NOTE — PROGRESS NOTE ADULT - ASSESSMENT
A/recs:  1) acute hypoxic respiratory failure with elevated tn and abnormal septal wall motion on tte in addition to rv dilatation and abnormal ecg this admission; left heart failure (ef=45% by tte) - s/p egd on 10- for anemia/melena - now for urgent/emergent gu procedure 10- with hematuria requiring prbc  a - nstemi earlier this admission  b - asa/statin/ace-i as tolerated; no plavix with recent anemia requiring prbc  c - recommend pulmonary consult to determine safety of bb use in this patient  d - pt aware of recommendation for cardiac cta prior to discharge  e - chf - systolic    f - recommend repeat ecg      2) paroxysmal atrial fibrillation  a - check ecg    3) htn - monitor    4) sepsis due to pna per ccm with bacteremia - rec id follow-up    5) possible copd exacerbation - rec pulm consult as above    6) acute renal failure - resolved    7) normocytic anemia and thrombocytopenia - serial h/h    8) shingles    9) prostate ca - per gu and hem-onc    10)  K>4, Mg>2    11) palliative care previously following

## 2017-10-24 NOTE — PROGRESS NOTE ADULT - ASSESSMENT
73 yo male with hx of HTN, COPD, Prostate CA (s/p radiation and seed 2013 on leuprolide outpatient) who presents with worsening of SOB and an episode of unwitnessed syncope found to have severe sepsis with gram + cocci in clusters identified as S. aureus and ATN.  Hospital course complicated by rapid response for hypotension, unresponsive to painful stimuli and was transferred to MICU for septic shock.  Septic shock has resolved but pt has had persistent MSSA bacteremia with resolving respiratory failure and ATN. Now with hgb drop in context of bleeding/ blood clots from [place].

## 2017-10-24 NOTE — CHART NOTE - NSCHARTNOTEFT_GEN_A_CORE
Admitting Diagnosis:   71 yo male with hx of HTN, COPD, Prostate CA (s/p radiation and seed 2013 on leuprolide outpatient) who presents with worsening of SOB and an episode of unwitnessed syncope found to have severe sepsis with gram + cocci in clusters identified as S. aureus and ATN.  Hospital course complicated by rapid response for hypotension, unresponsive to painful stimuli and was transferred to MICU for septic shock.  Septic shock has resolved but pt has had persistent MSSA bacteremia with resolving respiratory failure and ATN.      PAST MEDICAL & SURGICAL HISTORY:  COPD (chronic obstructive pulmonary disease)  Hypertension  Obstructive sleep apnea syndrome  Prostate cancer metastatic to bone  PC (prostate cancer): with seed placement      Current Nutrition Order:   Diet, NPO after Midnight:      NPO Start Date: 23-Oct-2017,   NPO Start Time: 23:59  Except Medications  With Ice Chips/Sips of Water (10-23-17 @ 16:53)  Diet, NPO after Midnight:      NPO Start Date: 23-Oct-2017,   NPO Start Time: 23:59 (10-23-17 @ 17:20)      PO Intake: Good (%) [ x  ]  Fair (50-75%) [   ] Poor (<25%) [   ]    GI Issues: denies N/V/D/C. Last BM yesterday evening (10/23).    Pain: pain controlled.    Skin Integrity: incontinence/incontinent associated dermatitis (IAD) per flowsheet.    Labs:   10-24    138  |  97  |  22  ----------------------------<  108<H>  3.5   |  26  |  1.23    Ca    9.2      24 Oct 2017 07:37  Mg     1.8     10-24      CAPILLARY BLOOD GLUCOSE          Medications:  MEDICATIONS  (STANDING):  ALBUTerol/ipratropium for Nebulization 3 milliLiter(s) Nebulizer every 4 hours  aspirin  chewable 81 milliGRAM(s) Oral daily  atorvastatin 80 milliGRAM(s) Oral at bedtime  ceFAZolin   IVPB 2000 milliGRAM(s) IV Intermittent every 8 hours  dextrose 5%. 1000 milliLiter(s) (50 mL/Hr) IV Continuous <Continuous>  finasteride 5 milliGRAM(s) Oral daily  furosemide    Tablet 40 milliGRAM(s) Oral every 12 hours  heparin  flush 100 Units/mL Injectable 100 Unit(s) IV Push every other day  lisinopril 2.5 milliGRAM(s) Oral daily  melatonin 3 milliGRAM(s) Oral at bedtime  nystatin Powder 1 Application(s) Topical two times a day  pantoprazole    Tablet 40 milliGRAM(s) Oral two times a day before meals  tamsulosin 0.8 milliGRAM(s) Oral at bedtime    MEDICATIONS  (PRN):  lidocaine 2% Gel 1 Application(s) Topical every 3 hours PRN catheter related discomfort at tip of penis  polyethylene glycol 3350 17 Gram(s) Oral daily PRN Constipation  simethicone 80 milliGRAM(s) Chew three times a day PRN Gas      Weight: 103.4kg (227#) via bed-scale weight (10/24)  Admitted weight: 109kg (10/5)  Daily Height in cm: 172.72 (24 Oct 2017 03:30)      Weight Change: 6kg weight change since admission (5% weight change in 3wks)  Reported # in last 6mo (Oct 2017)  Reported 20# intentional weight loss since spring 2017 (6.25% weight change); related to lifestyle change and portion control      Estimated energy needs:   Ht. 68". .4kg. BMI 34.7. IBW 70kg. 147%IBW  Used IBW to calculate nutrient needs - BW >120%IBW  Increased needs 2/2 prolonged catabolic illness related to prostate cancer mets bone and respiratory illness (pneumonia and COPD)  Fluid restrictions per MD  25-30kcal/kg IBW (1750kcal to 2100kcal/d)  1.3-1.5g protein/kg IBW (91g to 105g/d)  25-30ml/kg IBW (1750ml to 2100ml/d)      Subjective: 71yo male admitted with severe sepsis and pneumonia. Dx prostate cancer and metastasize to bones. Current diet order DASH/TLC with fluid restrictions. Planned NPO at midnight (10/24) for cytoscopy in AM. Pt reports good appetite; consistent PO intake >75% of meals. Denies GI distress or mechanical issues. Reports last BM yesterday evening (10/23). Pain controlled. NKFA or dietary intolerances. PTA states preparation of meals without added salt and intake of lean protein sources; consumption of red meat 2x per month. Denies prior DASH/TLC education. RD provided handout and briefly discussed DASH/TLC diet with emphasis on low cholesterol, low saturated fat and low sodium food choices/preparation methods. Pt expressed positive understanding. Compliance expected.    Previous Nutrition Diagnosis: Increased nutrient needs RT catabolic state AEB need for 1.3-1.5 g/kg protein.     Active [x ]  Resolved [   ]    If resolved, new PES:     Goal: Consistent PO intake of >75% meals; pt able to recall 2 main concepts from DASH/TLC diet (low saturated/chol/sodium food choices, label reading, preparation methods).     Recommendations:   1. Obtain standing scale weight and document on flowsheet weekly  2. Continue to monitor CMP  3. Encourage consistent intake of meals >75% with emphasis on protein rich sources; encourage patient preferences  4. Fluid restrictions per MD    Education:  RD provided handout and briefly discussed DASH/TLC diet with emphasis on low cholesterol, low saturated fat and low sodium food choices/preparation methods. Pt expressed positive understanding. Compliance expected.    Risk Level: High [   ] Moderate [ x  ] Low [   ] Admitting Diagnosis:   73 yo male with hx of HTN, COPD, Prostate CA (s/p radiation and seed 2013 on leuprolide outpatient) who presents with worsening of SOB and an episode of unwitnessed syncope found to have severe sepsis with gram + cocci in clusters identified as S. aureus and ATN.  Hospital course complicated by rapid response for hypotension, unresponsive to painful stimuli and was transferred to MICU for septic shock.  Septic shock has resolved but pt has had persistent MSSA bacteremia with resolving respiratory failure and ATN.      PAST MEDICAL & SURGICAL HISTORY:  COPD (chronic obstructive pulmonary disease)  Hypertension  Obstructive sleep apnea syndrome  Prostate cancer metastatic to bone  PC (prostate cancer): with seed placement      Current Nutrition Order:   Diet, NPO after Midnight:      NPO Start Date: 23-Oct-2017,   NPO Start Time: 23:59  Except Medications  With Ice Chips/Sips of Water (10-23-17 @ 16:53)  Diet, NPO after Midnight:      NPO Start Date: 23-Oct-2017,   NPO Start Time: 23:59 (10-23-17 @ 17:20)      PO Intake: Good (%) [ x  ]  Fair (50-75%) [   ] Poor (<25%) [   ]    GI Issues: denies N/V/D/C. Last BM yesterday evening (10/23).    Pain: pain controlled.    Skin Integrity: incontinence/incontinent associated dermatitis (IAD) per flowsheet.    Labs:   10-24    138  |  97  |  22  ----------------------------<  108<H>  3.5   |  26  |  1.23    Ca    9.2      24 Oct 2017 07:37  Mg     1.8     10-24      CAPILLARY BLOOD GLUCOSE          Medications:  MEDICATIONS  (STANDING):  ALBUTerol/ipratropium for Nebulization 3 milliLiter(s) Nebulizer every 4 hours  aspirin  chewable 81 milliGRAM(s) Oral daily  atorvastatin 80 milliGRAM(s) Oral at bedtime  ceFAZolin   IVPB 2000 milliGRAM(s) IV Intermittent every 8 hours  dextrose 5%. 1000 milliLiter(s) (50 mL/Hr) IV Continuous <Continuous>  finasteride 5 milliGRAM(s) Oral daily  furosemide    Tablet 40 milliGRAM(s) Oral every 12 hours  heparin  flush 100 Units/mL Injectable 100 Unit(s) IV Push every other day  lisinopril 2.5 milliGRAM(s) Oral daily  melatonin 3 milliGRAM(s) Oral at bedtime  nystatin Powder 1 Application(s) Topical two times a day  pantoprazole    Tablet 40 milliGRAM(s) Oral two times a day before meals  tamsulosin 0.8 milliGRAM(s) Oral at bedtime    MEDICATIONS  (PRN):  lidocaine 2% Gel 1 Application(s) Topical every 3 hours PRN catheter related discomfort at tip of penis  polyethylene glycol 3350 17 Gram(s) Oral daily PRN Constipation  simethicone 80 milliGRAM(s) Chew three times a day PRN Gas      Weight: 103.4kg (227#) via bed-scale weight (10/24)  Admitted weight: 109kg (10/5)  Height in cm: 172.72 (24 Oct 2017 03:30)      Weight Change: 6kg weight change since admission (5% weight change in 3wks)  Reported # in last 6mo (Oct 2017)  Reported 20# intentional weight loss since spring 2017 (6.25% weight change); related to lifestyle change and portion control      Estimated energy needs:   Ht. 68". .4kg. BMI 34.7. IBW 70kg. 147%IBW  Used IBW to calculate nutrient needs - BW >120%IBW  Increased needs 2/2 prolonged catabolic illness related to prostate cancer mets bone and respiratory illness (pneumonia and COPD)  1500ml Fluid restrictions per MD  25-30kcal/kg IBW (1750kcal to 2100kcal/d)  1.3-1.5g protein/kg IBW (91g to 105g/d)      Subjective: 71yo male admitted with severe sepsis and pneumonia. Dx prostate cancer and metastasize to bones. Current diet order DASH/TLC with fluid restrictions. Planned NPO at midnight (10/24) for cytoscopy in AM. Pt reports good appetite; consistent PO intake >75% of meals. Denies GI distress or mechanical issues. Reports last BM yesterday evening (10/23). Pain controlled. NKFA or dietary intolerances. PTA states preparation of meals without added salt and intake of lean protein sources; consumption of red meat 2x per month. Denies prior DASH/TLC education. RD provided handout and briefly discussed DASH/TLC diet with emphasis on low cholesterol, low saturated fat and low sodium food choices/preparation methods. Pt expressed positive understanding. Compliance expected.    Previous Nutrition Diagnosis: Increased nutrient needs RT catabolic state AEB need for 1.3-1.5 g/kg protein.     Active [x ]  Resolved [   ]    If resolved, new PES:     Goal: Consistent PO intake of >75% meals; pt able to recall 2 main concepts from DASH/TLC diet (low saturated/chol/sodium food choices, label reading, preparation methods).     Recommendations:   1. Obtain standing scale weight and document on flowsheet weekly  2. Continue to monitor CMP  3. Encourage consistent intake of meals >75% with emphasis on protein rich sources; encourage patient preferences  4. Fluid restrictions per MD    Education:  RD provided handout and briefly discussed DASH/TLC diet with emphasis on low cholesterol, low saturated fat and low sodium food choices/preparation methods. Pt expressed positive understanding. Compliance expected.    Risk Level: High [   ] Moderate [ x  ] Low [   ]

## 2017-10-24 NOTE — PROGRESS NOTE ADULT - SUBJECTIVE AND OBJECTIVE BOX
Cardiology for [facility]    cc: follow-up cardiology evaluation and management of left heart failure    interval - events noted - gu surgery not completed; no cp now    PAST MEDICAL & SURGICAL HISTORY:  COPD (chronic obstructive pulmonary disease)  Hypertension  Obstructive sleep apnea syndrome  Prostate cancer metastatic to bone  PC (prostate cancer): with seed placement    MEDICATIONS  (STANDING):  ALBUTerol/ipratropium for Nebulization 3 milliLiter(s) Nebulizer every 4 hours  aspirin  chewable 81 milliGRAM(s) Oral daily  atorvastatin 80 milliGRAM(s) Oral at bedtime  ceFAZolin   IVPB 2000 milliGRAM(s) IV Intermittent every 8 hours  dextrose 5%. 1000 milliLiter(s) (50 mL/Hr) IV Continuous <Continuous>  finasteride 5 milliGRAM(s) Oral daily  furosemide    Tablet 40 milliGRAM(s) Oral every 12 hours  heparin  flush 100 Units/mL Injectable 100 Unit(s) IV Push every other day  lisinopril 2.5 milliGRAM(s) Oral daily  melatonin 3 milliGRAM(s) Oral at bedtime  nystatin Powder 1 Application(s) Topical two times a day  pantoprazole    Tablet 40 milliGRAM(s) Oral two times a day before meals  tamsulosin 0.8 milliGRAM(s) Oral at bedtime    MEDICATIONS  (PRN):  lidocaine 2% Gel 1 Application(s) Topical every 3 hours PRN catheter related discomfort at tip of penis  polyethylene glycol 3350 17 Gram(s) Oral daily PRN Constipation  simethicone 80 milliGRAM(s) Chew three times a day PRN Gas    Vital Signs Last 24 Hrs  T(C): 37.1 (24 Oct 2017 20:44), Max: 37.1 (23 Oct 2017 22:57)  T(F): 98.8 (24 Oct 2017 20:44), Max: 98.8 (23 Oct 2017 22:57)  HR: 72 (24 Oct 2017 20:44) (65 - 74)  BP: 97/52 (24 Oct 2017 20:44) (91/55 - 112/71)  BP(mean): --  RR: 18 (24 Oct 2017 20:44) (18 - 20)  SpO2: 96% (24 Oct 2017 20:44) (96% - 100%)    physical exam  nad  anicteric  crackles present; no accessory muscle use  rr; s1/s2 present  soft abd  edema present  le pulse present    I&O's Detail    23 Oct 2017 07:01  -  24 Oct 2017 07:00  --------------------------------------------------------  IN:    IV PiggyBack: 500 mL  Total IN: 500 mL    OUT:    Indwelling Catheter - Urethral: 600 mL  Total OUT: 600 mL    Total NET: -100 mL      24 Oct 2017 07:01  -  24 Oct 2017 21:40  --------------------------------------------------------  IN:    IV PiggyBack: 100 mL    Solution: 100 mL  Total IN: 200 mL    OUT:    Indwelling Catheter - Urethral: 500 mL  Total OUT: 500 mL    Total NET: -300 mL          labs                                7.2    5.5   )-----------( 125      ( 24 Oct 2017 14:09 )             23.1   10-24    138  |  97  |  22  ----------------------------<  108<H>  3.5   |  26  |  1.23    Ca    9.2      24 Oct 2017 07:37  Mg     1.8     10-24      I&O's Summary    23 Oct 2017 07:01  -  24 Oct 2017 07:00  --------------------------------------------------------  IN: 500 mL / OUT: 600 mL / NET: -100 mL    24 Oct 2017 07:01  -  24 Oct 2017 21:40  --------------------------------------------------------  IN: 200 mL / OUT: 500 mL / NET: -300 mL        < from: Echocardiogram (10.16.17 @ 10:31) >    EXAM:  ECHOCARDIOGRAM (CARDIOL)                          PROCEDURE DATE:  10/16/2017                        INTERPRETATION:  Patient Height: 172.0 cm  Patient Weight: 103.0 kg  Heart Rate: 74 bpm  Systolic Pressure: 140 mmHg  Diastolic Pressure: 70mmHg  BSA: 2.2 m^2  Interpretation Summary  The left ventricle is mildly dilated.Hypokinesis of the basal inferior and   septal region.The left ventricular ejection fraction is estimated to be   45-50%The mitral inflow pattern is consistent with impaired left   ventricular   relaxation with mildly elevated left atrial pressure (8-14mmHg).  The   left   atrial size is normal. The right atrium is dilated.The right ventricle is   moderately dilated. The right ventricular systolic function is mildly   reduced.    There is mild aortic valve thickening.There is mild mitral valve   thickening.   There is mild mitral regurgitation.There is trace tricuspid   regurgitation.There was insufficient TR detected from which to calculate   pulmonary artery systolic pressure.  No aortic root dilatation.The   inferior   vena cava is normal in size (<2.1 cm) with normal inspiratory collapse   (>50%)   consistent with normal right atrial pressure.  There is no pericardial   effusion.    < end of copied text >  IN: 650 mL / OUT: 2875 mL / NET: -2225 mL          < from: Xray Chest 1 View AP -PORTABLE-Routine (10.09.17 @ 05:37) >  EXAM:  XR CHEST 1 VIEW PORT ROUTINE                          PROCEDURE DATE:  10/09/2017                     INTERPRETATION:    Portable AP Radiograph dated 10/9/2017 5:37 AM    CLINICAL INFORMATION: 72 years, Male, Intubated, PNA    PRIOR STUDIES:October 8, 2017    FINDINGS: Support tubes and lines are unchanged. Left pleural effusion is   stable. Right pleural effusion has increased. Pulmonary vascular   congestion has increased.    IMPRESSION:  Interval increase of right pleural effusion and pulmonary vascular   congestion.            "Thank you for the opportunity to participate in the care of this   patient."        KATHY PABLO M.D., RADIOLOGY ATTENDING  This document has been electronically signed. Oct  9 2017  8:33AM    < end of copied text >

## 2017-10-25 LAB
ANION GAP SERPL CALC-SCNC: 15 MMOL/L — SIGNIFICANT CHANGE UP (ref 5–17)
BLD GP AB SCN SERPL QL: NEGATIVE — SIGNIFICANT CHANGE UP
BUN SERPL-MCNC: 21 MG/DL — SIGNIFICANT CHANGE UP (ref 7–23)
CALCIUM SERPL-MCNC: 9.5 MG/DL — SIGNIFICANT CHANGE UP (ref 8.4–10.5)
CHLORIDE SERPL-SCNC: 97 MMOL/L — SIGNIFICANT CHANGE UP (ref 96–108)
CO2 SERPL-SCNC: 28 MMOL/L — SIGNIFICANT CHANGE UP (ref 22–31)
CREAT SERPL-MCNC: 1.01 MG/DL — SIGNIFICANT CHANGE UP (ref 0.5–1.3)
GLUCOSE SERPL-MCNC: 116 MG/DL — HIGH (ref 70–99)
HCT VFR BLD CALC: 21.4 % — LOW (ref 39–50)
HCT VFR BLD CALC: 25 % — LOW (ref 39–50)
HCT VFR BLD CALC: 25.3 % — LOW (ref 39–50)
HGB BLD-MCNC: 6.7 G/DL — CRITICAL LOW (ref 13–17)
HGB BLD-MCNC: 7.8 G/DL — LOW (ref 13–17)
HGB BLD-MCNC: 7.9 G/DL — LOW (ref 13–17)
MAGNESIUM SERPL-MCNC: 1.8 MG/DL — SIGNIFICANT CHANGE UP (ref 1.6–2.6)
MCHC RBC-ENTMCNC: 30.5 PG — SIGNIFICANT CHANGE UP (ref 27–34)
MCHC RBC-ENTMCNC: 30.5 PG — SIGNIFICANT CHANGE UP (ref 27–34)
MCHC RBC-ENTMCNC: 30.6 PG — SIGNIFICANT CHANGE UP (ref 27–34)
MCHC RBC-ENTMCNC: 31.2 G/DL — LOW (ref 32–36)
MCHC RBC-ENTMCNC: 31.2 G/DL — LOW (ref 32–36)
MCHC RBC-ENTMCNC: 31.3 G/DL — LOW (ref 32–36)
MCV RBC AUTO: 97.3 FL — SIGNIFICANT CHANGE UP (ref 80–100)
MCV RBC AUTO: 97.7 FL — SIGNIFICANT CHANGE UP (ref 80–100)
MCV RBC AUTO: 98.1 FL — SIGNIFICANT CHANGE UP (ref 80–100)
PHOSPHATE SERPL-MCNC: 3.1 MG/DL — SIGNIFICANT CHANGE UP (ref 2.5–4.5)
PLATELET # BLD AUTO: 106 K/UL — LOW (ref 150–400)
PLATELET # BLD AUTO: 123 K/UL — LOW (ref 150–400)
PLATELET # BLD AUTO: 94 K/UL — LOW (ref 150–400)
POTASSIUM SERPL-MCNC: 3.7 MMOL/L — SIGNIFICANT CHANGE UP (ref 3.5–5.3)
POTASSIUM SERPL-SCNC: 3.7 MMOL/L — SIGNIFICANT CHANGE UP (ref 3.5–5.3)
RBC # BLD: 2.2 M/UL — LOW (ref 4.2–5.8)
RBC # BLD: 2.56 M/UL — LOW (ref 4.2–5.8)
RBC # BLD: 2.58 M/UL — LOW (ref 4.2–5.8)
RBC # FLD: 16.5 % — SIGNIFICANT CHANGE UP (ref 10.3–16.9)
RBC # FLD: 16.6 % — SIGNIFICANT CHANGE UP (ref 10.3–16.9)
RBC # FLD: 16.6 % — SIGNIFICANT CHANGE UP (ref 10.3–16.9)
RH IG SCN BLD-IMP: POSITIVE — SIGNIFICANT CHANGE UP
SODIUM SERPL-SCNC: 140 MMOL/L — SIGNIFICANT CHANGE UP (ref 135–145)
WBC # BLD: 11.2 K/UL — HIGH (ref 3.8–10.5)
WBC # BLD: 11.7 K/UL — HIGH (ref 3.8–10.5)
WBC # BLD: 17.2 K/UL — HIGH (ref 3.8–10.5)
WBC # FLD AUTO: 11.2 K/UL — HIGH (ref 3.8–10.5)
WBC # FLD AUTO: 11.7 K/UL — HIGH (ref 3.8–10.5)
WBC # FLD AUTO: 17.2 K/UL — HIGH (ref 3.8–10.5)

## 2017-10-25 PROCEDURE — 99233 SBSQ HOSP IP/OBS HIGH 50: CPT

## 2017-10-25 PROCEDURE — 99356: CPT

## 2017-10-25 PROCEDURE — 71010: CPT | Mod: 26

## 2017-10-25 RX ORDER — MAGNESIUM SULFATE 500 MG/ML
1 VIAL (ML) INJECTION ONCE
Qty: 0 | Refills: 0 | Status: COMPLETED | OUTPATIENT
Start: 2017-10-25 | End: 2017-10-25

## 2017-10-25 RX ORDER — PIPERACILLIN AND TAZOBACTAM 4; .5 G/20ML; G/20ML
3.38 INJECTION, POWDER, LYOPHILIZED, FOR SOLUTION INTRAVENOUS EVERY 6 HOURS
Qty: 0 | Refills: 0 | Status: DISCONTINUED | OUTPATIENT
Start: 2017-10-25 | End: 2017-10-26

## 2017-10-25 RX ORDER — SODIUM CHLORIDE 9 MG/ML
250 INJECTION INTRAMUSCULAR; INTRAVENOUS; SUBCUTANEOUS ONCE
Qty: 0 | Refills: 0 | Status: DISCONTINUED | OUTPATIENT
Start: 2017-10-25 | End: 2017-10-25

## 2017-10-25 RX ORDER — POTASSIUM CHLORIDE 20 MEQ
10 PACKET (EA) ORAL
Qty: 0 | Refills: 0 | Status: DISCONTINUED | OUTPATIENT
Start: 2017-10-25 | End: 2017-10-26

## 2017-10-25 RX ORDER — LIDOCAINE HCL 20 MG/ML
5 VIAL (ML) INJECTION ONCE
Qty: 0 | Refills: 0 | Status: DISCONTINUED | OUTPATIENT
Start: 2017-10-25 | End: 2017-10-31

## 2017-10-25 RX ORDER — FUROSEMIDE 40 MG
20 TABLET ORAL ONCE
Qty: 0 | Refills: 0 | Status: COMPLETED | OUTPATIENT
Start: 2017-10-25 | End: 2017-10-25

## 2017-10-25 RX ADMIN — NYSTATIN CREAM 1 APPLICATION(S): 100000 CREAM TOPICAL at 05:53

## 2017-10-25 RX ADMIN — Medication 100 MILLIGRAM(S): at 01:36

## 2017-10-25 RX ADMIN — LISINOPRIL 2.5 MILLIGRAM(S): 2.5 TABLET ORAL at 05:52

## 2017-10-25 RX ADMIN — NYSTATIN CREAM 1 APPLICATION(S): 100000 CREAM TOPICAL at 18:50

## 2017-10-25 RX ADMIN — Medication 3 MILLIGRAM(S): at 23:04

## 2017-10-25 RX ADMIN — ATORVASTATIN CALCIUM 80 MILLIGRAM(S): 80 TABLET, FILM COATED ORAL at 23:04

## 2017-10-25 RX ADMIN — Medication 3 MILLILITER(S): at 05:36

## 2017-10-25 RX ADMIN — Medication 100 MILLIGRAM(S): at 11:24

## 2017-10-25 RX ADMIN — PIPERACILLIN AND TAZOBACTAM 200 GRAM(S): 4; .5 INJECTION, POWDER, LYOPHILIZED, FOR SOLUTION INTRAVENOUS at 17:10

## 2017-10-25 RX ADMIN — Medication 100 MILLIGRAM(S): at 23:05

## 2017-10-25 RX ADMIN — PANTOPRAZOLE SODIUM 40 MILLIGRAM(S): 20 TABLET, DELAYED RELEASE ORAL at 07:22

## 2017-10-25 RX ADMIN — Medication 3 MILLILITER(S): at 18:43

## 2017-10-25 RX ADMIN — Medication 40 MILLIGRAM(S): at 18:50

## 2017-10-25 RX ADMIN — TAMSULOSIN HYDROCHLORIDE 0.8 MILLIGRAM(S): 0.4 CAPSULE ORAL at 23:04

## 2017-10-25 RX ADMIN — Medication 3 MILLILITER(S): at 23:04

## 2017-10-25 RX ADMIN — Medication 40 MILLIGRAM(S): at 05:52

## 2017-10-25 RX ADMIN — Medication 20 MILLIGRAM(S): at 23:04

## 2017-10-25 RX ADMIN — Medication 100 GRAM(S): at 18:43

## 2017-10-25 RX ADMIN — LIDOCAINE 1 APPLICATION(S): 4 CREAM TOPICAL at 04:06

## 2017-10-25 RX ADMIN — FINASTERIDE 5 MILLIGRAM(S): 5 TABLET, FILM COATED ORAL at 11:28

## 2017-10-25 RX ADMIN — Medication 81 MILLIGRAM(S): at 11:28

## 2017-10-25 RX ADMIN — Medication 3 MILLILITER(S): at 10:55

## 2017-10-25 RX ADMIN — Medication 3 MILLILITER(S): at 14:03

## 2017-10-25 NOTE — PROGRESS NOTE ADULT - PROBLEM SELECTOR PLAN 1
Acute blood loss anemia 2/2 hematuria. Patient dropping hgb to 6.7 10/23 in setting of bleeding/ clots from Saldana catheter. Patient with UGIB on admission, however no recent melena or bloody stools, stool guiac negative. S/p 1 U PRBCs with good response on 10/23. Iron studies c/w ACD.  --Planned for OR today for cytoscopy.   -Transfuse 1U PRBCs prior to OR today  -Monitor CBC  -Monitor Saldana for bleeding, retention, now on CBI.  -Urology following

## 2017-10-25 NOTE — PROGRESS NOTE ADULT - SUBJECTIVE AND OBJECTIVE BOX
Cardiology for [facility]    cc: follow-up cardiology evaluation and management of left heart failure    interval - additional hematuria noted; no cp; events noted    PAST MEDICAL & SURGICAL HISTORY:  COPD (chronic obstructive pulmonary disease)  Hypertension  Obstructive sleep apnea syndrome  Prostate cancer metastatic to bone  PC (prostate cancer): with seed placement    MEDICATIONS  (STANDING):  ALBUTerol/ipratropium for Nebulization 3 milliLiter(s) Nebulizer every 4 hours  aspirin  chewable 81 milliGRAM(s) Oral daily  atorvastatin 80 milliGRAM(s) Oral at bedtime  ceFAZolin   IVPB 2000 milliGRAM(s) IV Intermittent every 8 hours  dextrose 5%. 1000 milliLiter(s) (50 mL/Hr) IV Continuous <Continuous>  finasteride 5 milliGRAM(s) Oral daily  furosemide    Tablet 40 milliGRAM(s) Oral every 12 hours  furosemide   Injectable 20 milliGRAM(s) IV Push once  heparin  flush 100 Units/mL Injectable 100 Unit(s) IV Push every other day  lidocaine 2% Jelly 5 milliLiter(s) IntraUrethral once  melatonin 3 milliGRAM(s) Oral at bedtime  nystatin Powder 1 Application(s) Topical two times a day  pantoprazole    Tablet 40 milliGRAM(s) Oral two times a day before meals  piperacillin/tazobactam IVPB. 3.375 Gram(s) IV Intermittent every 6 hours  potassium chloride  10 mEq/100 mL IVPB 10 milliEquivalent(s) IV Intermittent every 1 hour  tamsulosin 0.8 milliGRAM(s) Oral at bedtime    MEDICATIONS  (PRN):  lidocaine 2% Gel 1 Application(s) Topical every 3 hours PRN catheter related discomfort at tip of penis  polyethylene glycol 3350 17 Gram(s) Oral daily PRN Constipation  simethicone 80 milliGRAM(s) Chew three times a day PRN Gas    Vital Signs Last 24 Hrs  T(C): 36.9 (25 Oct 2017 20:44), Max: 38.6 (25 Oct 2017 13:47)  T(F): 98.5 (25 Oct 2017 20:44), Max: 101.5 (25 Oct 2017 13:47)  HR: 96 (25 Oct 2017 21:20) (76 - 113)  BP: 106/64 (25 Oct 2017 20:44) (95/56 - 138/68)  BP(mean): --  RR: 18 (25 Oct 2017 21:20) (16 - 23)  SpO2: 98% (25 Oct 2017 21:20) (94% - 100%)    physical exam  nad  mmm  crackles presen at bases  rr; s1/s2 present; no harsh murmur  soft abd; no rebound  le edema present  warm le    I&O's Detail    24 Oct 2017 07:01  -  25 Oct 2017 07:00  --------------------------------------------------------  IN:    IV PiggyBack: 100 mL    Solution: 100 mL  Total IN: 200 mL    OUT:    Indwelling Catheter - Urethral: 1000 mL  Total OUT: 1000 mL    Total NET: -800 mL      25 Oct 2017 07:01  -  25 Oct 2017 23:05  --------------------------------------------------------  IN:  Total IN: 0 mL    OUT:    Indwelling Catheter - Urethral: 2000 mL  Total OUT: 2000 mL    Total NET: -2000 mL            labs                                                      7.9    17.2  )-----------( 94       ( 25 Oct 2017 22:57 )             25.3     10-25    140  |  97  |  21  ----------------------------<  116<H>  3.7   |  28  |  1.01    Ca    9.5      25 Oct 2017 07:06  Phos  3.1     10-25  Mg     1.8     10-25        I&O's Summary    24 Oct 2017 07:01  -  25 Oct 2017 07:00  --------------------------------------------------------  IN: 200 mL / OUT: 1000 mL / NET: -800 mL    25 Oct 2017 07:01  -  25 Oct 2017 23:05  --------------------------------------------------------  IN: 0 mL / OUT: 2000 mL / NET: -2000 mL          < from: Echocardiogram (10.16.17 @ 10:31) >    EXAM:  ECHOCARDIOGRAM (CARDIOL)                          PROCEDURE DATE:  10/16/2017                        INTERPRETATION:  Patient Height: 172.0 cm  Patient Weight: 103.0 kg  Heart Rate: 74 bpm  Systolic Pressure: 140 mmHg  Diastolic Pressure: 70mmHg  BSA: 2.2 m^2  Interpretation Summary  The left ventricle is mildly dilated.Hypokinesis of the basal inferior and   septal region.The left ventricular ejection fraction is estimated to be   45-50%The mitral inflow pattern is consistent with impaired left   ventricular   relaxation with mildly elevated left atrial pressure (8-14mmHg).  The   left   atrial size is normal. The right atrium is dilated.The right ventricle is   moderately dilated. The right ventricular systolic function is mildly   reduced.    There is mild aortic valve thickening.There is mild mitral valve   thickening.   There is mild mitral regurgitation.There is trace tricuspid   regurgitation.There was insufficient TR detected from which to calculate   pulmonary artery systolic pressure.  No aortic root dilatation.The   inferior   vena cava is normal in size (<2.1 cm) with normal inspiratory collapse   (>50%)   consistent with normal right atrial pressure.  There is no pericardial   effusion.    < end of copied text >  IN: 650 mL / OUT: 2875 mL / NET: -2225 mL          < from: Xray Chest 1 View AP -PORTABLE-Routine (10.09.17 @ 05:37) >  EXAM:  XR CHEST 1 VIEW PORT ROUTINE                          PROCEDURE DATE:  10/09/2017                     INTERPRETATION:    Portable AP Radiograph dated 10/9/2017 5:37 AM    CLINICAL INFORMATION: 72 years, Male, Intubated, PNA    PRIOR STUDIES:October 8, 2017    FINDINGS: Support tubes and lines are unchanged. Left pleural effusion is   stable. Right pleural effusion has increased. Pulmonary vascular   congestion has increased.    IMPRESSION:  Interval increase of right pleural effusion and pulmonary vascular   congestion.            "Thank you for the opportunity to participate in the care of this   patient."        KATHY PABLO M.D., RADIOLOGY ATTENDING  This document has been electronically signed. Oct  9 2017  8:33AM    < end of copied text >

## 2017-10-25 NOTE — PROGRESS NOTE ADULT - PROBLEM SELECTOR PLAN 8
F no IVF, 250cc bolus PRN  E- replete lytes prn  N- dash diet w/ 1.5 L fluid restriction     PPx - On ASA 81mg, SCD     CODE - DNR not DNI    Dispo: [facility] for RJ placement

## 2017-10-25 NOTE — PROGRESS NOTE ADULT - SUBJECTIVE AND OBJECTIVE BOX
Attending Note    Was called to OR at approx 2:30 pm w/ plan for cysto, clot evac and fulguration due to persistent bleeding and clotted catheter.  In pre-op, pt was apparently canceled because of worsened anemia and new temp of 101.5; Pt had remained stable w/ hgb of 7.8 and then later this afternoon dropped to 6.8 (probably lower now) w/ significant inc in GH and clot retention requiring frequent irrigation. Repeat urine from <48 hrs ago w/ gnr pending. Blood cultures obtained today. CXR yesterday w/ no pneumonia or effusions, though pt has Significant emphysema and COPD per Cards.    Med team did not want to transfuse due to temp, but due to persistent hematuria (significant amount of blood clots flowing into drainage bag), transfusion suggested. I had spoken to pt's regular cardiologist, Dr. Kade Glass at length, who agreed we transfuse to make up for persistent loss. Pt has been in low 100's and down to 94/60's SBP but most recently 105/63 in pre op area. Though no prior h/o CAD, CHF, angina and no sx's now, pt seems to have had a non q wave mi at this admission, likely from demand and stress due to initial septicemia from MSSA. ECHO 10/16/17 45-50%.  All in agreement w/ Tylenol, re-assess current temp and start transfusing slowly. Pt w/ Zosyn.  She be tanked up and transferred to a step down or tele floor.    VS are stable:  P 87 	Sat 96% on 2 L   Urine is again cherry, but on mod cbi and getting no clots currently. I changed him to a 24 fr hematuria catheter easily and lg amount of clot returned.  Abd - is soft and ND; he is NT  Calves - b/l pedal edema w/ no calf tenderness    Imp:  fever (likely from urine and clot retention)  GH  pneumonia (s/p septic episode w/ MSSA)  CHF  Significant anemia  MI    Plan:  transfuse at least 2 units  abx, follow up cx's  watch drainage bag closely and irrigate prn  Labs in btwn units and may need lasix per meds and cards.  We contacted Med Attg and requested a MICU assessment.  OR tomorrow.   Make sure he is NPO at 8 am the latest.     I spoke w/ son Arturo for approx 25 min bringing him up to date. Explained all of above to pt who understands, and he is also aware that I will be out of town tomorrow. Dr. Osbaldo Hines will be covering me, who is aware of pt and all of the above.    Naomy Chappell M.D.

## 2017-10-25 NOTE — PROGRESS NOTE ADULT - ASSESSMENT
A/recs:  1) acute hypoxic respiratory failure with elevated tn and abnormal septal wall motion on tte in addition to rv dilatation and abnormal ecg this admission; left heart failure (ef=45% by tte) - s/p egd on 10- for anemia/melena - now for urgent/emergent gu procedure 10- with hematuria requiring prbc  a - nstemi earlier this admission  b - with continued bleeding, risk of asa may outweigh current benefit -  hold asa for now - monitor for signs/symptoms of ischemia - check daily ecg  c - recommend pulmonary evaluate safety of bb  d - urgent/emergent gu surgery pending:  i) no cardiac contraindications  ii) avoid labile bp  iii) check post-op ecg and tn  iv) agree with  recs for icu consult  v) patient aware of risks, benefits, alternatives; questions answered      2) paroxysmal atrial fibrillation  a - rec telemetry    3) htn - avoid labile bp    4) sepsis due to pna per ccm with bacteremia - rec id re-eval - note temperature earlier today    5) possible copd exacerbation - rec pulm follow-up    6) acute renal failure - resolved    7) normocytic anemia and thrombocytopenia - note prbc per gu    8) shingles    9) prostate ca - follow-up gu and onc recs    10)  K>4, Mg>2    11) palliative care previously following  -case discussed with Dr. Gentile of Interventional Cardiology, Dr. Aly, and Housestaff (Dr. Machuca)

## 2017-10-25 NOTE — PROGRESS NOTE ADULT - ASSESSMENT
73 yo male with hx of HTN, COPD, Prostate CA (s/p radiation and seed 2013 on leuprolide outpatient) who presents with worsening of SOB and an episode of unwitnessed syncope found to have severe sepsis with gram + cocci in clusters identified as S. aureus and ATN.  Hospital course complicated by rapid response for hypotension, unresponsive to painful stimuli and was transferred to MICU for septic shock.  Septic shock has resolved but pt has had persistent MSSA bacteremia with resolving respiratory failure and ATN. Now with hgb drop in context of bleeding/ blood clots from Saldana causing repeated episodes of Saldana obstruction.

## 2017-10-25 NOTE — PROGRESS NOTE ADULT - SUBJECTIVE AND OBJECTIVE BOX
The patient continues to have intermittent bleeding from his bladder.    CHEMOTHERAPY REGIMEN:        Day:                          Diet:  Protocol:                                    IVF:      MEDICATIONS  (STANDING):  ALBUTerol/ipratropium for Nebulization 3 milliLiter(s) Nebulizer every 4 hours  aspirin  chewable 81 milliGRAM(s) Oral daily  atorvastatin 80 milliGRAM(s) Oral at bedtime  ceFAZolin   IVPB 2000 milliGRAM(s) IV Intermittent every 8 hours  dextrose 5%. 1000 milliLiter(s) (50 mL/Hr) IV Continuous <Continuous>  finasteride 5 milliGRAM(s) Oral daily  furosemide    Tablet 40 milliGRAM(s) Oral every 12 hours  heparin  flush 100 Units/mL Injectable 100 Unit(s) IV Push every other day  lisinopril 2.5 milliGRAM(s) Oral daily  melatonin 3 milliGRAM(s) Oral at bedtime  nystatin Powder 1 Application(s) Topical two times a day  pantoprazole    Tablet 40 milliGRAM(s) Oral two times a day before meals  tamsulosin 0.8 milliGRAM(s) Oral at bedtime    MEDICATIONS  (PRN):  lidocaine 2% Gel 1 Application(s) Topical every 3 hours PRN catheter related discomfort at tip of penis  polyethylene glycol 3350 17 Gram(s) Oral daily PRN Constipation  simethicone 80 milliGRAM(s) Chew three times a day PRN Gas      Allergies    No Known Allergies    Intolerances        DVT Prophylaxis: [ ] YES [x ] NO      Antibiotics: [x ] YES [ ] NO    Pain Scale (1-10):       Location:    Vital Signs Last 24 Hrs  T(C): 37.1 (25 Oct 2017 05:22), Max: 37.1 (24 Oct 2017 20:44)  T(F): 98.7 (25 Oct 2017 05:22), Max: 98.8 (24 Oct 2017 20:44)  HR: 78 (25 Oct 2017 05:45) (70 - 87)  BP: 131/72 (25 Oct 2017 05:22) (91/55 - 131/72)  BP(mean): --  RR: 18 (25 Oct 2017 05:45) (18 - 23)  SpO2: 95% (25 Oct 2017 05:45) (94% - 98%)    Drug Dosing Weight  Height (cm): 172.72 (24 Oct 2017 03:30)  Weight (kg): 103.4 (24 Oct 2017 03:30)  BMI (kg/m2): 34.7 (24 Oct 2017 03:30)  BSA (m2): 2.16 (24 Oct 2017 03:30)    PHYSICAL EXAM:      Constitutional: intermittent bleeding from the bladder.    Eyes: conjunctiva pale.  ENMT: NC in place. Buccal mucosa moist.  Neck: no masses.  Respiratory: chest clear.  Cardiovascular: S1>S2 at apex. RSR with occasional ectopic beats.  Gastrointestinal: soft, nontender, active bowel sounds.  Genitourinary: hematuria this am.  Extremities: some leg edema.  Neurological: no gross focal deficits.  Skin: warm and dry.  Lymph Nodes: none palp.  Musculoskeletal: full ROM.  Psychiatric: affect normal.        URINARY CATHETER: [x ] YES [ ] NO     LABS:  CBC Full  -  ( 24 Oct 2017 14:09 )  WBC Count : 5.5 K/uL  Hemoglobin : 7.2 g/dL  Hematocrit : 23.1 %  Platelet Count - Automated : 125 K/uL  Mean Cell Volume : 97.9 fL  Mean Cell Hemoglobin : 30.5 pg  Mean Cell Hemoglobin Concentration : 31.2 g/dL  Auto Neutrophil # : x  Auto Lymphocyte # : x  Auto Monocyte # : x  Auto Eosinophil # : x  Auto Basophil # : x  Auto Neutrophil % : x  Auto Lymphocyte % : x  Auto Monocyte % : x  Auto Eosinophil % : x  Auto Basophil % : x    10-24    138  |  97  |  22  ----------------------------<  108<H>  3.5   |  26  |  1.23    Ca    9.2      24 Oct 2017 07:37  Mg     1.8     10-24      PT/INR - ( 24 Oct 2017 07:37 )   PT: 13.7 sec;   INR: 1.23          PTT - ( 24 Oct 2017 07:37 )  PTT:28.7 sec  Urinalysis Basic - ( 23 Oct 2017 11:13 )    Color: Red / Appearance: Turbid / S.020 / pH: x  Gluc: x / Ketone: NEGATIVE  / Bili: Negative / Urobili: 0.2 E.U./dL   Blood: x / Protein: >=300 mg/dL / Nitrite: POSITIVE   Leuk Esterase: Large / RBC: Many /HPF / WBC > 10 /HPF   Sq Epi: x / Non Sq Epi: 0-5 /HPF / Bacteria: Present /HPF        CULTURES:    RADIOLOGY & ADDITIONAL STUDIES:

## 2017-10-25 NOTE — PROGRESS NOTE ADULT - SUBJECTIVE AND OBJECTIVE BOX
OVERNIGHT EVENTS:    SUBJECTIVE / INTERVAL HPI: Patient seen and examined at bedside.     VITAL SIGNS:  Vital Signs Last 24 Hrs  T(C): 37.2 (25 Oct 2017 08:44), Max: 37.2 (25 Oct 2017 08:44)  T(F): 98.9 (25 Oct 2017 08:44), Max: 98.9 (25 Oct 2017 08:44)  HR: 88 (25 Oct 2017 09:10) (70 - 92)  BP: 138/68 (25 Oct 2017 08:44) (97/52 - 138/68)  BP(mean): --  RR: 20 (25 Oct 2017 09:10) (16 - 23)  SpO2: 97% (25 Oct 2017 09:10) (94% - 98%)    PHYSICAL EXAM:    General: WDWN  HEENT: NC/AT; PERRL, anicteric sclera; MMM  Neck: supple  Cardiovascular: +S1/S2; RRR  Respiratory: CTA B/L; no W/R/R  Gastrointestinal: soft, NT/ND; +BSx4  Extremities: WWP; no edema, clubbing or cyanosis  Vascular: 2+ radial, DP/PT pulses B/L  Neurological: AAOx3; no focal deficits    MEDICATIONS:  MEDICATIONS  (STANDING):  ALBUTerol/ipratropium for Nebulization 3 milliLiter(s) Nebulizer every 4 hours  aspirin  chewable 81 milliGRAM(s) Oral daily  atorvastatin 80 milliGRAM(s) Oral at bedtime  ceFAZolin   IVPB 2000 milliGRAM(s) IV Intermittent every 8 hours  dextrose 5%. 1000 milliLiter(s) (50 mL/Hr) IV Continuous <Continuous>  finasteride 5 milliGRAM(s) Oral daily  furosemide    Tablet 40 milliGRAM(s) Oral every 12 hours  heparin  flush 100 Units/mL Injectable 100 Unit(s) IV Push every other day  lisinopril 2.5 milliGRAM(s) Oral daily  melatonin 3 milliGRAM(s) Oral at bedtime  nystatin Powder 1 Application(s) Topical two times a day  pantoprazole    Tablet 40 milliGRAM(s) Oral two times a day before meals  tamsulosin 0.8 milliGRAM(s) Oral at bedtime    MEDICATIONS  (PRN):  lidocaine 2% Gel 1 Application(s) Topical every 3 hours PRN catheter related discomfort at tip of penis  polyethylene glycol 3350 17 Gram(s) Oral daily PRN Constipation  simethicone 80 milliGRAM(s) Chew three times a day PRN Gas      ALLERGIES:  Allergies    No Known Allergies    Intolerances        LABS:                        7.8    11.7  )-----------( 123      ( 25 Oct 2017 07:06 )             25.0     10-25    140  |  97  |  21  ----------------------------<  116<H>  3.7   |  28  |  1.01    Ca    9.5      25 Oct 2017 07:06  Phos  3.1     10-25  Mg     1.8     10-25      PT/INR - ( 24 Oct 2017 07:37 )   PT: 13.7 sec;   INR: 1.23          PTT - ( 24 Oct 2017 07:37 )  PTT:28.7 sec    CAPILLARY BLOOD GLUCOSE          RADIOLOGY & ADDITIONAL TESTS: Reviewed.    ASSESSMENT:    PLAN: OVERNIGHT EVENTS: CBI turned off, patients Saldana clotted requiring flushing by urology.     SUBJECTIVE / INTERVAL HPI: Patient seen and examined at bedside. He reports severe supra-pubic abdominal pain. Otherwise denies any f/c, CP, SOB, or abdominal pain.      VITAL SIGNS:  Vital Signs Last 24 Hrs  T(C): 37.2 (25 Oct 2017 08:44), Max: 37.2 (25 Oct 2017 08:44)  T(F): 98.9 (25 Oct 2017 08:44), Max: 98.9 (25 Oct 2017 08:44)  HR: 88 (25 Oct 2017 09:10) (70 - 92)  BP: 138/68 (25 Oct 2017 08:44) (97/52 - 138/68)  BP(mean): --  RR: 20 (25 Oct 2017 09:10) (16 - 23)  SpO2: 97% (25 Oct 2017 09:10) (94% - 98%)    PHYSICAL EXAM:    General: WDWN, appears uncomfortable but NAD.   HEENT: NC/AT; anicteric sclera; MMM  Neck: supple  Cardiovascular: +S1/S2; RRR, no m/r/g  Respiratory: CTA anteriorly; no W/R/R  Gastrointestinal: soft, non-distended, no tenderness to palpation, no guarding.   Extremities: WWP; 2+ LE edema    MEDICATIONS:  MEDICATIONS  (STANDING):  ALBUTerol/ipratropium for Nebulization 3 milliLiter(s) Nebulizer every 4 hours  aspirin  chewable 81 milliGRAM(s) Oral daily  atorvastatin 80 milliGRAM(s) Oral at bedtime  ceFAZolin   IVPB 2000 milliGRAM(s) IV Intermittent every 8 hours  dextrose 5%. 1000 milliLiter(s) (50 mL/Hr) IV Continuous <Continuous>  finasteride 5 milliGRAM(s) Oral daily  furosemide    Tablet 40 milliGRAM(s) Oral every 12 hours  heparin  flush 100 Units/mL Injectable 100 Unit(s) IV Push every other day  lisinopril 2.5 milliGRAM(s) Oral daily  melatonin 3 milliGRAM(s) Oral at bedtime  nystatin Powder 1 Application(s) Topical two times a day  pantoprazole    Tablet 40 milliGRAM(s) Oral two times a day before meals  tamsulosin 0.8 milliGRAM(s) Oral at bedtime    MEDICATIONS  (PRN):  lidocaine 2% Gel 1 Application(s) Topical every 3 hours PRN catheter related discomfort at tip of penis  polyethylene glycol 3350 17 Gram(s) Oral daily PRN Constipation  simethicone 80 milliGRAM(s) Chew three times a day PRN Gas      ALLERGIES:  Allergies    No Known Allergies    Intolerances        LABS:                        7.8    11.7  )-----------( 123      ( 25 Oct 2017 07:06 )             25.0     10-25    140  |  97  |  21  ----------------------------<  116<H>  3.7   |  28  |  1.01    Ca    9.5      25 Oct 2017 07:06  Phos  3.1     10-25  Mg     1.8     10-25      PT/INR - ( 24 Oct 2017 07:37 )   PT: 13.7 sec;   INR: 1.23          PTT - ( 24 Oct 2017 07:37 )  PTT:28.7 sec    CAPILLARY BLOOD GLUCOSE      RADIOLOGY & ADDITIONAL TESTS: Reviewed.

## 2017-10-25 NOTE — PROGRESS NOTE ADULT - SUBJECTIVE AND OBJECTIVE BOX
On interval followup, the right arm PICC site is clean, dry, non-inflamed and non-tender.  Afebrile. Notes, cultures and clinical progress are followed.

## 2017-10-25 NOTE — PROGRESS NOTE ADULT - SUBJECTIVE AND OBJECTIVE BOX
Urology Attending Note for 10/23/17 pm    Authored:  10/25/17    I have met pt in the out pt setting on 1 occasion. He had been diagnosed some time ago w/ met CaP and was on Casodex and Leupron. He'd been treated w/ XRT in past and recently had a rise in PSA 4.7; was going to see me in mid Nov for repeat PSA after removing Casodex.  He has seen Dr. Guadalupe as an out pt. Pt admitted w/ MSSA, pneumonia, sepsis and chf. Had Saldana for I/O's and then failed a VT. Apparently developed significant GH w/ dec blood count. When I saw him the other night, CBI was at an extremely slow rate and urine was light pink. Cleared easily and I irrigated for no clot at that time despite filling and empting him for return of all instilled fluid. HbB dropped to 6 and he responded appropriately to 1 u PRBC's and has maintained low HbB of 7.2, but continues to intermittently clot his catheter.     Had discussed possible OR with pt and his son at approximately 7:30 PM on Monday>  they were in agreement, but yesterday counts stable and needed no cbi throughout day. He was ambulating and had irrigation w/ no renewed bleeding.    He has clotted again this am and will need OR today for Cysto/clot evacuation/fulguration and possible resection etc.    Have spoken to medical team this am, 10/25/17.  Chart reviewed.    Naomy Chappell M.D.

## 2017-10-25 NOTE — PROGRESS NOTE ADULT - ATTENDING COMMENTS
Pt seen and examined, no chest pain, no n/v, no abd pain, pt denies dysuria.  SOB at baseline.  Pt is unaware that he is currently febrile.  VS- Tmax 101.5 rectally, 100.6 orally, BP dropped with BP 130s earlier and now 100s.  Exam- has crackles in b/l lungs from mid lobes to bases.  Labs, CXR, urine reviewed.  71 yo M with  1. Acute blood loss anemia 2/2 hematuria  2. Sepsis 2/2 UTI  3. Preoperative assessment  4. Newly dx systolic CHF with rEF  5. NSTEMI  6. COPD on O2  7. Pt seen and examined, no chest pain, no n/v, no abd pain, pt denies dysuria.  SOB at baseline.  Pt is unaware that he is currently febrile.  VS- Tmax 101.5 rectally, 100.6 orally, BP dropped with BP 130s earlier and now 100s.  Exam- has crackles in b/l lungs from mid lobes to bases.  Labs, CXR, urine reviewed.  73 yo M with  1. Acute blood loss anemia 2/2 hematuria  - pt will have received 2 prbcs in 2 days 2/2 massive hematuria  - being followed closely by  and getting CBI, for cystoscopy today for exploration  - currently 2nd prbc being held even though pt with Hb 6.7 and cardiac hx as below and for OR as pt is newly febrile  - d/w  at length regarding pt's stability for OR as documented below  2. Sepsis 2/2 UTI  - uptrending WBC x 2 days, today pt febrile, and BP dropped 30 mm Hg from earlier in AM- pt is septic  - pt with GNR in urine, started on zosyn today and awaiting final cx  - despite brisk bleed from bladder requiring transfusions, pt would benefit from control of sepsis and blood products before going to OR  - at this time pt is at risk for bacterial translocation with procedural manipulation in a pt with GNR in urine, so would need abx until better source control before going to OR  3. Preoperative assessment  - pt with elevated cardiac risk for OR, due to CHF and CAD with recent NSTEMI, however has a transfusion dependent bleed and will need to go to OR when medically optimized.  Sepsis needs to be under control and hemodynamically stable before going or OR.  Needs Hb of 8 for OR.  - pt is at elevated pulmonary risk for OR as has COPD and CHF and is on NC/BiPAP; will likely require a higher level of care post-procedure so will call ICU c/s in anticipation of OR for post op mgmt.  May need to keep intubated vs extubate to BIPAP if fluid overloaded from blood products/IVF from anemia and sepsis as well.    4. Newly dx systolic CHF with rEF  - currently on lasix 40 mg BID, will get prbc today and will assess need for more IVF  - hemodynamically, will f/u VS closely  5. NSTEMI  - stopped ACEi in setting of sepsis  6. COPD on O2  - c/w nebs and oxygen via NC and BiPAP  - at increased risk for resp compromise as stated above in preop assessment  7. MSSA bacteremia  - c/w ancef  8. Metastatic prostate cancer  - hematuria may be related to metastatic prostate ca, will f/u with  post-op  - if pt does have worsening disease despite tx, would benefit from palliative care consult, will f/u post op  Above plan communicated with HS and  service at length  75 min spent on pt care and communication with consultants

## 2017-10-26 DIAGNOSIS — N39.0 URINARY TRACT INFECTION, SITE NOT SPECIFIED: ICD-10-CM

## 2017-10-26 DIAGNOSIS — R78.81 BACTEREMIA: ICD-10-CM

## 2017-10-26 DIAGNOSIS — R31.9 HEMATURIA, UNSPECIFIED: ICD-10-CM

## 2017-10-26 LAB
-  AZTREONAM: SIGNIFICANT CHANGE UP
-  CEFTAZIDIME: SIGNIFICANT CHANGE UP
-  CIPROFLOXACIN: SIGNIFICANT CHANGE UP
-  GENTAMICIN: SIGNIFICANT CHANGE UP
-  PIPERACILLIN/TAZOBACTAM: SIGNIFICANT CHANGE UP
-  TOBRAMYCIN: SIGNIFICANT CHANGE UP
ANION GAP SERPL CALC-SCNC: 14 MMOL/L — SIGNIFICANT CHANGE UP (ref 5–17)
ANION GAP SERPL CALC-SCNC: 15 MMOL/L — SIGNIFICANT CHANGE UP (ref 5–17)
APTT BLD: 27.2 SEC — LOW (ref 27.5–37.4)
APTT BLD: 27.9 SEC — SIGNIFICANT CHANGE UP (ref 27.5–37.4)
BASE EXCESS BLDA CALC-SCNC: 4.9 MMOL/L — HIGH (ref -2–3)
BASE EXCESS BLDA CALC-SCNC: 5.7 MMOL/L — HIGH (ref -2–3)
BASE EXCESS BLDA CALC-SCNC: 9.2 MMOL/L — HIGH (ref -2–3)
BUN SERPL-MCNC: 22 MG/DL — SIGNIFICANT CHANGE UP (ref 7–23)
BUN SERPL-MCNC: 23 MG/DL — SIGNIFICANT CHANGE UP (ref 7–23)
CA-I BLDA-SCNC: 1.04 MMOL/L — LOW (ref 1.12–1.3)
CA-I BLDA-SCNC: 1.09 MMOL/L — LOW (ref 1.12–1.3)
CA-I BLDA-SCNC: 1.2 MMOL/L — SIGNIFICANT CHANGE UP (ref 1.12–1.3)
CALCIUM SERPL-MCNC: 8.2 MG/DL — LOW (ref 8.4–10.5)
CALCIUM SERPL-MCNC: 8.4 MG/DL — SIGNIFICANT CHANGE UP (ref 8.4–10.5)
CHLORIDE SERPL-SCNC: 92 MMOL/L — LOW (ref 96–108)
CHLORIDE SERPL-SCNC: 94 MMOL/L — LOW (ref 96–108)
CO2 SERPL-SCNC: 28 MMOL/L — SIGNIFICANT CHANGE UP (ref 22–31)
CO2 SERPL-SCNC: 28 MMOL/L — SIGNIFICANT CHANGE UP (ref 22–31)
COHGB MFR BLDA: 0.3 % — SIGNIFICANT CHANGE UP
COHGB MFR BLDA: 1.6 % — HIGH
COHGB MFR BLDA: 1.7 % — HIGH
CREAT SERPL-MCNC: 1.3 MG/DL — SIGNIFICANT CHANGE UP (ref 0.5–1.3)
CREAT SERPL-MCNC: 1.65 MG/DL — HIGH (ref 0.5–1.3)
CULTURE RESULTS: SIGNIFICANT CHANGE UP
GAS PNL BLDA: SIGNIFICANT CHANGE UP
GLUCOSE SERPL-MCNC: 127 MG/DL — HIGH (ref 70–99)
GLUCOSE SERPL-MCNC: 143 MG/DL — HIGH (ref 70–99)
HCO3 BLDA-SCNC: 29 MMOL/L — HIGH (ref 21–28)
HCO3 BLDA-SCNC: 30 MMOL/L — HIGH (ref 21–28)
HCO3 BLDA-SCNC: 34 MMOL/L — HIGH (ref 21–28)
HCT VFR BLD CALC: 22.1 % — LOW (ref 39–50)
HCT VFR BLD CALC: 23.4 % — LOW (ref 39–50)
HGB BLD-MCNC: 6.9 G/DL — CRITICAL LOW (ref 13–17)
HGB BLD-MCNC: 7.3 G/DL — LOW (ref 13–17)
HGB BLDA-MCNC: 11.9 G/DL — LOW (ref 13–17)
HGB BLDA-MCNC: 9.7 G/DL — LOW (ref 13–17)
HGB BLDA-MCNC: 9.7 G/DL — LOW (ref 13–17)
INR BLD: 1.41 — HIGH (ref 0.88–1.16)
INR BLD: 1.46 — HIGH (ref 0.88–1.16)
MAGNESIUM SERPL-MCNC: 1.5 MG/DL — LOW (ref 1.6–2.6)
MAGNESIUM SERPL-MCNC: 1.6 MG/DL — SIGNIFICANT CHANGE UP (ref 1.6–2.6)
MCHC RBC-ENTMCNC: 29.9 PG — SIGNIFICANT CHANGE UP (ref 27–34)
MCHC RBC-ENTMCNC: 30.7 PG — SIGNIFICANT CHANGE UP (ref 27–34)
MCHC RBC-ENTMCNC: 31.2 G/DL — LOW (ref 32–36)
MCHC RBC-ENTMCNC: 31.2 G/DL — LOW (ref 32–36)
MCV RBC AUTO: 95.9 FL — SIGNIFICANT CHANGE UP (ref 80–100)
MCV RBC AUTO: 98.2 FL — SIGNIFICANT CHANGE UP (ref 80–100)
METHGB MFR BLDA: 0.2 % — SIGNIFICANT CHANGE UP
METHGB MFR BLDA: 0.2 % — SIGNIFICANT CHANGE UP
METHGB MFR BLDA: 0.4 % — SIGNIFICANT CHANGE UP
METHOD TYPE: SIGNIFICANT CHANGE UP
O2 CT VFR BLDA CALC: 13.7 ML/DL — SIGNIFICANT CHANGE UP (ref 15–23)
O2 CT VFR BLDA CALC: 14.5 ML/DL — SIGNIFICANT CHANGE UP (ref 15–23)
O2 CT VFR BLDA CALC: SIGNIFICANT CHANGE UP (ref 15–23)
ORGANISM # SPEC MICROSCOPIC CNT: SIGNIFICANT CHANGE UP
ORGANISM # SPEC MICROSCOPIC CNT: SIGNIFICANT CHANGE UP
OXYHGB MFR BLDA: 87 % — LOW (ref 94–100)
OXYHGB MFR BLDA: 98 % — SIGNIFICANT CHANGE UP (ref 94–100)
OXYHGB MFR BLDA: 99 % — SIGNIFICANT CHANGE UP (ref 94–100)
PCO2 BLDA: 38 MMHG — SIGNIFICANT CHANGE UP (ref 35–48)
PCO2 BLDA: 48 MMHG — SIGNIFICANT CHANGE UP (ref 35–48)
PCO2 BLDA: 51 MMHG — HIGH (ref 35–48)
PH BLDA: 7.42 — SIGNIFICANT CHANGE UP (ref 7.35–7.45)
PH BLDA: 7.45 — SIGNIFICANT CHANGE UP (ref 7.35–7.45)
PH BLDA: 7.51 — HIGH (ref 7.35–7.45)
PLATELET # BLD AUTO: 77 K/UL — LOW (ref 150–400)
PLATELET # BLD AUTO: 84 K/UL — LOW (ref 150–400)
PO2 BLDA: 182 MMHG — HIGH (ref 83–108)
PO2 BLDA: 304 MMHG — HIGH (ref 83–108)
PO2 BLDA: 50 MMHG — CRITICAL LOW (ref 83–108)
POTASSIUM BLDA-SCNC: 3.5 MMOL/L — SIGNIFICANT CHANGE UP (ref 3.5–4.9)
POTASSIUM BLDA-SCNC: 3.5 MMOL/L — SIGNIFICANT CHANGE UP (ref 3.5–4.9)
POTASSIUM BLDA-SCNC: 4.5 MMOL/L — SIGNIFICANT CHANGE UP (ref 3.5–4.9)
POTASSIUM SERPL-MCNC: 3.1 MMOL/L — LOW (ref 3.5–5.3)
POTASSIUM SERPL-MCNC: 3.1 MMOL/L — LOW (ref 3.5–5.3)
POTASSIUM SERPL-SCNC: 3.1 MMOL/L — LOW (ref 3.5–5.3)
POTASSIUM SERPL-SCNC: 3.1 MMOL/L — LOW (ref 3.5–5.3)
PROTHROM AB SERPL-ACNC: 15.8 SEC — HIGH (ref 9.8–12.7)
PROTHROM AB SERPL-ACNC: 16.3 SEC — HIGH (ref 9.8–12.7)
RBC # BLD: 2.25 M/UL — LOW (ref 4.2–5.8)
RBC # BLD: 2.44 M/UL — LOW (ref 4.2–5.8)
RBC # FLD: 16.5 % — SIGNIFICANT CHANGE UP (ref 10.3–16.9)
RBC # FLD: 16.9 % — SIGNIFICANT CHANGE UP (ref 10.3–16.9)
SAO2 % BLDA: 100 % — SIGNIFICANT CHANGE UP (ref 95–100)
SAO2 % BLDA: 100 % — SIGNIFICANT CHANGE UP (ref 95–100)
SAO2 % BLDA: 89 % — LOW (ref 95–100)
SODIUM BLDA-SCNC: 126 MMOL/L — LOW (ref 138–146)
SODIUM BLDA-SCNC: 128 MMOL/L — LOW (ref 138–146)
SODIUM BLDA-SCNC: 137 MMOL/L — LOW (ref 138–146)
SODIUM SERPL-SCNC: 135 MMOL/L — SIGNIFICANT CHANGE UP (ref 135–145)
SODIUM SERPL-SCNC: 136 MMOL/L — SIGNIFICANT CHANGE UP (ref 135–145)
SPECIMEN SOURCE: SIGNIFICANT CHANGE UP
WBC # BLD: 14.3 K/UL — HIGH (ref 3.8–10.5)
WBC # BLD: 16.7 K/UL — HIGH (ref 3.8–10.5)
WBC # FLD AUTO: 14.3 K/UL — HIGH (ref 3.8–10.5)
WBC # FLD AUTO: 16.7 K/UL — HIGH (ref 3.8–10.5)

## 2017-10-26 PROCEDURE — 71010: CPT | Mod: 26

## 2017-10-26 PROCEDURE — 99233 SBSQ HOSP IP/OBS HIGH 50: CPT

## 2017-10-26 PROCEDURE — 99232 SBSQ HOSP IP/OBS MODERATE 35: CPT | Mod: GC

## 2017-10-26 PROCEDURE — 99291 CRITICAL CARE FIRST HOUR: CPT

## 2017-10-26 RX ORDER — POTASSIUM CHLORIDE 20 MEQ
40 PACKET (EA) ORAL ONCE
Qty: 0 | Refills: 0 | Status: COMPLETED | OUTPATIENT
Start: 2017-10-26 | End: 2017-10-26

## 2017-10-26 RX ORDER — FUROSEMIDE 40 MG
20 TABLET ORAL ONCE
Qty: 0 | Refills: 0 | Status: COMPLETED | OUTPATIENT
Start: 2017-10-26 | End: 2017-10-26

## 2017-10-26 RX ORDER — ATROPA BELLADONNA AND OPIUM 16.2; 6 MG/1; MG/1
1 SUPPOSITORY RECTAL ONCE
Qty: 0 | Refills: 0 | Status: DISCONTINUED | OUTPATIENT
Start: 2017-10-26 | End: 2017-10-26

## 2017-10-26 RX ORDER — PROPOFOL 10 MG/ML
4 INJECTION, EMULSION INTRAVENOUS
Qty: 1000 | Refills: 0 | Status: DISCONTINUED | OUTPATIENT
Start: 2017-10-26 | End: 2017-10-27

## 2017-10-26 RX ORDER — ATROPA BELLADONNA AND OPIUM 16.2; 6 MG/1; MG/1
1 SUPPOSITORY RECTAL ONCE
Qty: 0 | Refills: 0 | Status: DISCONTINUED | OUTPATIENT
Start: 2017-10-26 | End: 2017-10-31

## 2017-10-26 RX ORDER — POTASSIUM CHLORIDE 20 MEQ
10 PACKET (EA) ORAL
Qty: 0 | Refills: 0 | Status: DISCONTINUED | OUTPATIENT
Start: 2017-10-26 | End: 2017-10-31

## 2017-10-26 RX ORDER — POTASSIUM CHLORIDE 20 MEQ
10 PACKET (EA) ORAL
Qty: 0 | Refills: 0 | Status: COMPLETED | OUTPATIENT
Start: 2017-10-26 | End: 2017-10-26

## 2017-10-26 RX ORDER — CEFEPIME 1 G/1
2000 INJECTION, POWDER, FOR SOLUTION INTRAMUSCULAR; INTRAVENOUS EVERY 12 HOURS
Qty: 0 | Refills: 0 | Status: DISCONTINUED | OUTPATIENT
Start: 2017-10-27 | End: 2017-11-03

## 2017-10-26 RX ORDER — CEFEPIME 1 G/1
INJECTION, POWDER, FOR SOLUTION INTRAMUSCULAR; INTRAVENOUS
Qty: 0 | Refills: 0 | Status: DISCONTINUED | OUTPATIENT
Start: 2017-10-26 | End: 2017-11-03

## 2017-10-26 RX ORDER — MAGNESIUM SULFATE 500 MG/ML
4 VIAL (ML) INJECTION ONCE
Qty: 0 | Refills: 0 | Status: COMPLETED | OUTPATIENT
Start: 2017-10-26 | End: 2017-10-26

## 2017-10-26 RX ORDER — CEFEPIME 1 G/1
2000 INJECTION, POWDER, FOR SOLUTION INTRAMUSCULAR; INTRAVENOUS ONCE
Qty: 0 | Refills: 0 | Status: DISCONTINUED | OUTPATIENT
Start: 2017-10-26 | End: 2017-11-03

## 2017-10-26 RX ADMIN — Medication 100 MILLIEQUIVALENT(S): at 02:09

## 2017-10-26 RX ADMIN — Medication 40 MILLIEQUIVALENT(S): at 11:48

## 2017-10-26 RX ADMIN — Medication 100 GRAM(S): at 11:48

## 2017-10-26 RX ADMIN — Medication 3 MILLILITER(S): at 05:15

## 2017-10-26 RX ADMIN — Medication 20 MILLIGRAM(S): at 07:24

## 2017-10-26 RX ADMIN — PANTOPRAZOLE SODIUM 40 MILLIGRAM(S): 20 TABLET, DELAYED RELEASE ORAL at 07:24

## 2017-10-26 RX ADMIN — PIPERACILLIN AND TAZOBACTAM 200 GRAM(S): 4; .5 INJECTION, POWDER, LYOPHILIZED, FOR SOLUTION INTRAVENOUS at 07:23

## 2017-10-26 RX ADMIN — Medication 20 MILLIGRAM(S): at 13:03

## 2017-10-26 RX ADMIN — Medication 100 MILLIEQUIVALENT(S): at 16:08

## 2017-10-26 RX ADMIN — FINASTERIDE 5 MILLIGRAM(S): 5 TABLET, FILM COATED ORAL at 11:48

## 2017-10-26 RX ADMIN — Medication 3 MILLILITER(S): at 02:09

## 2017-10-26 RX ADMIN — Medication 3 MILLILITER(S): at 15:08

## 2017-10-26 RX ADMIN — Medication 3 MILLILITER(S): at 09:39

## 2017-10-26 RX ADMIN — PIPERACILLIN AND TAZOBACTAM 200 GRAM(S): 4; .5 INJECTION, POWDER, LYOPHILIZED, FOR SOLUTION INTRAVENOUS at 00:44

## 2017-10-26 RX ADMIN — Medication 100 MILLIEQUIVALENT(S): at 10:29

## 2017-10-26 RX ADMIN — Medication 100 MILLIGRAM(S): at 08:01

## 2017-10-26 RX ADMIN — PIPERACILLIN AND TAZOBACTAM 200 GRAM(S): 4; .5 INJECTION, POWDER, LYOPHILIZED, FOR SOLUTION INTRAVENOUS at 11:48

## 2017-10-26 RX ADMIN — PROPOFOL 2.48 MICROGRAM(S)/KG/MIN: 10 INJECTION, EMULSION INTRAVENOUS at 20:19

## 2017-10-26 RX ADMIN — Medication 200 MILLIGRAM(S): at 16:08

## 2017-10-26 RX ADMIN — Medication 20 MILLIGRAM(S): at 16:19

## 2017-10-26 NOTE — PROGRESS NOTE ADULT - PROBLEM SELECTOR PLAN 4
- c/w duonebs q 4   - encourage ambulation and incentive spirometry.   - Patient desaturated while walking to 86% w/o oxygen     # Possible sleep apnea  - work up sleep apnea outpatient w/ sleep study   - CPAP at night Persistent MSSA bacteremia, sepsis was resolved, now with new fever/ leukocytosis and +UA/urine culture, treatment as above.   - c/w cefazolin 2 g q8 4 weeks total course (10/19--)  - Gallium scan negative for focal areas of infection, KELLY negative for endocarditis. Repeat TTE w/o evidence of new vegetations or worsening valve thickening.   - Surveillance blood cx w/ no growth to date  - Patient will follow up with Dr. Clemente outpatient for further monitoring of his bacteremia Persistent MSSA bacteremia, sepsis was resolved, now with new fever/ leukocytosis and +UA/urine culture, treatment as above.   -Cefepime 2g q 12hrs (10/26--)  - s/p cefazolin 2 g q8 4 weeks total course (10/19--10/26)  - Gallium scan negative for focal areas of infection, KELLY negative for endocarditis. Repeat TTE w/o evidence of new vegetations or worsening valve thickening.   - Surveillance blood cx w/ no growth to date  - Patient will follow up with Dr. Clemente outpatient for further monitoring of his bacteremia

## 2017-10-26 NOTE — PROGRESS NOTE ADULT - ASSESSMENT
Persistent hematuria. The patient remains anemic as a result. New fever. Cultures pending. Thrombocytopenia.

## 2017-10-26 NOTE — PROGRESS NOTE ADULT - ATTENDING COMMENTS
Pt seen and examined at 8:30 am, no chest pain, denies SOB, no n/v, no abd pain, pt denies dysuria.  VS- Tmax 101.5 rectally, 100.6 orally yesterday afternoon,  BP 110s.  Exam- decreased BS throughout compared to prior days.  Labs, CXR, urine reviewed.  73 yo M with  1. Acute blood loss anemia 2/2 hematuria  - pt will have received 4 prbcs in 4 days 2/2 massive hematuria- getting lasix with transfusions as overloaded with risk of prolonged intubation post-op  - being followed closely by  and getting CBI, for cystoscopy today for exploration  - Hb goal of 8 for OR in setting of cardiac disease and active bleeding and sepsis  - d/w  at length regarding pt's stability for OR as documented below  2. Sepsis 2/2 UTI  - pt with pseudomonas in urine, started on zosyn and switched to cefepime  - despite brisk bleed from bladder requiring transfusions, pt would benefit from control of sepsis and blood products before going to OR later today  - at this time pt is at risk for bacterial translocation with procedural manipulation, received 24 hrs of abx prior to OR  3. Preoperative assessment  - pt with elevated cardiac risk for OR, due to CHF and CAD with recent NSTEMI, however has a transfusion dependent bleed and will need to go to OR when medically optimized.  Sepsis needs to be under control and hemodynamically stable before going or OR, which is improved today.  Needs Hb of 8 for OR.  - pt is at elevated pulmonary risk for OR as has COPD and CHF and is on NC/BiPAP; will likely require a higher level of care post-procedure so will call ICU c/s in anticipation of OR for post op mgmt.  May need to keep intubated vs extubate to BIPAP if fluid overloaded from blood products/IVF from anemia and sepsis as well.    4. Newly dx systolic CHF with rEF  - currently on lasix 40 mg BID, overloaded so getting an additional lasix 20 with each prbc  - hemodynamically, will f/u VS closely- currently stable  5. NSTEMI  - stopped ACEi in setting of sepsis  6. COPD on O2  - c/w nebs and oxygen via NC and BiPAP prn  - at increased risk for resp compromise as stated above in preop assessment, will likely need to go to MICU/SICU post-op  7. MSSA bacteremia  - c/w cefepime  8. Metastatic prostate cancer  - hematuria may be related to metastatic prostate ca, will f/u with  post-op  - if pt does have worsening disease despite tx, would benefit from palliative care consult, will f/u post op  Above plan communicated with HS and  service at length

## 2017-10-26 NOTE — PROGRESS NOTE ADULT - PROBLEM SELECTOR PLAN 7
#Prostate ca w/ mets to spine  Per oncologist continue to hold home meds.  -Thrombocytopenia resolved ATN-resolved  - ATN on admission w/ Cr now at baseline.     Hematuria w/ urinary retention  - Hematuria likely due to underlying prostate malignancy w/ recent glaser placement - possibly due to prostate inflammation/bleed.   - glaser w/o interruptions in flow, maintain glaser to be evaluated by urology once discharged.   - c/w tamsulosin .8 mg

## 2017-10-26 NOTE — PROGRESS NOTE ADULT - SUBJECTIVE AND OBJECTIVE BOX
UROLOGY POST OP NOTE (PAGER # [phone])    PROCEDURE: cysto, clot evacuation, fulguration    T(C): 36.7 (10-26-17 @ 19:28), Max: 37.3 (10-26-17 @ 09:47)  HR: 58 (10-26-17 @ 22:00) (58 - 92)  BP: 95/51 (10-26-17 @ 22:00) (87/50 - 113/68)  RR: 14 (10-26-17 @ 22:00) (14 - 26)  SpO2: 100% (10-26-17 @ 22:00) (95% - 100%)  Wt(kg): --  UO: CBI    SUBJECTIVE: patient on ventilator    ON PE: on ventilator    Abdomen: soft, ND    : CBI/FC intact, urine clear                          7.3    14.3  )-----------( 77       ( 26 Oct 2017 10:57 )             23.4     10-26    135  |  92<L>  |  22  ----------------------------<  127<H>  3.1<L>   |  28  |  1.65<H>    Ca    8.4      26 Oct 2017 10:57  Phos  3.1     10-25  Mg     1.6     10-26

## 2017-10-26 NOTE — PROVIDER CONTACT NOTE (CRITICAL VALUE NOTIFICATION) - ACTION/TREATMENT ORDERED:
Nafcillin IV started
monitor. IV abx. IVF.
none
One unit of PRBCs to be infused.
Sepsis protocol
Shirin drawing labs
For surgery

## 2017-10-26 NOTE — PROGRESS NOTE ADULT - PROBLEM SELECTOR PLAN 3
Sepsis w/ persistent MSSA bacteremia, now resolved. Patient currently w/o leukocytosis, fever, w/ stable BP.  - c/w cefazolin 2 g q8 4 weeks total course (10/19--)  - Gallium scan negative for focal areas of infection, KELLY negative for endocarditis. Repeat TTE w/o evidence of new vegetations or worsening valve thickening.   - blood cx w/ no growth to date  - Patient will follow up with Dr. Clemente outpatient for further monitoring of his bacteremia Sepsis likely 2/2 UTI, +UA with urine culture growing GNR, patient febrile to 102.5 on 10/25 and with leukocytosis, again meeting criteria for sepsis, now resolving. Repeat blood cultures drawn.   -Continue zosyn (10/25--) for UTI  -Ucx growing GNR, awaiting speciation  -F/u blood cx, NGTD Sepsis likely 2/2 UTI, +UA with urine culture growing GNR, patient febrile to 102.5 on 10/25 and with leukocytosis, again meeting criteria for sepsis, now resolving. Repeat blood cultures drawn; NGTD  -Management as above

## 2017-10-26 NOTE — CONSULT NOTE ADULT - ASSESSMENT
1. Hypoxia  - Was present prior to the procedure, as he was previously been found to be ambulating with an 86% saturation on room air. His CXR is with atelectasis of left base and elevation of left hemidiaphragm. Vascular congestion present, although improved from previously.   - 40 IV lasix, with goal to be net negative overnight  - Weaning trial in the AM    2. Hematuria  - Resolved after cauterization   - CBI overnight, as per urology.  - F/u urology recs     3. MSSA bacteremia, pseudomonas UTI  - C/w cefepime  - F/u ID recs

## 2017-10-26 NOTE — PROGRESS NOTE ADULT - SUBJECTIVE AND OBJECTIVE BOX
ID RECONSULT NOTE    In brief:  71 yo male with hx of HTN, COPD, Prostate CA (s/p radiation and seed 2013 on leuprolide outpatient) who presented with worsening of SOB and an episode of unwitnessed syncope found to have septic shock from MSSA bacteremia. Septic shock has resolved but pt has had persistent MSSA bacteremia with resolving respiratory failure and ATN. Now with acute blood loss anemia secondary to bleeding Saldana causing repeated episodes of Saldana obstruction pending cystoscopy. Patient now with new fever, leukocytosis and positive UA with positive Ucx.       PAST MEDICAL & SURGICAL HISTORY:  COPD (chronic obstructive pulmonary disease)  Hypertension  Obstructive sleep apnea syndrome  Prostate cancer metastatic to bone  PC (prostate cancer): with seed placement        REVIEW OF SYSTEMS:  Negative except for above.       MEDICATIONS  (STANDING):  ALBUTerol/ipratropium for Nebulization 3 milliLiter(s) Nebulizer every 4 hours  atorvastatin 80 milliGRAM(s) Oral at bedtime  belladonna 16.2 mG/opium 60 mg Suppository 1 Suppository(s) Rectal once  ceFAZolin   IVPB 2000 milliGRAM(s) IV Intermittent every 8 hours  dextrose 5%. 1000 milliLiter(s) (50 mL/Hr) IV Continuous <Continuous>  finasteride 5 milliGRAM(s) Oral daily  furosemide    Tablet 40 milliGRAM(s) Oral every 12 hours  furosemide   Injectable 20 milliGRAM(s) IV Push once  heparin  flush 100 Units/mL Injectable 100 Unit(s) IV Push every other day  lidocaine 2% Jelly 5 milliLiter(s) IntraUrethral once  melatonin 3 milliGRAM(s) Oral at bedtime  nystatin Powder 1 Application(s) Topical two times a day  pantoprazole    Tablet 40 milliGRAM(s) Oral two times a day before meals  piperacillin/tazobactam IVPB. 3.375 Gram(s) IV Intermittent every 6 hours  potassium chloride  10 mEq/100 mL IVPB 10 milliEquivalent(s) IV Intermittent every 1 hour  tamsulosin 0.8 milliGRAM(s) Oral at bedtime    MEDICATIONS  (PRN):  lidocaine 2% Gel 1 Application(s) Topical every 3 hours PRN catheter related discomfort at tip of penis  polyethylene glycol 3350 17 Gram(s) Oral daily PRN Constipation  simethicone 80 milliGRAM(s) Chew three times a day PRN Gas      Allergies    No Known Allergies    Intolerances        Vital Signs Last 24 Hrs  T(C): 37.3 (26 Oct 2017 09:47), Max: 38.6 (25 Oct 2017 13:47)  T(F): 99.2 (26 Oct 2017 09:47), Max: 101.5 (25 Oct 2017 13:47)  HR: 77 (26 Oct 2017 09:47) (77 - 113)  BP: 103/59 (26 Oct 2017 09:47) (92/57 - 116/72)  BP(mean): --  RR: 20 (26 Oct 2017 09:47) (16 - 26)  SpO2: 96% (26 Oct 2017 09:47) (96% - 100%)    PHYSICAL EXAM:  Constitutional: NAD. Well-developed, well nourished  HEENT: Conjunctiva clear, no oral lesion, no sinus tenderness on percussion	  Neck: no JVD, no lymphadenopathy, supple  Respiratory: CTAB. No w/r/r.  Cardiovascular: RRR with no murmurs  Gastrointestinal:soft, (+) BS, no HSM, nondistened, nonrigid.  Extremities: LE WWP b/l. No LE edema b/l.   Vascular: 2+ DP pulses b/l    LABS                        7.3    14.3  )-----------( 77       ( 26 Oct 2017 10:57 )             23.4     10-26    135  |  92<L>  |  22  ----------------------------<  127<H>  3.1<L>   |  28  |  1.65<H>    Ca    8.4      26 Oct 2017 10:57  Phos  3.1     10-25  Mg     1.6     10-26      PT/INR - ( 26 Oct 2017 10:57 )   PT: 15.8 sec;   INR: 1.41          PTT - ( 26 Oct 2017 10:57 )  PTT:27.9 sec      MICROBIOLOGY:    RADIOLOGY & ADDITIONAL STUDIES: ID RECONSULT NOTE    In brief:  71 yo male with hx of HTN, COPD, Prostate CA (s/p radiation and seed 2013 on leuprolide outpatient) who presented with worsening of SOB and an episode of unwitnessed syncope found to have septic shock from MSSA bacteremia. Septic shock has resolved but pt has had persistent MSSA bacteremia with resolving respiratory failure and ATN. Now with acute blood loss anemia secondary to bleeding Glaser causing repeated episodes of Glaser obstruction pending cystoscopy. Patient now with new fever, leukocytosis and positive UA with positive Ucx (GNR). Patient was started on zosyn. ID consulted for abx selection. Patient seen and examined at bedside. Currently has no pain. Denies chest pain, shortness of breath, cough, nausea, vomiting, diarrhea. He only reports pain with obstructed glaser catheter.     PAST MEDICAL & SURGICAL HISTORY:  COPD (chronic obstructive pulmonary disease)  Hypertension  Obstructive sleep apnea syndrome  Prostate cancer metastatic to bone  PC (prostate cancer): with seed placement    REVIEW OF SYSTEMS:  Negative except for above.     MEDICATIONS  (STANDING):  ALBUTerol/ipratropium for Nebulization 3 milliLiter(s) Nebulizer every 4 hours  atorvastatin 80 milliGRAM(s) Oral at bedtime  belladonna 16.2 mG/opium 60 mg Suppository 1 Suppository(s) Rectal once  ceFAZolin   IVPB 2000 milliGRAM(s) IV Intermittent every 8 hours  dextrose 5%. 1000 milliLiter(s) (50 mL/Hr) IV Continuous <Continuous>  finasteride 5 milliGRAM(s) Oral daily  furosemide    Tablet 40 milliGRAM(s) Oral every 12 hours  furosemide   Injectable 20 milliGRAM(s) IV Push once  heparin  flush 100 Units/mL Injectable 100 Unit(s) IV Push every other day  lidocaine 2% Jelly 5 milliLiter(s) IntraUrethral once  melatonin 3 milliGRAM(s) Oral at bedtime  nystatin Powder 1 Application(s) Topical two times a day  pantoprazole    Tablet 40 milliGRAM(s) Oral two times a day before meals  piperacillin/tazobactam IVPB. 3.375 Gram(s) IV Intermittent every 6 hours  potassium chloride  10 mEq/100 mL IVPB 10 milliEquivalent(s) IV Intermittent every 1 hour  tamsulosin 0.8 milliGRAM(s) Oral at bedtime    MEDICATIONS  (PRN):  lidocaine 2% Gel 1 Application(s) Topical every 3 hours PRN catheter related discomfort at tip of penis  polyethylene glycol 3350 17 Gram(s) Oral daily PRN Constipation  simethicone 80 milliGRAM(s) Chew three times a day PRN Gas    Allergies  No Known Allergies    Vital Signs Last 24 Hrs  T(C): 37.3 (26 Oct 2017 09:47), Max: 38.6 (25 Oct 2017 13:47)  T(F): 99.2 (26 Oct 2017 09:47), Max: 101.5 (25 Oct 2017 13:47)  HR: 77 (26 Oct 2017 09:47) (77 - 113)  BP: 103/59 (26 Oct 2017 09:47) (92/57 - 116/72)  BP(mean): --  RR: 20 (26 Oct 2017 09:47) (16 - 26)  SpO2: 96% (26 Oct 2017 09:47) (96% - 100%)    PHYSICAL EXAM:  Constitutional: NAD. Well-developed, well nourished  HEENT: Conjunctiva clear, no lesions on tongue. Oral mucosa mildly dry.   Respiratory: CTAB. No w/r/r.  Cardiovascular: RRR with no murmurs  Gastrointestinal:soft, (+) BS, no HSM, nondistened, nonrigid.  Extremities: LE WWP b/l. No LE edema b/l.   Vascular: 2+ DP pulses b/l    LABS                        7.3    14.3  )-----------( 77       ( 26 Oct 2017 10:57 )             23.4     10-26    135  |  92<L>  |  22  ----------------------------<  127<H>  3.1<L>   |  28  |  1.65<H>    Ca    8.4      26 Oct 2017 10:57  Phos  3.1     10-25  Mg     1.6     10-26    PT/INR - ( 26 Oct 2017 10:57 )   PT: 15.8 sec;   INR: 1.41        PTT - ( 26 Oct 2017 10:57 )  PTT:27.9 sec    MICROBIOLOGY:  Culture - Blood (10.25.17 @ 15:23)    Specimen Source: .Blood Blood-Peripheral    Culture Results:   No growth at 12 hours    Culture - Blood (10.25.17 @ 15:22)    Specimen Source: .Blood Blood-Peripheral    Culture Results:   No growth at 12 hours    Culture - Urine (10.24.17 @ 08:28)    Specimen Source: .Urine Catheterized    Culture Results:   >100,000 CFU/ml Gram Negative Rods  Identification and susceptibility to follow.    Culture - Blood (10.10.17 @ 17:30)    Specimen Source: .Blood Blood    Culture Results:   No growth at 5 days.    Culture - Blood (10.09.17 @ 13:45)    Gram Stain:   Aerobic Bottle: Gram Positive Cocci in Clusters  Result called to and read back by_ Ms. KIA Bolaños RN  10/10/2017 11:01:23    -  Cefazolin: S <=4    -  Clindamycin: S <=0.5    -  Erythromycin: S <=0.5    -  Linezolid: S 4    -  Oxacillin: S 0.5    -  Penicillin: R >8    -  RIF- Rifampin: S <=1    -  Trimethoprim/Sulfamethoxazole: S <=0.5/9.5    -  Vancomycin: S 2    Specimen Source: .Blood Blood    Organism: Staphylococcus aureus    Culture Results:   Growth in aerobic bottle: Staphylococcus aureus  Anaerobic Bottle: No growth    Organism Identification: Staphylococcus aureus    Method Type: DILMA    Culture - Blood (10.07.17 @ 17:25)    -  Cefazolin: S <=4    Gram Stain:   Aerobic Bottle: Gram Positive Cocci in Clusters  Result called to and read back by_ Ms. KIA Bolaños  10/09/2017 10:31:58    -  Clindamycin: S <=0.5    -  Erythromycin: S <=0.5    -  Oxacillin: S 0.5    -  Linezolid: S 4    -  Penicillin: R >8    -  RIF- Rifampin: S <=1    -  Trimethoprim/Sulfamethoxazole: S <=0.5/9.5    -  Vancomycin: S 2    Specimen Source: .Blood Blood    Organism: Staphylococcus aureus    Culture Results:   Growth in aerobic bottle: Staphylococcus aureus  Anaerobic Bottle: No growth  Culture - Blood (10.06.17 @ 19:38)    Gram Stain:   Aerobic Bottle: Gram Positive Cocci in Clusters  Result called to and read back by_ Jerrell Hill RN (7 EA) 10/07/2017 13:41    -  Cefazolin: S <=4    -  Linezolid: S 4    -  Oxacillin: S 0.5    -  Erythromycin: S <=0.5    -  Clindamycin: S <=0.5    -  Vancomycin: S 2    -  Trimethoprim/Sulfamethoxazole: S <=0.5/9.5    -  RIF- Rifampin: S <=1    -  Penicillin: R >8    Specimen Source: .Blood Blood    Organism: Staphylococcus aureus    Culture Results:   Growth in aerobic bottle: Staphylococcus aureus  Anaerobic Bottle: No growth    Organism Identification: Staphylococcus aureus    Method Type: DILMA      Organism Identification: Staphylococcus aureus    Method Type: DILMA    Culture - Blood (10.04.17 @ 19:37)    Gram Stain:   Aerobic and Anaerobic Bottle: Gram Positive Cocci in Clusters  Result called to and read back by_ Dr. Weiner  10/05/2017 09:35:30    Specimen Source: .Blood Blood-Peripheral    Culture Results:   Growth in aerobic and anaerobic bottles: Staphylococcus aureus  See previous culture FOR SENSITIVITY      RADIOLOGY & ADDITIONAL STUDIES:  Reviewed

## 2017-10-26 NOTE — PROGRESS NOTE ADULT - SUBJECTIVE AND OBJECTIVE BOX
Patient seen and examined at bedside. Patient has been being followed by urology team for some time. To summarize, patient was admitted for bacteremia of unknown origin and during septic episode was found to have NSTEMI with deterioration of CHF. During stay developed intermittent hematuria with clot retention eventually requiring Saldana clot evacuation and CBI. Unable to get clear. Patient has hx of locally advanced prostate cancer with External beam radiation and chemical recurrence now on Lupron. Requiring multiple transfusions, this is now an emergent case to take to OR to evacuate clots and attempt to find source of bleeding.     ICU Vital Signs Last 24 Hrs  T(C): 36.4 (27 Oct 2017 06:30), Max: 37.3 (26 Oct 2017 09:47)  T(F): 97.6 (27 Oct 2017 06:30), Max: 99.2 (26 Oct 2017 09:47)  HR: 62 (27 Oct 2017 07:00) (50 - 90)  BP: 69/43 (27 Oct 2017 00:01) (69/43 - 112/65)  BP(mean): 50 (27 Oct 2017 00:01) (50 - 131)  ABP: 140/56 (27 Oct 2017 07:00) (86/56 - 175/74)  ABP(mean): 78 (27 Oct 2017 07:00) (56 - 92)  RR: 16 (27 Oct 2017 07:00) (13 - 20)  SpO2: 98% (27 Oct 2017 07:00) (95% - 100%)

## 2017-10-26 NOTE — PROGRESS NOTE ADULT - PROBLEM SELECTOR PLAN 5
HFrEF: TTE shows HFrEF with EF 45%, right atrial dilatation, septal wall motion abnormalities and mild hypokinesis of left ventricle. Unable to visualize any vegetations or R heart structures. Unable to calculate pulmonary a pressures.   - c/w ASA + lipitor  - Lasix 40mg PO BID, give IV if needed.   -Continue lisinopril 2.5 daily  -Consider starting metoprolol as tolerated once BP allows  - strict I/Os, daily weights, fluid restriction 1.5L  - acute onset of HFrEF; discussed case with Dr. Glass who agrees that patient should have ischemia workup outpatient for acute HFrEF and repeat echo to be done to assess for improvement in LV function.     #New onset paroxysmal A.fib Vs MAT:  - pt has remained in NSR since admission to 7 la. To be followed up with outpatient cardiologist for further monitoring    #NSTEMI :  - EKG positive for ST depression in anteroseptal leads, Echo positive for septal hypokinesis, troponin +.   - Ischemia likely demand ischemia due to severe sepsis  - c/w ASA  -Additional ischemic work-up as outpatient after discharge. - c/w duonebs q 4   - encourage ambulation and incentive spirometry.   - Patient desaturated while walking to 86% w/o oxygen     # Possible sleep apnea  - work up sleep apnea outpatient w/ sleep study   - CPAP at night

## 2017-10-26 NOTE — PROGRESS NOTE ADULT - SUBJECTIVE AND OBJECTIVE BOX
Cardiology for [facility]    cc: follow-up cardiology evaluation and management of left heart failure    interval - events noted; now post-op area; intubated    PAST MEDICAL & SURGICAL HISTORY:  COPD (chronic obstructive pulmonary disease)  Hypertension  Obstructive sleep apnea syndrome  Prostate cancer metastatic to bone  PC (prostate cancer): with seed placement    MEDICATIONS  (STANDING):  ALBUTerol/ipratropium for Nebulization 3 milliLiter(s) Nebulizer every 4 hours  atorvastatin 80 milliGRAM(s) Oral at bedtime  belladonna 16.2 mG/opium 60 mg Suppository 1 Suppository(s) Rectal once  cefepime  IVPB 2000 milliGRAM(s) IV Intermittent once  cefepime  IVPB      dextrose 5%. 1000 milliLiter(s) (50 mL/Hr) IV Continuous <Continuous>  finasteride 5 milliGRAM(s) Oral daily  furosemide    Tablet 40 milliGRAM(s) Oral every 12 hours  heparin  flush 100 Units/mL Injectable 100 Unit(s) IV Push every other day  lidocaine 2% Jelly 5 milliLiter(s) IntraUrethral once  melatonin 3 milliGRAM(s) Oral at bedtime  nystatin Powder 1 Application(s) Topical two times a day  pantoprazole    Tablet 40 milliGRAM(s) Oral two times a day before meals  potassium chloride  10 mEq/100 mL IVPB 10 milliEquivalent(s) IV Intermittent every 1 hour  propofol Infusion 4 MICROgram(s)/kG/Min (2.482 mL/Hr) IV Continuous <Continuous>  tamsulosin 0.8 milliGRAM(s) Oral at bedtime    MEDICATIONS  (PRN):  guaiFENesin    Syrup 200 milliGRAM(s) Oral every 6 hours PRN Cough  lidocaine 2% Gel 1 Application(s) Topical every 3 hours PRN catheter related discomfort at tip of penis  polyethylene glycol 3350 17 Gram(s) Oral daily PRN Constipation  simethicone 80 milliGRAM(s) Chew three times a day PRN Gas      ICU Vital Signs Last 24 Hrs  T(C): 36.7 (26 Oct 2017 19:28), Max: 37.3 (26 Oct 2017 09:47)  T(F): 98 (26 Oct 2017 19:28), Max: 99.2 (26 Oct 2017 09:47)  HR: 58 (26 Oct 2017 22:00) (58 - 92)  BP: 95/51 (26 Oct 2017 22:00) (87/50 - 113/68)  BP(mean): 58 (26 Oct 2017 22:00) (58 - 131)  ABP: 102/44 (26 Oct 2017 22:00) (98/42 - 144/72)  ABP(mean): 60 (26 Oct 2017 22:00) (56 - 92)  RR: 14 (26 Oct 2017 22:00) (14 - 26)  SpO2: 100% (26 Oct 2017 22:00) (95% - 100%)    physical exam  intubated  no hjr  decreased breath sounds at bases; crackles present  rr; s1/s2 present  soft abd  le edema present  le pulses present    I&O's Detail    25 Oct 2017 07:01  -  26 Oct 2017 07:00  --------------------------------------------------------  IN:    IV PiggyBack: 450 mL    Packed Red Blood Cells: 320 mL  Total IN: 770 mL    OUT:    Indwelling Catheter - Urethral: 2000 mL  Total OUT: 2000 mL    Total NET: -1230 mL      26 Oct 2017 07:01  -  26 Oct 2017 22:27  --------------------------------------------------------  IN:    Continuous Bladder Irrigation: 9000 mL    IV PiggyBack: 700 mL    propofol Infusion: 85.5 mL  Total IN: 9785.5 mL    OUT:    Continuous Bladder Irrigation: 7700 mL    Indwelling Catheter - Urethral: 1700 mL  Total OUT: 9400 mL    Total NET: 385.5 mL              labs                                           7.3    14.3  )-----------( 77       ( 26 Oct 2017 10:57 )             23.4   10-26    135  |  92<L>  |  22  ----------------------------<  127<H>  3.1<L>   |  28  |  1.65<H>    Ca    8.4      26 Oct 2017 10:57  Phos  3.1     10-25  Mg     1.6     10-26        I&O's Summary    25 Oct 2017 07:01  -  26 Oct 2017 07:00  --------------------------------------------------------  IN: 770 mL / OUT: 2000 mL / NET: -1230 mL    26 Oct 2017 07:01  -  26 Oct 2017 22:27  --------------------------------------------------------  IN: 9785.5 mL / OUT: 9400 mL / NET: 385.5 mL            < from: Echocardiogram (10.16.17 @ 10:31) >    EXAM:  ECHOCARDIOGRAM (CARDIOL)                          PROCEDURE DATE:  10/16/2017                        INTERPRETATION:  Patient Height: 172.0 cm  Patient Weight: 103.0 kg  Heart Rate: 74 bpm  Systolic Pressure: 140 mmHg  Diastolic Pressure: 70mmHg  BSA: 2.2 m^2  Interpretation Summary  The left ventricle is mildly dilated.Hypokinesis of the basal inferior and   septal region.The left ventricular ejection fraction is estimated to be   45-50%The mitral inflow pattern is consistent with impaired left   ventricular   relaxation with mildly elevated left atrial pressure (8-14mmHg).  The   left   atrial size is normal. The right atrium is dilated.The right ventricle is   moderately dilated. The right ventricular systolic function is mildly   reduced.    There is mild aortic valve thickening.There is mild mitral valve   thickening.   There is mild mitral regurgitation.There is trace tricuspid   regurgitation.There was insufficient TR detected from which to calculate   pulmonary artery systolic pressure.  No aortic root dilatation.The   inferior   vena cava is normal in size (<2.1 cm) with normal inspiratory collapse   (>50%)   consistent with normal right atrial pressure.  There is no pericardial   effusion.    < end of copied text >  IN: 650 mL / OUT: 2875 mL / NET: -2225 mL          < from: Xray Chest 1 View AP -PORTABLE-Routine (10.09.17 @ 05:37) >  EXAM:  XR CHEST 1 VIEW PORT ROUTINE                          PROCEDURE DATE:  10/09/2017                     INTERPRETATION:    Portable AP Radiograph dated 10/9/2017 5:37 AM    CLINICAL INFORMATION: 72 years, Male, Intubated, PNA    PRIOR STUDIES:October 8, 2017    FINDINGS: Support tubes and lines are unchanged. Left pleural effusion is   stable. Right pleural effusion has increased. Pulmonary vascular   congestion has increased.    IMPRESSION:  Interval increase of right pleural effusion and pulmonary vascular   congestion.            "Thank you for the opportunity to participate in the care of this   patient."        KATHY PABLO M.D., RADIOLOGY ATTENDING  This document has been electronically signed. Oct  9 2017  8:33AM    < end of copied text >

## 2017-10-26 NOTE — CHART NOTE - NSCHARTNOTEFT_GEN_A_CORE
Patient noted to be hypotensive to SBP 90s about 4:30AM during transfusion. Patient was assessed by me immediately at bedside, complaining of penile pain and pressure which began acutely 5 min prior. Vitals rechecked immediately, pt noted to be hypertensive up to SBP 160s w/ HR low 100s. Saldana no longer flushing, CBI clamped. Urology paged, however unable to get in touch with urology and patient in significant distress and pain w/ BPs rising. Senior resident (Dr. Coello) was made aware and irrigated Saldana at bedside immediately with relief of pain and return of flow through Saldana. Patient put back on CBI with continued flow noted through Saldana. Vital signs repeated after patient no longer in distress with HR 85, /68. Patient now resting comfortably.

## 2017-10-26 NOTE — PROGRESS NOTE ADULT - SUBJECTIVE AND OBJECTIVE BOX
O/N Events: Transfused 1U PRBCs, post-transfusion hgb 6.7. Transfused additional 1U PRBC. Saldana clotted 2x overnight, requiring flushing, many blood clots removed.     Subjective: Patient seen and examined at bedside. Currently he denies any f/c, CP, SOB, abdominal pain/ suprapubic fullness.     VITALS  Vital Signs Last 24 Hrs  T(C): 37.3 (26 Oct 2017 09:47), Max: 38.6 (25 Oct 2017 13:47)  T(F): 99.2 (26 Oct 2017 09:47), Max: 101.5 (25 Oct 2017 13:47)  HR: 77 (26 Oct 2017 09:47) (77 - 113)  BP: 103/59 (26 Oct 2017 09:47) (92/57 - 129/72)  BP(mean): --  RR: 20 (26 Oct 2017 09:47) (16 - 26)  SpO2: 96% (26 Oct 2017 09:47) (96% - 100%)    I&O's Summary    25 Oct 2017 07:01  -  26 Oct 2017 07:00  --------------------------------------------------------  IN: 770 mL / OUT: 2000 mL / NET: -1230 mL    26 Oct 2017 07:01  -  26 Oct 2017 09:52  --------------------------------------------------------  IN: 50 mL / OUT: 0 mL / NET: 50 mL      PHYSICAL EXAM  General: Awake and alert; NAD  Head: NC/AT;   Eyes:  anicteric sclera  Neck: Supple  Respiratory: +Poor air movement, clear to auscultation anteriorly. No w/r/r.   Cardiovascular: Regular rhythm/rate; S1/S2; no gallops or murmurs auscultated  Gastrointestinal: Soft; NTND w/out rebound tenderness or guarding;   Extremities: WWP; 2+ b/l LE edema, no cyanosis    MEDICATIONS  (STANDING):  ALBUTerol/ipratropium for Nebulization 3 milliLiter(s) Nebulizer every 4 hours  atorvastatin 80 milliGRAM(s) Oral at bedtime  belladonna 16.2 mG/opium 60 mg Suppository 1 Suppository(s) Rectal once  ceFAZolin   IVPB 2000 milliGRAM(s) IV Intermittent every 8 hours  dextrose 5%. 1000 milliLiter(s) (50 mL/Hr) IV Continuous <Continuous>  finasteride 5 milliGRAM(s) Oral daily  furosemide    Tablet 40 milliGRAM(s) Oral every 12 hours  heparin  flush 100 Units/mL Injectable 100 Unit(s) IV Push every other day  lidocaine 2% Jelly 5 milliLiter(s) IntraUrethral once  melatonin 3 milliGRAM(s) Oral at bedtime  nystatin Powder 1 Application(s) Topical two times a day  pantoprazole    Tablet 40 milliGRAM(s) Oral two times a day before meals  piperacillin/tazobactam IVPB. 3.375 Gram(s) IV Intermittent every 6 hours  tamsulosin 0.8 milliGRAM(s) Oral at bedtime    MEDICATIONS  (PRN):  lidocaine 2% Gel 1 Application(s) Topical every 3 hours PRN catheter related discomfort at tip of penis  polyethylene glycol 3350 17 Gram(s) Oral daily PRN Constipation  simethicone 80 milliGRAM(s) Chew three times a day PRN Gas      LABS                        6.9    16.7  )-----------( 84       ( 26 Oct 2017 02:40 )             22.1     10-25    140  |  97  |  21  ----------------------------<  116<H>  3.7   |  28  |  1.01    Ca    9.5      25 Oct 2017 07:06  Phos  3.1     10-25  Mg     1.8     10-25        PT/INR - ( 26 Oct 2017 09:26 )   PT: 16.3 sec;   INR: 1.46          PTT - ( 26 Oct 2017 09:26 )  PTT:27.2 sec      IMAGING/EKG/ETC-    < from: Xray Chest 1 View AP -PORTABLE-Routine (10.24.17 @ 06:34) >  FINDINGS: Portable view of the chest is compared to 10/16/2017 and   demonstrates a right-sided central venous catheter with the tip in the   SVC. There is normal heart size. Patchy atelectatic/fibrotic opacity   within the left lower lobe is slightly intervally worsened. No   consolidation. No pleural effusion.    < end of copied text > O/N Events: Transfused 1U PRBCs, post-transfusion hgb 6.7. Transfused additional 1U PRBC. Saldana clotted 2x overnight, requiring flushing, many blood clots removed.     Subjective: Patient seen and examined at bedside. Currently he denies any f/c, CP, SOB, abdominal pain/ suprapubic fullness.     VITALS  Vital Signs Last 24 Hrs  T(C): 37.3 (26 Oct 2017 09:47), Max: 38.6 (25 Oct 2017 13:47)  T(F): 99.2 (26 Oct 2017 09:47), Max: 101.5 (25 Oct 2017 13:47)  HR: 77 (26 Oct 2017 09:47) (77 - 113)  BP: 103/59 (26 Oct 2017 09:47) (92/57 - 129/72)  BP(mean): --  RR: 20 (26 Oct 2017 09:47) (16 - 26)  SpO2: 96% (26 Oct 2017 09:47) (96% - 100%)    I&O's Summary    25 Oct 2017 07:01  -  26 Oct 2017 07:00  --------------------------------------------------------  IN: 770 mL / OUT: 2000 mL / NET: -1230 mL    26 Oct 2017 07:01  -  26 Oct 2017 09:52  --------------------------------------------------------  IN: 50 mL / OUT: 0 mL / NET: 50 mL      PHYSICAL EXAM  General: Awake and alert; NAD  Head: NC/AT;   Eyes:  anicteric sclera  Neck: Supple  Respiratory: +Poor air movement, clear to auscultation anteriorly. No w/r/r.   Cardiovascular: Regular rhythm/rate; S1/S2; no gallops or murmurs auscultated  Gastrointestinal: Soft; NTND w/out rebound tenderness or guarding;   : +Saldana in place, draining pink urine/fluid  Extremities: WWP; 2+ b/l LE edema, no cyanosis    MEDICATIONS  (STANDING):  ALBUTerol/ipratropium for Nebulization 3 milliLiter(s) Nebulizer every 4 hours  atorvastatin 80 milliGRAM(s) Oral at bedtime  belladonna 16.2 mG/opium 60 mg Suppository 1 Suppository(s) Rectal once  ceFAZolin   IVPB 2000 milliGRAM(s) IV Intermittent every 8 hours  dextrose 5%. 1000 milliLiter(s) (50 mL/Hr) IV Continuous <Continuous>  finasteride 5 milliGRAM(s) Oral daily  furosemide    Tablet 40 milliGRAM(s) Oral every 12 hours  heparin  flush 100 Units/mL Injectable 100 Unit(s) IV Push every other day  lidocaine 2% Jelly 5 milliLiter(s) IntraUrethral once  melatonin 3 milliGRAM(s) Oral at bedtime  nystatin Powder 1 Application(s) Topical two times a day  pantoprazole    Tablet 40 milliGRAM(s) Oral two times a day before meals  piperacillin/tazobactam IVPB. 3.375 Gram(s) IV Intermittent every 6 hours  tamsulosin 0.8 milliGRAM(s) Oral at bedtime    MEDICATIONS  (PRN):  lidocaine 2% Gel 1 Application(s) Topical every 3 hours PRN catheter related discomfort at tip of penis  polyethylene glycol 3350 17 Gram(s) Oral daily PRN Constipation  simethicone 80 milliGRAM(s) Chew three times a day PRN Gas      LABS                        6.9    16.7  )-----------( 84       ( 26 Oct 2017 02:40 )             22.1     10-25    140  |  97  |  21  ----------------------------<  116<H>  3.7   |  28  |  1.01    Ca    9.5      25 Oct 2017 07:06  Phos  3.1     10-25  Mg     1.8     10-25        PT/INR - ( 26 Oct 2017 09:26 )   PT: 16.3 sec;   INR: 1.46          PTT - ( 26 Oct 2017 09:26 )  PTT:27.2 sec      IMAGING/EKG/ETC-    < from: Xray Chest 1 View AP -PORTABLE-Routine (10.24.17 @ 06:34) >  FINDINGS: Portable view of the chest is compared to 10/16/2017 and   demonstrates a right-sided central venous catheter with the tip in the   SVC. There is normal heart size. Patchy atelectatic/fibrotic opacity   within the left lower lobe is slightly intervally worsened. No   consolidation. No pleural effusion.    < end of copied text > Hospital Course: 72 year old male with a PMHx of HTN, COPD, Prostate CA (2013, s/p radiation and seed placement, on leuprolide with a recent elevation of PSA and imaging showing bone mets) presents from his heme/on office with worsening of SOB and episode syncope the morning of admission 10/4. Pt was admitted for severe sepsis w/ CATALINA and possible pneumonia. Pt empirically treated for CAP w/ ceftriaxone/azithromycin/vancomycin and placed on BiPAP. Rapid response was called 10/5 for respiratory failure and hypotension once it was discovered that the patient removed his BiPaP on the floor. Patient stepped up to the MICU and started on peripheral levophed for closer monitoring. Patient intubated that PM for airway protection. In the MICU had OGT, right IJ with left A-line placed. Pt's ABX broadened to Zosyn and Vancomycin (dosed by level). OG tube was placed for decompression of previous ileus seen and abdominal distention. Blood Cx came back positive for MSSA w/ persistent bacteremia. Patient transitioned to nafcillin for MSSA coverage. Pt also had an ECHO (TTE) which showed HFrEF with EF 45%, right atrial dilatation, septal wall motion abnormalities and mild hypokinesis of left ventricle. KELLY which came back negative for any vegetation and repeat TTE negative for vegetations or worsening valvular dysfunction. Troponin positive w/ EKG changes concerning for demand ischemia in setting of septic shock. 10/6 patient came off propofol and levophed. He was successfully extubated to nasal cannula. Patient was transferred to [facility] for further monitoring and to work on discharge planning. While on [facility] pt developing upper GI bleed requiring transfusion of 3 U PRBC w/ endoscopy showing pigmented spot. Treatement with BID protonix initiated and patient to follow up with GI on November 13 for further management. Additionally patient developed hematuria and urinary retention likely 2/2 to traumatic glaser catheter w/ friable prostate due to prostate CA. Patient initially had glaser removed once starting flomax but had repeat episode of urinary retention. Glaser was placed and follow up with urology was made to assess when to remove glaser after discharge.  Patient evaluated by physical therapy who recommended that he go to [facility] but pt wanted to go home with home PT. When ambulating w/o oxygen patient's saturations dropped as low as 86% and arrangements were made for patient to have home oxygen. After discussion with patient's cardiologist the decision was made to proceed with work up for ischemic heart disease as an outpatient. However patient changed mind and decided on RJ after all. Patient stepped down for arrangement for RJ placement at [facility] pending insurance authorization. On 4 Uris, patient had increased merlot colored bleeding from Glaser and blood clots obstructing Glaser. Glaser was replaced with coude. Hgb dropped to 6.7 and he was transfused 1U PRBCs with appropriate response. He continued to have merlot urine, he was started on CBI and planned for possible cytoscopy by urology. Then on 10/25, he was febrile to 102.5 and had a leukocytosis, urine culture growing pseudomonas. Inially he was started on Zosyn, but then switched to cefepime on 10/26 and both Zosyn and cefazolin was discontinued. His Glaser continued to clot, and hgb again dropped to 6.7 requiring 2U PRBCs. He was taken to urology for cystoscopy and possible intervention...    O/N Events: Transfused 1U PRBCs, post-transfusion hgb 6.7. Transfused additional 1U PRBC. Glaser clotted 2x overnight, requiring flushing, many blood clots removed.     Subjective: Patient seen and examined at bedside. Currently he denies any f/c, CP, SOB, abdominal pain/ suprapubic fullness.     VITALS  Vital Signs Last 24 Hrs  T(C): 37.3 (26 Oct 2017 09:47), Max: 38.6 (25 Oct 2017 13:47)  T(F): 99.2 (26 Oct 2017 09:47), Max: 101.5 (25 Oct 2017 13:47)  HR: 77 (26 Oct 2017 09:47) (77 - 113)  BP: 103/59 (26 Oct 2017 09:47) (92/57 - 129/72)  BP(mean): --  RR: 20 (26 Oct 2017 09:47) (16 - 26)  SpO2: 96% (26 Oct 2017 09:47) (96% - 100%)    I&O's Summary    25 Oct 2017 07:01  -  26 Oct 2017 07:00  --------------------------------------------------------  IN: 770 mL / OUT: 2000 mL / NET: -1230 mL    26 Oct 2017 07:01  -  26 Oct 2017 09:52  --------------------------------------------------------  IN: 50 mL / OUT: 0 mL / NET: 50 mL      PHYSICAL EXAM  General: Awake and alert; NAD  Head: NC/AT;   Eyes:  anicteric sclera  Neck: Supple  Respiratory: +Poor air movement, clear to auscultation anteriorly. No w/r/r.   Cardiovascular: Regular rhythm/rate; S1/S2; no gallops or murmurs auscultated  Gastrointestinal: Soft; NTND w/out rebound tenderness or guarding;   : +Glaser in place, draining pink urine/fluid  Extremities: WWP; 2+ b/l LE edema, no cyanosis    MEDICATIONS  (STANDING):  ALBUTerol/ipratropium for Nebulization 3 milliLiter(s) Nebulizer every 4 hours  atorvastatin 80 milliGRAM(s) Oral at bedtime  belladonna 16.2 mG/opium 60 mg Suppository 1 Suppository(s) Rectal once  ceFAZolin   IVPB 2000 milliGRAM(s) IV Intermittent every 8 hours  dextrose 5%. 1000 milliLiter(s) (50 mL/Hr) IV Continuous <Continuous>  finasteride 5 milliGRAM(s) Oral daily  furosemide    Tablet 40 milliGRAM(s) Oral every 12 hours  heparin  flush 100 Units/mL Injectable 100 Unit(s) IV Push every other day  lidocaine 2% Jelly 5 milliLiter(s) IntraUrethral once  melatonin 3 milliGRAM(s) Oral at bedtime  nystatin Powder 1 Application(s) Topical two times a day  pantoprazole    Tablet 40 milliGRAM(s) Oral two times a day before meals  piperacillin/tazobactam IVPB. 3.375 Gram(s) IV Intermittent every 6 hours  tamsulosin 0.8 milliGRAM(s) Oral at bedtime    MEDICATIONS  (PRN):  lidocaine 2% Gel 1 Application(s) Topical every 3 hours PRN catheter related discomfort at tip of penis  polyethylene glycol 3350 17 Gram(s) Oral daily PRN Constipation  simethicone 80 milliGRAM(s) Chew three times a day PRN Gas      LABS                        6.9    16.7  )-----------( 84       ( 26 Oct 2017 02:40 )             22.1     10-25    140  |  97  |  21  ----------------------------<  116<H>  3.7   |  28  |  1.01    Ca    9.5      25 Oct 2017 07:06  Phos  3.1     10-25  Mg     1.8     10-25        PT/INR - ( 26 Oct 2017 09:26 )   PT: 16.3 sec;   INR: 1.46          PTT - ( 26 Oct 2017 09:26 )  PTT:27.2 sec      IMAGING/EKG/ETC-    < from: Xray Chest 1 View AP -PORTABLE-Routine (10.24.17 @ 06:34) >  FINDINGS: Portable view of the chest is compared to 10/16/2017 and   demonstrates a right-sided central venous catheter with the tip in the   SVC. There is normal heart size. Patchy atelectatic/fibrotic opacity   within the left lower lobe is slightly intervally worsened. No   consolidation. No pleural effusion.    < end of copied text >

## 2017-10-26 NOTE — PROGRESS NOTE ADULT - PROBLEM SELECTOR PLAN 1
-stable on ventilator  -OOB  -IS, SCD's  -Diet: NPO  -Antibx: cefepime  -I's & O's  -pain control  -IVF's  -continue CBI/FC  -ICU care

## 2017-10-26 NOTE — CONSULT NOTE ADULT - SUBJECTIVE AND OBJECTIVE BOX
ICU CONSULT    71 y/o M initially admitted for a CC of SOB, syncope and productive cough. Was found to have PNA and was started on treatment for CAP. Was shortly after stepped up from F to MICU for respiratory failure and was intubated and on levophed. Found to have MSSA bacteremia, although source unknown (possibly lung). Started and anti-staphylococcal antibiotics with eventual clearance of bacteremia. Developed NSTEMI likely 2/2 hypoxia and sepsis. Medically managed, no catheterization performed. Developed upper GI bleed requiring transfusions, no s/p EGD and resolution. Discharge planning started, but patient found to have O2 sat of 86% when ambulating without oxygen. Plan made to discharge on home O2. However discharge delayed due to appearance of clots and blood in Saldana, requiring blood transfusions. Also found to have pseudomonas UTI. Started on cefepime to cover both MSSA and pseudomonas. Urology took patient to OR for cystoscopy and found bleeding artery in lobe of prostate, which was cauterized. He was intubated for the procedure. Anesthesiologist found patients O2 sat to drop to mid-80s when FiO2 lowered to room air (21%), so requested MICU consult for optimization prior to extubation.      72yMale    PMHx - COPD, HTN, NSTEMI, pAFIB, prostate CA (2013, s/p radiation and seed placement),   PSx - unknown  Meds - cefepime 2g q12h, propofol, finasteride 5mg daily, furosemide 40mg bid, atorvastatin 80mg daily, Protonix 40mg daily, Miralax daily PRN, tamsulosin 0.8mg daily, melatonin bedtime PRN  Allergies - none known  FHx - unknown  Sx - unknown      PHYSICAL EXAM   Vital Signs Last 24 Hrs  T(C): 36.7 (26 Oct 2017 19:28), Max: 37.3 (26 Oct 2017 09:47)  T(F): 98 (26 Oct 2017 19:28), Max: 99.2 (26 Oct 2017 09:47)  HR: 60 (26 Oct 2017 20:26) (60 - 96)  BP: 87/50 (26 Oct 2017 20:26) (87/50 - 113/68)  BP(mean): 60 (26 Oct 2017 20:26) (60 - 85)  RR: 15 (26 Oct 2017 20:26) (15 - 26)  SpO2: 99% (26 Oct 2017 20:26) (95% - 100%)      General - intubated, sedated   HEENT - NC/AT, eyes taped shut  CV - S1/S2, RRR, no murmur  Resp - CTA b/l  Abdomen - +BS, soft, NTND, no masses  - on CBI  Extremities - WWP, +2 pitting edema b/l lower extremities    Skin - no rash      LABS                        7.3    14.3  )-----------( 77       ( 26 Oct 2017 10:57 )             23.4     10-26    135  |  92<L>  |  22  ----------------------------<  127<H>  3.1<L>   |  28  |  1.65<H>    Ca    8.4      26 Oct 2017 10:57  Phos  3.1     10-25  Mg     1.6     10-26      PT/INR - ( 26 Oct 2017 10:57 )   PT: 15.8 sec;   INR: 1.41          PTT - ( 26 Oct 2017 10:57 )  PTT:27.9 sec            IMAGING   CXR -   EKG -

## 2017-10-26 NOTE — PROGRESS NOTE ADULT - PROBLEM SELECTOR PLAN 8
# Pigmented spot seen on EGD  - bleed likely due to ulcer now healed, hgb again dropping, however guiac negative, most likely 2/2 bleeding from Saldana.   - PPI BID oral  - hemoglobin goal 8 hgb given recent NSTEMI   - daily cbc while pt admitted  - to follow up with Dr. 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  -Thrombocytopenia resolved

## 2017-10-26 NOTE — PROGRESS NOTE ADULT - PROBLEM SELECTOR PLAN 2
+UA with urine culture growing GNR, patient febrile to 102.5 on 10/25 and with leukocytosis, again meeting criteria. +UA with urine culture growing GNR, patient febrile to 102.5 on 10/25 and with leukocytosis, again meeting criteria for sepsis. Repeat blood cultures drawn.   -Continue zosyn (10/25--)  -Prelim urine Cx: GNR, awaiting speciation  -F/u blood cx, NGTD +UA with urine culture growing GNR, patient febrile to 102.5 on 10/25 and with leukocytosis, again meeting criteria for sepsis. Repeat blood cultures drawn. Urine cultures speciated pseudomonas.   -Cefepime 2g q 12hrs (10/26--)  -S/p zosyn (10/25-10/26)  -Prelim urine Cx: GNR, awaiting speciation  -F/u blood cx, NGTD

## 2017-10-26 NOTE — BRIEF OPERATIVE NOTE - PROCEDURE
<<-----Click on this checkbox to enter Procedure Fulguration of prostate  10/26/2017    Active  JOSEPHINE  Cystourethroscopy, with irrigation and evacuation of multiple obstructing clots  10/26/2017    Active  BRENTONONA

## 2017-10-26 NOTE — PROVIDER CONTACT NOTE (CRITICAL VALUE NOTIFICATION) - TEST AND RESULT REPORTED:
WBC 14267
HBG- 6.6
Hemoglobin 6.9
Hgb 6.7  Hct 21.4
Hgb 6.9
Positive blood C&S from 10/7/17 for gram pos cocci in clusters in aerobic bottle
WBC 46,300
WBC=41.3
WBC=42.4
aerobic bottle  G+ coccyx
troponin 0.06

## 2017-10-26 NOTE — PROGRESS NOTE ADULT - PROBLEM SELECTOR PLAN 9
F no IVF, 250cc bolus PRN  E- replete lytes prn  N- dash diet w/ 1.5 L fluid restriction     PPx - On ASA 81mg, SCD     CODE - DNR not DNI    Dispo: [facility] for RJ placement # Pigmented spot seen on EGD  - bleed likely due to ulcer now healed, hgb again dropping, however guiac negative, most likely 2/2 bleeding from Saldana.   - PPI BID oral  - hemoglobin goal 8 hgb given recent NSTEMI   - daily cbc while pt admitted  - to follow up with Dr. Mayo outpatient on discharge    #ileus -resolved  - c/w PO agents for constipation

## 2017-10-26 NOTE — PROGRESS NOTE ADULT - PROBLEM SELECTOR PLAN 6
ATN-resolved  - ATN on admission w/ Cr now at baseline.     Hematuria w/ urinary retention  - Hematuria likely due to underlying prostate malignancy w/ recent glaser placement - possibly due to prostate inflammation/bleed.   - glaser w/o interruptions in flow, maintain glaser to be evaluated by urology once discharged.   - c/w tamsulosin .8 mg HFrEF: TTE shows HFrEF with EF 45%, right atrial dilatation, septal wall motion abnormalities and mild hypokinesis of left ventricle. Unable to visualize any vegetations or R heart structures. Unable to calculate pulmonary a pressures.   - c/w ASA + lipitor  - Lasix 40mg PO BID, give IV if needed.   -Continue lisinopril 2.5 daily  -Consider starting metoprolol as tolerated once BP allows  - strict I/Os, daily weights, fluid restriction 1.5L  - acute onset of HFrEF; discussed case with Dr. Glass who agrees that patient should have ischemia workup outpatient for acute HFrEF and repeat echo to be done to assess for improvement in LV function.     #New onset paroxysmal A.fib Vs MAT:  - pt has remained in NSR since admission to [facility]. To be followed up with outpatient cardiologist for further monitoring    #NSTEMI :  - EKG positive for ST depression in anteroseptal leads, Echo positive for septal hypokinesis, troponin +.   - Ischemia likely demand ischemia due to severe sepsis  - c/w ASA  -Additional ischemic work-up as outpatient after discharge.

## 2017-10-26 NOTE — PROGRESS NOTE ADULT - SUBJECTIVE AND OBJECTIVE BOX
Bladder irrigation continues. The urine in the bag is light pink in color. The patient developed a fever yesterday. Cultures were obtained. The patient continues to have a cough. Secretions are white.    CHEMOTHERAPY REGIMEN:        Day:                          Diet:  Protocol:                                    IVF:      MEDICATIONS  (STANDING):  ALBUTerol/ipratropium for Nebulization 3 milliLiter(s) Nebulizer every 4 hours  atorvastatin 80 milliGRAM(s) Oral at bedtime  ceFAZolin   IVPB 2000 milliGRAM(s) IV Intermittent every 8 hours  dextrose 5%. 1000 milliLiter(s) (50 mL/Hr) IV Continuous <Continuous>  finasteride 5 milliGRAM(s) Oral daily  furosemide    Tablet 40 milliGRAM(s) Oral every 12 hours  furosemide   Injectable 20 milliGRAM(s) IV Push once  heparin  flush 100 Units/mL Injectable 100 Unit(s) IV Push every other day  lidocaine 2% Jelly 5 milliLiter(s) IntraUrethral once  melatonin 3 milliGRAM(s) Oral at bedtime  nystatin Powder 1 Application(s) Topical two times a day  pantoprazole    Tablet 40 milliGRAM(s) Oral two times a day before meals  piperacillin/tazobactam IVPB. 3.375 Gram(s) IV Intermittent every 6 hours  potassium chloride  10 mEq/100 mL IVPB 10 milliEquivalent(s) IV Intermittent every 1 hour  tamsulosin 0.8 milliGRAM(s) Oral at bedtime    MEDICATIONS  (PRN):  lidocaine 2% Gel 1 Application(s) Topical every 3 hours PRN catheter related discomfort at tip of penis  polyethylene glycol 3350 17 Gram(s) Oral daily PRN Constipation  simethicone 80 milliGRAM(s) Chew three times a day PRN Gas      Allergies    No Known Allergies    Intolerances        DVT Prophylaxis: [ ] YES [x ] NO      Antibiotics: [s ] YES [ ] NO    Pain Scale (1-10):       Location:    Vital Signs Last 24 Hrs  T(C): 37.2 (26 Oct 2017 05:00), Max: 38.6 (25 Oct 2017 13:47)  T(F): 99 (26 Oct 2017 05:00), Max: 101.5 (25 Oct 2017 13:47)  HR: 86 (26 Oct 2017 05:39) (78 - 113)  BP: 113/68 (26 Oct 2017 05:00) (92/57 - 138/68)  BP(mean): --  RR: 20 (26 Oct 2017 05:39) (16 - 26)  SpO2: 99% (26 Oct 2017 05:39) (96% - 100%)    Drug Dosing Weight  Height (cm): 172.72 (24 Oct 2017 03:30)  Weight (kg): 103.4 (24 Oct 2017 03:30)  BMI (kg/m2): 34.7 (24 Oct 2017 03:30)  BSA (m2): 2.16 (24 Oct 2017 03:30)    PHYSICAL EXAM:      Constitutional: becoming worn out by the persistent bladder bleeding.  Eyes: conjunctiva palor persists.  ENMT: NC in place. Buccal mucosa moist.  Neck: no masses.  Respiratory: chest clear.  Cardiovascular: S1>S2 at apex. Regular rhythm with ectopic beats.  Gastrointestinal: distended, tympanitic, soft, nontender. Active bowel sounds.  Genitourinary: urine is a light pink in color.  Extremities: leg edema persists.  Vascular: radial pulses equal bilaterally.    Neurological: no gross focal deficits.  Skin: warm and dry.  Lymph Nodes: none palp.    Musculoskeletal: full ROM.  Psychiatric: affect normal.        URINARY CATHETER: [x ] YES [ ] NO     LABS:  CBC Full  -  ( 26 Oct 2017 02:40 )  WBC Count : 16.7 K/uL  Hemoglobin : 6.9 g/dL  Hematocrit : 22.1 %  Platelet Count - Automated : 84 K/uL  Mean Cell Volume : 98.2 fL  Mean Cell Hemoglobin : 30.7 pg  Mean Cell Hemoglobin Concentration : 31.2 g/dL  Auto Neutrophil # : x  Auto Lymphocyte # : x  Auto Monocyte # : x  Auto Eosinophil # : x  Auto Basophil # : x  Auto Neutrophil % : x  Auto Lymphocyte % : x  Auto Monocyte % : x  Auto Eosinophil % : x  Auto Basophil % : x    10-25    140  |  97  |  21  ----------------------------<  116<H>  3.7   |  28  |  1.01    Ca    9.5      25 Oct 2017 07:06  Phos  3.1     10-25  Mg     1.8     10-25      PT/INR - ( 24 Oct 2017 07:37 )   PT: 13.7 sec;   INR: 1.23          PTT - ( 24 Oct 2017 07:37 )  PTT:28.7 sec      CULTURES:    RADIOLOGY & ADDITIONAL STUDIES:

## 2017-10-26 NOTE — PROGRESS NOTE ADULT - ASSESSMENT
73 yo male with hx of HTN, COPD, Prostate CA (s/p radiation and seed 2013 on leuprolide outpatient) who presented with worsening of SOB and an episode of unwitnessed syncope found to have severe sepsis with gram + cocci in clusters identified as S. aureus and ATN.  Hospital course complicated by hypotension and transfer to MICU for septic shock.  Septic shock has resolved but pt has had persistent MSSA bacteremia with resolving respiratory failure and ATN. Now with hgb drop in context of bleeding/ blood clots from [place] causing repeated episodes of [place] obstruction.

## 2017-10-26 NOTE — PROGRESS NOTE ADULT - ATTENDING COMMENTS
Briefly, 71 yo male with prostate ca, here with MSSA BSI thought to be 2/2 PNA. Bcx ngtd since 10/10. Has been on cefazolin. Course c/b gross hematuria, fevers, +UA with Ucx growing > 100K GNR. Plan for cystoscopy later today. d/c cefazolin and zosyn and start cefepime 2g IV q12h (broad Gram negative coverage and also has in vitro activity vs. MSSA); reasonable as patient is at tail end of MSSA therapy and bcx have been negative for over 2 weeks. f/u repeat bcx and final ID of GNR in urine.

## 2017-10-26 NOTE — PROGRESS NOTE ADULT - PROBLEM SELECTOR PLAN 1
Acute blood loss anemia 2/2 hematuria. Patient dropping hgb to 6.7 10/23 in setting of bleeding/ clots from [place] catheter. Patient with UGIB on admission, however no recent melena or bloody stools, stool guiac negative. S/p 1 U PRBCs with good response on 10/23. Iron studies c/w ACD. Patient again dropped hgb to 6.7 on 10/24, now s/p 2U PRBCs  -Follow up post-transfusion CBC  --Planned for OR today for cytoscopy (delayed yesterday due to new fever/ hgb drop)  -Monitor Sald[place] for bleeding, retention, now on CBI.  -Urology following, recs appreciated

## 2017-10-26 NOTE — PROGRESS NOTE ADULT - ASSESSMENT
A/recs:  1) acute hypoxic respiratory failure with elevated tn and abnormal septal wall motion on tte in addition to rv dilatation and abnormal ecg this admission; left heart failure (ef=45% by tte) - s/p egd on 10- for anemia/melena - now for urgent/emergent gu procedure 10- with hematuria requiring prbc  a - nstemi earlier this admission  b - with continued bleeding, risk of asa may outweigh current benefit -  hold asa for now - monitor for signs/symptoms of ischemia - check daily ecg  c - recommend pulmonary evaluate safety of bb  d - urgent/emergent gu surgery pending:  i) no cardiac contraindications  ii) avoid labile bp  iii) check post-op ecg and tn  iv) agree with  recs for icu consult  v) patient aware of risks, benefits, alternatives; questions answered      2) paroxysmal atrial fibrillation  a - continuous temeletry    3) htn - lisinopril held by int med team    4) sepsis due to pna per [facility] with bacteremia -id follow-up appreciated - note recs    5) possible copd exacerbation -per ccm    6) acute renal failure - monitor cr/uop    7) normocytic anemia and thrombocytopenia - prbc given; serial h/h    8) shingles    9) prostate ca - per gu and hem-onc    10)  K>4, Mg>2    11) palliative care previously following

## 2017-10-26 NOTE — BRIEF OPERATIVE NOTE - OPERATION/FINDINGS
approximately 400cc of old clot evacuated. Brisk bleeding from small vessel on left lateral lobe of prostate and engorged prostatic vessels all fulgurated. At the end of the case the bladder urine was clear, started on very slow CBI with no traction

## 2017-10-27 DIAGNOSIS — D50.0 IRON DEFICIENCY ANEMIA SECONDARY TO BLOOD LOSS (CHRONIC): ICD-10-CM

## 2017-10-27 DIAGNOSIS — J96.90 RESPIRATORY FAILURE, UNSPECIFIED, UNSPECIFIED WHETHER WITH HYPOXIA OR HYPERCAPNIA: ICD-10-CM

## 2017-10-27 DIAGNOSIS — Z29.9 ENCOUNTER FOR PROPHYLACTIC MEASURES, UNSPECIFIED: ICD-10-CM

## 2017-10-27 DIAGNOSIS — C61 MALIGNANT NEOPLASM OF PROSTATE: ICD-10-CM

## 2017-10-27 LAB
ANION GAP SERPL CALC-SCNC: 17 MMOL/L — SIGNIFICANT CHANGE UP (ref 5–17)
APPEARANCE UR: CLEAR — SIGNIFICANT CHANGE UP
BILIRUB UR-MCNC: NEGATIVE — SIGNIFICANT CHANGE UP
BUN SERPL-MCNC: 17 MG/DL — SIGNIFICANT CHANGE UP (ref 7–23)
CALCIUM SERPL-MCNC: 8.9 MG/DL — SIGNIFICANT CHANGE UP (ref 8.4–10.5)
CHLORIDE SERPL-SCNC: 94 MMOL/L — LOW (ref 96–108)
CO2 SERPL-SCNC: 26 MMOL/L — SIGNIFICANT CHANGE UP (ref 22–31)
COLOR SPEC: YELLOW — SIGNIFICANT CHANGE UP
CREAT SERPL-MCNC: 1.16 MG/DL — SIGNIFICANT CHANGE UP (ref 0.5–1.3)
DIFF PNL FLD: (no result)
GLUCOSE SERPL-MCNC: 130 MG/DL — HIGH (ref 70–99)
GLUCOSE UR QL: NEGATIVE — SIGNIFICANT CHANGE UP
HCT VFR BLD CALC: 28.2 % — LOW (ref 39–50)
HGB BLD-MCNC: 9.1 G/DL — LOW (ref 13–17)
KETONES UR-MCNC: NEGATIVE — SIGNIFICANT CHANGE UP
LEUKOCYTE ESTERASE UR-ACNC: (no result)
MAGNESIUM SERPL-MCNC: 2.1 MG/DL — SIGNIFICANT CHANGE UP (ref 1.6–2.6)
MCHC RBC-ENTMCNC: 30.2 PG — SIGNIFICANT CHANGE UP (ref 27–34)
MCHC RBC-ENTMCNC: 32.3 G/DL — SIGNIFICANT CHANGE UP (ref 32–36)
MCV RBC AUTO: 93.7 FL — SIGNIFICANT CHANGE UP (ref 80–100)
NITRITE UR-MCNC: NEGATIVE — SIGNIFICANT CHANGE UP
PH UR: 5.5 — SIGNIFICANT CHANGE UP (ref 5–8)
PLATELET # BLD AUTO: 95 K/UL — LOW (ref 150–400)
POTASSIUM SERPL-MCNC: 3.4 MMOL/L — LOW (ref 3.5–5.3)
POTASSIUM SERPL-SCNC: 3.4 MMOL/L — LOW (ref 3.5–5.3)
PROT UR-MCNC: NEGATIVE MG/DL — SIGNIFICANT CHANGE UP
RBC # BLD: 3.01 M/UL — LOW (ref 4.2–5.8)
RBC # FLD: 16.1 % — SIGNIFICANT CHANGE UP (ref 10.3–16.9)
SODIUM SERPL-SCNC: 137 MMOL/L — SIGNIFICANT CHANGE UP (ref 135–145)
SP GR SPEC: <=1.005 — SIGNIFICANT CHANGE UP (ref 1–1.03)
UROBILINOGEN FLD QL: 0.2 E.U./DL — SIGNIFICANT CHANGE UP
WBC # BLD: 18.4 K/UL — HIGH (ref 3.8–10.5)
WBC # FLD AUTO: 18.4 K/UL — HIGH (ref 3.8–10.5)

## 2017-10-27 PROCEDURE — 99233 SBSQ HOSP IP/OBS HIGH 50: CPT | Mod: GC

## 2017-10-27 PROCEDURE — 71010: CPT | Mod: 26

## 2017-10-27 PROCEDURE — 99232 SBSQ HOSP IP/OBS MODERATE 35: CPT | Mod: GC

## 2017-10-27 RX ORDER — NOREPINEPHRINE BITARTRATE/D5W 8 MG/250ML
0.01 PLASTIC BAG, INJECTION (ML) INTRAVENOUS
Qty: 8 | Refills: 0 | Status: DISCONTINUED | OUTPATIENT
Start: 2017-10-27 | End: 2017-10-27

## 2017-10-27 RX ORDER — FENTANYL CITRATE 50 UG/ML
100 INJECTION INTRAVENOUS ONCE
Qty: 0 | Refills: 0 | Status: DISCONTINUED | OUTPATIENT
Start: 2017-10-27 | End: 2017-10-27

## 2017-10-27 RX ORDER — POTASSIUM CHLORIDE 20 MEQ
10 PACKET (EA) ORAL
Qty: 0 | Refills: 0 | Status: COMPLETED | OUTPATIENT
Start: 2017-10-27 | End: 2017-10-27

## 2017-10-27 RX ORDER — PANTOPRAZOLE SODIUM 20 MG/1
40 TABLET, DELAYED RELEASE ORAL
Qty: 0 | Refills: 0 | Status: DISCONTINUED | OUTPATIENT
Start: 2017-10-27 | End: 2017-10-31

## 2017-10-27 RX ORDER — PANTOPRAZOLE SODIUM 20 MG/1
40 TABLET, DELAYED RELEASE ORAL DAILY
Qty: 0 | Refills: 0 | Status: DISCONTINUED | OUTPATIENT
Start: 2017-10-27 | End: 2017-10-27

## 2017-10-27 RX ORDER — FUROSEMIDE 40 MG
40 TABLET ORAL ONCE
Qty: 0 | Refills: 0 | Status: COMPLETED | OUTPATIENT
Start: 2017-10-27 | End: 2017-10-27

## 2017-10-27 RX ORDER — CHLORHEXIDINE GLUCONATE 213 G/1000ML
15 SOLUTION TOPICAL
Qty: 0 | Refills: 0 | Status: DISCONTINUED | OUTPATIENT
Start: 2017-10-27 | End: 2017-10-31

## 2017-10-27 RX ADMIN — Medication 3 MILLIGRAM(S): at 21:36

## 2017-10-27 RX ADMIN — Medication 100 MILLIEQUIVALENT(S): at 05:16

## 2017-10-27 RX ADMIN — TAMSULOSIN HYDROCHLORIDE 0.8 MILLIGRAM(S): 0.4 CAPSULE ORAL at 21:36

## 2017-10-27 RX ADMIN — CEFEPIME 100 MILLIGRAM(S): 1 INJECTION, POWDER, FOR SOLUTION INTRAMUSCULAR; INTRAVENOUS at 17:32

## 2017-10-27 RX ADMIN — ATORVASTATIN CALCIUM 80 MILLIGRAM(S): 80 TABLET, FILM COATED ORAL at 21:58

## 2017-10-27 RX ADMIN — Medication 3 MILLILITER(S): at 18:59

## 2017-10-27 RX ADMIN — PANTOPRAZOLE SODIUM 40 MILLIGRAM(S): 20 TABLET, DELAYED RELEASE ORAL at 17:32

## 2017-10-27 RX ADMIN — Medication 1.94 MICROGRAM(S)/KG/MIN: at 01:44

## 2017-10-27 RX ADMIN — Medication 3 MILLILITER(S): at 14:06

## 2017-10-27 RX ADMIN — Medication 40 MILLIGRAM(S): at 17:32

## 2017-10-27 RX ADMIN — NYSTATIN CREAM 1 APPLICATION(S): 100000 CREAM TOPICAL at 07:26

## 2017-10-27 RX ADMIN — PROPOFOL 2.48 MICROGRAM(S)/KG/MIN: 10 INJECTION, EMULSION INTRAVENOUS at 01:06

## 2017-10-27 RX ADMIN — CEFEPIME 100 MILLIGRAM(S): 1 INJECTION, POWDER, FOR SOLUTION INTRAMUSCULAR; INTRAVENOUS at 07:10

## 2017-10-27 RX ADMIN — Medication 3 MILLILITER(S): at 09:23

## 2017-10-27 RX ADMIN — FINASTERIDE 5 MILLIGRAM(S): 5 TABLET, FILM COATED ORAL at 12:51

## 2017-10-27 RX ADMIN — Medication 100 MILLIEQUIVALENT(S): at 07:10

## 2017-10-27 RX ADMIN — NYSTATIN CREAM 1 APPLICATION(S): 100000 CREAM TOPICAL at 17:31

## 2017-10-27 RX ADMIN — Medication 40 MILLIGRAM(S): at 09:59

## 2017-10-27 RX ADMIN — CHLORHEXIDINE GLUCONATE 15 MILLILITER(S): 213 SOLUTION TOPICAL at 07:11

## 2017-10-27 RX ADMIN — Medication 3 MILLILITER(S): at 06:20

## 2017-10-27 RX ADMIN — Medication 100 MILLIEQUIVALENT(S): at 04:40

## 2017-10-27 RX ADMIN — SIMETHICONE 80 MILLIGRAM(S): 80 TABLET, CHEWABLE ORAL at 21:36

## 2017-10-27 RX ADMIN — Medication 3 MILLILITER(S): at 02:10

## 2017-10-27 RX ADMIN — FENTANYL CITRATE 100 MICROGRAM(S): 50 INJECTION INTRAVENOUS at 01:07

## 2017-10-27 RX ADMIN — CHLORHEXIDINE GLUCONATE 15 MILLILITER(S): 213 SOLUTION TOPICAL at 19:08

## 2017-10-27 RX ADMIN — Medication 3 MILLILITER(S): at 21:37

## 2017-10-27 NOTE — PROGRESS NOTE ADULT - PROBLEM SELECTOR PLAN 6
F no IVF, 250cc bolus PRN  E- replete lytes prn with goal K>4 and Mg >2  N- DASH/TLC  PPx - On ASA 81mg, SCD     CODE - DNR not DNI  Dispo: RJ Pigmented spot seen on EGD, bleed likely due to ulcer now healed, hgb drop however guiac negative, most likely 2/2 bleeding from [place].   - protonix 40 mg PO daily  - hemoglobin goal 8 hgb given recent NSTEMI   - daily cbc while pt admitted  - to follow up with Dr. Mayo outpatient on discharge

## 2017-10-27 NOTE — PROGRESS NOTE ADULT - SUBJECTIVE AND OBJECTIVE BOX
ID FOLLOW UP NOTE    OVERNIGHT:  nick took patient to OR for cystoscopy and found bleeding artery in lobe of prostate, which was cauterized. He was intubated for the procedure. Anesthesiologist found patients O2 sat to drop to mid-80s when FiO2 lowered to room air (21%), so requested MICU consult for optimization prior to extubation. Patient transferred to MICU to monitoring. His CXR is with atelectasis of left base and elevation of left hemidiaphragm. Vascular congestion present, although improved from previously. 40 IV lasix given and patient extubated later successfully.     Patient seen and examined at bedside. Denies cp, sob, n, v, d, f, c. Denies cough. Denies abd pain, dysuria, or back pain .    PAST MEDICAL & SURGICAL HISTORY:  COPD (chronic obstructive pulmonary disease)  Hypertension  Obstructive sleep apnea syndrome  Prostate cancer metastatic to bone  PC (prostate cancer): with seed placement    REVIEW OF SYSTEMS:  Negative except for above.     MEDICATIONS  (STANDING):  ALBUTerol/ipratropium for Nebulization 3 milliLiter(s) Nebulizer every 4 hours  atorvastatin 80 milliGRAM(s) Oral at bedtime  belladonna 16.2 mG/opium 60 mg Suppository 1 Suppository(s) Rectal once  cefepime  IVPB 2000 milliGRAM(s) IV Intermittent once  cefepime  IVPB 2000 milliGRAM(s) IV Intermittent every 12 hours  cefepime  IVPB      chlorhexidine 0.12% Liquid 15 milliLiter(s) Swish and Spit two times a day  dextrose 5%. 1000 milliLiter(s) (50 mL/Hr) IV Continuous <Continuous>  finasteride 5 milliGRAM(s) Oral daily  furosemide    Tablet 40 milliGRAM(s) Oral every 12 hours  heparin  flush 100 Units/mL Injectable 100 Unit(s) IV Push every other day  lidocaine 2% Jelly 5 milliLiter(s) IntraUrethral once  melatonin 3 milliGRAM(s) Oral at bedtime  nystatin Powder 1 Application(s) Topical two times a day  pantoprazole  Injectable 40 milliGRAM(s) IV Push daily  potassium chloride  10 mEq/100 mL IVPB 10 milliEquivalent(s) IV Intermittent every 1 hour  tamsulosin 0.8 milliGRAM(s) Oral at bedtime    MEDICATIONS  (PRN):  guaiFENesin    Syrup 200 milliGRAM(s) Oral every 6 hours PRN Cough  lidocaine 2% Gel 1 Application(s) Topical every 3 hours PRN catheter related discomfort at tip of penis  polyethylene glycol 3350 17 Gram(s) Oral daily PRN Constipation  simethicone 80 milliGRAM(s) Chew three times a day PRN Gas    Allergies  No Known Allergies    Vital Signs Last 24 Hrs  T(C): 37.9 (27 Oct 2017 11:00), Max: 37.9 (27 Oct 2017 11:00)  T(F): 100.3 (27 Oct 2017 11:00), Max: 100.3 (27 Oct 2017 11:00)  HR: 82 (27 Oct 2017 10:00) (50 - 90)  BP: 136/69 (27 Oct 2017 09:00) (69/43 - 136/69)  BP(mean): 97 (27 Oct 2017 09:00) (50 - 131)  RR: 32 (27 Oct 2017 10:00) (13 - 32)  SpO2: 96% (27 Oct 2017 10:00) (95% - 100%)    PHYSICAL EXAM:  Constitutional: NAD. Well-developed, well nourished  HEENT: Conjunctiva clear, no lesions on tongue. Oral mucosa mildly dry.   Respiratory: CTAB. No w/r/r.  Cardiovascular: RRR with no murmurs  Gastrointestinal:soft, (+) BS, no HSM, nondistened, nonrigid.  Extremities: LE WWP b/l. No LE edema b/l.   Vascular: 2+ DP pulses b/l  :  +glaser catheter. Blood tinged urine noted.     LABS                        9.1    18.4  )-----------( 95       ( 27 Oct 2017 02:54 )             28.2     10-27    137  |  94<L>  |  17  ----------------------------<  130<H>  3.4<L>   |  26  |  1.16    Ca    8.9      27 Oct 2017 02:54  Mg     2.1     10-27      PT/INR - ( 26 Oct 2017 10:57 )   PT: 15.8 sec;   INR: 1.41          PTT - ( 26 Oct 2017 10:57 )  PTT:27.9 sec  Urinalysis Basic - ( 27 Oct 2017 02:57 )    Color: Yellow / Appearance: Clear / SG: <=1.005 / pH: x  Gluc: x / Ketone: NEGATIVE  / Bili: Negative / Urobili: 0.2 E.U./dL   Blood: x / Protein: NEGATIVE mg/dL / Nitrite: NEGATIVE   Leuk Esterase: Moderate / RBC: Many /HPF / WBC > 10 /HPF   Sq Epi: x / Non Sq Epi: 0-5 /HPF / Bacteria: Present /HPF    MICROBIOLOGY:  UCX 10/24 --> pseudomonas  Bcx 10/9 --> MSSA.   Bcx 10/25 --> NGTD.    RADIOLOGY & ADDITIONAL STUDIES:

## 2017-10-27 NOTE — PROGRESS NOTE ADULT - SUBJECTIVE AND OBJECTIVE BOX
Patient seen and examined at bedside. Intubated, off sedation. Patient is POD 1 from cystoscopy, clot evacuation, and prostate fulguration of bleeding vessel. CBI was running on drips with urine very clear. Stopped CBI, no traction, urine was still clear when leaving room. Will monitor closely for re-bleeding or clot formation. Extubation and cardio/pulm optimization by ICU team.                           9.1    18.4  )-----------( 95       ( 27 Oct 2017 02:54 )             28.2     10-27    137  |  94<L>  |  17  ----------------------------<  130<H>  3.4<L>   |  26  |  1.16    Ca    8.9      27 Oct 2017 02:54  Mg     2.1     10-27

## 2017-10-27 NOTE — PROGRESS NOTE ADULT - SUBJECTIVE AND OBJECTIVE BOX
HOSPITAL COURSE:  73 y/o M with hx of HTN, COPD, Systolic CHF, Prostate CA (s/p radiation and seed 2013 on leuprolide outpatient) initially admitted for SOB, syncope and productive cough. Was admitted to [facility] and treated for CAP. Was shortly after stepped up to MICU for respiratory failure and was intubated and started on levophed. Found to have MSSA bacteremia, although source unknown (possibly lung, s/p negative gallium scan). Started and anti-staphylococcal antibiotics with eventual clearance of bacteremia. Developed NSTEMI likely 2/2 hypoxia and sepsis which was medically managed and no catheterization was performed. Developed upper GI bleed requiring transfusions, s/p EGD (found to have a healing ulcer) and resolution. Discharge planning started, but delayed due to appearance of clots and blood in [place], requiring blood transfusions. Also found to have pseudomonas UTI. Started on cefepime to cover both MSSA and pseudomonas. Urology took patient to OR for cystoscopy and found bleeding artery in lobe of prostate, which was cauterized. He was intubated for the procedure. Anesthesiologist found patients O2 sat to drop to mid-80s when FiO2 lowered to room air (21%), so requested MICU consult for optimization prior to extubation. Pt titrated off sedation and successfully extubated, tolerating BiPAP well. Pt tachypneic w/RR 30s. Pt given Lasix 40mg IVP with improvement of respiratory status. Pt afebrile and hemodynamically stable upon transfer to [facility]    INTERVAL HPI/OVERNIGHT EVENTS: Pt was seen at bedside. Complains of cough.     ROS  CV: Denies chest pain, palpitations  RESP: Denies SOB  GI: Denies abdominal pain, constipation, diarrhea, nausea, vomiting  : Denies dysuria, hematuria, flank or back pain  ID: Denies fevers, chills  MSK: Denies joint pain   DERM: Denies any rashes, bruising, pruritis  NEURO: No headaches, blurry vision, double vision    VITAL SIGNS:  T(F): 100 (10-27-17 @ 21:07), Max: 100.3 (10-27-17 @ 11:00)  HR: 79 (10-27-17 @ 21:07) (50 - 106)  BP: 115/63 (10-27-17 @ 21:07) (69/43 - 136/69)  BP(mean): 68 (10-27-17 @ 19:00) (50 - 97)  ABP: 92/46 (10-27-17 @ 15:00) (86/56 - 175/74)  ABP(mean): 62 (10-27-17 @ 15:00) (60 - 86)  RR: 20 (10-27-17 @ 21:07) (13 - 35)  SpO2: 96% (10-27-17 @ 21:07) (92% - 100%)      10-26-17 @ 07:01  -  10-27-17 @ 07:00  --------------------------------------------------------  IN: 39115.5 mL / OUT: 25079 mL / NET: 38.5 mL    10-27-17 @ 07:01  -  10-27-17 @ 21:25  --------------------------------------------------------  IN: 530 mL / OUT: 940 mL / NET: -410 mL        PHYSICAL EXAM:    Constitutional: WDWN, NAD, resting comfortably   Head: NC/AT  Eyes: PERRL, EOMI, anicteric sclera, no mucosal pallor  ENT: no nasal discharge; uvula midline, no oropharyngeal erythema or exudates; MMM  Neck: supple; no JVD or thyromegaly, no lymphadenopathy  Respiratory: Expiratory rhonchi B/L; no W/R/R, no retractions  Cardiac: +S1/S2; RRR; no M/R/G  Gastrointestinal: abdomen soft, distended, non tender, no rebound or guarding; +BSx4  Back: spine midline, no bony tenderness or step-offs; no CVAT B/L  Extremities: warm; no calf tenderness, 2 + pedal edema B/L, no cyanosis, no clubbing  Musculoskeletal: NROM x4; no joint swelling, tenderness or erythema  Vascular: 2+ radial; DP/PT pulses B/L  Dermatologic: no rash, no petechia   Lymphatic: no submandibular or cervical LAD  Neurologic: AAOx3; CNII-XII grossly intact; 5/5 strength throughout, sensory symmetrically intact in B/L LE   Psychiatric: pleasant and conversive; affect and characteristics of appearance, verbalizations, behaviors are appropriate      MEDICATIONS  (STANDING):  ALBUTerol/ipratropium for Nebulization 3 milliLiter(s) Nebulizer every 4 hours  atorvastatin 80 milliGRAM(s) Oral at bedtime  belladonna 16.2 mG/opium 60 mg Suppository 1 Suppository(s) Rectal once  cefepime  IVPB 2000 milliGRAM(s) IV Intermittent once  cefepime  IVPB 2000 milliGRAM(s) IV Intermittent every 12 hours  cefepime  IVPB      chlorhexidine 0.12% Liquid 15 milliLiter(s) Swish and Spit two times a day  dextrose 5%. 1000 milliLiter(s) (50 mL/Hr) IV Continuous <Continuous>  finasteride 5 milliGRAM(s) Oral daily  furosemide    Tablet 40 milliGRAM(s) Oral every 12 hours  heparin  flush 100 Units/mL Injectable 100 Unit(s) IV Push every other day  lidocaine 2% Jelly 5 milliLiter(s) IntraUrethral once  melatonin 3 milliGRAM(s) Oral at bedtime  nystatin Powder 1 Application(s) Topical two times a day  pantoprazole    Tablet 40 milliGRAM(s) Oral before breakfast  potassium chloride  10 mEq/100 mL IVPB 10 milliEquivalent(s) IV Intermittent every 1 hour  tamsulosin 0.8 milliGRAM(s) Oral at bedtime    MEDICATIONS  (PRN):  guaiFENesin    Syrup 200 milliGRAM(s) Oral every 6 hours PRN Cough  lidocaine 2% Gel 1 Application(s) Topical every 3 hours PRN catheter related discomfort at tip of penis  polyethylene glycol 3350 17 Gram(s) Oral daily PRN Constipation  simethicone 80 milliGRAM(s) Chew three times a day PRN Gas      Allergies    No Known Allergies    Intolerances        LABS:                        9.1    18.4  )-----------( 95       ( 27 Oct 2017 02:54 )             28.2     10-27    137  |  94<L>  |  17  ----------------------------<  130<H>  3.4<L>   |  26  |  1.16    Ca    8.9      27 Oct 2017 02:54  Mg     2.1     10-27      PT/INR - ( 26 Oct 2017 10:57 )   PT: 15.8 sec;   INR: 1.41          PTT - ( 26 Oct 2017 10:57 )  PTT:27.9 sec  Urinalysis Basic - ( 27 Oct 2017 02:57 )    Color: Yellow / Appearance: Clear / SG: <=1.005 / pH: x  Gluc: x / Ketone: NEGATIVE  / Bili: Negative / Urobili: 0.2 E.U./dL   Blood: x / Protein: NEGATIVE mg/dL / Nitrite: NEGATIVE   Leuk Esterase: Moderate / RBC: Many /HPF / WBC > 10 /HPF   Sq Epi: x / Non Sq Epi: 0-5 /HPF / Bacteria: Present /HPF        RADIOLOGY & ADDITIONAL TESTS: HOSPITAL COURSE:  73 y/o M with hx of HTN, COPD, Systolic CHF, Prostate CA (s/p radiation and seed 2013 on leuprolide outpatient) initially admitted for SOB, syncope and productive cough. Was admitted to [facility] and treated for CAP. Was shortly after stepped up to MICU for respiratory failure and was intubated and started on levophed. Found to have MSSA bacteremia, although source unknown (possibly lung, s/p negative gallium scan). Started and anti-staphylococcal antibiotics with eventual clearance of bacteremia. Developed NSTEMI likely 2/2 hypoxia and sepsis which was medically managed and no catheterization was performed. Developed upper GI bleed requiring transfusions, s/p EGD (found to have a healing ulcer) and resolution. Discharge planning started, but delayed due to appearance of clots and blood in [place], requiring blood transfusions. Also found to have pseudomonas UTI. Started on cefepime to cover both MSSA and pseudomonas. Urology took patient to OR for cystoscopy and found bleeding artery in lobe of prostate, which was cauterized. He was intubated for the procedure. Anesthesiologist found patients O2 sat to drop to mid-80s when FiO2 lowered to room air (21%), so requested MICU consult for optimization prior to extubation. Pt titrated off sedation and successfully extubated, tolerating BiPAP well. Pt tachypneic w/RR 30s. Pt given Lasix 40mg IVP with improvement of respiratory status. Pt afebrile and hemodynamically stable upon transfer to [facility]    INTERVAL HPI/OVERNIGHT EVENTS: Pt was seen at bedside. Complains of cough.     ROS  CV: Denies chest pain, palpitations  RESP: Denies SOB  GI: Denies abdominal pain, constipation, diarrhea, nausea, vomiting  : Denies dysuria, hematuria, flank or back pain  ID: Denies fevers, chills  MSK: Denies joint pain   SKIN: No rashes  DERM: Denies any rashes, bruising, pruritis  NEURO: No headaches, blurry vision, double vision  PSYCH: No suicidal or homicidal ideation    VITAL SIGNS:  T(F): 100 (10-27-17 @ 21:07), Max: 100.3 (10-27-17 @ 11:00)  HR: 79 (10-27-17 @ 21:07) (50 - 106)  BP: 115/63 (10-27-17 @ 21:07) (69/43 - 136/69)  BP(mean): 68 (10-27-17 @ 19:00) (50 - 97)  ABP: 92/46 (10-27-17 @ 15:00) (86/56 - 175/74)  ABP(mean): 62 (10-27-17 @ 15:00) (60 - 86)  RR: 20 (10-27-17 @ 21:07) (13 - 35)  SpO2: 96% (10-27-17 @ 21:07) (92% - 100%)      10-26-17 @ 07:01  -  10-27-17 @ 07:00  --------------------------------------------------------  IN: 56841.5 mL / OUT: 83501 mL / NET: 38.5 mL    10-27-17 @ 07:01  -  10-27-17 @ 21:25  --------------------------------------------------------  IN: 530 mL / OUT: 940 mL / NET: -410 mL        PHYSICAL EXAM:    Constitutional: WDWN, NAD, resting comfortably   Head: NC/AT  Eyes: PERRL, EOMI, anicteric sclera, no mucosal pallor  ENT: no nasal discharge; uvula midline, no oropharyngeal erythema or exudates; MMM  Neck: supple; no JVD or thyromegaly, no lymphadenopathy  Respiratory: Expiratory rhonchi B/L; no W/R/R, no retractions  Cardiac: +S1/S2; RRR; no M/R/G  Gastrointestinal: abdomen soft, distended, non tender, no rebound or guarding; +BSx4  Back: spine midline, no bony tenderness or step-offs; no CVAT B/L  Extremities: warm; no calf tenderness, 2 + pedal edema B/L, no cyanosis, no clubbing  Musculoskeletal: NROM x4; no joint swelling, tenderness or erythema  Vascular: 2+ radial; DP/PT pulses B/L  Dermatologic: no rash, no petechia   Lymphatic: no submandibular or cervical LAD  Neurologic: AAOx3; CNII-XII grossly intact; 5/5 strength throughout, sensory symmetrically intact in B/L LE   Psychiatric: pleasant and conversive; affect and characteristics of appearance, verbalizations, behaviors are appropriate      MEDICATIONS  (STANDING):  ALBUTerol/ipratropium for Nebulization 3 milliLiter(s) Nebulizer every 4 hours  atorvastatin 80 milliGRAM(s) Oral at bedtime  belladonna 16.2 mG/opium 60 mg Suppository 1 Suppository(s) Rectal once  cefepime  IVPB 2000 milliGRAM(s) IV Intermittent once  cefepime  IVPB 2000 milliGRAM(s) IV Intermittent every 12 hours  cefepime  IVPB      chlorhexidine 0.12% Liquid 15 milliLiter(s) Swish and Spit two times a day  dextrose 5%. 1000 milliLiter(s) (50 mL/Hr) IV Continuous <Continuous>  finasteride 5 milliGRAM(s) Oral daily  furosemide    Tablet 40 milliGRAM(s) Oral every 12 hours  heparin  flush 100 Units/mL Injectable 100 Unit(s) IV Push every other day  lidocaine 2% Jelly 5 milliLiter(s) IntraUrethral once  melatonin 3 milliGRAM(s) Oral at bedtime  nystatin Powder 1 Application(s) Topical two times a day  pantoprazole    Tablet 40 milliGRAM(s) Oral before breakfast  potassium chloride  10 mEq/100 mL IVPB 10 milliEquivalent(s) IV Intermittent every 1 hour  tamsulosin 0.8 milliGRAM(s) Oral at bedtime    MEDICATIONS  (PRN):  guaiFENesin    Syrup 200 milliGRAM(s) Oral every 6 hours PRN Cough  lidocaine 2% Gel 1 Application(s) Topical every 3 hours PRN catheter related discomfort at tip of penis  polyethylene glycol 3350 17 Gram(s) Oral daily PRN Constipation  simethicone 80 milliGRAM(s) Chew three times a day PRN Gas      Allergies    No Known Allergies    Intolerances        LABS:                        9.1    18.4  )-----------( 95       ( 27 Oct 2017 02:54 )             28.2     10-27    137  |  94<L>  |  17  ----------------------------<  130<H>  3.4<L>   |  26  |  1.16    Ca    8.9      27 Oct 2017 02:54  Mg     2.1     10-27      PT/INR - ( 26 Oct 2017 10:57 )   PT: 15.8 sec;   INR: 1.41          PTT - ( 26 Oct 2017 10:57 )  PTT:27.9 sec  Urinalysis Basic - ( 27 Oct 2017 02:57 )    Color: Yellow / Appearance: Clear / SG: <=1.005 / pH: x  Gluc: x / Ketone: NEGATIVE  / Bili: Negative / Urobili: 0.2 E.U./dL   Blood: x / Protein: NEGATIVE mg/dL / Nitrite: NEGATIVE   Leuk Esterase: Moderate / RBC: Many /HPF / WBC > 10 /HPF   Sq Epi: x / Non Sq Epi: 0-5 /HPF / Bacteria: Present /HPF        RADIOLOGY & ADDITIONAL TESTS: HOSPITAL COURSE:  73 y/o M with hx of HTN, COPD, Systolic CHF, Prostate CA (s/p radiation and seed 2013 on leuprolide outpatient) initially admitted for SOB, syncope and productive cough. Was admitted to [facility] and treated for CAP. Was shortly after stepped up to MICU for respiratory failure and was intubated and started on levophed. Found to have MSSA bacteremia, although source unknown (possibly lung, s/p negative gallium scan). Started and anti-staphylococcal antibiotics with eventual clearance of bacteremia. Developed NSTEMI likely 2/2 hypoxia and sepsis which was medically managed and no catheterization was performed. Developed upper GI bleed requiring transfusions, s/p EGD (found to have a healing ulcer) and resolution. Discharge planning started, but delayed due to appearance of clots and blood in [place], requiring blood transfusions. Also found to have pseudomonas UTI. Started on cefepime to cover both MSSA and pseudomonas. Urology took patient to OR for cystoscopy and found bleeding artery in lobe of prostate, which was cauterized. He was intubated for the procedure. Anesthesiologist found patients O2 sat to drop to mid-80s when FiO2 lowered to room air (21%), so requested MICU consult for optimization prior to extubation. Pt titrated off sedation and successfully extubated, tolerating BiPAP well. Pt tachypneic w/RR 30s. Pt given Lasix 40mg IVP with improvement of respiratory status. Pt afebrile and hemodynamically stable upon transfer to [facility]    INTERVAL HPI/OVERNIGHT EVENTS: Pt was seen at bedside. Complains of cough.     ROS  CV: Denies chest pain, palpitations  RESP: Denies SOB  GI: Denies abdominal pain, constipation, diarrhea, nausea, vomiting  : Denies dysuria, hematuria, flank or back pain  ID: Denies fevers, chills  MSK: Denies joint pain   SKIN: No rashes  DERM: Denies any rashes, bruising, pruritis  NEURO: No headaches, blurry vision, double vision  PSYCH: No suicidal or homicidal ideation    VITAL SIGNS:  T(F): 100 (10-27-17 @ 21:07), Max: 100.3 (10-27-17 @ 11:00)  HR: 79 (10-27-17 @ 21:07) (50 - 106)  BP: 115/63 (10-27-17 @ 21:07) (69/43 - 136/69)  BP(mean): 68 (10-27-17 @ 19:00) (50 - 97)  ABP: 92/46 (10-27-17 @ 15:00) (86/56 - 175/74)  ABP(mean): 62 (10-27-17 @ 15:00) (60 - 86)  RR: 20 (10-27-17 @ 21:07) (13 - 35)  SpO2: 96% (10-27-17 @ 21:07) (92% - 100%)      10-26-17 @ 07:01  -  10-27-17 @ 07:00  --------------------------------------------------------  IN: 18725.5 mL / OUT: 67700 mL / NET: 38.5 mL    10-27-17 @ 07:01  -  10-27-17 @ 21:25  --------------------------------------------------------  IN: 530 mL / OUT: 940 mL / NET: -410 mL        PHYSICAL EXAM:    Constitutional: WDWN, NAD, resting comfortably   Head: NC/AT  Eyes: PERRL, EOMI, anicteric sclera, no mucosal pallor  ENT: no nasal discharge; uvula midline, no oropharyngeal erythema or exudates; MMM  Neck: supple; no JVD or thyromegaly, no lymphadenopathy  Respiratory: Expiratory rhonchi B/L; no W/R/R, no retractions  Cardiac: +S1/S2; RRR; no M/R/G  Gastrointestinal: abdomen soft, distended, non tender, no rebound or guarding; +BSx4  : [place] in place, draining pink urine, residual dry blood around the glans penis   Back: spine midline, no bony tenderness or step-offs; no CVAT B/L  Extremities: warm; no calf tenderness, 2 + pedal edema B/L, no cyanosis, no clubbing  Musculoskeletal: NROM x4; no joint swelling, tenderness or erythema  Vascular: 2+ radial; DP/PT pulses B/L  Dermatologic: no rash, no petechia   Lymphatic: no submandibular or cervical LAD  Neurologic: AAOx3; CNII-XII grossly intact; 5/5 strength throughout, sensory symmetrically intact in B/L LE   Psychiatric: pleasant and conversive; affect and characteristics of appearance, verbalizations, behaviors are appropriate      MEDICATIONS  (STANDING):  ALBUTerol/ipratropium for Nebulization 3 milliLiter(s) Nebulizer every 4 hours  atorvastatin 80 milliGRAM(s) Oral at bedtime  belladonna 16.2 mG/opium 60 mg Suppository 1 Suppository(s) Rectal once  cefepime  IVPB 2000 milliGRAM(s) IV Intermittent once  cefepime  IVPB 2000 milliGRAM(s) IV Intermittent every 12 hours  cefepime  IVPB      chlorhexidine 0.12% Liquid 15 milliLiter(s) Swish and Spit two times a day  dextrose 5%. 1000 milliLiter(s) (50 mL/Hr) IV Continuous <Continuous>  finasteride 5 milliGRAM(s) Oral daily  furosemide    Tablet 40 milliGRAM(s) Oral every 12 hours  heparin  flush 100 Units/mL Injectable 100 Unit(s) IV Push every other day  lidocaine 2% Jelly 5 milliLiter(s) IntraUrethral once  melatonin 3 milliGRAM(s) Oral at bedtime  nystatin Powder 1 Application(s) Topical two times a day  pantoprazole    Tablet 40 milliGRAM(s) Oral before breakfast  potassium chloride  10 mEq/100 mL IVPB 10 milliEquivalent(s) IV Intermittent every 1 hour  tamsulosin 0.8 milliGRAM(s) Oral at bedtime    MEDICATIONS  (PRN):  guaiFENesin    Syrup 200 milliGRAM(s) Oral every 6 hours PRN Cough  lidocaine 2% Gel 1 Application(s) Topical every 3 hours PRN catheter related discomfort at tip of penis  polyethylene glycol 3350 17 Gram(s) Oral daily PRN Constipation  simethicone 80 milliGRAM(s) Chew three times a day PRN Gas      Allergies    No Known Allergies    Intolerances        LABS:                        9.1    18.4  )-----------( 95       ( 27 Oct 2017 02:54 )             28.2     10-27    137  |  94<L>  |  17  ----------------------------<  130<H>  3.4<L>   |  26  |  1.16    Ca    8.9      27 Oct 2017 02:54  Mg     2.1     10-27      PT/INR - ( 26 Oct 2017 10:57 )   PT: 15.8 sec;   INR: 1.41          PTT - ( 26 Oct 2017 10:57 )  PTT:27.9 sec  Urinalysis Basic - ( 27 Oct 2017 02:57 )    Color: Yellow / Appearance: Clear / SG: <=1.005 / pH: x  Gluc: x / Ketone: NEGATIVE  / Bili: Negative / Urobili: 0.2 E.U./dL   Blood: x / Protein: NEGATIVE mg/dL / Nitrite: NEGATIVE   Leuk Esterase: Moderate / RBC: Many /HPF / WBC > 10 /HPF   Sq Epi: x / Non Sq Epi: 0-5 /HPF / Bacteria: Present /HPF        RADIOLOGY & ADDITIONAL TESTS:

## 2017-10-27 NOTE — PROGRESS NOTE ADULT - ASSESSMENT
The patient is doing well s/p cystoscopy and cauterization of a bleeding left-sided prostate blood vessel. Urine is clear this am. He is intubated now but removal of the tube is planned.

## 2017-10-27 NOTE — PROGRESS NOTE ADULT - PROBLEM SELECTOR PLAN 3
Acute blood loss anemia 2/2 hematuria and bleeding/ clots from Glaser catheter on 10/23. Patient with UGIB on admission, however no recent melena or bloody stools, stool guiac negative. S/p multiple 1 U PRBCs with good response on 10/23. Iron studies c/w ACD. Patient again dropped hgb to 6.7 on 10/24, now s/p 2U PRBCs  -Continue to trend CBC daily  -Transfuse for Hb <8 given recent NSTEMI  -Monitor glaser output  -f/u urology recs

## 2017-10-27 NOTE — PROGRESS NOTE ADULT - PROBLEM SELECTOR PLAN 7
F no IVF, 250cc bolus PRN  E- replete lytes prn with goal K>4 and Mg >2  N- DASH/TLC, bowel regiment   PPx - On ASA 81mg, SCD, no Hep SQ given recent bleeding     CODE - DNR not DNI  Dispo: RJ

## 2017-10-27 NOTE — PROGRESS NOTE ADULT - SUBJECTIVE AND OBJECTIVE BOX
The patient is resting comfortably in the MICU this am. He is intubated but awake. He is s/p cystoscopy yesterday. He was found to have a bleeding vessel of the prostate gland, This was cauterized. Urine is clear this am.    CHEMOTHERAPY REGIMEN:        Day:                          Diet:  Protocol:                                    IVF:      MEDICATIONS  (STANDING):  ALBUTerol/ipratropium for Nebulization 3 milliLiter(s) Nebulizer every 4 hours  atorvastatin 80 milliGRAM(s) Oral at bedtime  belladonna 16.2 mG/opium 60 mg Suppository 1 Suppository(s) Rectal once  cefepime  IVPB 2000 milliGRAM(s) IV Intermittent once  cefepime  IVPB 2000 milliGRAM(s) IV Intermittent every 12 hours  cefepime  IVPB      chlorhexidine 0.12% Liquid 15 milliLiter(s) Swish and Spit two times a day  dextrose 5%. 1000 milliLiter(s) (50 mL/Hr) IV Continuous <Continuous>  finasteride 5 milliGRAM(s) Oral daily  furosemide    Tablet 40 milliGRAM(s) Oral every 12 hours  heparin  flush 100 Units/mL Injectable 100 Unit(s) IV Push every other day  lidocaine 2% Jelly 5 milliLiter(s) IntraUrethral once  melatonin 3 milliGRAM(s) Oral at bedtime  norepinephrine Infusion 0.01 MICROgram(s)/kG/Min (1.939 mL/Hr) IV Continuous <Continuous>  nystatin Powder 1 Application(s) Topical two times a day  pantoprazole  Injectable 40 milliGRAM(s) IV Push daily  potassium chloride  10 mEq/100 mL IVPB 10 milliEquivalent(s) IV Intermittent every 1 hour  potassium chloride  10 mEq/100 mL IVPB 10 milliEquivalent(s) IV Intermittent every 1 hour  propofol Infusion 4 MICROgram(s)/kG/Min (2.482 mL/Hr) IV Continuous <Continuous>  tamsulosin 0.8 milliGRAM(s) Oral at bedtime    MEDICATIONS  (PRN):  guaiFENesin    Syrup 200 milliGRAM(s) Oral every 6 hours PRN Cough  lidocaine 2% Gel 1 Application(s) Topical every 3 hours PRN catheter related discomfort at tip of penis  polyethylene glycol 3350 17 Gram(s) Oral daily PRN Constipation  simethicone 80 milliGRAM(s) Chew three times a day PRN Gas      Allergies    No Known Allergies    Intolerances        DVT Prophylaxis: [ ] YES [x ] NO      Antibiotics: [x ] YES [ ] NO    Pain Scale (1-10):       Location:    Vital Signs Last 24 Hrs  T(C): 36.5 (27 Oct 2017 02:12), Max: 37.3 (26 Oct 2017 09:47)  T(F): 97.7 (27 Oct 2017 02:12), Max: 99.2 (26 Oct 2017 09:47)  HR: 64 (27 Oct 2017 05:00) (50 - 90)  BP: 69/43 (27 Oct 2017 00:01) (69/43 - 112/65)  BP(mean): 50 (27 Oct 2017 00:01) (50 - 131)  RR: 16 (27 Oct 2017 05:00) (13 - 20)  SpO2: 100% (27 Oct 2017 05:00) (95% - 100%)    Drug Dosing Weight  Height (cm): 172.72 (24 Oct 2017 03:30)  Weight (kg): 103.4 (24 Oct 2017 03:30)  BMI (kg/m2): 34.7 (24 Oct 2017 03:30)  BSA (m2): 2.16 (24 Oct 2017 03:30)    PHYSICAL EXAM:      Constitutional: intubated, awake.  Eyes: conjunctival palor persists.  ENMT: intubated.  Neck: no masses.  Respiratory: chest is clear.  Cardiovascular: S1>S2 at apex. RSR.  Gastrointestinal: soft, nontender, active bowel sounds.  Genitourinary: urine is clear on CBI.  Extremities: compression devices in place on the legs.  Neurological: no gross focal deficits.  Skin: warm and dry.  Lymph Nodes: none palp.  Psychiatric: affect normal.        URINARY CATHETER: [x ] YES [ ] NO     LABS:  CBC Full  -  ( 27 Oct 2017 02:54 )  WBC Count : 18.4 K/uL  Hemoglobin : 9.1 g/dL  Hematocrit : 28.2 %  Platelet Count - Automated : 95 K/uL  Mean Cell Volume : 93.7 fL  Mean Cell Hemoglobin : 30.2 pg  Mean Cell Hemoglobin Concentration : 32.3 g/dL  Auto Neutrophil # : x  Auto Lymphocyte # : x  Auto Monocyte # : x  Auto Eosinophil # : x  Auto Basophil # : x  Auto Neutrophil % : x  Auto Lymphocyte % : x  Auto Monocyte % : x  Auto Eosinophil % : x  Auto Basophil % : x    10-27    137  |  94<L>  |  17  ----------------------------<  130<H>  3.4<L>   |  26  |  1.16    Ca    8.9      27 Oct 2017 02:54  Phos  3.1     10-25  Mg     2.1     10-27      PT/INR - ( 26 Oct 2017 10:57 )   PT: 15.8 sec;   INR: 1.41          PTT - ( 26 Oct 2017 10:57 )  PTT:27.9 sec  Urinalysis Basic - ( 27 Oct 2017 02:57 )    Color: Yellow / Appearance: Clear / SG: <=1.005 / pH: x  Gluc: x / Ketone: NEGATIVE  / Bili: Negative / Urobili: 0.2 E.U./dL   Blood: x / Protein: NEGATIVE mg/dL / Nitrite: NEGATIVE   Leuk Esterase: Moderate / RBC: Many /HPF / WBC > 10 /HPF   Sq Epi: x / Non Sq Epi: 0-5 /HPF / Bacteria: Present /HPF        CULTURES: Culture - Blood (10.25.17 @ 15:22)    Specimen Source: .Blood Blood-Peripheral    Culture Results:   No growth at 1 day.        RADIOLOGY & ADDITIONAL STUDIES:

## 2017-10-27 NOTE — PROGRESS NOTE ADULT - PROBLEM SELECTOR PLAN 1
+UA with urine culture growing pseudomonas 10/24, patient febrile to 102.5 on 10/25 and with leukocytosis, again meeting criteria for sepsis. Repeat blood cxs 10/25 NGTD   -MSSA bacteremia with + blood cxs 10/9, now resolved.  -c/w Cefepime 2g q 12hrs (started 10/26--) as it covers both MSSA and pseudomonas  - cefazolin 2 g q8 4 weeks total course (started 10/19--10/26)  - Gallium scan negative for focal areas of infection, KELLY 10/9 negative for endocarditis. Repeat TTE 10/16 w/o evidence of new vegetations or worsening valve thickening.   - Surveillance blood cx w/ no growth to date  - Patient will follow up with Dr. Clemente outpatient for further monitoring of his bacteremia. Pt previously ambulating with an 86% saturation on room air.   - Pt extubated this AM; now tolerating BiPAP well  -CXR this AM with bibasilar patchy infiltrates improved from previously.   - c/w PO lasix and consider 40 IV lasix PRN for signs of volume overload and increased work of breathing    #COPD: Patient desaturated while walking to 86% w/o oxygen   - c/w duonebs q 4   -robitussin PRN for cough    #Possible sleep apnea  - work up sleep apnea outpatient w/ sleep study   - CPAP at night. +UA 10/27 with urine culture growing pseudomonas 10/24, patient febrile to 102.5 on 10/25 and with leukocytosis, again meeting criteria for sepsis. Repeat blood cxs 10/25 NGTD   -MSSA bacteremia with + blood cxs 10/9, now resolved. s/p cefazolin 2 g q8 2 weeks total course (started 10/19--10/26), initially planed for 4 weeks duration but switched to cefepime   -- Gallium scan negative for focal areas of infection, KELLY 10/9 negative for endocarditis. Repeat TTE 10/16 w/o evidence of new vegetations or worsening valve thickening.   - Surveillance blood cx w/ no growth to date  -c/w Cefepime 2g q 12hrs (started 10/26) as it covers both MSSA and pseudomonas. f/u ID recs for duration  - Patient will follow up with Dr. Clemente outpatient for further monitoring of his bacteremia.

## 2017-10-27 NOTE — PROGRESS NOTE ADULT - PROBLEM SELECTOR PLAN 4
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  - glaser w/o interruptions in flow, maintain Glaser to be evaluated by urology once discharged. Eurology to evaluate pt for CBI if Glaser output decreases significantly  - c/w tamsulosin .8 mg. #CHF HFrEF  TTE shows HFrEF with EF 45%, right atrial dilatation, septal wall motion abnormalities and mild hypokinesis of left ventricle. Unable to visualize any vegetations or R heart structures. Unable to calculate pulmonary a pressures.   - c/w ASA + lipitor  - Lasix 40mg PO BID, give IV if needed.   -Continue lisinopril 2.5 daily  -Consider starting metoprolol as tolerated once BP allows  - strict I/Os, daily weights, fluid restriction 1.5L  - acute onset of HFrEF; discussed case with Dr. Glass who agrees that patient should have ischemia workup outpatient for acute HFrEF and repeat echo to be done to assess for improvement in LV function.     #New onset paroxysmal A.fib Vs MAT:  - pt has remained in NSR since admission to 7 la. To be followed up with outpatient cardiologist for further monitoring    #NSTEMI  - EKG positive for ST depression in anteroseptal and lateral leads (II, III, aVF, V3-V6), Echo positive for septal hypokinesis, troponin + (peaked at 0.51).   - Ischemia likely demand ischemia due to severe sepsis  - c/w ASA 81 mg, lipitor 80 mg  - Additional ischemic work-up as outpatient after discharge. #CHF HFrEF  TTE shows HFrEF with EF 45%, right atrial dilatation, septal wall motion abnormalities and mild hypokinesis of left ventricle. Unable to visualize any vegetations or R heart structures. Unable to calculate pulmonary a pressures.   - c/w lipitor, consider ASA once bleeding resolves  - Lasix 40mg PO BID, give IV if needed.   -Continue lisinopril 2.5 daily  -Consider starting metoprolol as tolerated once BP allows  - strict I/Os, daily weights, fluid restriction 1.5L  - acute onset of HFrEF; discussed case with Dr. Glass who agrees that patient should have ischemia workup outpatient for acute HFrEF and repeat echo to be done to assess for improvement in LV function.     #New onset paroxysmal A.fib Vs MAT:  - pt has remained in NSR since admission to 7 la. To be followed up with outpatient cardiologist for further monitoring    #NSTEMI  - EKG positive for ST depression in anteroseptal and lateral leads (II, III, aVF, V3-V6), Echo positive for septal hypokinesis, troponin + (peaked at 0.51).   - Ischemia likely demand ischemia due to severe sepsis  - c/w ASA 81 mg, lipitor 80 mg  - Additional ischemic work-up as outpatient after discharge. #CHF HFrEF  TTE shows HFrEF with EF 45%, right atrial dilatation, septal wall motion abnormalities and mild hypokinesis of left ventricle. Unable to visualize any vegetations or R heart structures. Unable to calculate pulmonary a pressures.   - c/w lipitor, consider ASA once bleeding resolves  - Lasix 40mg PO BID, give IV if needed.   -Continue lisinopril 2.5 daily  -Consider starting metoprolol ACEI/ARBs as tolerated once BP allows  - strict I/Os, daily weights, fluid restriction 1.5L  - acute onset of HFrEF; discussed case with Dr. Glass who agrees that patient should have ischemia workup outpatient for acute HFrEF and repeat echo to be done to assess for improvement in LV function.     #New onset paroxysmal A.fib Vs MAT:  - pt has remained in NSR since admission to [facility]. To be followed up with outpatient cardiologist for further monitoring    #NSTEMI  - EKG positive for ST depression in anteroseptal and lateral leads (II, III, aVF, V3-V6), Echo positive for septal hypokinesis, troponin + (peaked at 0.51).   - Ischemia likely demand ischemia due to severe sepsis  - c/w lipitor 80 mg, holding ASA in the setting of bleeding   - Additional ischemic work-up as outpatient after discharge. #CHF HFrEF  TTE shows HFrEF with EF 45%, right atrial dilatation, septal wall motion abnormalities and mild hypokinesis of left ventricle. Unable to visualize any vegetations or R heart structures. Unable to calculate pulmonary a pressures.   - c/w lipitor, consider ASA once bleeding resolves, f/u with urology for when is appropriate to restart   - Lasix 40mg PO BID, give IV if needed.   -Continue lisinopril 2.5 daily  -Consider starting metoprolol ACEI/ARBs as tolerated once BP allows  - strict I/Os, daily weights, fluid restriction 1.5L  - acute onset of HFrEF; discussed case with Dr. Glass who agrees that patient should have ischemia workup outpatient for acute HFrEF and repeat echo to be done to assess for improvement in LV function.     #New onset paroxysmal A.fib Vs MAT:  - pt has remained in NSR since admission to  la. To be followed up with outpatient cardiologist for further monitoring    #NSTEMI  - EKG positive for ST depression in anteroseptal and lateral leads (II, III, aVF, V3-V6), Echo positive for septal hypokinesis, troponin + (peaked at 0.51).   - Ischemia likely demand ischemia due to severe sepsis  - c/w lipitor 80 mg, holding ASA in the setting of bleeding, f/u with urology for when is appropriate to restart   - Additional ischemic work-up as outpatient after discharge.

## 2017-10-27 NOTE — PROGRESS NOTE ADULT - ASSESSMENT
73 y/o M w/PMH of HTN, COPD, Prostate CA (s/p radiation and seed 2013 on leuprolide outpatient) who presented with worsening of SOB and an episode of unwitnessed syncope found to have severe sepsis with gram + cocci in clusters identified as S. aureus and ATN.  Hospital course complicated by hypotension and transfer to MICU for septic shock.  Septic shock has resolved but pt has had persistent MSSA bacteremia with resolving respiratory failure and ATN. Now, pt with hgb drop in context of bleeding/ blood clots from Glaser causing repeated episodes of Glaser obstruction s/p cystoscopy c/b delayed extubation of pt due to increased work of breathing.    PULM  #Respiratory Failure  Was present prior to the procedure, as he was previously been found to be ambulating with an 86% saturation on room air. His CXR is with atelectasis of left base and elevation of left hemidiaphragm. Vascular congestion present, although improved from previously.   - Pt extubated this AM; now tolerating BiPAP well  - 40 IV lasix PRN for signs of volume overload and increased work of breathing    #COPD: Patient desaturated while walking to 86% w/o oxygen   - c/w duonebs q 4     #Possible sleep apnea  - work up sleep apnea outpatient w/ sleep study   - CPAP at night.     HEME  #Anemia  Acute blood loss anemia 2/2 hematuria and bleeding/ clots from Glaser catheter on 10/23. Patient with UGIB on admission, however no recent melena or bloody stools, stool guiac negative. S/p multiple 1 U PRBCs with good response on 10/23. Iron studies c/w ACD. Patient again dropped hgb to 6.7 on 10/24, now s/p 2U PRBCs  Planned for OR today for cytoscopy (delayed yesterday due to new fever/ hgb drop)  - patient s/p cauterization by urology  - CBI overnight, as per urology.  - F/u urology recs     ID  #UTI   +UA with urine culture growing pseudomonas, patient febrile to 102.5 on 10/25 and with leukocytosis, again meeting criteria for sepsis. Repeat blood cultures drawn.   -per ID- DC zosyn and cefazolin  -c/w Cefepime 2g q 12hrs (10/26--)  -F/u blood cx, NGTD.     #Sepsis  Likely 2/2 UTI, +UA with urine culture growing GNR, patient febrile to 102.5 on 10/25 and with leukocytosis, again meeting criteria for sepsis, now resolving. Repeat blood cultures drawn; NGTD Persistent MSSA bacteremia, sepsis was resolved.  -c/w Cefepime 2g q 12hrs (10/26--) as it covers both MSSA and pseudomonas  - cefazolin 2 g q8 4 weeks total course (10/19--10/26)  - Gallium scan negative for focal areas of infection, KELLY negative for endocarditis. Repeat TTE w/o evidence of new vegetations or worsening valve thickening.   - Surveillance blood cx w/ no growth to date  - Patient will follow up with Dr. Clemente outpatient for further monitoring of his bacteremia.     Cardio  #CHF HFrEF  TTE shows HFrEF with EF 45%, right atrial dilatation, septal wall motion abnormalities and mild hypokinesis of left ventricle. Unable to visualize any vegetations or R heart structures. Unable to calculate pulmonary a pressures.   - c/w ASA + lipitor  - Lasix 40mg PO BID, give IV if needed.   -Continue lisinopril 2.5 daily  -Consider starting metoprolol as tolerated once BP allows  - strict I/Os, daily weights, fluid restriction 1.5L  - acute onset of HFrEF; discussed case with Dr. Glass who agrees that patient should have ischemia workup outpatient for acute HFrEF and repeat echo to be done to assess for improvement in LV function.     #New onset paroxysmal A.fib Vs MAT:  - pt has remained in NSR since admission to 7 la. To be followed up with outpatient cardiologist for further monitoring    #NSTEMI  - EKG positive for ST depression in anteroseptal leads, Echo positive for septal hypokinesis, troponin +.   - Ischemia likely demand ischemia due to severe sepsis  - c/w ASA  - Additional ischemic work-up as outpatient after discharge.    Renal  Hematuria w/ urinary retention  - s/p cystoscopy and found bleeding artery in lobe of prostate, which was cauterized.  - glaser w/o interruptions in flow, maintain glaser to be evaluated by urology once discharged.   - c/w tamsulosin .8 mg.     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  -Thrombocytopenia resolved.     GI  Upper GI bleed  Pigmented spot seen on EGD, bleed likely due to ulcer now healed, hgb again dropping, however guiac negative, most likely 2/2 bleeding from Glaser.   - PPI po daily  - hemoglobin goal 8 hgb given recent NSTEMI   - daily cbc while pt admitted  - to follow up with Dr. Mayo outpatient on discharge    #ileus -resolved  - c/w PO agents for constipation.     F no IVF, 250cc bolus PRN  E- replete lytes prn  N- DASH/TLC    PPx - On ASA 81mg, SCD     CODE - DNR not DNI  Dispo: Transfer to [facility] pending respiratory status improvement

## 2017-10-27 NOTE — PROGRESS NOTE ADULT - ATTENDING COMMENTS
Patient seen and examined with house-staff during bedside rounds.  Resident note read, including vitals, physical findings, laboratory data, and radiological reports.   Revisions included below.  Direct personal management at bed side and extensive interpretation of the data.  Plan was outlined and discussed in details with the housestaff.  Decision making of high complexity  patient clinically improved with decrease in respiratory rate and resolution of t respiratory distress. Patient improved with diuresis as and was restarted.  continue current regimen.  Continue antibiotic. Sepsis picture resolved. Patient is to be transferred to

## 2017-10-27 NOTE — PROGRESS NOTE ADULT - ASSESSMENT
71 yo male with hx of HTN, COPD, Prostate CA (s/p radiation and seed 2013 on leuprolide outpatient) who presented with worsening of SOB and an episode of unwitnessed syncope found to have severe sepsis with gram + cocci in clusters identified as S. aureus and ATN.  Hospital course complicated by hypotension and transfer to MICU for septic shock.  Septic shock has resolved but pt has had persistent MSSA bacteremia with resolving respiratory failure and ATN. Now with hgb drop in context of bleeding/ blood clots from Glaser causing repeated episodes of Glaser obstruction.     HEME  #Anemia  Acute blood loss anemia 2/2 hematuria and bleeding/ clots from Glaser catheter on 10/23. Patient with UGIB on admission, however no recent melena or bloody stools, stool guiac negative. S/p multiple 1 U PRBCs with good response on 10/23. Iron studies c/w ACD. Patient again dropped hgb to 6.7 on 10/24, now s/p 2U PRBCs  Planned for OR today for cytoscopy (delayed yesterday due to new fever/ hgb drop)  - patient s/p cauterization by urology  - CBI overnight, as per urology.  - F/u urology recs     #UTI   +UA with urine culture growing pseudomonas, patient febrile to 102.5 on 10/25 and with leukocytosis, again meeting criteria for sepsis. Repeat blood cultures drawn.   -per ID- DC zosyn and cefazolin  -c/w Cefepime 2g q 12hrs (10/26--)  -F/u blood cx, NGTD.     Sepsis.  Plan: Sepsis likely 2/2 UTI, +UA with urine culture growing GNR, patient febrile to 102.5 on 10/25 and with leukocytosis, again meeting criteria for sepsis, now resolving. Repeat blood cultures drawn; NGTD  -Management as above.      Problem/Plan - 4:  ·  Problem: Bacteremia.  Plan: Persistent MSSA bacteremia, sepsis was resolved, now with new fever/ leukocytosis and +UA/urine culture, treatment as above.   -Cefepime 2g q 12hrs (10/26--)  - s/p cefazolin 2 g q8 4 weeks total course (10/19--10/26)  - Gallium scan negative for focal areas of infection, KELLY negative for endocarditis. Repeat TTE w/o evidence of new vegetations or worsening valve thickening.   - Surveillance blood cx w/ no growth to date  - Patient will follow up with Dr. Clemente outpatient for further monitoring of his bacteremia.      Problem/Plan - 5:  ·  Problem: COPD (chronic obstructive pulmonary disease).  Plan: - c/w duonebs q 4   - encourage ambulation and incentive spirometry.   - Patient desaturated while walking to 86% w/o oxygen     # Possible sleep apnea  - work up sleep apnea outpatient w/ sleep study   - CPAP at night.      Problem/Plan - 6:  Problem: CHF (congestive heart failure). Plan: HFrEF: TTE shows HFrEF with EF 45%, right atrial dilatation, septal wall motion abnormalities and mild hypokinesis of left ventricle. Unable to visualize any vegetations or R heart structures. Unable to calculate pulmonary a pressures.   - c/w ASA + lipitor  - Lasix 40mg PO BID, give IV if needed.   -Continue lisinopril 2.5 daily  -Consider starting metoprolol as tolerated once BP allows  - strict I/Os, daily weights, fluid restriction 1.5L  - acute onset of HFrEF; discussed case with Dr. Glass who agrees that patient should have ischemia workup outpatient for acute HFrEF and repeat echo to be done to assess for improvement in LV function.     #New onset paroxysmal A.fib Vs MAT:  - pt has remained in NSR since admission to 7 la. To be followed up with outpatient cardiologist for further monitoring    #NSTEMI :  - EKG positive for ST depression in anteroseptal leads, Echo positive for septal hypokinesis, troponin +.   - Ischemia likely demand ischemia due to severe sepsis  - c/w ASA  -Additional ischemic work-up as outpatient after discharge.     Problem/Plan - 7:  ·  Problem: ATN (acute tubular necrosis).  Plan: ATN-resolved  - ATN on admission w/ Cr now at baseline.     Hematuria w/ urinary retention  - Hematuria likely due to underlying prostate malignancy w/ recent glaser placement - possibly due to prostate inflammation/bleed.   - glaser w/o interruptions in flow, maintain glaser to be evaluated by urology once discharged.   - c/w tamsulosin .8 mg.      Problem/Plan - 8:  ·  Problem: Prostate cancer metastatic to bone.  Plan: #Prostate ca w/ mets to spine  Per oncologist continue to hold home meds.  -Thrombocytopenia resolved.      Problem/Plan - 9:  ·  Problem: Upper GI bleed.  Plan: # Pigmented spot seen on EGD  - bleed likely due to ulcer now healed, hgb again dropping, however guiac negative, most likely 2/2 bleeding from Glaser.   - PPI BID oral  - hemoglobin goal 8 hgb given recent NSTEMI   - daily cbc while pt admitted  - to follow up with Dr. Mayo outpatient on discharge    #ileus -resolved  - c/w PO agents for constipation.      Problem/Plan - 10:  Problem: Nutrition, metabolism, and development symptoms. Plan; F no IVF, 250cc bolus PRN  E- replete lytes prn  N- dash diet w/ 1.5 L fluid restriction     PPx - On ASA 81mg, SCD     CODE - DNR not DNI    Dispo: [facility] for RJ placement. 73 yo male with hx of HTN, COPD, Prostate CA (s/p radiation and seed 2013 on leuprolide outpatient) who presented with worsening of SOB and an episode of unwitnessed syncope found to have severe sepsis with gram + cocci in clusters identified as S. aureus and ATN.  Hospital course complicated by hypotension and transfer to MICU for septic shock.  Septic shock has resolved but pt has had persistent MSSA bacteremia with resolving respiratory failure and ATN. Now with hgb drop in context of bleeding/ blood clots from Glaser causing repeated episodes of Glaser obstruction.     PULM  #Respiratory Failure  Was present prior to the procedure, as he was previously been found to be ambulating with an 86% saturation on room air. His CXR is with atelectasis of left base and elevation of left hemidiaphragm. Vascular congestion present, although improved from previously.   - Vent Settings: FiO2 of 50, Vt 500, RR 12, PEEP 4  - 40 IV lasix, with goal to be net negative overnight  - Weaning trial in the AM    #COPD  - c/w duonebs q 4   - Patient desaturated while walking to 86% w/o oxygen     #Possible sleep apnea  - work up sleep apnea outpatient w/ sleep study   - CPAP at night.     HEME  #Anemia  Acute blood loss anemia 2/2 hematuria and bleeding/ clots from Glaser catheter on 10/23. Patient with UGIB on admission, however no recent melena or bloody stools, stool guiac negative. S/p multiple 1 U PRBCs with good response on 10/23. Iron studies c/w ACD. Patient again dropped hgb to 6.7 on 10/24, now s/p 2U PRBCs  Planned for OR today for cytoscopy (delayed yesterday due to new fever/ hgb drop)  - patient s/p cauterization by urology  - CBI overnight, as per urology.  - F/u urology recs     ID  #UTI   +UA with urine culture growing pseudomonas, patient febrile to 102.5 on 10/25 and with leukocytosis, again meeting criteria for sepsis. Repeat blood cultures drawn.   -per ID- DC zosyn and cefazolin  -c/w Cefepime 2g q 12hrs (10/26--)  -F/u blood cx, NGTD.     #Sepsis  Likely 2/2 UTI, +UA with urine culture growing GNR, patient febrile to 102.5 on 10/25 and with leukocytosis, again meeting criteria for sepsis, now resolving. Repeat blood cultures drawn; NGTD Persistent MSSA bacteremia, sepsis was resolved.  -c/w Cefepime 2g q 12hrs (10/26--) as it covers both MSSA and pseudomonas  -  cefazolin 2 g q8 4 weeks total course (10/19--10/26)  - Gallium scan negative for focal areas of infection, KELLY negative for endocarditis. Repeat TTE w/o evidence of new vegetations or worsening valve thickening.   - Surveillance blood cx w/ no growth to date  - Patient will follow up with Dr. Clemente outpatient for further monitoring of his bacteremia.     Cardio  #CHF HFrEF  TTE shows HFrEF with EF 45%, right atrial dilatation, septal wall motion abnormalities and mild hypokinesis of left ventricle. Unable to visualize any vegetations or R heart structures. Unable to calculate pulmonary a pressures.   - c/w ASA + lipitor  - Lasix 40mg PO BID, give IV if needed.   -Continue lisinopril 2.5 daily  -Consider starting metoprolol as tolerated once BP allows  - strict I/Os, daily weights, fluid restriction 1.5L  - acute onset of HFrEF; discussed case with Dr. Glass who agrees that patient should have ischemia workup outpatient for acute HFrEF and repeat echo to be done to assess for improvement in LV function.     #New onset paroxysmal A.fib Vs MAT:  - pt has remained in NSR since admission to 7 la. To be followed up with outpatient cardiologist for further monitoring    #NSTEMI  - EKG positive for ST depression in anteroseptal leads, Echo positive for septal hypokinesis, troponin +.   - Ischemia likely demand ischemia due to severe sepsis  - c/w ASA  - Additional ischemic work-up as outpatient after discharge.    Renal  Hematuria w/ urinary retention  - s/p cystoscopy and found bleeding artery in lobe of prostate, which was cauterized.  - glaser w/o interruptions in flow, maintain glaser to be evaluated by urology once discharged.   - c/w tamsulosin .8 mg.     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  -Thrombocytopenia resolved.     GI  Upper GI bleed  Pigmented spot seen on EGD, bleed likely due to ulcer now healed, hgb again dropping, however guiac negative, most likely 2/2 bleeding from Glaser.   - PPI BID   - hemoglobin goal 8 hgb given recent NSTEMI   - daily cbc while pt admitted  - to follow up with Dr. Mayo outpatient on discharge    #ileus -resolved  - c/w PO agents for constipation.     F no IVF, 250cc bolus PRN  E- replete lytes prn  N- NPO     PPx - On ASA 81mg, SCD     CODE - DNR not DNI    Dispo: MICU

## 2017-10-27 NOTE — PROGRESS NOTE ADULT - SUBJECTIVE AND OBJECTIVE BOX
INTERVAL HPI/OVERNIGHT EVENTS: Extubated. Pt tachypneic, RR 30s. Pt given Lasix 40mg IVP, -150cc/hr.    SUBJECTIVE: Patient seen and examined at bedside. Pt reported productive cough; however, pt denied SOB, f/c/n/v/d, CP, palpitations, abdominal pain, and urinary symptoms.    OBJECTIVE:    VITAL SIGNS:  ICU Vital Signs Last 24 Hrs  T(C): 37.9 (27 Oct 2017 14:11), Max: 37.9 (27 Oct 2017 11:00)  T(F): 100.3 (27 Oct 2017 14:11), Max: 100.3 (27 Oct 2017 11:00)  HR: 106 (27 Oct 2017 14:33) (50 - 106)  BP: 123/64 (27 Oct 2017 14:33) (69/43 - 136/69)  BP(mean): 83 (27 Oct 2017 14:33) (50 - 131)  ABP: 108/54 (27 Oct 2017 14:10) (86/56 - 175/74)  ABP(mean): 70 (27 Oct 2017 14:10) (56 - 92)  RR: 28 (27 Oct 2017 14:33) (13 - 32)  SpO2: 92% (27 Oct 2017 14:33) (92% - 100%)    Mode: CPAP with PS, FiO2: 40, PEEP: 5, PS: 10, MAP: 9, PIP: 16    10-26 @ 07:01  -  10-27 @ 07:00  --------------------------------------------------------  IN: 20938.5 mL / OUT: 49910 mL / NET: 38.5 mL    10-27 @ 07:01  -  10-27 @ 15:05  --------------------------------------------------------  IN: 480 mL / OUT: 750 mL / NET: -270 mL      CAPILLARY BLOOD GLUCOSE          PHYSICAL EXAM:  General: NAD, breathing comfortably on BiPAP  HEENT: NCAT, PERRL, clear conjunctiva, no scleral icterus  Neck: supple, no JVD  Respiratory: CTA b/l, no w/r/r  Cardiovascular: RRR, normal S1S2, no M/R/G  Abdomen: soft, NT/ND, bowel sounds in all four quadrants, no palpable masses  Extremities: WWP, no clubbing, cyanosis, +2 pitting edema in LE b/l  Neuro: AAOx3, moving all four extremities    MEDICATIONS:  MEDICATIONS  (STANDING):  ALBUTerol/ipratropium for Nebulization 3 milliLiter(s) Nebulizer every 4 hours  atorvastatin 80 milliGRAM(s) Oral at bedtime  belladonna 16.2 mG/opium 60 mg Suppository 1 Suppository(s) Rectal once  cefepime  IVPB 2000 milliGRAM(s) IV Intermittent once  cefepime  IVPB 2000 milliGRAM(s) IV Intermittent every 12 hours  cefepime  IVPB      chlorhexidine 0.12% Liquid 15 milliLiter(s) Swish and Spit two times a day  dextrose 5%. 1000 milliLiter(s) (50 mL/Hr) IV Continuous <Continuous>  finasteride 5 milliGRAM(s) Oral daily  furosemide    Tablet 40 milliGRAM(s) Oral every 12 hours  heparin  flush 100 Units/mL Injectable 100 Unit(s) IV Push every other day  lidocaine 2% Jelly 5 milliLiter(s) IntraUrethral once  melatonin 3 milliGRAM(s) Oral at bedtime  nystatin Powder 1 Application(s) Topical two times a day  pantoprazole    Tablet 40 milliGRAM(s) Oral before breakfast  potassium chloride  10 mEq/100 mL IVPB 10 milliEquivalent(s) IV Intermittent every 1 hour  tamsulosin 0.8 milliGRAM(s) Oral at bedtime    MEDICATIONS  (PRN):  guaiFENesin    Syrup 200 milliGRAM(s) Oral every 6 hours PRN Cough  lidocaine 2% Gel 1 Application(s) Topical every 3 hours PRN catheter related discomfort at tip of penis  polyethylene glycol 3350 17 Gram(s) Oral daily PRN Constipation  simethicone 80 milliGRAM(s) Chew three times a day PRN Gas      ALLERGIES:  Allergies    No Known Allergies    Intolerances        LABS:                        9.1    18.4  )-----------( 95       ( 27 Oct 2017 02:54 )             28.2     10-27    137  |  94<L>  |  17  ----------------------------<  130<H>  3.4<L>   |  26  |  1.16    Ca    8.9      27 Oct 2017 02:54  Mg     2.1     10-27      PT/INR - ( 26 Oct 2017 10:57 )   PT: 15.8 sec;   INR: 1.41          PTT - ( 26 Oct 2017 10:57 )  PTT:27.9 sec  Urinalysis Basic - ( 27 Oct 2017 02:57 )    Color: Yellow / Appearance: Clear / SG: <=1.005 / pH: x  Gluc: x / Ketone: NEGATIVE  / Bili: Negative / Urobili: 0.2 E.U./dL   Blood: x / Protein: NEGATIVE mg/dL / Nitrite: NEGATIVE   Leuk Esterase: Moderate / RBC: Many /HPF / WBC > 10 /HPF   Sq Epi: x / Non Sq Epi: 0-5 /HPF / Bacteria: Present /HPF        ABG - ( 26 Oct 2017 18:27 )  pH: 7.42  /  pCO2: 48    /  pO2: 304   / HCO3: 30    / Base Excess: 4.9   /  SaO2: x     / Lactate: x            RADIOLOGY & ADDITIONAL TESTS: Reviewed. PGY-1 TRANSFER NOTE  HOSPITAL COURSE: 71 y/o M with hx of HTN, COPD, Systolic CHF, Prostate CA (s/p radiation and seed 2013 on leuprolide outpatient) initially admitted for a CC of SOB, syncope and productive cough. Was admitted to [facility] and treated for CAP. Was shortly after stepped up to MICU for respiratory failure and was intubated and started on levophed. Found to have MSSA bacteremia, although source unknown (possibly lung, s/p negative gallium scan). Started and anti-staphylococcal antibiotics with eventual clearance of bacteremia. Developed NSTEMI likely 2/2 hypoxia and sepsis which was medically managed and no catheterization was performed. Developed upper GI bleed requiring transfusions, s/p EGD (found to have a healing ulcer) and resolution. Discharge planning started, but delayed due to appearance of clots and blood in [place], requiring blood transfusions. Also found to have pseudomonas UTI. Started on cefepime to cover both MSSA and pseudomonas. Urology took patient to OR for cystoscopy and found bleeding artery in lobe of prostate, which was cauterized. He was intubated for the procedure. Anesthesiologist found patients O2 sat to drop to mid-80s when FiO2 lowered to room air (21%), so requested MICU consult for optimization prior to extubation. Pt titrated off sedation and successfully extubated, tolerating BiPAP well. Pt tachypneic w/RR 30s. Pt given Lasix 40mg IVP with improvement of respiratory status. Pt afebrile and hemodynamically stable upon transfer [facility]    SUBJECTIVE: Patient seen and examined at bedside. Pt reported productive cough; however, pt denied SOB, f/c/n/v/d, CP, palpitations, abdominal pain, and urinary symptoms.    OBJECTIVE:    VITAL SIGNS:  ICU Vital Signs Last 24 Hrs  T(C): 37.9 (27 Oct 2017 14:11), Max: 37.9 (27 Oct 2017 11:00)  T(F): 100.3 (27 Oct 2017 14:11), Max: 100.3 (27 Oct 2017 11:00)  HR: 106 (27 Oct 2017 14:33) (50 - 106)  BP: 123/64 (27 Oct 2017 14:33) (69/43 - 136/69)  BP(mean): 83 (27 Oct 2017 14:33) (50 - 131)  ABP: 108/54 (27 Oct 2017 14:10) (86/56 - 175/74)  ABP(mean): 70 (27 Oct 2017 14:10) (56 - 92)  RR: 28 (27 Oct 2017 14:33) (13 - 32)  SpO2: 92% (27 Oct 2017 14:33) (92% - 100%)    Mode: CPAP with PS, FiO2: 40, PEEP: 5, PS: 10, MAP: 9, PIP: 16    10-26 @ 07:01  -  10-27 @ 07:00  --------------------------------------------------------  IN: 78882.5 mL / OUT: 37060 mL / NET: 38.5 mL    10-27 @ 07:01  -  10-27 @ 15:05  --------------------------------------------------------  IN: 480 mL / OUT: 750 mL / NET: -270 mL      CAPILLARY BLOOD GLUCOSE          PHYSICAL EXAM:  General: NAD, breathing comfortably on BiPAP  HEENT: NCAT, PERRL, clear conjunctiva, no scleral icterus  Neck: supple, no JVD  Respiratory: CTA b/l, no w/r/r  Cardiovascular: RRR, normal S1S2, no M/R/G  Abdomen: soft, NT/ND, bowel sounds in all four quadrants, no palpable masses  Extremities: WWP, no clubbing, cyanosis, +2 pitting edema in LE b/l  Neuro: AAOx3, moving all four extremities    MEDICATIONS:  MEDICATIONS  (STANDING):  ALBUTerol/ipratropium for Nebulization 3 milliLiter(s) Nebulizer every 4 hours  atorvastatin 80 milliGRAM(s) Oral at bedtime  belladonna 16.2 mG/opium 60 mg Suppository 1 Suppository(s) Rectal once  cefepime  IVPB 2000 milliGRAM(s) IV Intermittent once  cefepime  IVPB 2000 milliGRAM(s) IV Intermittent every 12 hours  cefepime  IVPB      chlorhexidine 0.12% Liquid 15 milliLiter(s) Swish and Spit two times a day  dextrose 5%. 1000 milliLiter(s) (50 mL/Hr) IV Continuous <Continuous>  finasteride 5 milliGRAM(s) Oral daily  furosemide    Tablet 40 milliGRAM(s) Oral every 12 hours  heparin  flush 100 Units/mL Injectable 100 Unit(s) IV Push every other day  lidocaine 2% Jelly 5 milliLiter(s) IntraUrethral once  melatonin 3 milliGRAM(s) Oral at bedtime  nystatin Powder 1 Application(s) Topical two times a day  pantoprazole    Tablet 40 milliGRAM(s) Oral before breakfast  potassium chloride  10 mEq/100 mL IVPB 10 milliEquivalent(s) IV Intermittent every 1 hour  tamsulosin 0.8 milliGRAM(s) Oral at bedtime    MEDICATIONS  (PRN):  guaiFENesin    Syrup 200 milliGRAM(s) Oral every 6 hours PRN Cough  lidocaine 2% Gel 1 Application(s) Topical every 3 hours PRN catheter related discomfort at tip of penis  polyethylene glycol 3350 17 Gram(s) Oral daily PRN Constipation  simethicone 80 milliGRAM(s) Chew three times a day PRN Gas      ALLERGIES:  Allergies    No Known Allergies    Intolerances        LABS:                        9.1    18.4  )-----------( 95       ( 27 Oct 2017 02:54 )             28.2     10-27    137  |  94<L>  |  17  ----------------------------<  130<H>  3.4<L>   |  26  |  1.16    Ca    8.9      27 Oct 2017 02:54  Mg     2.1     10-27      PT/INR - ( 26 Oct 2017 10:57 )   PT: 15.8 sec;   INR: 1.41          PTT - ( 26 Oct 2017 10:57 )  PTT:27.9 sec  Urinalysis Basic - ( 27 Oct 2017 02:57 )    Color: Yellow / Appearance: Clear / SG: <=1.005 / pH: x  Gluc: x / Ketone: NEGATIVE  / Bili: Negative / Urobili: 0.2 E.U./dL   Blood: x / Protein: NEGATIVE mg/dL / Nitrite: NEGATIVE   Leuk Esterase: Moderate / RBC: Many /HPF / WBC > 10 /HPF   Sq Epi: x / Non Sq Epi: 0-5 /HPF / Bacteria: Present /HPF        ABG - ( 26 Oct 2017 18:27 )  pH: 7.42  /  pCO2: 48    /  pO2: 304   / HCO3: 30    / Base Excess: 4.9   /  SaO2: x     / Lactate: x            RADIOLOGY & ADDITIONAL TESTS: Reviewed.

## 2017-10-27 NOTE — PROGRESS NOTE ADULT - SUBJECTIVE AND OBJECTIVE BOX
TRANSFER ACCEPTANCE NOTE  HOSPITAL COURSE  73 y/o M initially admitted for a CC of SOB, syncope and productive cough. Was found to have PNA and was started on treatment for CAP. Was shortly after stepped up from [facility] to MICU for respiratory failure and was intubated and on levophed. Found to have MSSA bacteremia, although source unknown (possibly lung). Started and anti-staphylococcal antibiotics with eventual clearance of bacteremia. Developed NSTEMI likely 2/2 hypoxia and sepsis. Medically managed, no catheterization performed. Developed upper GI bleed requiring transfusions, no s/p EGD and resolution. Discharge planning started, but patient found to have O2 sat of 86% when ambulating without oxygen. Plan made to discharge on home O2. However discharge delayed due to appearance of clots and blood in [place], requiring blood transfusions. Also found to have pseudomonas UTI. Started on cefepime to cover both MSSA and pseudomonas. Urology took patient to OR for cystoscopy and found bleeding artery in lobe of prostate, which was cauterized. He was intubated for the procedure. Anesthesiologist found patients O2 sat to drop to mid-80s when FiO2 lowered to room air (21%), so requested MICU consult for optimization prior to extubation.      OVERNIGHT: No overnight events.  SUBJECTIVE: Patient seen and examined at bedside.     ROS:  CV: Denies chest pain  Resp: Denies SOB  GI: Denies abdominal pain, constipation, diarrhea, nausea, vomiting  : Denies dysuria  ID: Denies fevers, chills  MSK: Denies joint pain     OBJECTIVE:    VITAL SIGNS:  ICU Vital Signs Last 24 Hrs  T(C): 36.7 (26 Oct 2017 19:28), Max: 37.3 (26 Oct 2017 09:47)  T(F): 98 (26 Oct 2017 19:28), Max: 99.2 (26 Oct 2017 09:47)  HR: 66 (27 Oct 2017 01:00) (50 - 90)  BP: 69/43 (27 Oct 2017 00:01) (69/43 - 113/68)  BP(mean): 50 (27 Oct 2017 00:01) (50 - 131)  ABP: 175/74 (27 Oct 2017 01:00) (86/56 - 175/74)  ABP(mean): 68 (27 Oct 2017 00:31) (56 - 92)  RR: 14 (27 Oct 2017 01:00) (14 - 20)  SpO2: 100% (27 Oct 2017 01:00) (95% - 100%)    Mode: AC/ CMV (Assist Control/ Continuous Mandatory Ventilation), RR (machine): 12, TV (machine): 500, FiO2: 60, PEEP: 4, ITime: 1, MAP: 10, PIP: 31    10-25 @ 07:01  -  10-26 @ 07:00  --------------------------------------------------------  IN: 770 mL / OUT: 2000 mL / NET: -1230 mL    10-26 @ 07:01  -  10-27 @ 01:31  --------------------------------------------------------  IN: 9785.5 mL / OUT: 9400 mL / NET: 385.5 mL      CAPILLARY BLOOD GLUCOSE          PHYSICAL EXAM:    General: NAD, comfortable  HEENT: NCAT, PERRL, clear conjunctiva, no scleral icterus  Neck: supple, no JVD  Respiratory: CTA b/l, no wheezing, rhonchi, rales  Cardiovascular: RRR, normal S1S2, no M/R/G  Abdomen: soft, NT/ND, bowel sounds in all four quadrants, no palpable masses  Extremities: WWP, no clubbing, cyanosis, or edema  Neuro:     MEDICATIONS:  MEDICATIONS  (STANDING):  ALBUTerol/ipratropium for Nebulization 3 milliLiter(s) Nebulizer every 4 hours  atorvastatin 80 milliGRAM(s) Oral at bedtime  belladonna 16.2 mG/opium 60 mg Suppository 1 Suppository(s) Rectal once  cefepime  IVPB 2000 milliGRAM(s) IV Intermittent once  cefepime  IVPB 2000 milliGRAM(s) IV Intermittent every 12 hours  cefepime  IVPB      dextrose 5%. 1000 milliLiter(s) (50 mL/Hr) IV Continuous <Continuous>  finasteride 5 milliGRAM(s) Oral daily  furosemide    Tablet 40 milliGRAM(s) Oral every 12 hours  heparin  flush 100 Units/mL Injectable 100 Unit(s) IV Push every other day  lidocaine 2% Jelly 5 milliLiter(s) IntraUrethral once  melatonin 3 milliGRAM(s) Oral at bedtime  norepinephrine Infusion 0.01 MICROgram(s)/kG/Min (1.939 mL/Hr) IV Continuous <Continuous>  nystatin Powder 1 Application(s) Topical two times a day  pantoprazole  Injectable 40 milliGRAM(s) IV Push daily  potassium chloride  10 mEq/100 mL IVPB 10 milliEquivalent(s) IV Intermittent every 1 hour  propofol Infusion 4 MICROgram(s)/kG/Min (2.482 mL/Hr) IV Continuous <Continuous>  tamsulosin 0.8 milliGRAM(s) Oral at bedtime    MEDICATIONS  (PRN):  guaiFENesin    Syrup 200 milliGRAM(s) Oral every 6 hours PRN Cough  lidocaine 2% Gel 1 Application(s) Topical every 3 hours PRN catheter related discomfort at tip of penis  polyethylene glycol 3350 17 Gram(s) Oral daily PRN Constipation  simethicone 80 milliGRAM(s) Chew three times a day PRN Gas      ALLERGIES:  Allergies    No Known Allergies    Intolerances        LABS:                        7.3    14.3  )-----------( 77       ( 26 Oct 2017 10:57 )             23.4     10-26    135  |  92<L>  |  22  ----------------------------<  127<H>  3.1<L>   |  28  |  1.65<H>    Ca    8.4      26 Oct 2017 10:57  Phos  3.1     10-25  Mg     1.6     10-26      PT/INR - ( 26 Oct 2017 10:57 )   PT: 15.8 sec;   INR: 1.41          PTT - ( 26 Oct 2017 10:57 )  PTT:27.9 sec      RADIOLOGY & ADDITIONAL TESTS: Reviewed. TRANSFER ACCEPTANCE NOTE  HOSPITAL COURSE  71 y/o M initially admitted for a CC of SOB, syncope and productive cough. Was found to have PNA and was started on treatment for CAP. Was shortly after stepped up from [facility] to MICU for respiratory failure and was intubated and on levophed. Found to have MSSA bacteremia, although source unknown (possibly lung). Started and anti-staphylococcal antibiotics with eventual clearance of bacteremia. Developed NSTEMI likely 2/2 hypoxia and sepsis. Medically managed, no catheterization performed. Developed upper GI bleed requiring transfusions, no s/p EGD and resolution. Discharge planning started, but patient found to have O2 sat of 86% when ambulating without oxygen. Plan made to discharge on home O2. However discharge delayed due to appearance of clots and blood in [place], requiring blood transfusions. Also found to have pseudomonas UTI. Started on cefepime to cover both MSSA and pseudomonas. Urology took patient to OR for cystoscopy and found bleeding artery in lobe of prostate, which was cauterized. He was intubated for the procedure. Anesthesiologist found patients O2 sat to drop to mid-80s when FiO2 lowered to room air (21%), so requested MICU consult for optimization prior to extubation.    SUBJECTIVE: Patient seen and examined at bedside. Intubated and sedated, breathing comfortably on vent.      OBJECTIVE:  VITAL SIGNS:  ICU Vital Signs Last 24 Hrs  T(C): 36.7 (26 Oct 2017 19:28), Max: 37.3 (26 Oct 2017 09:47)  T(F): 98 (26 Oct 2017 19:28), Max: 99.2 (26 Oct 2017 09:47)  HR: 66 (27 Oct 2017 01:00) (50 - 90)  BP: 69/43 (27 Oct 2017 00:01) (69/43 - 113/68)  BP(mean): 50 (27 Oct 2017 00:01) (50 - 131)  ABP: 175/74 (27 Oct 2017 01:00) (86/56 - 175/74)  ABP(mean): 68 (27 Oct 2017 00:31) (56 - 92)  RR: 14 (27 Oct 2017 01:00) (14 - 20)  SpO2: 100% (27 Oct 2017 01:00) (95% - 100%)    Mode: AC/ CMV (Assist Control/ Continuous Mandatory Ventilation), RR (machine): 12, TV (machine): 500, FiO2: 60, PEEP: 4, ITime: 1, MAP: 10, PIP: 31    10-25 @ 07:01  -  10-26 @ 07:00  --------------------------------------------------------  IN: 770 mL / OUT: 2000 mL / NET: -1230 mL    10-26 @ 07:01  -  10-27 @ 01:31  --------------------------------------------------------  IN: 9785.5 mL / OUT: 9400 mL / NET: 385.5 mL      CAPILLARY BLOOD GLUCOSE    PHYSICAL EXAM:  General: NAD, comfortable  HEENT: NCAT, PERRL, clear conjunctiva, no scleral icterus  Neck: supple, no JVD  Respiratory: CTA b/l, no wheezing, rhonchi, rales  Cardiovascular: RRR, normal S1S2, no M/R/G  Abdomen: soft, NT/ND, bowel sounds in all four quadrants, no palpable masses  Extremities: WWP, no clubbing, cyanosis, +2 pitting edema in LE b/l  Neuro: Intubated and sedated, Pupils sluggish but reactive, gag reflex intact, withdrawals to pain in all four limbs    MEDICATIONS:  MEDICATIONS  (STANDING):  ALBUTerol/ipratropium for Nebulization 3 milliLiter(s) Nebulizer every 4 hours  atorvastatin 80 milliGRAM(s) Oral at bedtime  belladonna 16.2 mG/opium 60 mg Suppository 1 Suppository(s) Rectal once  cefepime  IVPB 2000 milliGRAM(s) IV Intermittent once  cefepime  IVPB 2000 milliGRAM(s) IV Intermittent every 12 hours  cefepime  IVPB      dextrose 5%. 1000 milliLiter(s) (50 mL/Hr) IV Continuous <Continuous>  finasteride 5 milliGRAM(s) Oral daily  furosemide    Tablet 40 milliGRAM(s) Oral every 12 hours  heparin  flush 100 Units/mL Injectable 100 Unit(s) IV Push every other day  lidocaine 2% Jelly 5 milliLiter(s) IntraUrethral once  melatonin 3 milliGRAM(s) Oral at bedtime  norepinephrine Infusion 0.01 MICROgram(s)/kG/Min (1.939 mL/Hr) IV Continuous <Continuous>  nystatin Powder 1 Application(s) Topical two times a day  pantoprazole  Injectable 40 milliGRAM(s) IV Push daily  potassium chloride  10 mEq/100 mL IVPB 10 milliEquivalent(s) IV Intermittent every 1 hour  propofol Infusion 4 MICROgram(s)/kG/Min (2.482 mL/Hr) IV Continuous <Continuous>  tamsulosin 0.8 milliGRAM(s) Oral at bedtime    MEDICATIONS  (PRN):  guaiFENesin    Syrup 200 milliGRAM(s) Oral every 6 hours PRN Cough  lidocaine 2% Gel 1 Application(s) Topical every 3 hours PRN catheter related discomfort at tip of penis  polyethylene glycol 3350 17 Gram(s) Oral daily PRN Constipation  simethicone 80 milliGRAM(s) Chew three times a day PRN Gas      ALLERGIES:  Allergies    No Known Allergies    Intolerances        LABS:                        7.3    14.3  )-----------( 77       ( 26 Oct 2017 10:57 )             23.4     10-26    135  |  92<L>  |  22  ----------------------------<  127<H>  3.1<L>   |  28  |  1.65<H>    Ca    8.4      26 Oct 2017 10:57  Phos  3.1     10-25  Mg     1.6     10-26      PT/INR - ( 26 Oct 2017 10:57 )   PT: 15.8 sec;   INR: 1.41          PTT - ( 26 Oct 2017 10:57 )  PTT:27.9 sec      RADIOLOGY & ADDITIONAL TESTS: Reviewed. TRANSFER ACCEPTANCE NOTE  HOSPITAL COURSE  71 y/o M with hx of HTN, COPD, Systolic CHF, Prostate CA (s/p radiation and seed 2013 on leuprolide outpatient) initially admitted for a CC of SOB, syncope and productive cough. Was admitted to [facility] and treated for CAP. Was shortly after stepped up to MICU for respiratory failure and was intubated and started on levophed. Found to have MSSA bacteremia, although source unknown (possibly lung, s/p negative gallium scan). Started and anti-staphylococcal antibiotics with eventual clearance of bacteremia. Developed NSTEMI likely 2/2 hypoxia and sepsis which was medically managed and no catheterization was performed. Developed upper GI bleed requiring transfusions, s/p EGD (found to have a healing ulcer) and resolution. Discharge planning started, but delayed due to appearance of clots and blood in [place], requiring blood transfusions. Also found to have pseudomonas UTI. Started on cefepime to cover both MSSA and pseudomonas. Urology took patient to OR for cystoscopy and found bleeding artery in lobe of prostate, which was cauterized. He was intubated for the procedure. Anesthesiologist found patients O2 sat to drop to mid-80s when FiO2 lowered to room air (21%), so requested MICU consult for optimization prior to extubation.    SUBJECTIVE: Patient seen and examined at bedside. Intubated and sedated, breathing comfortably on vent.      OBJECTIVE:  VITAL SIGNS:  ICU Vital Signs Last 24 Hrs  T(C): 36.7 (26 Oct 2017 19:28), Max: 37.3 (26 Oct 2017 09:47)  T(F): 98 (26 Oct 2017 19:28), Max: 99.2 (26 Oct 2017 09:47)  HR: 66 (27 Oct 2017 01:00) (50 - 90)  BP: 69/43 (27 Oct 2017 00:01) (69/43 - 113/68)  BP(mean): 50 (27 Oct 2017 00:01) (50 - 131)  ABP: 175/74 (27 Oct 2017 01:00) (86/56 - 175/74)  ABP(mean): 68 (27 Oct 2017 00:31) (56 - 92)  RR: 14 (27 Oct 2017 01:00) (14 - 20)  SpO2: 100% (27 Oct 2017 01:00) (95% - 100%)    Mode: AC/ CMV (Assist Control/ Continuous Mandatory Ventilation), RR (machine): 12, TV (machine): 500, FiO2: 60, PEEP: 4, ITime: 1, MAP: 10, PIP: 31    10-25 @ 07:01  -  10-26 @ 07:00  --------------------------------------------------------  IN: 770 mL / OUT: 2000 mL / NET: -1230 mL    10-26 @ 07:01  -  10-27 @ 01:31  --------------------------------------------------------  IN: 9785.5 mL / OUT: 9400 mL / NET: 385.5 mL      CAPILLARY BLOOD GLUCOSE    PHYSICAL EXAM:  General: NAD, comfortable  HEENT: NCAT, PERRL, clear conjunctiva, no scleral icterus  Neck: supple, no JVD  Respiratory: CTA b/l, no wheezing, rhonchi, rales  Cardiovascular: RRR, normal S1S2, no M/R/G  Abdomen: soft, NT/ND, bowel sounds in all four quadrants, no palpable masses  Extremities: WWP, no clubbing, cyanosis, +2 pitting edema in LE b/l  Neuro: Intubated and sedated, Pupils sluggish but reactive, gag reflex intact, withdrawals to pain in all four limbs    MEDICATIONS:  MEDICATIONS  (STANDING):  ALBUTerol/ipratropium for Nebulization 3 milliLiter(s) Nebulizer every 4 hours  atorvastatin 80 milliGRAM(s) Oral at bedtime  belladonna 16.2 mG/opium 60 mg Suppository 1 Suppository(s) Rectal once  cefepime  IVPB 2000 milliGRAM(s) IV Intermittent once  cefepime  IVPB 2000 milliGRAM(s) IV Intermittent every 12 hours  cefepime  IVPB      dextrose 5%. 1000 milliLiter(s) (50 mL/Hr) IV Continuous <Continuous>  finasteride 5 milliGRAM(s) Oral daily  furosemide    Tablet 40 milliGRAM(s) Oral every 12 hours  heparin  flush 100 Units/mL Injectable 100 Unit(s) IV Push every other day  lidocaine 2% Jelly 5 milliLiter(s) IntraUrethral once  melatonin 3 milliGRAM(s) Oral at bedtime  norepinephrine Infusion 0.01 MICROgram(s)/kG/Min (1.939 mL/Hr) IV Continuous <Continuous>  nystatin Powder 1 Application(s) Topical two times a day  pantoprazole  Injectable 40 milliGRAM(s) IV Push daily  potassium chloride  10 mEq/100 mL IVPB 10 milliEquivalent(s) IV Intermittent every 1 hour  propofol Infusion 4 MICROgram(s)/kG/Min (2.482 mL/Hr) IV Continuous <Continuous>  tamsulosin 0.8 milliGRAM(s) Oral at bedtime    MEDICATIONS  (PRN):  guaiFENesin    Syrup 200 milliGRAM(s) Oral every 6 hours PRN Cough  lidocaine 2% Gel 1 Application(s) Topical every 3 hours PRN catheter related discomfort at tip of penis  polyethylene glycol 3350 17 Gram(s) Oral daily PRN Constipation  simethicone 80 milliGRAM(s) Chew three times a day PRN Gas      ALLERGIES:  Allergies    No Known Allergies    Intolerances        LABS:                        7.3    14.3  )-----------( 77       ( 26 Oct 2017 10:57 )             23.4     10-26    135  |  92<L>  |  22  ----------------------------<  127<H>  3.1<L>   |  28  |  1.65<H>    Ca    8.4      26 Oct 2017 10:57  Phos  3.1     10-25  Mg     1.6     10-26      PT/INR - ( 26 Oct 2017 10:57 )   PT: 15.8 sec;   INR: 1.41          PTT - ( 26 Oct 2017 10:57 )  PTT:27.9 sec      RADIOLOGY & ADDITIONAL TESTS: Reviewed.

## 2017-10-27 NOTE — PROGRESS NOTE ADULT - PROBLEM SELECTOR PLAN 5
Pigmented spot seen on EGD, bleed likely due to ulcer now healed, hgb drop however guiac negative, most likely 2/2 bleeding from [place].   - PPI po daily  - hemoglobin goal 8 hgb given recent NSTEMI   - daily cbc while pt admitted  - to follow up with Dr. 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  - glaser w/o interruptions in flow, maintain Glaser to be evaluated by urology once discharged. 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  - glaser w/o interruptions in flow, maintain Glaser to be evaluated by urology once discharged. Urology to evaluate pt for CBI if Glaser output decreases significantly, CBI stopped on 10/27 in AM  - c/w tamsulosin .8 mg and finasteride 5 mg PO daily  -Lidocaine jelly to penis tip for Glaser discomfort/pain

## 2017-10-27 NOTE — PROGRESS NOTE ADULT - PROBLEM SELECTOR PLAN 2
#CHF HFrEF  TTE shows HFrEF with EF 45%, right atrial dilatation, septal wall motion abnormalities and mild hypokinesis of left ventricle. Unable to visualize any vegetations or R heart structures. Unable to calculate pulmonary a pressures.   - c/w ASA + lipitor  - Lasix 40mg PO BID, give IV if needed.   -Continue lisinopril 2.5 daily  -Consider starting metoprolol as tolerated once BP allows  - strict I/Os, daily weights, fluid restriction 1.5L  - acute onset of HFrEF; discussed case with Dr. Glass who agrees that patient should have ischemia workup outpatient for acute HFrEF and repeat echo to be done to assess for improvement in LV function.     #New onset paroxysmal A.fib Vs MAT:  - pt has remained in NSR since admission to 7 la. To be followed up with outpatient cardiologist for further monitoring    #NSTEMI  - EKG positive for ST depression in anteroseptal and lateral leads (II, III, aVF, V3-V6), Echo positive for septal hypokinesis, troponin + (peaked at 0.51).   - Ischemia likely demand ischemia due to severe sepsis  - c/w ASA  - Additional ischemic work-up as outpatient after discharge. +UA with urine culture growing pseudomonas 10/24, patient febrile to 102.5 on 10/25 and with leukocytosis, again meeting criteria for sepsis. Repeat blood cxs 10/25 NGTD   -MSSA bacteremia with + blood cxs 10/9, now resolved.  -c/w Cefepime 2g q 12hrs (started 10/26--) as it covers both MSSA and pseudomonas  - cefazolin 2 g q8 4 weeks total course (started 10/19--10/26)  - Gallium scan negative for focal areas of infection, KELLY 10/9 negative for endocarditis. Repeat TTE 10/16 w/o evidence of new vegetations or worsening valve thickening.   - Surveillance blood cx w/ no growth to date  - Patient will follow up with Dr. Clemente outpatient for further monitoring of his bacteremia. +UA 10/27 with urine culture growing pseudomonas 10/24, patient febrile to 102.5 on 10/25 and with leukocytosis, again meeting criteria for sepsis. Repeat blood cxs 10/25 NGTD   -MSSA bacteremia with + blood cxs 10/9, now resolved.  -c/w Cefepime 2g q 12hrs (started 10/26--) as it covers both MSSA and pseudomonas  - cefazolin 2 g q8 4 weeks total course (started 10/19--10/26)  - Gallium scan negative for focal areas of infection, KELLY 10/9 negative for endocarditis. Repeat TTE 10/16 w/o evidence of new vegetations or worsening valve thickening.   - Surveillance blood cx w/ no growth to date  - Patient will follow up with Dr. Clemente outpatient for further monitoring of his bacteremia. Pt previously ambulating with an 86% saturation on room air.   - Pt extubated this AM; now tolerating BiPAP well  -CXR this AM with bibasilar patchy infiltrates improved from previously.   - c/w PO lasix and consider 40 IV lasix PRN for signs of volume overload and increased work of breathing  -wean off O2 as tolerated    #COPD: Patient desaturated while walking to 86% w/o oxygen   - c/w duonebs q 4   -robitussin PRN for cough    #Possible sleep apnea  - work up sleep apnea outpatient w/ sleep study   - CPAP at night.

## 2017-10-27 NOTE — PROGRESS NOTE ADULT - ASSESSMENT
A/recs:  1) acute hypoxic respiratory failure with elevated tn and abnormal septal wall motion on tte in addition to rv dilatation and abnormal ecg this admission; left heart failure (ef=45% by tte) - s/p egd on 10- for anemia/melena - now for urgent/emergent gu procedure 10- with hematuria requiring prbc  a - nstemi earlier this admission  b - with continued bleeding, risk of asa may outweigh current benefit -  hold asa for now - monitor for signs/symptoms of ischemia - check daily ecg  c - recommend pulmonary evaluate safety of bb  d - urgent/emergent gu surgery pending:  i) no cardiac contraindications  ii) avoid labile bp  iii) check post-op ecg and tn  iv) agree with  recs for icu consult  v) patient aware of risks, benefits, alternatives; questions answered      2) paroxysmal atrial fibrillation  a - continuous temeletry    3) htn - lisinopril held by int med team    4) sepsis due to pna per [facility] with bacteremia -id follow-up appreciated - note recs    5) possible copd exacerbation -per ccm    6) acute renal failure - monitor cr/uop    7) normocytic anemia and thrombocytopenia - prbc given; serial h/h    8) shingles    9) prostate ca - per gu and hem-onc    10)  K>4, Mg>2    11) palliative care previously following A/recs:  1) acute hypoxic respiratory failure with elevated tn and abnormal septal wall motion on tte in addition to rv dilatation and abnormal ecg earlier this admission; left heart failure (ef=45% by tte) -  a - nstemi earlier this admission  b - holding asa with hematuria - check with gu when safe to restart asa 81 mg daily; check daily ecg while off asa  c - rec Pulmonary determine if safe to start beta-blocker  d - avoid labile bp  e - recommend check post-op tn and ecg    2) paroxysmal atrial fibrillation  a - monitor    3) htn - lisinopril remains held    4) sepsis due to pna per ccm with bacteremia -id following    5) possible copd exacerbation -per primary team    6) acute renal failure - resolved    7) normocytic anemia and thrombocytopenia - serial h/h    8) shingles    9) prostate ca - management per gu and hem-onc    10)  K>4, Mg>2    11) palliative care previously following

## 2017-10-27 NOTE — PROGRESS NOTE ADULT - ASSESSMENT
73 yo male with hx of HTN, COPD, Prostate CA (s/p radiation and seed 2013 on leuprolide outpatient) who presented with worsening of SOB and an episode of unwitnessed syncope found to have septic shock from MSSA bacteremia from PNA. Bacteremia cleared but now with sepsis secondary to pseudomonas UTI.     RECOMMEND:  #. Continue Cefepime 2g IVPB q12hrs -- will cover MSSA and Pseudomonas  #. F/u Bcx 10/25 -- NGTD.     Above discussed with attending. Please follow up attending attestation as well. 73 yo male with hx of HTN, COPD, Prostate CA (s/p radiation and seed 2013 on leuprolide outpatient) who presented with worsening of SOB and an episode of unwitnessed syncope found to have septic shock from MSSA bacteremia from PNA. Bacteremia cleared but now with sepsis secondary to pseudomonas UTI.     RECOMMEND:  #. Continue Cefepime 2g IVPB q12hrs for Pseudomonas UTI; also at tail end of MSSA BSI therapy, which should be covered with current regimen  #. F/u Bcx 10/25 -- NGTD.     Above discussed with attending. Please follow up attending attestation as well.

## 2017-10-27 NOTE — PROGRESS NOTE ADULT - SUBJECTIVE AND OBJECTIVE BOX
Cardiology for Preister    cc: follow-up cardiology evaluation and management of left heart failure    interval - extubated; now 4 uris; no cp    PAST MEDICAL & SURGICAL HISTORY:  COPD (chronic obstructive pulmonary disease)  Hypertension  Obstructive sleep apnea syndrome  Prostate cancer metastatic to bone  PC (prostate cancer): with seed placement    MEDICATIONS  (STANDING):  ALBUTerol/ipratropium for Nebulization 3 milliLiter(s) Nebulizer every 4 hours  atorvastatin 80 milliGRAM(s) Oral at bedtime  belladonna 16.2 mG/opium 60 mg Suppository 1 Suppository(s) Rectal once  cefepime  IVPB 2000 milliGRAM(s) IV Intermittent once  cefepime  IVPB 2000 milliGRAM(s) IV Intermittent every 12 hours  cefepime  IVPB      chlorhexidine 0.12% Liquid 15 milliLiter(s) Swish and Spit two times a day  dextrose 5%. 1000 milliLiter(s) (50 mL/Hr) IV Continuous <Continuous>  finasteride 5 milliGRAM(s) Oral daily  furosemide    Tablet 40 milliGRAM(s) Oral every 12 hours  heparin  flush 100 Units/mL Injectable 100 Unit(s) IV Push every other day  lidocaine 2% Jelly 5 milliLiter(s) IntraUrethral once  melatonin 3 milliGRAM(s) Oral at bedtime  nystatin Powder 1 Application(s) Topical two times a day  pantoprazole    Tablet 40 milliGRAM(s) Oral before breakfast  potassium chloride  10 mEq/100 mL IVPB 10 milliEquivalent(s) IV Intermittent every 1 hour  tamsulosin 0.8 milliGRAM(s) Oral at bedtime    MEDICATIONS  (PRN):  guaiFENesin    Syrup 200 milliGRAM(s) Oral every 6 hours PRN Cough  lidocaine 2% Gel 1 Application(s) Topical every 3 hours PRN catheter related discomfort at tip of penis  polyethylene glycol 3350 17 Gram(s) Oral daily PRN Constipation  simethicone 80 milliGRAM(s) Chew three times a day PRN Gas    Vital Signs Last 24 Hrs  T(C): 37.8 (27 Oct 2017 21:07), Max: 37.9 (27 Oct 2017 11:00)  T(F): 100 (27 Oct 2017 21:07), Max: 100.3 (27 Oct 2017 11:00)  HR: 79 (27 Oct 2017 21:07) (50 - 106)  BP: 115/63 (27 Oct 2017 21:07) (69/43 - 136/69)  BP(mean): 68 (27 Oct 2017 19:00) (50 - 97)  RR: 20 (27 Oct 2017 21:07) (13 - 35)  SpO2: 96% (27 Oct 2017 21:07) (92% - 100%)    physical exam  nad  conversant  bilat breath sounds present  rr; s1/s2 present; no rub  soft abd; nt  le edema bilat  warm ue and le    I&O's Detail    26 Oct 2017 07:01  -  27 Oct 2017 07:00  --------------------------------------------------------  IN:    Continuous Bladder Irrigation: 49910 mL    IV PiggyBack: 750 mL    norepinephrine Infusion: 60 mL    propofol Infusion: 378.5 mL    Solution: 300 mL  Total IN: 14005.5 mL    OUT:    Continuous Bladder Irrigation: 84077 mL    Indwelling Catheter - Urethral: 1700 mL  Total OUT: 31082 mL    Total NET: 38.5 mL      27 Oct 2017 07:01  -  27 Oct 2017 23:00  --------------------------------------------------------  IN:    IV PiggyBack: 50 mL    Oral Fluid: 480 mL  Total IN: 530 mL    OUT:    Indwelling Catheter - Urethral: 940 mL  Total OUT: 940 mL    Total NET: -410 mL        labs                                                             9.1    18.4  )-----------( 95       ( 27 Oct 2017 02:54 )             28.2   10-27    137  |  94<L>  |  17  ----------------------------<  130<H>  3.4<L>   |  26  |  1.16    Ca    8.9      27 Oct 2017 02:54  Mg     2.1     10-27    I&O's Summary    26 Oct 2017 07:01  -  27 Oct 2017 07:00  --------------------------------------------------------  IN: 22004.5 mL / OUT: 57688 mL / NET: 38.5 mL    27 Oct 2017 07:01  -  27 Oct 2017 23:01  --------------------------------------------------------  IN: 530 mL / OUT: 940 mL / NET: -410 mL    radiology    < from: Xray Chest 1 View AP- PORTABLE-Urgent (10.27.17 @ 09:50) >  EXAM:  XR CHEST 1 VIEW PORT URGENT                          PROCEDURE DATE:  10/27/2017                     INTERPRETATION:  XR CHEST 1 VIEW PORT URGENT dated 10/27/2017 9:50 AM    CLINICAL INFORMATION: Tachypneic    COMPARISON: October 26, 2017    FINDINGS: Interval removal of endotracheal tube. Right arm PICC line   remains in place. Decreased bibasilar patchy infiltrates.    IMPRESSION: As above.            "Thank you for the opportunity to participate in the care of this   patient."        KATHY PABLO M.D., RADIOLOGY ATTENDING  This document has been electronically signed. Oct 27 2017  3:50PM    < end of copied text >      < from: Echocardiogram (10.16.17 @ 10:31) >    EXAM:  ECHOCARDIOGRAM (CARDIOL)                          PROCEDURE DATE:  10/16/2017                        INTERPRETATION:  Patient Height: 172.0 cm  Patient Weight: 103.0 kg  Heart Rate: 74 bpm  Systolic Pressure: 140 mmHg  Diastolic Pressure: 70mmHg  BSA: 2.2 m^2  Interpretation Summary  The left ventricle is mildly dilated.Hypokinesis of the basal inferior and   septal region.The left ventricular ejection fraction is estimated to be   45-50%The mitral inflow pattern is consistent with impaired left   ventricular   relaxation with mildly elevated left atrial pressure (8-14mmHg).  The   left   atrial size is normal. The right atrium is dilated.The right ventricle is   moderately dilated. The right ventricular systolic function is mildly   reduced.    There is mild aortic valve thickening.There is mild mitral valve   thickening.   There is mild mitral regurgitation.There is trace tricuspid   regurgitation.There was insufficient TR detected from which to calculate   pulmonary artery systolic pressure.  No aortic root dilatation.The   inferior   vena cava is normal in size (<2.1 cm) with normal inspiratory collapse   (>50%)   consistent with normal right atrial pressure.  There is no pericardial   effusion.    < end of copied text >  IN: 650 mL / OUT: 2875 mL / NET: -2225 mL          < from: Xray Chest 1 View AP -PORTABLE-Routine (10.09.17 @ 05:37) >  EXAM:  XR CHEST 1 VIEW PORT ROUTINE                          PROCEDURE DATE:  10/09/2017                     INTERPRETATION:    Portable AP Radiograph dated 10/9/2017 5:37 AM    CLINICAL INFORMATION: 72 years, Male, Intubated, PNA    PRIOR STUDIES:October 8, 2017    FINDINGS: Support tubes and lines are unchanged. Left pleural effusion is   stable. Right pleural effusion has increased. Pulmonary vascular   congestion has increased.    IMPRESSION:  Interval increase of right pleural effusion and pulmonary vascular   congestion.            "Thank you for the opportunity to participate in the care of this   patient."        KATHY PABLO M.D., RADIOLOGY ATTENDING  This document has been electronically signed. Oct  9 2017  8:33AM    < end of copied text >

## 2017-10-27 NOTE — PROGRESS NOTE ADULT - ASSESSMENT
73 y/o M w/PMH of HTN, COPD, Prostate CA (s/p radiation and seed 2013 on leuprolide outpatient) who presented with worsening of SOB and an episode of unwitnessed syncope found to have severe sepsis with gram + cocci in clusters identified as S. aureus and ATN.  Hospital course complicated by hypotension and transfer to MICU for septic shock.  Septic shock has resolved but pt has had persistent MSSA bacteremia with resolving respiratory failure and ATN. Now, pt with hgb drop in context of bleeding/ blood clots from Glaser causing repeated episodes of Glaser obstruction s/p cystoscopy c/b delayed extubation of pt due to increased work of breathing.    PULM  #Respiratory Failure  Was present prior to the procedure, as he was previously been found to be ambulating with an 86% saturation on room air. His CXR is with atelectasis of left base and elevation of left hemidiaphragm. Vascular congestion present, although improved from previously.   - Pt extubated this AM; now tolerating BiPAP well  - 40 IV lasix PRN for signs of volume overload and increased work of breathing    #COPD: Patient desaturated while walking to 86% w/o oxygen   - c/w duonebs q 4     #Possible sleep apnea  - work up sleep apnea outpatient w/ sleep study   - CPAP at night.     HEME  #Anemia  Acute blood loss anemia 2/2 hematuria and bleeding/ clots from Gl[place] catheter on 10/23. Patient with UGIB on admission, however no recent melena or bloody stools, stool guiac negative. S/p multiple 1 U PRBCs with good response on 10/23. Iron studies c/w ACD. Patient again dropped hgb to 6.7 on 10/24, now s/p 2U PRBCs  Planned for OR today for cytoscopy (delayed yesterday due to new fever/ hgb drop)  - patient s/p cauterization by urology  - CBI overnight, as per urology.  - F/u urology recs     ID  #UTI   +UA with urine culture growing pseudomonas, patient febrile to 102.5 on 10/25 and with leukocytosis, again meeting criteria for sepsis. Repeat blood cultures drawn.   -per ID- DC zosyn and cefazolin  -c/w Cefepime 2g q 12hrs (10/26--)  -F/u blood cx, NGTD.     #Sepsis  Likely 2/2 UTI, +UA with urine culture growing GNR, patient febrile to 102.5 on 10/25 and with leukocytosis, again meeting criteria for sepsis, now resolving. Repeat blood cultures drawn; NGTD Persistent MSSA bacteremia, sepsis was resolved.  -c/w Cefepime 2g q 12hrs (10/26--) as it covers both MSSA and pseudomonas  - cefazolin 2 g q8 4 weeks total course (10/19--10/26)  - Gallium scan negative for focal areas of infection, KELLY negative for endocarditis. Repeat TTE w/o evidence of new vegetations or worsening valve thickening.   - Surveillance blood cx w/ no growth to date  - Patient will follow up with Dr. Clemente outpatient for further monitoring of his bacteremia.     Cardio  #CHF HFrEF  TTE shows HFrEF with EF 45%, right atrial dilatation, septal wall motion abnormalities and mild hypokinesis of left ventricle. Unable to visualize any vegetations or R heart structures. Unable to calculate pulmonary a pressures.   - c/w ASA + lipitor  - Lasix 40mg PO BID, give IV if needed.   -Continue lisinopril 2.5 daily  -Consider starting metoprolol as tolerated once BP allows  - strict I/Os, daily weights, fluid restriction 1.5L  - acute onset of HFrEF; discussed case with Dr. Glass who agrees that patient should have ischemia workup outpatient for acute HFrEF and repeat echo to be done to assess for improvement in LV function.     #New onset paroxysmal A.fib Vs MAT:  - pt has remained in NSR since admission to 7 la. To be followed up with outpatient cardiologist for further monitoring    #NSTEMI  - EKG positive for ST depression in anteroseptal leads, Echo positive for septal hypokinesis, troponin +.   - Ischemia likely demand ischemia due to severe sepsis  - c/w ASA  - Additional ischemic work-up as outpatient after discharge.    Renal  Hematuria w/ urinary retention  - s/p cystoscopy and found bleeding artery in lobe of prostate, which was cauterized.  - glaser w/o interruptions in flow, maintain glaser to be evaluated by urology once discharged.   - c/w tamsulosin .8 mg.     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  -Thrombocytopenia resolved.     GI  Upper GI bleed  Pigmented spot seen on EGD, bleed likely due to ulcer now healed, hgb again dropping, however guiac negative, most likely 2/2 bleeding from [place].   - PPI po daily  - hemoglobin goal 8 hgb given recent NSTEMI   - daily cbc while pt admitted  - to follow up with Dr. Mayo outpatient on discharge    #ileus -resolved  - c/w PO agents for constipation.     F no IVF, 250cc bolus PRN  E- replete lytes prn  N- DASH/TLC    PPx - On ASA 81mg, SCD     CODE - DNR not DNI  Dispo: Transfer to [facility] pending respiratory status improvement 71 y/o M w/PMH of HTN, COPD, Prostate CA (s/p radiation and seed 2013 on leuprolide outpatient) who presented with worsening of SOB and an episode of unwitnessed syncope found to have severe sepsis with gram + cocci in clusters identified as S. aureus and ATN.  Hospital course complicated by hypotension and transfer to MICU for septic shock.  Septic shock has resolved but pt has had persistent MSSA bacteremia with resolving respiratory failure and ATN. Now, pt with hgb drop in context of bleeding/ blood clots from [place] causing repeated episodes of [place] obstruction s/p cystoscopy c/b delayed extubation of pt due to increased work of breathing.    PULM  #Respiratory Failure  Was present prior to the procedure, as he was previously been found to be ambulating with an 86% saturation on room air. His CXR is with atelectasis of left base and elevation of left hemidiaphragm. Vascular congestion present, although improved from previously.   - Pt extubated this AM; now tolerating BiPAP well  - 40 IV lasix PRN for signs of volume overload and increased work of breathing    #COPD: Patient desaturated while walking to 86% w/o oxygen   - c/w duonebs q 4     #Possible sleep apnea  - work up sleep apnea outpatient w/ sleep study   - CPAP at night. 71 y/o M w/PMH of HTN, COPD, Prostate CA (s/p radiation and seed 2013 on leuprolide outpatient) who presented with worsening of SOB and an episode of unwitnessed syncope found to have severe sepsis with gram + cocci in clusters identified as S. aureus and ATN.  Hospital course complicated by hypotension and transfer to MICU for septic shock.  Septic shock has resolved but pt has had persistent MSSA bacteremia with resolving respiratory failure and ATN. Now, pt with hgb drop in context of bleeding/ blood clots from [place] causing repeated episodes of [place] obstruction s/p cystoscopy c/b delayed extubation of pt due to increased work of breathing.

## 2017-10-28 DIAGNOSIS — R14.0 ABDOMINAL DISTENSION (GASEOUS): ICD-10-CM

## 2017-10-28 LAB
ALBUMIN SERPL ELPH-MCNC: 2.6 G/DL — LOW (ref 3.3–5)
ALP SERPL-CCNC: 176 U/L — HIGH (ref 40–120)
ALT FLD-CCNC: <5 U/L — LOW (ref 10–45)
ANION GAP SERPL CALC-SCNC: 14 MMOL/L — SIGNIFICANT CHANGE UP (ref 5–17)
APTT BLD: 28.4 SEC — SIGNIFICANT CHANGE UP (ref 27.5–37.4)
AST SERPL-CCNC: 21 U/L — SIGNIFICANT CHANGE UP (ref 10–40)
BILIRUB SERPL-MCNC: 0.4 MG/DL — SIGNIFICANT CHANGE UP (ref 0.2–1.2)
BUN SERPL-MCNC: 19 MG/DL — SIGNIFICANT CHANGE UP (ref 7–23)
CALCIUM SERPL-MCNC: 8.8 MG/DL — SIGNIFICANT CHANGE UP (ref 8.4–10.5)
CHLORIDE SERPL-SCNC: 93 MMOL/L — LOW (ref 96–108)
CO2 SERPL-SCNC: 28 MMOL/L — SIGNIFICANT CHANGE UP (ref 22–31)
CREAT SERPL-MCNC: 1.18 MG/DL — SIGNIFICANT CHANGE UP (ref 0.5–1.3)
GLUCOSE SERPL-MCNC: 100 MG/DL — HIGH (ref 70–99)
HCT VFR BLD CALC: 26.5 % — LOW (ref 39–50)
HGB BLD-MCNC: 8.6 G/DL — LOW (ref 13–17)
INR BLD: 1.27 — HIGH (ref 0.88–1.16)
MAGNESIUM SERPL-MCNC: 2 MG/DL — SIGNIFICANT CHANGE UP (ref 1.6–2.6)
MCHC RBC-ENTMCNC: 30.8 PG — SIGNIFICANT CHANGE UP (ref 27–34)
MCHC RBC-ENTMCNC: 32.5 G/DL — SIGNIFICANT CHANGE UP (ref 32–36)
MCV RBC AUTO: 95 FL — SIGNIFICANT CHANGE UP (ref 80–100)
PHOSPHATE SERPL-MCNC: 3.4 MG/DL — SIGNIFICANT CHANGE UP (ref 2.5–4.5)
PLATELET # BLD AUTO: 93 K/UL — LOW (ref 150–400)
POTASSIUM SERPL-MCNC: 3.4 MMOL/L — LOW (ref 3.5–5.3)
POTASSIUM SERPL-SCNC: 3.4 MMOL/L — LOW (ref 3.5–5.3)
PROT SERPL-MCNC: 7.6 G/DL — SIGNIFICANT CHANGE UP (ref 6–8.3)
PROTHROM AB SERPL-ACNC: 14.2 SEC — HIGH (ref 9.8–12.7)
RBC # BLD: 2.79 M/UL — LOW (ref 4.2–5.8)
RBC # FLD: 16.2 % — SIGNIFICANT CHANGE UP (ref 10.3–16.9)
SODIUM SERPL-SCNC: 135 MMOL/L — SIGNIFICANT CHANGE UP (ref 135–145)
WBC # BLD: 8.9 K/UL — SIGNIFICANT CHANGE UP (ref 3.8–10.5)
WBC # FLD AUTO: 8.9 K/UL — SIGNIFICANT CHANGE UP (ref 3.8–10.5)

## 2017-10-28 PROCEDURE — 99233 SBSQ HOSP IP/OBS HIGH 50: CPT | Mod: GC

## 2017-10-28 PROCEDURE — 99232 SBSQ HOSP IP/OBS MODERATE 35: CPT

## 2017-10-28 PROCEDURE — 74000: CPT | Mod: 26

## 2017-10-28 PROCEDURE — 71010: CPT | Mod: 26

## 2017-10-28 RX ORDER — IPRATROPIUM/ALBUTEROL SULFATE 18-103MCG
3 AEROSOL WITH ADAPTER (GRAM) INHALATION ONCE
Qty: 0 | Refills: 0 | Status: COMPLETED | OUTPATIENT
Start: 2017-10-28 | End: 2017-10-28

## 2017-10-28 RX ORDER — POTASSIUM CHLORIDE 20 MEQ
10 PACKET (EA) ORAL
Qty: 0 | Refills: 0 | Status: COMPLETED | OUTPATIENT
Start: 2017-10-28 | End: 2017-10-28

## 2017-10-28 RX ORDER — POTASSIUM CHLORIDE 20 MEQ
40 PACKET (EA) ORAL ONCE
Qty: 0 | Refills: 0 | Status: COMPLETED | OUTPATIENT
Start: 2017-10-28 | End: 2017-10-28

## 2017-10-28 RX ORDER — SIMETHICONE 80 MG/1
80 TABLET, CHEWABLE ORAL ONCE
Qty: 0 | Refills: 0 | Status: COMPLETED | OUTPATIENT
Start: 2017-10-28 | End: 2017-10-28

## 2017-10-28 RX ORDER — LIDOCAINE 4 G/100G
1 CREAM TOPICAL ONCE
Qty: 0 | Refills: 0 | Status: COMPLETED | OUTPATIENT
Start: 2017-10-28 | End: 2017-10-28

## 2017-10-28 RX ADMIN — LIDOCAINE 1 APPLICATION(S): 4 CREAM TOPICAL at 17:31

## 2017-10-28 RX ADMIN — CEFEPIME 100 MILLIGRAM(S): 1 INJECTION, POWDER, FOR SOLUTION INTRAMUSCULAR; INTRAVENOUS at 07:34

## 2017-10-28 RX ADMIN — CHLORHEXIDINE GLUCONATE 15 MILLILITER(S): 213 SOLUTION TOPICAL at 17:30

## 2017-10-28 RX ADMIN — Medication 100 MILLIEQUIVALENT(S): at 09:52

## 2017-10-28 RX ADMIN — Medication 40 MILLIGRAM(S): at 17:30

## 2017-10-28 RX ADMIN — SIMETHICONE 80 MILLIGRAM(S): 80 TABLET, CHEWABLE ORAL at 12:56

## 2017-10-28 RX ADMIN — Medication 40 MILLIEQUIVALENT(S): at 09:52

## 2017-10-28 RX ADMIN — Medication 40 MILLIGRAM(S): at 06:17

## 2017-10-28 RX ADMIN — Medication 3 MILLILITER(S): at 10:07

## 2017-10-28 RX ADMIN — NYSTATIN CREAM 1 APPLICATION(S): 100000 CREAM TOPICAL at 07:34

## 2017-10-28 RX ADMIN — CHLORHEXIDINE GLUCONATE 15 MILLILITER(S): 213 SOLUTION TOPICAL at 09:54

## 2017-10-28 RX ADMIN — Medication 3 MILLILITER(S): at 06:16

## 2017-10-28 RX ADMIN — Medication 100 UNIT(S): at 12:57

## 2017-10-28 RX ADMIN — Medication 100 MILLIEQUIVALENT(S): at 11:41

## 2017-10-28 RX ADMIN — ATORVASTATIN CALCIUM 80 MILLIGRAM(S): 80 TABLET, FILM COATED ORAL at 22:32

## 2017-10-28 RX ADMIN — PANTOPRAZOLE SODIUM 40 MILLIGRAM(S): 20 TABLET, DELAYED RELEASE ORAL at 06:16

## 2017-10-28 RX ADMIN — TAMSULOSIN HYDROCHLORIDE 0.8 MILLIGRAM(S): 0.4 CAPSULE ORAL at 22:32

## 2017-10-28 RX ADMIN — LIDOCAINE 1 APPLICATION(S): 4 CREAM TOPICAL at 10:07

## 2017-10-28 RX ADMIN — CEFEPIME 100 MILLIGRAM(S): 1 INJECTION, POWDER, FOR SOLUTION INTRAMUSCULAR; INTRAVENOUS at 18:30

## 2017-10-28 RX ADMIN — NYSTATIN CREAM 1 APPLICATION(S): 100000 CREAM TOPICAL at 17:31

## 2017-10-28 RX ADMIN — Medication 100 MILLIEQUIVALENT(S): at 12:58

## 2017-10-28 RX ADMIN — FINASTERIDE 5 MILLIGRAM(S): 5 TABLET, FILM COATED ORAL at 11:40

## 2017-10-28 RX ADMIN — SIMETHICONE 80 MILLIGRAM(S): 80 TABLET, CHEWABLE ORAL at 22:32

## 2017-10-28 RX ADMIN — Medication 3 MILLILITER(S): at 22:32

## 2017-10-28 RX ADMIN — Medication 3 MILLIGRAM(S): at 22:32

## 2017-10-28 NOTE — PROGRESS NOTE ADULT - PROBLEM SELECTOR PLAN 3
The patient is very distended and tympanic on exam.  Concern for ileus vs bowel obstruction.  Please make patient NPO and obtain an abdominal xray.  He will likely need a NGT to set to intermittent suction.    -D/C atrovent.  -Patient should get out of bed into a chair.  -Hold any narcotics.

## 2017-10-28 NOTE — PROGRESS NOTE ADULT - SUBJECTIVE AND OBJECTIVE BOX
Interval Events:  Patient seen and examined at bedside.  Patient complaining of having abdominal distention and having "gas".  No nausea or vomiting.  No chest pain or shortness of breath.          Vital Signs Last 24 Hrs  T(C): 36.7 (28 Oct 2017 09:09), Max: 37.8 (27 Oct 2017 21:07)  T(F): 98.1 (28 Oct 2017 09:09), Max: 100 (27 Oct 2017 21:07)  HR: 70 (28 Oct 2017 09:09) (70 - 90)  BP: 105/61 (28 Oct 2017 09:09) (98/55 - 118/55)  BP(mean): 68 (27 Oct 2017 19:00) (67 - 74)  RR: 18 (28 Oct 2017 09:09) (18 - 35)  SpO2: 96% (28 Oct 2017 09:09) (93% - 98%)    10-27 @ 07:01  -  10-28 @ 07:00  --------------------------------------------------------  IN: 530 mL / OUT: 1690 mL / NET: -1160 mL          PHYSICAL EXAM:    General: Well developed; well nourished; in no acute distress  Neck: Supple; non tender; no masses  Respiratory: Clear to auscultation bilaterally without wheezing, rhonchi or rales  Cardiovascular: Regular rate and rhythm. S1 and S2 Normal; No murmurs, gallops or rubs  Gastrointestinal: +distended and tympanic to percussion throughout the abdomen  Extremities: +2 pitting edema lower extremities  Neurological: Alert and oriented x3  Skin: Warm and dry. No obvious rash    LABS:  ABG - ( 26 Oct 2017 18:27 )  pH: 7.42  /  pCO2: 48    /  pO2: 304   / HCO3: 30    / Base Excess: 4.9   /  SaO2: x                   CBC Full  -  ( 28 Oct 2017 07:08 )  WBC Count : 8.9 K/uL  Hemoglobin : 8.6 g/dL  Hematocrit : 26.5 %  Platelet Count - Automated : 93 K/uL  Mean Cell Volume : 95.0 fL  Mean Cell Hemoglobin : 30.8 pg  Mean Cell Hemoglobin Concentration : 32.5 g/dL      10-28    135  |  93<L>  |  19  ----------------------------<  100<H>  3.4<L>   |  28  |  1.18    Ca    8.8      28 Oct 2017 07:12  Phos  3.4     10-28  Mg     2.0     10-28    TPro  7.6  /  Alb  2.6<L>  /  TBili  0.4  /  DBili  x   /  AST  21  /  ALT  <5<L>  /  AlkPhos  176<H>  10-28    PT/INR - ( 28 Oct 2017 07:10 )   PT: 14.2 sec;   INR: 1.27          PTT - ( 28 Oct 2017 07:10 )  PTT:28.4 sec      Urinalysis Basic - ( 27 Oct 2017 02:57 )    Color: Yellow / Appearance: Clear / SG: <=1.005 / pH: x  Gluc: x / Ketone: NEGATIVE  / Bili: Negative / Urobili: 0.2 E.U./dL   Blood: x / Protein: NEGATIVE mg/dL / Nitrite: NEGATIVE   Leuk Esterase: Moderate / RBC: Many /HPF / WBC > 10 /HPF   Sq Epi: x / Non Sq Epi: 0-5 /HPF / Bacteria: Present /HPF                RADIOLOGY & ADDITIONAL STUDIES (The following images were personally reviewed):

## 2017-10-28 NOTE — PROGRESS NOTE ADULT - PROBLEM SELECTOR PLAN 1
+UA 10/27 with urine culture growing pseudomonas 10/24, patient febrile to 102.5 on 10/25 and with leukocytosis, again meeting criteria for sepsis. Repeat blood cxs 10/25 NGTD   -MSSA bacteremia with + blood cxs 10/9, now resolved. s/p cefazolin 2 g q8 2 weeks total course (started 10/19--10/26), initially planed for 4 weeks duration but switched to cefepime   -c/w Cefepime 2g q 12hrs (started 10/26, tenative end date Nov. 7th) as it covers both MSSA and pseudomonas.   -- Gallium scan negative for focal areas of infection, KELLY 10/9 negative for endocarditis. Repeat TTE 10/16 w/o evidence of new vegetations or worsening valve thickening.   - Surveillance blood cx w/ no growth to date  - Patient will follow up with Dr. Clemente outpatient for further monitoring of his bacteremia.    UTI (urinary tract infection).  Plan: +UA with urine culture growing pseudomonas patient febrile to 102.5 on 10/25 and with leukocytosis, again meeting criteria for sepsis. Repeat blood cultures; NGTD.    -Cefepime 2g q 12hrs (10/26--), per urology only needs 1 week course for UTI, however will be on Cefepime until Nov 7th for MSSA bacteremia.  -S/p zosyn (10/25-10/26)

## 2017-10-28 NOTE — PROGRESS NOTE ADULT - ASSESSMENT
71 y/o M w/PMH of HTN, COPD, Prostate CA (s/p radiation and seed 2013 on leuprolide outpatient) who presented with worsening of SOB and an episode of unwitnessed syncope found to have severe sepsis with gram + cocci in clusters identified as S. aureus and ATN.  Hospital course complicated by hypotension and transfer to MICU for septic shock.  Septic shock has resolved but pt has had persistent MSSA bacteremia with resolving respiratory failure and ATN. Patient stepped back down to floors however Saldana repeatedly obstructed from blood clots and hgb dropping, now s/p cauterization of prostate by urology. Post-op patient required stay in MICU for extubation, now stable for RMF.

## 2017-10-28 NOTE — PROGRESS NOTE ADULT - SUBJECTIVE AND OBJECTIVE BOX
OVERNIGHT EVENTS: NILDA.    SUBJECTIVE / INTERVAL HPI: Patient seen and examined at bedside. He reports feeling well. Denies any fevers/chills, CP, SOB, or abdominal pain. Later in the day complaining of abdominal distension/ gas after eating.     VITAL SIGNS:  Vital Signs Last 24 Hrs  T(C): 36.7 (28 Oct 2017 09:09), Max: 37.8 (27 Oct 2017 21:07)  T(F): 98.1 (28 Oct 2017 09:09), Max: 100 (27 Oct 2017 21:07)  HR: 70 (28 Oct 2017 09:09) (70 - 90)  BP: 105/61 (28 Oct 2017 09:09) (98/55 - 118/55)  BP(mean): 68 (27 Oct 2017 19:00) (67 - 68)  RR: 18 (28 Oct 2017 09:09) (18 - 35)  SpO2: 96% (28 Oct 2017 09:09) (93% - 98%)    PHYSICAL EXAM:    General: Awake and alert, NAD.   HEENT: NC/AT; MMM  Neck: supple  Cardiovascular: +S1/S2; RRR, no m/r/g.   Respiratory: +Bibasilar crackles, No wheezing or rales.   Gastrointestinal: soft, +distended, non-tender. No guarding   Extremities: WWP; 1+ LE edema b/l    MEDICATIONS:  MEDICATIONS  (STANDING):  atorvastatin 80 milliGRAM(s) Oral at bedtime  belladonna 16.2 mG/opium 60 mg Suppository 1 Suppository(s) Rectal once  cefepime  IVPB 2000 milliGRAM(s) IV Intermittent once  cefepime  IVPB 2000 milliGRAM(s) IV Intermittent every 12 hours  cefepime  IVPB      chlorhexidine 0.12% Liquid 15 milliLiter(s) Swish and Spit two times a day  dextrose 5%. 1000 milliLiter(s) (50 mL/Hr) IV Continuous <Continuous>  finasteride 5 milliGRAM(s) Oral daily  furosemide    Tablet 40 milliGRAM(s) Oral every 12 hours  heparin  flush 100 Units/mL Injectable 100 Unit(s) IV Push every other day  lidocaine 2% Gel 1 Application(s) Topical once  lidocaine 2% Jelly 5 milliLiter(s) IntraUrethral once  melatonin 3 milliGRAM(s) Oral at bedtime  nystatin Powder 1 Application(s) Topical two times a day  pantoprazole    Tablet 40 milliGRAM(s) Oral before breakfast  potassium chloride  10 mEq/100 mL IVPB 10 milliEquivalent(s) IV Intermittent every 1 hour  tamsulosin 0.8 milliGRAM(s) Oral at bedtime    MEDICATIONS  (PRN):  guaiFENesin    Syrup 200 milliGRAM(s) Oral every 6 hours PRN Cough  lidocaine 2% Gel 1 Application(s) Topical every 3 hours PRN catheter related discomfort at tip of penis  polyethylene glycol 3350 17 Gram(s) Oral daily PRN Constipation  simethicone 80 milliGRAM(s) Chew three times a day PRN Gas      ALLERGIES:  Allergies    No Known Allergies    Intolerances        LABS:                        8.6    8.9   )-----------( 93       ( 28 Oct 2017 07:08 )             26.5     10-28    135  |  93<L>  |  19  ----------------------------<  100<H>  3.4<L>   |  28  |  1.18    Ca    8.8      28 Oct 2017 07:12  Phos  3.4     10-28  Mg     2.0     10-28    TPro  7.6  /  Alb  2.6<L>  /  TBili  0.4  /  DBili  x   /  AST  21  /  ALT  <5<L>  /  AlkPhos  176<H>  10-28    PT/INR - ( 28 Oct 2017 07:10 )   PT: 14.2 sec;   INR: 1.27          PTT - ( 28 Oct 2017 07:10 )  PTT:28.4 sec  Urinalysis Basic - ( 27 Oct 2017 02:57 )    Color: Yellow / Appearance: Clear / SG: <=1.005 / pH: x  Gluc: x / Ketone: NEGATIVE  / Bili: Negative / Urobili: 0.2 E.U./dL   Blood: x / Protein: NEGATIVE mg/dL / Nitrite: NEGATIVE   Leuk Esterase: Moderate / RBC: Many /HPF / WBC > 10 /HPF   Sq Epi: x / Non Sq Epi: 0-5 /HPF / Bacteria: Present /HPF      RADIOLOGY & ADDITIONAL TESTS: Reviewed.

## 2017-10-28 NOTE — PROGRESS NOTE ADULT - ASSESSMENT
72 year old man with COPD, Prostate Cancer on therapy who has MSSA blood stream infection and pseudomonas UTI.  Septic shock and respiratory failure has resolved.

## 2017-10-28 NOTE — PROGRESS NOTE ADULT - PROBLEM SELECTOR PLAN 4
#CHF HFrEF  TTE shows HFrEF with EF 45%, right atrial dilatation, septal wall motion abnormalities and mild hypokinesis of left ventricle. Unable to visualize any vegetations or R heart structures. Unable to calculate pulmonary a pressures.   - c/w lipitor, consider ASA once bleeding resolves, f/u with urology for when is appropriate to restart   - Lasix 40mg PO BID, give IV if needed.   -Continue lisinopril 2.5 daily  -Consider starting metoprolol ACEI/ARBs as tolerated once BP allows  - strict I/Os, daily weights, fluid restriction 1.5L  - acute onset of HFrEF; discussed case with Dr. Glass who agrees that patient should have ischemia workup outpatient for acute HFrEF and repeat echo to be done to assess for improvement in LV function.     #New onset paroxysmal A.fib Vs MAT:  - pt has remained in NSR since admission to  [facility]. To be followed up with outpatient cardiologist for further monitoring    #NSTEMI  - EKG positive for ST depression in anteroseptal and lateral leads (II, III, aVF, V3-V6), Echo positive for septal hypokinesis, troponin + (peaked at 0.51).   - Ischemia likely demand ischemia due to severe sepsis  - c/w lipitor 80 mg, holding ASA in the setting of bleeding, f/u with urology for when is appropriate to restart   - Additional ischemic work-up as outpatient after discharge.

## 2017-10-28 NOTE — PROGRESS NOTE ADULT - PROBLEM SELECTOR PLAN 6
Pigmented spot seen on EGD, bleed likely due to ulcer now healed, hgb drop however guiac negative, most likely 2/2 bleeding from Saldana.   - protonix 40 mg PO daily  - hemoglobin goal 8 hgb given recent NSTEMI   - daily cbc while pt admitted  - to follow up with Dr. Mayo outpatient on discharge

## 2017-10-28 NOTE — PROGRESS NOTE ADULT - PROBLEM SELECTOR PLAN 3
Acute blood loss anemia 2/2 hematuria and bleeding/ clots from Glaser catheter on 10/23. Patient with UGIB on admission, however no recent melena or bloody stools, stool guiac negative. S/p multiple 1 U PRBCs with good response on 10/23. Iron studies c/w ACD. Patient again dropped hgb to 6.7 on 10/24, now s/p 2U PRBCs. Hgb stable s/p prostate cauterization.   -Continue to trend CBC daily  -Transfuse for Hb <8 given recent NSTEMI  -Monitor glaser output  -f/u urology recs

## 2017-10-28 NOTE — PROGRESS NOTE ADULT - PROBLEM SELECTOR PLAN 1
Patient has pseudomonas in the urine.  Currently on Cefepime.  Glaser remains in place due to prior significant hematuria causing urinary obstruction.  Please discuss with urology when glaser can be removed.  Cefepime to be continued until 11/7 as per ID.  Recs appreciated.

## 2017-10-28 NOTE — PROGRESS NOTE ADULT - ASSESSMENT
The patient continues to do well s/p cystectomy. He reports mildly pink urine at some point yesterday. Urine now in the bag appears normal.

## 2017-10-28 NOTE — PROGRESS NOTE ADULT - ASSESSMENT
Pt. with systolic heart failure, recent NSTEMI, COPD, is cardiologically stable post recent urological procedure.  Suggest daily ECASA be restarted as soon as urology concurs.    Pt to continue on beta-blockers and furosemide at current doses.    F/U EKG within next 24 hrs.    Pt. discussed with  earlier.

## 2017-10-28 NOTE — PROGRESS NOTE ADULT - SUBJECTIVE AND OBJECTIVE BOX
71 y/o male with recent NSTMI and hx. of a.HTN, COPD and MARIA ESTHER, is s/p urological procedure. I am following this pt. for Dr. Kade Glass.    Pt. has hx. of recent exacerbation of pulmonary sxs. resolving now. He is extubated, alert and cooperative.    PE  VSS  Pt alert , conversing and offering no cardiac complaints.  Neck: Supple; NVs flat; Carotids = and without bruits.  Chest: Dullness with few rales in both posterior bases.  Cor; Fair heart sounds without M,G,R,T o H.  Abd: Soft.    No evidence of hematuria in Saldana catheter.    Labs; Hb of 8.6,   Platelets- 93,000.    Most recent echocardiogram revealed LVEF of about 45% and basal septal and basal inferior wall hypokinesis; mild mitral regurgitation.

## 2017-10-28 NOTE — PROGRESS NOTE ADULT - SUBJECTIVE AND OBJECTIVE BOX
On interval followup, the right arm PICC site is clean, dry, non-inflamed and non-tender. Afebrile. Notes and cultures are followed.

## 2017-10-28 NOTE — PROGRESS NOTE ADULT - SUBJECTIVE AND OBJECTIVE BOX
INTERVAL HPI/OVERNIGHT EVENTS: No fevers. Hematuria resolved.    CONSTITUTIONAL:  Negative fever or chills, feels well, good appetite  EYES:  Negative  blurry vision or double vision  CARDIOVASCULAR:  Negative for chest pain or palpitations  RESPIRATORY:  Negative for cough, wheezing, or SOB   GASTROINTESTINAL:  Negative for nausea, vomiting, diarrhea, constipation, or abdominal pain  GENITOURINARY:  Negative frequency, urgency or dysuria  NEUROLOGIC:  No headache, confusion, dizziness, lightheadedness      ANTIBIOTICS/RELEVANT:    MEDICATIONS  (STANDING):  ALBUTerol/ipratropium for Nebulization 3 milliLiter(s) Nebulizer every 4 hours  atorvastatin 80 milliGRAM(s) Oral at bedtime  belladonna 16.2 mG/opium 60 mg Suppository 1 Suppository(s) Rectal once  cefepime  IVPB 2000 milliGRAM(s) IV Intermittent once  cefepime  IVPB 2000 milliGRAM(s) IV Intermittent every 12 hours  cefepime  IVPB      chlorhexidine 0.12% Liquid 15 milliLiter(s) Swish and Spit two times a day  dextrose 5%. 1000 milliLiter(s) (50 mL/Hr) IV Continuous <Continuous>  finasteride 5 milliGRAM(s) Oral daily  furosemide    Tablet 40 milliGRAM(s) Oral every 12 hours  heparin  flush 100 Units/mL Injectable 100 Unit(s) IV Push every other day  lidocaine 2% Jelly 5 milliLiter(s) IntraUrethral once  melatonin 3 milliGRAM(s) Oral at bedtime  nystatin Powder 1 Application(s) Topical two times a day  pantoprazole    Tablet 40 milliGRAM(s) Oral before breakfast  potassium chloride  10 mEq/100 mL IVPB 10 milliEquivalent(s) IV Intermittent every 1 hour  potassium chloride  10 mEq/100 mL IVPB 10 milliEquivalent(s) IV Intermittent every 1 hour  tamsulosin 0.8 milliGRAM(s) Oral at bedtime    MEDICATIONS  (PRN):  guaiFENesin    Syrup 200 milliGRAM(s) Oral every 6 hours PRN Cough  lidocaine 2% Gel 1 Application(s) Topical every 3 hours PRN catheter related discomfort at tip of penis  polyethylene glycol 3350 17 Gram(s) Oral daily PRN Constipation  simethicone 80 milliGRAM(s) Chew three times a day PRN Gas        Vital Signs Last 24 Hrs  T(C): 36.7 (28 Oct 2017 09:09), Max: 37.9 (27 Oct 2017 11:00)  T(F): 98.1 (28 Oct 2017 09:09), Max: 100.3 (27 Oct 2017 11:00)  HR: 70 (28 Oct 2017 09:09) (70 - 106)  BP: 105/61 (28 Oct 2017 09:09) (98/55 - 123/64)  BP(mean): 68 (27 Oct 2017 19:00) (67 - 83)  RR: 18 (28 Oct 2017 09:09) (18 - 35)  SpO2: 96% (28 Oct 2017 09:09) (92% - 98%)    PHYSICAL EXAM:  Constitutional:Well-developed, well nourished  Eyes:MEHUL, EOMI  Ear/Nose/Throat: no oral lesion, no sinus tenderness on percussion	  Neck:no JVD, no lymphadenopathy, supple  Respiratory: CTA suzanna  Cardiovascular: S1S2 RRR, no murmurs  Gastrointestinal:soft, (+) BS, no HSM  Extremities:no e/e/c  Vascular: DP Pulse:	right normal; left normal      LABS:                        8.6    8.9   )-----------( 93       ( 28 Oct 2017 07:08 )             26.5     10-28    135  |  93<L>  |  19  ----------------------------<  100<H>  3.4<L>   |  28  |  1.18    Ca    8.8      28 Oct 2017 07:12  Phos  3.4     10-28  Mg     2.0     10-28    TPro  7.6  /  Alb  2.6<L>  /  TBili  0.4  /  DBili  x   /  AST  21  /  ALT  <5<L>  /  AlkPhos  176<H>  10-28    PT/INR - ( 28 Oct 2017 07:10 )   PT: 14.2 sec;   INR: 1.27          PTT - ( 28 Oct 2017 07:10 )  PTT:28.4 sec  Urinalysis Basic - ( 27 Oct 2017 02:57 )    Color: Yellow / Appearance: Clear / SG: <=1.005 / pH: x  Gluc: x / Ketone: NEGATIVE  / Bili: Negative / Urobili: 0.2 E.U./dL   Blood: x / Protein: NEGATIVE mg/dL / Nitrite: NEGATIVE   Leuk Esterase: Moderate / RBC: Many /HPF / WBC > 10 /HPF   Sq Epi: x / Non Sq Epi: 0-5 /HPF / Bacteria: Present /HPF        MICROBIOLOGY: Culture - Urine (10.24.17 @ 08:28)    -  Aztreonam: S 8    -  Ceftazidime: S 4    -  Ciprofloxacin: S <=1    -  Gentamicin: S <=4    -  Piperacillin/Tazobactam: S <=16    -  Tobramycin: S <=4    Specimen Source: .Urine Catheterized    Culture Results:   >100,000 CFU/ml Pseudomonas aeruginosa    Organism Identification: Pseudomonas aeruginosa    Organism: Pseudomonas aeruginosa    Method Type: DILMA    Bcx negative for MSSA since 10/10    RADIOLOGY & ADDITIONAL STUDIES: reviewed INTERVAL HPI/OVERNIGHT EVENTS: No fevers. Hematuria resolved. Having post-prandial reflux symptoms.    CONSTITUTIONAL:  Negative fever or chills, feels well, good appetite  EYES:  Negative  blurry vision or double vision  CARDIOVASCULAR:  Negative for chest pain or palpitations  RESPIRATORY:  Negative for cough, wheezing, or SOB   GASTROINTESTINAL:  Negative for nausea, vomiting, diarrhea, constipation, or abdominal pain  GENITOURINARY:  Negative frequency, urgency or dysuria  NEUROLOGIC:  No headache, confusion, dizziness, lightheadedness      ANTIBIOTICS/RELEVANT:    MEDICATIONS  (STANDING):  ALBUTerol/ipratropium for Nebulization 3 milliLiter(s) Nebulizer every 4 hours  atorvastatin 80 milliGRAM(s) Oral at bedtime  belladonna 16.2 mG/opium 60 mg Suppository 1 Suppository(s) Rectal once  cefepime  IVPB 2000 milliGRAM(s) IV Intermittent once  cefepime  IVPB 2000 milliGRAM(s) IV Intermittent every 12 hours  cefepime  IVPB      chlorhexidine 0.12% Liquid 15 milliLiter(s) Swish and Spit two times a day  dextrose 5%. 1000 milliLiter(s) (50 mL/Hr) IV Continuous <Continuous>  finasteride 5 milliGRAM(s) Oral daily  furosemide    Tablet 40 milliGRAM(s) Oral every 12 hours  heparin  flush 100 Units/mL Injectable 100 Unit(s) IV Push every other day  lidocaine 2% Jelly 5 milliLiter(s) IntraUrethral once  melatonin 3 milliGRAM(s) Oral at bedtime  nystatin Powder 1 Application(s) Topical two times a day  pantoprazole    Tablet 40 milliGRAM(s) Oral before breakfast  potassium chloride  10 mEq/100 mL IVPB 10 milliEquivalent(s) IV Intermittent every 1 hour  potassium chloride  10 mEq/100 mL IVPB 10 milliEquivalent(s) IV Intermittent every 1 hour  tamsulosin 0.8 milliGRAM(s) Oral at bedtime    MEDICATIONS  (PRN):  guaiFENesin    Syrup 200 milliGRAM(s) Oral every 6 hours PRN Cough  lidocaine 2% Gel 1 Application(s) Topical every 3 hours PRN catheter related discomfort at tip of penis  polyethylene glycol 3350 17 Gram(s) Oral daily PRN Constipation  simethicone 80 milliGRAM(s) Chew three times a day PRN Gas        Vital Signs Last 24 Hrs  T(C): 36.7 (28 Oct 2017 09:09), Max: 37.9 (27 Oct 2017 11:00)  T(F): 98.1 (28 Oct 2017 09:09), Max: 100.3 (27 Oct 2017 11:00)  HR: 70 (28 Oct 2017 09:09) (70 - 106)  BP: 105/61 (28 Oct 2017 09:09) (98/55 - 123/64)  BP(mean): 68 (27 Oct 2017 19:00) (67 - 83)  RR: 18 (28 Oct 2017 09:09) (18 - 35)  SpO2: 96% (28 Oct 2017 09:09) (92% - 98%)    PHYSICAL EXAM:  Constitutional:Well-developed, well nourished  Eyes:MEHUL, EOMI  Ear/Nose/Throat: no oral lesion, no sinus tenderness on percussion	  Neck:no JVD, no lymphadenopathy, supple  Respiratory: CTA suzanna  Cardiovascular: S1S2 RRR, no murmurs  Gastrointestinal:soft, (+) BS, no HSM  Extremities:no e/e/c  Vascular: DP Pulse:	right normal; left normal      LABS:                        8.6    8.9   )-----------( 93       ( 28 Oct 2017 07:08 )             26.5     10-28    135  |  93<L>  |  19  ----------------------------<  100<H>  3.4<L>   |  28  |  1.18    Ca    8.8      28 Oct 2017 07:12  Phos  3.4     10-28  Mg     2.0     10-28    TPro  7.6  /  Alb  2.6<L>  /  TBili  0.4  /  DBili  x   /  AST  21  /  ALT  <5<L>  /  AlkPhos  176<H>  10-28    PT/INR - ( 28 Oct 2017 07:10 )   PT: 14.2 sec;   INR: 1.27          PTT - ( 28 Oct 2017 07:10 )  PTT:28.4 sec  Urinalysis Basic - ( 27 Oct 2017 02:57 )    Color: Yellow / Appearance: Clear / SG: <=1.005 / pH: x  Gluc: x / Ketone: NEGATIVE  / Bili: Negative / Urobili: 0.2 E.U./dL   Blood: x / Protein: NEGATIVE mg/dL / Nitrite: NEGATIVE   Leuk Esterase: Moderate / RBC: Many /HPF / WBC > 10 /HPF   Sq Epi: x / Non Sq Epi: 0-5 /HPF / Bacteria: Present /HPF        MICROBIOLOGY: Culture - Urine (10.24.17 @ 08:28)    -  Aztreonam: S 8    -  Ceftazidime: S 4    -  Ciprofloxacin: S <=1    -  Gentamicin: S <=4    -  Piperacillin/Tazobactam: S <=16    -  Tobramycin: S <=4    Specimen Source: .Urine Catheterized    Culture Results:   >100,000 CFU/ml Pseudomonas aeruginosa    Organism Identification: Pseudomonas aeruginosa    Organism: Pseudomonas aeruginosa    Method Type: DILMA    Bcx negative for MSSA since 10/10    RADIOLOGY & ADDITIONAL STUDIES: reviewed

## 2017-10-28 NOTE — CHART NOTE - NSCHARTNOTEFT_GEN_A_CORE
Patient complaining of abdominal distension, increased gas this afternoon after eating a large meal. Denies any nausea, abdominal pain or cramping. On exam the abdomen is soft but distended and tympanic to percussion. Non-tender without guarding. Abdominal X-ray performed which showed large gastric bubble. Patient was advised to get a NG tube temporarily for decompression to prevent the risk of n/v and possible aspiration. He refused the procedure, stating he would like to try to pass the gas on his own. Will follow up closely with patient and ensure he is improving symptomatically.

## 2017-10-28 NOTE — PROGRESS NOTE ADULT - SUBJECTIVE AND OBJECTIVE BOX
72M s/p Cysto, Clot evac and prostate fulguration for persistent hematuria.  Also UTI    Saldana in place with clear Urine.  Abx x 1 week in total  Continue Flomax  TOV on day of discharge.  If retention, D/C with Saldana

## 2017-10-28 NOTE — PROGRESS NOTE ADULT - ASSESSMENT
73 yo male with hx of HTN, COPD, Prostate CA (s/p radiation and seed 2013 on leuprolide outpatient) who presented with worsening of SOB and an episode of unwitnessed syncope found to have septic shock from MSSA bacteremia from PNA. KELLY negative and bacteremia cleared since 10/10; course c/b sepsis secondary to pseudomonas UTI and hematuria, which has resolved.     #. Continue Cefepime 2g IVPB q12hrs for Pseudomonas UTI; also at tail end of MSSA BSI therapy, which should be covered with current regimen--continue through 11/7/17. There is no PO alternative.    Please call with ? [phone]  To see again as requested

## 2017-10-28 NOTE — PROGRESS NOTE ADULT - SUBJECTIVE AND OBJECTIVE BOX
The patient looks good this am. Breathing okay. States that he is eating. Urine looks clear.    CHEMOTHERAPY REGIMEN:        Day:                          Diet:  Protocol:                                    IVF:      MEDICATIONS  (STANDING):  ALBUTerol/ipratropium for Nebulization 3 milliLiter(s) Nebulizer every 4 hours  atorvastatin 80 milliGRAM(s) Oral at bedtime  belladonna 16.2 mG/opium 60 mg Suppository 1 Suppository(s) Rectal once  cefepime  IVPB 2000 milliGRAM(s) IV Intermittent once  cefepime  IVPB 2000 milliGRAM(s) IV Intermittent every 12 hours  cefepime  IVPB      chlorhexidine 0.12% Liquid 15 milliLiter(s) Swish and Spit two times a day  dextrose 5%. 1000 milliLiter(s) (50 mL/Hr) IV Continuous <Continuous>  finasteride 5 milliGRAM(s) Oral daily  furosemide    Tablet 40 milliGRAM(s) Oral every 12 hours  heparin  flush 100 Units/mL Injectable 100 Unit(s) IV Push every other day  lidocaine 2% Jelly 5 milliLiter(s) IntraUrethral once  melatonin 3 milliGRAM(s) Oral at bedtime  nystatin Powder 1 Application(s) Topical two times a day  pantoprazole    Tablet 40 milliGRAM(s) Oral before breakfast  potassium chloride  10 mEq/100 mL IVPB 10 milliEquivalent(s) IV Intermittent every 1 hour  tamsulosin 0.8 milliGRAM(s) Oral at bedtime    MEDICATIONS  (PRN):  guaiFENesin    Syrup 200 milliGRAM(s) Oral every 6 hours PRN Cough  lidocaine 2% Gel 1 Application(s) Topical every 3 hours PRN catheter related discomfort at tip of penis  polyethylene glycol 3350 17 Gram(s) Oral daily PRN Constipation  simethicone 80 milliGRAM(s) Chew three times a day PRN Gas      Allergies    No Known Allergies    Intolerances        DVT Prophylaxis: [ ] YES [x ] NO      Antibiotics: [x ] YES [ ] NO    Pain Scale (1-10):       Location:    Vital Signs Last 24 Hrs  T(C): 37 (28 Oct 2017 04:54), Max: 37.9 (27 Oct 2017 11:00)  T(F): 98.6 (28 Oct 2017 04:54), Max: 100.3 (27 Oct 2017 11:00)  HR: 71 (28 Oct 2017 04:54) (69 - 106)  BP: 98/55 (28 Oct 2017 04:54) (98/55 - 136/69)  BP(mean): 68 (27 Oct 2017 19:00) (67 - 97)  RR: 24 (28 Oct 2017 05:31) (18 - 35)  SpO2: 97% (28 Oct 2017 05:31) (92% - 98%)    Drug Dosing Weight  Height (cm): 172.72 (24 Oct 2017 03:30)  Weight (kg): 102.6 (27 Oct 2017 05:47)  BMI (kg/m2): 34.4 (27 Oct 2017 05:47)  BSA (m2): 2.15 (27 Oct 2017 05:47)    PHYSICAL EXAM:      Constitutional: looks good, feeling good.  Eyes: conjunctival palor persists.  ENMT: NC in place. Buccal mucosa moist.  Neck: no masses.  Respiratory: chest is clear.  Cardiovascular: S1>S2 at apex. RSR.  Gastrointestinal: abdomen remains distended, tympanitic. Soft, nontender, active bowel sounds.  Genitourinary: cath remains in place.  Extremities: some  leg edema.   Neurological: no gross focal deficits.  Skin: warm and dry.  Lymph Nodes: none palp.  Musculoskeletal: full ROM.  Psychiatric: affect normal.        URINARY CATHETER: [x ] YES [ ] NO     LABS:  CBC Full  -  ( 27 Oct 2017 02:54 )  WBC Count : 18.4 K/uL  Hemoglobin : 9.1 g/dL  Hematocrit : 28.2 %  Platelet Count - Automated : 95 K/uL  Mean Cell Volume : 93.7 fL  Mean Cell Hemoglobin : 30.2 pg  Mean Cell Hemoglobin Concentration : 32.3 g/dL  Auto Neutrophil # : x  Auto Lymphocyte # : x  Auto Monocyte # : x  Auto Eosinophil # : x  Auto Basophil # : x  Auto Neutrophil % : x  Auto Lymphocyte % : x  Auto Monocyte % : x  Auto Eosinophil % : x  Auto Basophil % : x    10-27    137  |  94<L>  |  17  ----------------------------<  130<H>  3.4<L>   |  26  |  1.16    Ca    8.9      27 Oct 2017 02:54  Mg     2.1     10-27      PT/INR - ( 26 Oct 2017 10:57 )   PT: 15.8 sec;   INR: 1.41          PTT - ( 26 Oct 2017 10:57 )  PTT:27.9 sec  Urinalysis Basic - ( 27 Oct 2017 02:57 )    Color: Yellow / Appearance: Clear / SG: <=1.005 / pH: x  Gluc: x / Ketone: NEGATIVE  / Bili: Negative / Urobili: 0.2 E.U./dL   Blood: x / Protein: NEGATIVE mg/dL / Nitrite: NEGATIVE   Leuk Esterase: Moderate / RBC: Many /HPF / WBC > 10 /HPF   Sq Epi: x / Non Sq Epi: 0-5 /HPF / Bacteria: Present /HPF        CULTURES:    RADIOLOGY & ADDITIONAL STUDIES:

## 2017-10-28 NOTE — PROGRESS NOTE ADULT - PROBLEM SELECTOR PLAN 2
Pt previously ambulating with an 86% saturation on room air.   - Pt extubated 10/28, s/p cystoscopy, now satting well on RA.  - c/w PO lasix and consider 40 IV lasix PRN for signs of volume overload and increased work of breathing  -wean off O2 as tolerated    #COPD: Patient desaturated while walking to 86% w/o oxygen   - c/w duonebs q 4   -robitussin PRN for cough    #Possible sleep apnea  - work up sleep apnea outpatient w/ sleep study   - CPAP at night.

## 2017-10-28 NOTE — PROGRESS NOTE ADULT - PROBLEM SELECTOR PLAN 5
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  - glaser w/o interruptions in flow, maintain Glaser to be evaluated by urology once discharged. Urology to evaluate pt for CBI if Glaser output decreases significantly, CBI stopped on 10/27 in AM  - c/w tamsulosin .8 mg and finasteride 5 mg PO daily  -Lidocaine jelly to penis tip for Glaser discomfort/pain

## 2017-10-28 NOTE — PROGRESS NOTE ADULT - PROBLEM SELECTOR PLAN 2
MSSA BSI.  Source not clearly defined.  KELLY and gallium negative.  Cefepime will cover the MSSA.  -Repeat blood cultures are negative.    -Afebrile for 24 hours.

## 2017-10-29 LAB
ANION GAP SERPL CALC-SCNC: 12 MMOL/L — SIGNIFICANT CHANGE UP (ref 5–17)
BLD GP AB SCN SERPL QL: NEGATIVE — SIGNIFICANT CHANGE UP
BUN SERPL-MCNC: 20 MG/DL — SIGNIFICANT CHANGE UP (ref 7–23)
CALCIUM SERPL-MCNC: 9.2 MG/DL — SIGNIFICANT CHANGE UP (ref 8.4–10.5)
CHLORIDE SERPL-SCNC: 100 MMOL/L — SIGNIFICANT CHANGE UP (ref 96–108)
CO2 SERPL-SCNC: 30 MMOL/L — SIGNIFICANT CHANGE UP (ref 22–31)
CREAT SERPL-MCNC: 0.99 MG/DL — SIGNIFICANT CHANGE UP (ref 0.5–1.3)
GLUCOSE SERPL-MCNC: 137 MG/DL — HIGH (ref 70–99)
HCT VFR BLD CALC: 28.3 % — LOW (ref 39–50)
HGB BLD-MCNC: 9 G/DL — LOW (ref 13–17)
MAGNESIUM SERPL-MCNC: 2 MG/DL — SIGNIFICANT CHANGE UP (ref 1.6–2.6)
MCHC RBC-ENTMCNC: 30.5 PG — SIGNIFICANT CHANGE UP (ref 27–34)
MCHC RBC-ENTMCNC: 31.8 G/DL — LOW (ref 32–36)
MCV RBC AUTO: 95.9 FL — SIGNIFICANT CHANGE UP (ref 80–100)
PLATELET # BLD AUTO: 96 K/UL — LOW (ref 150–400)
POTASSIUM SERPL-MCNC: 3.9 MMOL/L — SIGNIFICANT CHANGE UP (ref 3.5–5.3)
POTASSIUM SERPL-SCNC: 3.9 MMOL/L — SIGNIFICANT CHANGE UP (ref 3.5–5.3)
RBC # BLD: 2.95 M/UL — LOW (ref 4.2–5.8)
RBC # FLD: 15.6 % — SIGNIFICANT CHANGE UP (ref 10.3–16.9)
RH IG SCN BLD-IMP: POSITIVE — SIGNIFICANT CHANGE UP
SODIUM SERPL-SCNC: 142 MMOL/L — SIGNIFICANT CHANGE UP (ref 135–145)
WBC # BLD: 5.8 K/UL — SIGNIFICANT CHANGE UP (ref 3.8–10.5)
WBC # FLD AUTO: 5.8 K/UL — SIGNIFICANT CHANGE UP (ref 3.8–10.5)

## 2017-10-29 PROCEDURE — 99233 SBSQ HOSP IP/OBS HIGH 50: CPT | Mod: GC

## 2017-10-29 PROCEDURE — 74000: CPT | Mod: 26

## 2017-10-29 RX ORDER — POLYETHYLENE GLYCOL 3350 17 G/17G
17 POWDER, FOR SOLUTION ORAL ONCE
Qty: 0 | Refills: 0 | Status: COMPLETED | OUTPATIENT
Start: 2017-10-29 | End: 2017-10-29

## 2017-10-29 RX ORDER — IPRATROPIUM/ALBUTEROL SULFATE 18-103MCG
3 AEROSOL WITH ADAPTER (GRAM) INHALATION ONCE
Qty: 0 | Refills: 0 | Status: COMPLETED | OUTPATIENT
Start: 2017-10-29 | End: 2017-10-29

## 2017-10-29 RX ORDER — IOHEXOL 300 MG/ML
50 INJECTION, SOLUTION INTRAVENOUS ONCE
Qty: 0 | Refills: 0 | Status: DISCONTINUED | OUTPATIENT
Start: 2017-10-29 | End: 2017-10-31

## 2017-10-29 RX ADMIN — PANTOPRAZOLE SODIUM 40 MILLIGRAM(S): 20 TABLET, DELAYED RELEASE ORAL at 06:18

## 2017-10-29 RX ADMIN — Medication 40 MILLIGRAM(S): at 17:58

## 2017-10-29 RX ADMIN — NYSTATIN CREAM 1 APPLICATION(S): 100000 CREAM TOPICAL at 06:18

## 2017-10-29 RX ADMIN — SIMETHICONE 80 MILLIGRAM(S): 80 TABLET, CHEWABLE ORAL at 11:35

## 2017-10-29 RX ADMIN — CHLORHEXIDINE GLUCONATE 15 MILLILITER(S): 213 SOLUTION TOPICAL at 17:58

## 2017-10-29 RX ADMIN — CEFEPIME 100 MILLIGRAM(S): 1 INJECTION, POWDER, FOR SOLUTION INTRAMUSCULAR; INTRAVENOUS at 17:57

## 2017-10-29 RX ADMIN — Medication 40 MILLIGRAM(S): at 06:18

## 2017-10-29 RX ADMIN — Medication 3 MILLILITER(S): at 18:19

## 2017-10-29 RX ADMIN — Medication 3 MILLILITER(S): at 05:57

## 2017-10-29 RX ADMIN — Medication 3 MILLIGRAM(S): at 22:24

## 2017-10-29 RX ADMIN — FINASTERIDE 5 MILLIGRAM(S): 5 TABLET, FILM COATED ORAL at 11:33

## 2017-10-29 RX ADMIN — CHLORHEXIDINE GLUCONATE 15 MILLILITER(S): 213 SOLUTION TOPICAL at 06:18

## 2017-10-29 RX ADMIN — CEFEPIME 100 MILLIGRAM(S): 1 INJECTION, POWDER, FOR SOLUTION INTRAMUSCULAR; INTRAVENOUS at 06:18

## 2017-10-29 RX ADMIN — NYSTATIN CREAM 1 APPLICATION(S): 100000 CREAM TOPICAL at 17:59

## 2017-10-29 RX ADMIN — TAMSULOSIN HYDROCHLORIDE 0.8 MILLIGRAM(S): 0.4 CAPSULE ORAL at 22:25

## 2017-10-29 RX ADMIN — ATORVASTATIN CALCIUM 80 MILLIGRAM(S): 80 TABLET, FILM COATED ORAL at 22:24

## 2017-10-29 NOTE — CONSULT NOTE ADULT - CONSULT REASON
Hypoxemia
ICU trigger: dx of an active stage IV malignancy
Melena, Anemia
Persistent MSSA bacteremia
Tachypnea, SOB, Sepsis
central venous access
retention
Abdominal Distention
Abnormal EKG

## 2017-10-29 NOTE — PROGRESS NOTE ADULT - ASSESSMENT
A/recs:  1) acute hypoxic respiratory failure with elevated tn and abnormal septal wall motion on tte in addition to rv dilatation and abnormal ecg earlier this admission; left heart failure (ef=45% by tte) -  a - nstemi earlier this admission  b - with patient currently  npo, rec GUN and Internal Medicine to decide when safe to restart asa  c - recommend repeat ecg and troponin - call if abnormal  d - rec Pulmonary consult to determine if safe to start beta-blocker  e - per Seferino rec cardiac cta prior to discharge if no contraindications    2) paroxysmal atrial fibrillation  a - check ecg    3) htn - monitoring off lisinopril since gu surgery    4) sepsis due to pna per ccm with bacteremia - rec id follow-up    5) possible copd exacerbation - rec pulmonary follow-up    6) acute renal failure - resolved    7) normocytic anemia and thrombocytopenia - monitoring h/h    8) shingles    9) prostate ca - gu and hem-onc following    10)  K>4, Mg>2    11) palliative care previously following  discussed with Housestaff (Casey)

## 2017-10-29 NOTE — CHART NOTE - NSCHARTNOTEFT_GEN_A_CORE
Hospitalist Addendum    Radiology resident called to evaluate abdominal xray.  Ileus vs bowel obstruction.  Patient is passing gas.  The patient's abdomen still remains very distended and tympanic.  CT Abdomen ordered with PO contrast to rule out bowel obstruction.  Will call surgery to evaluate patient.

## 2017-10-29 NOTE — PROGRESS NOTE ADULT - PROBLEM SELECTOR PLAN 1
Patient has pseudomonas in the urine.  Currently on Cefepime.  Saldana remains in place due to prior significant hematuria causing urinary obstruction.  TOV the day of the discharge as per urology. Cefepime to be continued until 11/7 as per ID.  Recs appreciated.

## 2017-10-29 NOTE — CONSULT NOTE ADULT - ATTENDING COMMENTS
[facility] ATTENDING  Patient seen and discussed with House Officer/Resident  Chart and history reviewed  Exam, labs, and radiology as noted above  Assessment and Plans as outlined  Remains intubated post procedure, clinically stable  cxr with clearing dense LLL consolidation improving but mild pvc.edema to diurese overnight  Intubated and on mechanical ventilator support on full assist-control mode   Patient appears comfortable, adequately sedated, not in distress  Patient suctioned to clear secretions, and ventilator adjusted to optimize patient-vent synchrony to maximize ventilation and oxygenation.    Breathing is non-labored without increased work of breathing --- no intercostal accessory muscle use and no paradoxical abdominal motion evident   Hemodynamically stable---clinically perfusing adequately on current vasoactive drips  Aim to maintain MAP >= 65 mmHg, U/O >= 0.5 ml/kg/hr, pulse oximetry Ox-Sat >= 92%   Metabolic demands along with any abnormal blood chemistries, and fluid requirements being addressed    Sedation and/or pain meds as needed to reduce metabolic demand and maintain comfort   GI/DVT prophylaxis  Patient is critical but stabilizing at present  Care discussed with ICU Resident and RN as well as Respiratory Therapist and other appropriate ancillary staff to co-ordinate multidisciplinary care.
Pt was seen and examined. Agree with the above
72M COPD HTN chronic LE edema likely CKD, prostate Ca mets to bone, presetned with worsening sob after 1 month ho cough productive yellow sputum.  CT: bronchial cuffing vs bronchiectasis LLL. admit to medical stepdown, IVF hydration for acute/chronic renal insufficiency. treat with vanco ceftriaxone azithro given marked WBC elvation and sob. bronchodilators. check sono legs eval for DVT.
Patient seen and examined at bedside.  Abdomen softly distended, NT.  Recommend:  CT abdomen/pelvis to evaluate source of distention
Agree with above.  Nafcillin is the preferred agent for MSSA bacteremia.  It may help clear the bacteremia more rapidly than vancomycin.  Also want to exclude a nidus of infection.  Agree with gallium/bone scan to help localize focus of infection.  Would also replace right IJ and send tip for culture as this could be nidus of infection.      In some cases S. aureus takes several days to clear from blood stream even if source control is achieved.  Can continue nafcillin now and check blood cultures daily.

## 2017-10-29 NOTE — PROGRESS NOTE ADULT - SUBJECTIVE AND OBJECTIVE BOX
Interval Events:  Patient seen and examined at bedside.  Patient is hungry however he does note his abdomen is still distended.  No vomiting.        Vital Signs Last 24 Hrs  T(C): 36.7 (29 Oct 2017 09:05), Max: 36.8 (28 Oct 2017 16:36)  T(F): 98 (29 Oct 2017 09:05), Max: 98.2 (28 Oct 2017 16:36)  HR: 84 (29 Oct 2017 09:29) (65 - 85)  BP: 102/65 (29 Oct 2017 09:05) (102/65 - 131/80)  BP(mean): --  RR: 17 (29 Oct 2017 09:29) (16 - 20)  SpO2: 95% (29 Oct 2017 09:29) (95% - 97%)    10-28 @ 07:01  -  10-29 @ 07:00  --------------------------------------------------------  IN: 0 mL / OUT: 1475 mL / NET: -1475 mL          PHYSICAL EXAM:    General: Well developed; well nourished; in no acute distress  Neck: Supple; non tender; no masses  Respiratory: Clear to auscultation bilaterally without wheezing, rhonchi or rales  Cardiovascular: Regular rate and rhythm. S1 and S2 Normal; No murmurs, gallops or rubs  Gastrointestinal: very distended, tympanic. soft and non tender  Extremities: Normal range of motion, No clubbing, cyanosis or edema  Neurological: Alert and oriented x3  Skin: Warm and dry. No obvious rash    LABS:      CBC Full  -  ( 29 Oct 2017 07:19 )  WBC Count : 5.8 K/uL  Hemoglobin : 9.0 g/dL  Hematocrit : 28.3 %  Platelet Count - Automated : 96 K/uL  Mean Cell Volume : 95.9 fL  Mean Cell Hemoglobin : 30.5 pg  Mean Cell Hemoglobin Concentration : 31.8 g/dL    10-29    142  |  100  |  20  ----------------------------<  137<H>  3.9   |  30  |  0.99    Ca    9.2      29 Oct 2017 07:19  Phos  3.4     10-28  Mg     2.0     10-29    TPro  7.6  /  Alb  2.6<L>  /  TBili  0.4  /  DBili  x   /  AST  21  /  ALT  <5<L>  /  AlkPhos  176<H>  10-28    PT/INR - ( 28 Oct 2017 07:10 )   PT: 14.2 sec;   INR: 1.27          PTT - ( 28 Oct 2017 07:10 )  PTT:28.4 sec                  RADIOLOGY & ADDITIONAL STUDIES (The following images were personally reviewed):  AXR: Very distended stomach

## 2017-10-29 NOTE — PROGRESS NOTE ADULT - PROBLEM SELECTOR PLAN 3
The patient is still very distended and tympanic on exam.  The patient is refusing an NGT.  Would keep NPO for now.  Please repeat abdominal xray to see if there is less gas.

## 2017-10-29 NOTE — CONSULT NOTE ADULT - SUBJECTIVE AND OBJECTIVE BOX
HPI:  72M PMHx of HTN, COPD, Prostate CA (2013, s/p radiation and seed placement, on leuprolide with a recent elevation of PSA and imaging showing bone mets) admitted to [facility] originally for sepsis 2/2 PNA, underwent a prolonged MICU course complicated by respiratory failure requiring intubation, demand ischemia from septic shock, GI bleed requiring 3u pRBC, and hematuria/urinary retention.  Over the last 2-3 days the primary team was concerned about abdominal distention. The patient reports that his abdomen has been distended since admission.  He notes that prior to being made NPO he was tolerating his diet.  He continues to pass flatus and a single BM daily.  He does c/o belching.  He denies any nausea or abdominal pain and would like to eat.  He denies any recent fevers, chills, CP, new onset SOB, diarrhea, dysuria/bladder spasms.       PAST MEDICAL & SURGICAL HISTORY:  COPD (chronic obstructive pulmonary disease)  Hypertension  Obstructive sleep apnea syndrome  Prostate cancer metastatic to bone  PC (prostate cancer): with seed placement      MEDICATIONS  (STANDING):  atorvastatin 80 milliGRAM(s) Oral at bedtime  belladonna 16.2 mG/opium 60 mg Suppository 1 Suppository(s) Rectal once  cefepime  IVPB 2000 milliGRAM(s) IV Intermittent once  cefepime  IVPB 2000 milliGRAM(s) IV Intermittent every 12 hours  cefepime  IVPB      chlorhexidine 0.12% Liquid 15 milliLiter(s) Swish and Spit two times a day  dextrose 5%. 1000 milliLiter(s) (50 mL/Hr) IV Continuous <Continuous>  finasteride 5 milliGRAM(s) Oral daily  furosemide    Tablet 40 milliGRAM(s) Oral every 12 hours  heparin  flush 100 Units/mL Injectable 100 Unit(s) IV Push every other day  iohexol 350 mG (iodine)/mL Oral Solution 50 milliLiter(s) Oral once  lidocaine 2% Jelly 5 milliLiter(s) IntraUrethral once  melatonin 3 milliGRAM(s) Oral at bedtime  nystatin Powder 1 Application(s) Topical two times a day  pantoprazole    Tablet 40 milliGRAM(s) Oral before breakfast  potassium chloride  10 mEq/100 mL IVPB 10 milliEquivalent(s) IV Intermittent every 1 hour  tamsulosin 0.8 milliGRAM(s) Oral at bedtime    MEDICATIONS  (PRN):  guaiFENesin    Syrup 200 milliGRAM(s) Oral every 6 hours PRN Cough  lidocaine 2% Gel 1 Application(s) Topical every 3 hours PRN catheter related discomfort at tip of penis  polyethylene glycol 3350 17 Gram(s) Oral daily PRN Constipation  simethicone 80 milliGRAM(s) Chew three times a day PRN Gas        Vital Signs Last 24 Hrs  T(C): 36.9 (29 Oct 2017 21:17), Max: 37 (29 Oct 2017 15:57)  T(F): 98.5 (29 Oct 2017 21:17), Max: 98.6 (29 Oct 2017 15:57)  HR: 80 (29 Oct 2017 21:18) (60 - 85)  BP: 134/77 (29 Oct 2017 21:17) (102/65 - 151/74)  BP(mean): --  RR: 14 (29 Oct 2017 21:18) (14 - 20)  SpO2: 94% (29 Oct 2017 21:18) (94% - 97%)    PHYSICAL EXAM:  Gen: Age appropriate male in NAD  CV: RRR  Resp: CTAB, no wheezing/rales/rhonchi  Abd: Normoactive BS, softly distended with mild tympany, no ttp rebound/guarding/rigidity  : Saldana in place draining clear yellow urine to gravity  Ext: Mild peripheral pitting edema, WWP    LABS:                        9.0    5.8   )-----------( 96       ( 29 Oct 2017 07:19 )             28.3     10-29    142  |  100  |  20  ----------------------------<  137<H>  3.9   |  30  |  0.99    Ca    9.2      29 Oct 2017 07:19  Phos  3.4     10-28  Mg     2.0     10-29    TPro  7.6  /  Alb  2.6<L>  /  TBili  0.4  /  DBili  x   /  AST  21  /  ALT  <5<L>  /  AlkPhos  176<H>  10-28    PT/INR - ( 28 Oct 2017 07:10 )   PT: 14.2 sec;   INR: 1.27          PTT - ( 28 Oct 2017 07:10 )  PTT:28.4 sec      RADIOLOGY & ADDITIONAL STUDIES:  AXR from 10/29 pending read, however shows distended transverse colon measuring roughly 6cm.

## 2017-10-29 NOTE — CONSULT NOTE ADULT - CONSULT REQUESTED DATE/TIME
26-Oct-2017 19:20
04-Oct-2017 20:44
05-Oct-2017 23:52
09-Oct-2017
10-Oct-2017 16:14
13-Oct-2017 11:32
15-Oct-2017 11:12
06-Oct-2017
29-Oct-2017

## 2017-10-29 NOTE — CONSULT NOTE ADULT - CONSULT REQUESTED BY NAME
Bhavik
Bhavik Spencer
Dr Ace Lima
Dr Yip
Dr. Cerna
Nathen
Dr. Montague
Nathen
Becca Mathis
Tahir Gibbons (DO), Cardiology; Interventional Cardiology  [address] [place] [address]  Phone: [phone]  Fax: [phone]

## 2017-10-29 NOTE — CONSULT NOTE ADULT - ASSESSMENT
72M PMHx of HTN, COPD, Prostate CA here with a prolonged hospital stay now with continued distention.  Surgery consulted to r/o colonic obstruction.  - Given the fact that the pt is having bowel function, should consider partial colonic obstruction vs colonic pseudo-obstruction.  - Would agree with CT A/P with PO Contrast  - Optimize electrolytes  - Refrain from use of narcotics or medications that would slow gut motility.  - If CT okay, would recommend bowel regimen.  - Surgery, team 4c will continue to follow  - Discussed with Chief on Call and Dr. Womack 72M PMHx of HTN, COPD, Prostate CA here with a prolonged hospital stay now with continued distention.  Surgery consulted to r/o colonic obstruction.  - Given the fact that the pt is having bowel function, should consider partial colonic obstruction vs colonic pseudo-obstruction vs constipation.  - As pt is having bowel function, denies nausea and is not clinically obstructed, would hold off NG decompression  - No urgent surgical intervention at this point in time.  - Would agree with CT A/P with PO Contrast  - Optimize electrolytes  - Refrain from use of narcotics or medications that would slow gut motility.  - If CT okay, would recommend bowel regimen.  - Surgery, team 4c will continue to follow  - Discussed with Chief on Call and Dr. Womack

## 2017-10-29 NOTE — PROGRESS NOTE ADULT - NSHPATTENDINGPLANDISCUSS_GEN_ALL_CORE
Housestaff
Housestaff
Milagros Araya
primary team
primary team
as above
primary team
as above
Housestaff
as above
Dr. Yip yesterday
primary RN, Music Therapist
ICU housestaff
Dr. Chen

## 2017-10-29 NOTE — PROGRESS NOTE ADULT - SUBJECTIVE AND OBJECTIVE BOX
Cardiology for Preister    cc: follow-up cardiology evaluation and management of left heart failure    interval - events noted; no cp now - remains npo per Int Med.  Reports abdominal pain improved after bm last night.    PAST MEDICAL & SURGICAL HISTORY:  COPD (chronic obstructive pulmonary disease)  Hypertension  Obstructive sleep apnea syndrome  Prostate cancer metastatic to bone  PC (prostate cancer): with seed placement    MEDICATIONS  (STANDING):  atorvastatin 80 milliGRAM(s) Oral at bedtime  belladonna 16.2 mG/opium 60 mg Suppository 1 Suppository(s) Rectal once  cefepime  IVPB 2000 milliGRAM(s) IV Intermittent once  cefepime  IVPB 2000 milliGRAM(s) IV Intermittent every 12 hours  cefepime  IVPB      chlorhexidine 0.12% Liquid 15 milliLiter(s) Swish and Spit two times a day  dextrose 5%. 1000 milliLiter(s) (50 mL/Hr) IV Continuous <Continuous>  finasteride 5 milliGRAM(s) Oral daily  furosemide    Tablet 40 milliGRAM(s) Oral every 12 hours  heparin  flush 100 Units/mL Injectable 100 Unit(s) IV Push every other day  lidocaine 2% Jelly 5 milliLiter(s) IntraUrethral once  melatonin 3 milliGRAM(s) Oral at bedtime  nystatin Powder 1 Application(s) Topical two times a day  pantoprazole    Tablet 40 milliGRAM(s) Oral before breakfast  potassium chloride  10 mEq/100 mL IVPB 10 milliEquivalent(s) IV Intermittent every 1 hour  tamsulosin 0.8 milliGRAM(s) Oral at bedtime    MEDICATIONS  (PRN):  guaiFENesin    Syrup 200 milliGRAM(s) Oral every 6 hours PRN Cough  lidocaine 2% Gel 1 Application(s) Topical every 3 hours PRN catheter related discomfort at tip of penis  polyethylene glycol 3350 17 Gram(s) Oral daily PRN Constipation  simethicone 80 milliGRAM(s) Chew three times a day PRN Gas    Vital Signs Last 24 Hrs  T(C): 37 (29 Oct 2017 15:57), Max: 37 (29 Oct 2017 15:57)  T(F): 98.6 (29 Oct 2017 15:57), Max: 98.6 (29 Oct 2017 15:57)  HR: 64 (29 Oct 2017 15:57) (64 - 85)  BP: 151/74 (29 Oct 2017 15:57) (102/65 - 151/74)  BP(mean): --  RR: 18 (29 Oct 2017 15:57) (16 - 20)  SpO2: 96% (29 Oct 2017 15:57) (95% - 97%)    physical exam  nad  anicteric  mmm  breath sounds present bilat  rr; s1/s2 present; no diastolic murmur heard  distended abd  less le edema   le pulses present    I&O's Detail    28 Oct 2017 07:01  -  29 Oct 2017 07:00  --------------------------------------------------------  IN:  Total IN: 0 mL    OUT:    Indwelling Catheter - Urethral: 1475 mL  Total OUT: 1475 mL    Total NET: -1475 mL            labs                                                                        9.0    5.8   )-----------( 96       ( 29 Oct 2017 07:19 )             28.3   10-29    142  |  100  |  20  ----------------------------<  137<H>  3.9   |  30  |  0.99    Ca    9.2      29 Oct 2017 07:19  Phos  3.4     10-28  Mg     2.0     10-29    TPro  7.6  /  Alb  2.6<L>  /  TBili  0.4  /  DBili  x   /  AST  21  /  ALT  <5<L>  /  AlkPhos  176<H>  10-28    I&O's Summary    28 Oct 2017 07:01  -  29 Oct 2017 07:00  --------------------------------------------------------  IN: 0 mL / OUT: 1475 mL / NET: -1475 mL        radiology    < from: Xray Chest 1 View AP- PORTABLE-Urgent (10.27.17 @ 09:50) >  EXAM:  XR CHEST 1 VIEW PORT URGENT                          PROCEDURE DATE:  10/27/2017                     INTERPRETATION:  XR CHEST 1 VIEW PORT URGENT dated 10/27/2017 9:50 AM    CLINICAL INFORMATION: Tachypneic    COMPARISON: October 26, 2017    FINDINGS: Interval removal of endotracheal tube. Right arm PICC line   remains in place. Decreased bibasilar patchy infiltrates.    IMPRESSION: As above.            "Thank you for the opportunity to participate in the care of this   patient."        KATHY PABLO M.D., RADIOLOGY ATTENDING  This document has been electronically signed. Oct 27 2017  3:50PM    < end of copied text >      < from: Echocardiogram (10.16.17 @ 10:31) >    EXAM:  ECHOCARDIOGRAM (CARDIOL)                          PROCEDURE DATE:  10/16/2017                        INTERPRETATION:  Patient Height: 172.0 cm  Patient Weight: 103.0 kg  Heart Rate: 74 bpm  Systolic Pressure: 140 mmHg  Diastolic Pressure: 70mmHg  BSA: 2.2 m^2  Interpretation Summary  The left ventricle is mildly dilated.Hypokinesis of the basal inferior and   septal region.The left ventricular ejection fraction is estimated to be   45-50%The mitral inflow pattern is consistent with impaired left   ventricular   relaxation with mildly elevated left atrial pressure (8-14mmHg).  The   left   atrial size is normal. The right atrium is dilated.The right ventricle is   moderately dilated. The right ventricular systolic function is mildly   reduced.    There is mild aortic valve thickening.There is mild mitral valve   thickening.   There is mild mitral regurgitation.There is trace tricuspid   regurgitation.There was insufficient TR detected from which to calculate   pulmonary artery systolic pressure.  No aortic root dilatation.The   inferior   vena cava is normal in size (<2.1 cm) with normal inspiratory collapse   (>50%)   consistent with normal right atrial pressure.  There is no pericardial   effusion.    < end of copied text >  IN: 650 mL / OUT: 2875 mL / NET: -2225 mL          < from: Xray Chest 1 View AP -PORTABLE-Routine (10.09.17 @ 05:37) >  EXAM:  XR CHEST 1 VIEW PORT ROUTINE                          PROCEDURE DATE:  10/09/2017                     INTERPRETATION:    Portable AP Radiograph dated 10/9/2017 5:37 AM    CLINICAL INFORMATION: 72 years, Male, Intubated, PNA    PRIOR STUDIES:October 8, 2017    FINDINGS: Support tubes and lines are unchanged. Left pleural effusion is   stable. Right pleural effusion has increased. Pulmonary vascular   congestion has increased.    IMPRESSION:  Interval increase of right pleural effusion and pulmonary vascular   congestion.            "Thank you for the opportunity to participate in the care of this   patient."        KATHY PABLO M.D., RADIOLOGY ATTENDING  This document has been electronically signed. Oct  9 2017  8:33AM    < end of copied text >

## 2017-10-30 LAB
ANION GAP SERPL CALC-SCNC: 16 MMOL/L — SIGNIFICANT CHANGE UP (ref 5–17)
BUN SERPL-MCNC: 19 MG/DL — SIGNIFICANT CHANGE UP (ref 7–23)
CALCIUM SERPL-MCNC: 9.1 MG/DL — SIGNIFICANT CHANGE UP (ref 8.4–10.5)
CHLORIDE SERPL-SCNC: 97 MMOL/L — SIGNIFICANT CHANGE UP (ref 96–108)
CO2 SERPL-SCNC: 29 MMOL/L — SIGNIFICANT CHANGE UP (ref 22–31)
CREAT SERPL-MCNC: 0.94 MG/DL — SIGNIFICANT CHANGE UP (ref 0.5–1.3)
CULTURE RESULTS: SIGNIFICANT CHANGE UP
CULTURE RESULTS: SIGNIFICANT CHANGE UP
GLUCOSE SERPL-MCNC: 84 MG/DL — SIGNIFICANT CHANGE UP (ref 70–99)
HCT VFR BLD CALC: 27.8 % — LOW (ref 39–50)
HGB BLD-MCNC: 8.9 G/DL — LOW (ref 13–17)
MAGNESIUM SERPL-MCNC: 1.8 MG/DL — SIGNIFICANT CHANGE UP (ref 1.6–2.6)
MCHC RBC-ENTMCNC: 30.9 PG — SIGNIFICANT CHANGE UP (ref 27–34)
MCHC RBC-ENTMCNC: 32 G/DL — SIGNIFICANT CHANGE UP (ref 32–36)
MCV RBC AUTO: 96.5 FL — SIGNIFICANT CHANGE UP (ref 80–100)
PLATELET # BLD AUTO: 112 K/UL — LOW (ref 150–400)
POTASSIUM SERPL-MCNC: 3.4 MMOL/L — LOW (ref 3.5–5.3)
POTASSIUM SERPL-SCNC: 3.4 MMOL/L — LOW (ref 3.5–5.3)
RBC # BLD: 2.88 M/UL — LOW (ref 4.2–5.8)
RBC # FLD: 15.4 % — SIGNIFICANT CHANGE UP (ref 10.3–16.9)
SODIUM SERPL-SCNC: 142 MMOL/L — SIGNIFICANT CHANGE UP (ref 135–145)
SPECIMEN SOURCE: SIGNIFICANT CHANGE UP
SPECIMEN SOURCE: SIGNIFICANT CHANGE UP
WBC # BLD: 6 K/UL — SIGNIFICANT CHANGE UP (ref 3.8–10.5)
WBC # FLD AUTO: 6 K/UL — SIGNIFICANT CHANGE UP (ref 3.8–10.5)

## 2017-10-30 PROCEDURE — 99253 IP/OBS CNSLTJ NEW/EST LOW 45: CPT | Mod: GC

## 2017-10-30 PROCEDURE — 99233 SBSQ HOSP IP/OBS HIGH 50: CPT

## 2017-10-30 RX ORDER — IPRATROPIUM/ALBUTEROL SULFATE 18-103MCG
3 AEROSOL WITH ADAPTER (GRAM) INHALATION ONCE
Qty: 0 | Refills: 0 | Status: COMPLETED | OUTPATIENT
Start: 2017-10-30 | End: 2017-10-30

## 2017-10-30 RX ORDER — POTASSIUM CHLORIDE 20 MEQ
40 PACKET (EA) ORAL ONCE
Qty: 0 | Refills: 0 | Status: COMPLETED | OUTPATIENT
Start: 2017-10-30 | End: 2017-10-30

## 2017-10-30 RX ORDER — POLYETHYLENE GLYCOL 3350 17 G/17G
17 POWDER, FOR SOLUTION ORAL DAILY
Qty: 0 | Refills: 0 | Status: DISCONTINUED | OUTPATIENT
Start: 2017-10-30 | End: 2017-10-31

## 2017-10-30 RX ORDER — DOCUSATE SODIUM 100 MG
100 CAPSULE ORAL DAILY
Qty: 0 | Refills: 0 | Status: DISCONTINUED | OUTPATIENT
Start: 2017-10-30 | End: 2017-11-03

## 2017-10-30 RX ORDER — POTASSIUM CHLORIDE 20 MEQ
10 PACKET (EA) ORAL
Qty: 0 | Refills: 0 | Status: COMPLETED | OUTPATIENT
Start: 2017-10-30 | End: 2017-10-30

## 2017-10-30 RX ORDER — MAGNESIUM SULFATE 500 MG/ML
1 VIAL (ML) INJECTION ONCE
Qty: 0 | Refills: 0 | Status: COMPLETED | OUTPATIENT
Start: 2017-10-30 | End: 2017-10-30

## 2017-10-30 RX ORDER — SENNA PLUS 8.6 MG/1
2 TABLET ORAL AT BEDTIME
Qty: 0 | Refills: 0 | Status: DISCONTINUED | OUTPATIENT
Start: 2017-10-30 | End: 2017-11-03

## 2017-10-30 RX ADMIN — Medication 100 MILLIEQUIVALENT(S): at 10:13

## 2017-10-30 RX ADMIN — NYSTATIN CREAM 1 APPLICATION(S): 100000 CREAM TOPICAL at 06:59

## 2017-10-30 RX ADMIN — TAMSULOSIN HYDROCHLORIDE 0.8 MILLIGRAM(S): 0.4 CAPSULE ORAL at 22:34

## 2017-10-30 RX ADMIN — Medication 3 MILLIGRAM(S): at 22:34

## 2017-10-30 RX ADMIN — CHLORHEXIDINE GLUCONATE 15 MILLILITER(S): 213 SOLUTION TOPICAL at 06:58

## 2017-10-30 RX ADMIN — Medication 100 UNIT(S): at 11:31

## 2017-10-30 RX ADMIN — Medication 100 MILLIGRAM(S): at 12:48

## 2017-10-30 RX ADMIN — POLYETHYLENE GLYCOL 3350 17 GRAM(S): 17 POWDER, FOR SOLUTION ORAL at 12:48

## 2017-10-30 RX ADMIN — Medication 40 MILLIEQUIVALENT(S): at 12:48

## 2017-10-30 RX ADMIN — Medication 40 MILLIGRAM(S): at 06:58

## 2017-10-30 RX ADMIN — Medication 100 MILLIEQUIVALENT(S): at 11:24

## 2017-10-30 RX ADMIN — POLYETHYLENE GLYCOL 3350 17 GRAM(S): 17 POWDER, FOR SOLUTION ORAL at 00:29

## 2017-10-30 RX ADMIN — CEFEPIME 100 MILLIGRAM(S): 1 INJECTION, POWDER, FOR SOLUTION INTRAMUSCULAR; INTRAVENOUS at 18:07

## 2017-10-30 RX ADMIN — Medication 100 GRAM(S): at 11:50

## 2017-10-30 RX ADMIN — PANTOPRAZOLE SODIUM 40 MILLIGRAM(S): 20 TABLET, DELAYED RELEASE ORAL at 06:58

## 2017-10-30 RX ADMIN — CHLORHEXIDINE GLUCONATE 15 MILLILITER(S): 213 SOLUTION TOPICAL at 18:07

## 2017-10-30 RX ADMIN — NYSTATIN CREAM 1 APPLICATION(S): 100000 CREAM TOPICAL at 18:07

## 2017-10-30 RX ADMIN — FINASTERIDE 5 MILLIGRAM(S): 5 TABLET, FILM COATED ORAL at 11:30

## 2017-10-30 RX ADMIN — ATORVASTATIN CALCIUM 80 MILLIGRAM(S): 80 TABLET, FILM COATED ORAL at 22:35

## 2017-10-30 RX ADMIN — Medication 40 MILLIGRAM(S): at 18:07

## 2017-10-30 RX ADMIN — Medication 100 MILLIEQUIVALENT(S): at 10:46

## 2017-10-30 RX ADMIN — Medication 3 MILLILITER(S): at 05:29

## 2017-10-30 RX ADMIN — CEFEPIME 100 MILLIGRAM(S): 1 INJECTION, POWDER, FOR SOLUTION INTRAMUSCULAR; INTRAVENOUS at 06:57

## 2017-10-30 RX ADMIN — Medication 3 MILLILITER(S): at 23:04

## 2017-10-30 RX ADMIN — SENNA PLUS 2 TABLET(S): 8.6 TABLET ORAL at 22:33

## 2017-10-30 NOTE — PROGRESS NOTE ADULT - PROBLEM SELECTOR PLAN 1
+UA 10/27 with urine culture growing pseudomonas 10/24, patient febrile to 102.5 on 10/25 and with leukocytosis, again meeting criteria for sepsis. Repeat blood cxs 10/25 NGTD. Also with MSSA bacteremia earlier in hospitalization with + blood cxs 10/9, now resolved.  -c/w Cefepime 2g q 12hrs (started 10/26, tenative end date Nov. 7th) as it covers both MSSA and pseudomonas.   -s/p cefazolin 2 g q8 (10/19--10/26), initially planed for 4 weeks duration but switched to cefepime for remainder of course to cover pseudomonas.   -- Gallium scan negative for focal areas of infection, KELLY 10/9 negative for endocarditis. Repeat TTE 10/16 w/o evidence of new vegetations or worsening valve thickening.   - Surveillance blood cx w/ no growth to date  - Patient will follow up with Dr. Clemente outpatient for further monitoring of his bacteremia.    UTI (urinary tract infection).  Plan: +UA with urine culture growing pseudomonas patient febrile to 102.5 on 10/25 and with leukocytosis, again meeting criteria for sepsis. Repeat blood cultures; NGTD.    -Cefepime 2g q 12hrs (10/26--), per urology only needs 1 week course for UTI, however will be on Cefepime until Nov 7th for MSSA bacteremia.  -S/p zosyn (10/25-10/26)

## 2017-10-30 NOTE — PROGRESS NOTE ADULT - ASSESSMENT
73 y/o M w/PMH of HTN, COPD, Prostate CA (s/p radiation and seed 2013 on leuprolide outpatient) who presented with worsening of SOB and an episode of unwitnessed syncope found to have severe sepsis with gram + cocci in clusters identified as S. aureus and ATN.  Hospital course complicated by hypotension and transfer to MICU for septic shock.  Septic shock has resolved but pt has had persistent MSSA bacteremia with resolving respiratory failure and ATN. Patient stepped back down to floors however Saldana repeatedly obstructed from blood clots and hgb dropping, now s/p cauterization of prostate by urology. Post-op patient required stay in MICU for extubation, now stable for RMF.

## 2017-10-30 NOTE — PROGRESS NOTE ADULT - SUBJECTIVE AND OBJECTIVE BOX
SUBJECTIVE: Patient seen and examined bedside by resident. The patient reports that his abdomen has been distended since admission.  He notes that prior to being made NPO he was tolerating his diet.  He continues to pass flatus and a single BM daily.  He denies any nausea or abdominal pain and would like to eat.  He denies any recent fevers, chills, CP, new onset SOB, diarrhea, dysuria/bladder spasms.       cefepime  IVPB 2000 milliGRAM(s) IV Intermittent once  cefepime  IVPB 2000 milliGRAM(s) IV Intermittent every 12 hours  cefepime  IVPB      furosemide    Tablet 40 milliGRAM(s) Oral every 12 hours  heparin  flush 100 Units/mL Injectable 100 Unit(s) IV Push every other day  tamsulosin 0.8 milliGRAM(s) Oral at bedtime      Vital Signs Last 24 Hrs  T(C): 36.6 (30 Oct 2017 08:47), Max: 37 (29 Oct 2017 15:57)  T(F): 97.8 (30 Oct 2017 08:47), Max: 98.6 (29 Oct 2017 15:57)  HR: 75 (30 Oct 2017 08:50) (60 - 85)  BP: 135/70 (30 Oct 2017 08:47) (127/67 - 151/74)  BP(mean): --  RR: 19 (30 Oct 2017 08:50) (14 - 19)  SpO2: 97% (30 Oct 2017 08:50) (93% - 97%)  I&O's Detail    29 Oct 2017 07:01  -  30 Oct 2017 07:00  --------------------------------------------------------  IN:  Total IN: 0 mL    OUT:    Indwelling Catheter - Urethral: 900 mL  Total OUT: 900 mL    Total NET: -900 mL      30 Oct 2017 07:01  -  30 Oct 2017 15:33  --------------------------------------------------------  IN:    Solution: 300 mL    Solution: 100 mL  Total IN: 400 mL    OUT:    Ureteral Catheter: 1000 mL  Total OUT: 1000 mL    Total NET: -600 mL          General: NAD, resting comfortably in bed  C/V: NSR  Pulm: Nonlabored breathing, no respiratory distress  Abd: soft, NT/ND.  Extrem: WWP, no edema, SCDs in place        LABS:                        8.9    6.0   )-----------( 112      ( 30 Oct 2017 07:20 )             27.8     10-30    142  |  97  |  19  ----------------------------<  84  3.4<L>   |  29  |  0.94    Ca    9.1      30 Oct 2017 07:20  Mg     1.8     10-30            RADIOLOGY & ADDITIONAL STUDIES:

## 2017-10-30 NOTE — PROGRESS NOTE ADULT - PROBLEM SELECTOR PLAN 7
F no IVF  E- replete lytes prn with goal K>4 and Mg >2  N- DASH/TLC, bowel regiment   PPx - On ASA 81mg, SCD, no Hep SQ given recent bleeding     CODE - DNR not DNI  Dispo: [facility]

## 2017-10-30 NOTE — PROGRESS NOTE ADULT - ASSESSMENT
72M PMHx of HTN, COPD, Prostate CA here with a prolonged hospital stay now with continued distention.  Surgery consulted to r/o colonic obstruction.    Patient continues to have BF, Flatus & BM.   No surgical intervention  Did not receive CT A/P

## 2017-10-30 NOTE — PROGRESS NOTE ADULT - SUBJECTIVE AND OBJECTIVE BOX
Resting comfortably this am. He has been NPO awaiting a CT scan of the A/P. Urine remains clear. Passing flatus.    CHEMOTHERAPY REGIMEN:        Day:                          Diet:  Protocol:                                    IVF:      MEDICATIONS  (STANDING):  atorvastatin 80 milliGRAM(s) Oral at bedtime  belladonna 16.2 mG/opium 60 mg Suppository 1 Suppository(s) Rectal once  cefepime  IVPB 2000 milliGRAM(s) IV Intermittent once  cefepime  IVPB 2000 milliGRAM(s) IV Intermittent every 12 hours  cefepime  IVPB      chlorhexidine 0.12% Liquid 15 milliLiter(s) Swish and Spit two times a day  dextrose 5%. 1000 milliLiter(s) (50 mL/Hr) IV Continuous <Continuous>  finasteride 5 milliGRAM(s) Oral daily  furosemide    Tablet 40 milliGRAM(s) Oral every 12 hours  heparin  flush 100 Units/mL Injectable 100 Unit(s) IV Push every other day  iohexol 350 mG (iodine)/mL Oral Solution 50 milliLiter(s) Oral once  lidocaine 2% Jelly 5 milliLiter(s) IntraUrethral once  melatonin 3 milliGRAM(s) Oral at bedtime  nystatin Powder 1 Application(s) Topical two times a day  pantoprazole    Tablet 40 milliGRAM(s) Oral before breakfast  potassium chloride  10 mEq/100 mL IVPB 10 milliEquivalent(s) IV Intermittent every 1 hour  tamsulosin 0.8 milliGRAM(s) Oral at bedtime    MEDICATIONS  (PRN):  guaiFENesin    Syrup 200 milliGRAM(s) Oral every 6 hours PRN Cough  lidocaine 2% Gel 1 Application(s) Topical every 3 hours PRN catheter related discomfort at tip of penis  polyethylene glycol 3350 17 Gram(s) Oral daily PRN Constipation  simethicone 80 milliGRAM(s) Chew three times a day PRN Gas      Allergies    No Known Allergies    Intolerances        DVT Prophylaxis: [ ] YES [ ] NO      Antibiotics: [ ] YES [ ] NO    Pain Scale (1-10):       Location:    Vital Signs Last 24 Hrs  T(C): 36.9 (30 Oct 2017 05:21), Max: 37 (29 Oct 2017 15:57)  T(F): 98.5 (30 Oct 2017 05:21), Max: 98.6 (29 Oct 2017 15:57)  HR: 67 (30 Oct 2017 06:28) (60 - 85)  BP: 127/67 (30 Oct 2017 05:21) (102/65 - 151/74)  BP(mean): --  RR: 16 (30 Oct 2017 06:28) (14 - 18)  SpO2: 93% (30 Oct 2017 06:28) (93% - 97%)    Drug Dosing Weight  Height (cm): 172.72 (24 Oct 2017 03:30)  Weight (kg): 102.6 (27 Oct 2017 05:47)  BMI (kg/m2): 34.4 (27 Oct 2017 05:47)  BSA (m2): 2.15 (27 Oct 2017 05:47)    PHYSICAL EXAM:      Constitutional: feeling okay.  Eyes: conjunctiva pink.  ENMT: NC in place. Buccal mucosa moist.  Neck: no masses.  Respiratory: chest clear.  Cardiovascular: S1>S2 at the apex. RSR.  Gastrointestinal: distended, soft, nontender, quiet bowel sounds.  Genitourinary: urine in the cath and bag remains clear.  Extremities: leg edema persists.  Neurological: no gross focal deficits.  Skin: warm and dry.  Lymph Nodes: none palp.  Musculoskeletal: full ROM.  Psychiatric: affect normal.        URINARY CATHETER: [ ] YES [ ] NO     LABS:  CBC Full  -  ( 29 Oct 2017 07:19 )  WBC Count : 5.8 K/uL  Hemoglobin : 9.0 g/dL  Hematocrit : 28.3 %  Platelet Count - Automated : 96 K/uL  Mean Cell Volume : 95.9 fL  Mean Cell Hemoglobin : 30.5 pg  Mean Cell Hemoglobin Concentration : 31.8 g/dL  Auto Neutrophil # : x  Auto Lymphocyte # : x  Auto Monocyte # : x  Auto Eosinophil # : x  Auto Basophil # : x  Auto Neutrophil % : x  Auto Lymphocyte % : x  Auto Monocyte % : x  Auto Eosinophil % : x  Auto Basophil % : x    10-29    142  |  100  |  20  ----------------------------<  137<H>  3.9   |  30  |  0.99    Ca    9.2      29 Oct 2017 07:19  Phos  3.4     10-28  Mg     2.0     10-29    TPro  7.6  /  Alb  2.6<L>  /  TBili  0.4  /  DBili  x   /  AST  21  /  ALT  <5<L>  /  AlkPhos  176<H>  10-28    PT/INR - ( 28 Oct 2017 07:10 )   PT: 14.2 sec;   INR: 1.27          PTT - ( 28 Oct 2017 07:10 )  PTT:28.4 sec      CULTURES:    RADIOLOGY & ADDITIONAL STUDIES:

## 2017-10-30 NOTE — PROGRESS NOTE ADULT - PROBLEM SELECTOR PLAN 5
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  - Saldana w/o interruptions in flow, maintain Saldana to be evaluated by urology once discharged. Urology to evaluate pt for CBI if Saldana output decreases significantly,   - c/w tamsulosin .8 mg and finasteride 5 mg PO daily  -Saldana removed today, start TOV

## 2017-10-30 NOTE — PROGRESS NOTE ADULT - SUBJECTIVE AND OBJECTIVE BOX
O/N Events: NILDA. Did not go for CTAP.    Subjective: Patient seen and examined at bedside. He reports that his abdominal distension feels improved. He reports increased appetite. He reports passing gas, his last BM was Saturday night. He denies any f/c, CP, SOB, or abdominal pain.     VITALS  Vital Signs Last 24 Hrs  T(C): 36.6 (30 Oct 2017 08:47), Max: 37 (29 Oct 2017 15:57)  T(F): 97.8 (30 Oct 2017 08:47), Max: 98.6 (29 Oct 2017 15:57)  HR: 75 (30 Oct 2017 08:50) (60 - 85)  BP: 135/70 (30 Oct 2017 08:47) (127/67 - 151/74)  BP(mean): --  RR: 19 (30 Oct 2017 08:50) (14 - 19)  SpO2: 97% (30 Oct 2017 08:50) (93% - 97%)    I&O's Summary    29 Oct 2017 07:01  -  30 Oct 2017 07:00  --------------------------------------------------------  IN: 0 mL / OUT: 900 mL / NET: -900 mL      PHYSICAL EXAM  General: Awake and alert; NAD  Head: NC/AT;   Eyes: anicteric sclera  Neck: Supple;   Respiratory: Decreased air movement at the bases. No w/r/r.  Cardiovascular: Regular rhythm/rate; S1/S2; no gallops or murmurs auscultated  Gastrointestinal: Soft; mildly distended but appears at baseline, non-tender w/out guarding; bowel sounds normal  Extremities: WWP; 2+ LE edema b/l.    MEDICATIONS  (STANDING):  atorvastatin 80 milliGRAM(s) Oral at bedtime  belladonna 16.2 mG/opium 60 mg Suppository 1 Suppository(s) Rectal once  cefepime  IVPB 2000 milliGRAM(s) IV Intermittent once  cefepime  IVPB 2000 milliGRAM(s) IV Intermittent every 12 hours  cefepime  IVPB      chlorhexidine 0.12% Liquid 15 milliLiter(s) Swish and Spit two times a day  dextrose 5%. 1000 milliLiter(s) (50 mL/Hr) IV Continuous <Continuous>  finasteride 5 milliGRAM(s) Oral daily  furosemide    Tablet 40 milliGRAM(s) Oral every 12 hours  heparin  flush 100 Units/mL Injectable 100 Unit(s) IV Push every other day  iohexol 350 mG (iodine)/mL Oral Solution 50 milliLiter(s) Oral once  lidocaine 2% Jelly 5 milliLiter(s) IntraUrethral once  magnesium sulfate  IVPB 1 Gram(s) IV Intermittent once  melatonin 3 milliGRAM(s) Oral at bedtime  nystatin Powder 1 Application(s) Topical two times a day  pantoprazole    Tablet 40 milliGRAM(s) Oral before breakfast  potassium chloride  10 mEq/100 mL IVPB 10 milliEquivalent(s) IV Intermittent every 1 hour  potassium chloride  10 mEq/100 mL IVPB 10 milliEquivalent(s) IV Intermittent every 1 hour  tamsulosin 0.8 milliGRAM(s) Oral at bedtime    MEDICATIONS  (PRN):  guaiFENesin    Syrup 200 milliGRAM(s) Oral every 6 hours PRN Cough  lidocaine 2% Gel 1 Application(s) Topical every 3 hours PRN catheter related discomfort at tip of penis  polyethylene glycol 3350 17 Gram(s) Oral daily PRN Constipation  simethicone 80 milliGRAM(s) Chew three times a day PRN Gas      LABS                        8.9    6.0   )-----------( 112      ( 30 Oct 2017 07:20 )             27.8     10-30    142  |  97  |  19  ----------------------------<  84  3.4<L>   |  29  |  0.94    Ca    9.1      30 Oct 2017 07:20  Mg     1.8     10-30        IMAGING/EKG/ETC- Reviewed

## 2017-10-30 NOTE — PROGRESS NOTE ADULT - PROBLEM SELECTOR PLAN 3
Acute blood loss anemia 2/2 hematuria and bleeding/ clots from Glaser catheter on 10/23. Patient with UGIB on admission, however no recent melena or bloody stools, stool guiac negative. S/p multiple 1 U PRBCs with good response on 10/23. Iron studies c/w ACD. Patient again dropped hgb to 6.7 on 10/24, now s/p 2U PRBCs. Hgb stable s/p prostate cauterization.   -Continue to trend CBC daily  -Transfuse for Hb <8 given recent NSTEMI  -Monitor glaser output  -f/u urology recs Acute blood loss anemia 2/2 hematuria and bleeding/ clots from Saldana catheter on 10/23. Patient with UGIB on admission, however no recent melena or bloody stools, stool guiac negative. S/p multiple 1 U PRBCs with good response on 10/23. Iron studies c/w ACD. Patient again dropped hgb to 6.7 on 10/24, now s/p 2U PRBCs. Hgb stable s/p prostate cauterization, resolved.   -Continue to trend CBC daily  -Transfuse for Hb <8 given recent NSTEMI

## 2017-10-30 NOTE — PROGRESS NOTE ADULT - ASSESSMENT
A/recs:  1) acute hypoxic respiratory failure with elevated tn and abnormal septal wall motion on tte in addition to rv dilatation and abnormal ecg earlier this admission; left heart failure (ef=45% by tte) -  a - nstemi earlier this admission  b - gu and int med to decide when to restart asa  c - repeat ecg and troponin not seen - recommend check ecg and troponin - call if abnormal  d - pt remains off beta-blocker - rec Pulm consult to determine safety of beta-blocker use in this patient  e - per Seferino, rec cardiac cta prior to discharge if no contraindications    2) paroxysmal atrial fibrillation  a - recommend ecg as above    3) htn - lisinopril remains held    4) sepsis due to pna per ccm with bacteremia - id consulted earlier this admission    5) possible copd exacerbation - rec pulmonary follow-up    6) acute renal failure - resolved - monitor cr and uop    7) normocytic anemia and thrombocytopenia - serial h/h    8) shingles    9) prostate ca - per gu and hem-onc    10)  K>4, Mg>2    11) palliative care previously following

## 2017-10-30 NOTE — PROGRESS NOTE ADULT - SUBJECTIVE AND OBJECTIVE BOX
Patient seen and examined at bedside. Did well over the weekend, but complaining about being hungry because NPO for tests. Hgb remained stable, afebrile, urine color is clear. OOBA. passing flatus, no abdominal pain today. Will DC Saldana and TOV today. Patient should be encouraged to walk around and to attempt to urinate standing if possible for best change of passing TOV.     ICU Vital Signs Last 24 Hrs  T(C): 36.6 (30 Oct 2017 08:47), Max: 37 (29 Oct 2017 15:57)  T(F): 97.8 (30 Oct 2017 08:47), Max: 98.6 (29 Oct 2017 15:57)  HR: 75 (30 Oct 2017 08:50) (60 - 85)  BP: 135/70 (30 Oct 2017 08:47) (127/67 - 151/74)  BP(mean): --  ABP: --  ABP(mean): --  RR: 19 (30 Oct 2017 08:50) (14 - 19)  SpO2: 97% (30 Oct 2017 08:50) (93% - 97%)                          8.9    6.0   )-----------( 112      ( 30 Oct 2017 07:20 )             27.8     10-30    142  |  97  |  19  ----------------------------<  84  3.4<L>   |  29  |  0.94    Ca    9.1      30 Oct 2017 07:20  Mg     1.8     10-30

## 2017-10-30 NOTE — PROGRESS NOTE ADULT - ATTENDING COMMENTS
Patient seen and examined at bedside.  Abdomen softly distended NT.  Recommend CT abdomen/pelvis to evaluate source of distention.

## 2017-10-30 NOTE — PROGRESS NOTE ADULT - ATTENDING COMMENTS
Pt seen and examined, passing gas, no n/v, feeling better after a BM.  VSS, exam with RRR, lungs with decreased BS b/l, abd distended but soft with normal BS.  Labs reivewed  71 yo M with  1. Acute blood loss anemia 2/2 hematuria  - s/p cystoscopy with fulguration of prostate for bleeding vessel  - passed TOV with no hematuria  - no longer transfusion dependent  2. Sepsis 2/2 UTI  - c/w cefepime  3. Constipation  - low susp for bowel obstruction- will start aggressive bowel regimen    4. Newly dx systolic CHF with rEF  - currently on lasix 40 mg BID  5. NSTEMI  - stopped ACEi in setting of sepsis, off ASA for now  6. COPD on O2  - c/w nebs and oxygen via NC and BiPAP prn  7. MSSA bacteremia  - c/w cefepime  8. Metastatic prostate cancer  - tx per

## 2017-10-31 LAB
ANION GAP SERPL CALC-SCNC: 13 MMOL/L — SIGNIFICANT CHANGE UP (ref 5–17)
BUN SERPL-MCNC: 18 MG/DL — SIGNIFICANT CHANGE UP (ref 7–23)
CALCIUM SERPL-MCNC: 9 MG/DL — SIGNIFICANT CHANGE UP (ref 8.4–10.5)
CHLORIDE SERPL-SCNC: 97 MMOL/L — SIGNIFICANT CHANGE UP (ref 96–108)
CO2 SERPL-SCNC: 33 MMOL/L — HIGH (ref 22–31)
CREAT SERPL-MCNC: 0.96 MG/DL — SIGNIFICANT CHANGE UP (ref 0.5–1.3)
GLUCOSE SERPL-MCNC: 117 MG/DL — HIGH (ref 70–99)
HCT VFR BLD CALC: 28.3 % — LOW (ref 39–50)
HGB BLD-MCNC: 8.8 G/DL — LOW (ref 13–17)
MAGNESIUM SERPL-MCNC: 2 MG/DL — SIGNIFICANT CHANGE UP (ref 1.6–2.6)
MCHC RBC-ENTMCNC: 30.2 PG — SIGNIFICANT CHANGE UP (ref 27–34)
MCHC RBC-ENTMCNC: 31.1 G/DL — LOW (ref 32–36)
MCV RBC AUTO: 97.3 FL — SIGNIFICANT CHANGE UP (ref 80–100)
PLATELET # BLD AUTO: 129 K/UL — LOW (ref 150–400)
POTASSIUM SERPL-MCNC: 3.5 MMOL/L — SIGNIFICANT CHANGE UP (ref 3.5–5.3)
POTASSIUM SERPL-SCNC: 3.5 MMOL/L — SIGNIFICANT CHANGE UP (ref 3.5–5.3)
RBC # BLD: 2.91 M/UL — LOW (ref 4.2–5.8)
RBC # FLD: 15.4 % — SIGNIFICANT CHANGE UP (ref 10.3–16.9)
SODIUM SERPL-SCNC: 143 MMOL/L — SIGNIFICANT CHANGE UP (ref 135–145)
WBC # BLD: 6.2 K/UL — SIGNIFICANT CHANGE UP (ref 3.8–10.5)
WBC # FLD AUTO: 6.2 K/UL — SIGNIFICANT CHANGE UP (ref 3.8–10.5)

## 2017-10-31 PROCEDURE — 99233 SBSQ HOSP IP/OBS HIGH 50: CPT

## 2017-10-31 RX ORDER — ATORVASTATIN CALCIUM 80 MG/1
1 TABLET, FILM COATED ORAL
Qty: 0 | Refills: 0 | COMMUNITY
Start: 2017-10-31

## 2017-10-31 RX ORDER — PANTOPRAZOLE SODIUM 20 MG/1
40 TABLET, DELAYED RELEASE ORAL DAILY
Qty: 0 | Refills: 0 | Status: DISCONTINUED | OUTPATIENT
Start: 2017-10-31 | End: 2017-11-03

## 2017-10-31 RX ORDER — SENNA PLUS 8.6 MG/1
2 TABLET ORAL
Qty: 0 | Refills: 0 | COMMUNITY
Start: 2017-10-31

## 2017-10-31 RX ORDER — TAMSULOSIN HYDROCHLORIDE 0.4 MG/1
1 CAPSULE ORAL
Qty: 0 | Refills: 0 | COMMUNITY

## 2017-10-31 RX ORDER — SIMETHICONE 80 MG/1
1 TABLET, CHEWABLE ORAL
Qty: 0 | Refills: 0 | COMMUNITY
Start: 2017-10-31

## 2017-10-31 RX ORDER — IPRATROPIUM/ALBUTEROL SULFATE 18-103MCG
3 AEROSOL WITH ADAPTER (GRAM) INHALATION ONCE
Qty: 0 | Refills: 0 | Status: COMPLETED | OUTPATIENT
Start: 2017-10-31 | End: 2017-10-31

## 2017-10-31 RX ORDER — LACTULOSE 10 G/15ML
20 SOLUTION ORAL DAILY
Qty: 0 | Refills: 0 | Status: DISCONTINUED | OUTPATIENT
Start: 2017-10-31 | End: 2017-11-03

## 2017-10-31 RX ORDER — FUROSEMIDE 40 MG
1 TABLET ORAL
Qty: 0 | Refills: 0 | COMMUNITY
Start: 2017-10-31

## 2017-10-31 RX ORDER — NYSTATIN CREAM 100000 [USP'U]/G
1 CREAM TOPICAL
Qty: 0 | Refills: 0 | COMMUNITY
Start: 2017-10-31

## 2017-10-31 RX ORDER — CEFEPIME 1 G/1
2000 INJECTION, POWDER, FOR SOLUTION INTRAMUSCULAR; INTRAVENOUS
Qty: 0 | Refills: 0 | COMMUNITY
Start: 2017-10-31

## 2017-10-31 RX ORDER — FINASTERIDE 5 MG/1
1 TABLET, FILM COATED ORAL
Qty: 0 | Refills: 0 | COMMUNITY
Start: 2017-10-31

## 2017-10-31 RX ORDER — TAMSULOSIN HYDROCHLORIDE 0.4 MG/1
2 CAPSULE ORAL
Qty: 0 | Refills: 0 | COMMUNITY
Start: 2017-10-31

## 2017-10-31 RX ORDER — POTASSIUM CHLORIDE 20 MEQ
40 PACKET (EA) ORAL ONCE
Qty: 0 | Refills: 0 | Status: COMPLETED | OUTPATIENT
Start: 2017-10-31 | End: 2017-10-31

## 2017-10-31 RX ORDER — PANTOPRAZOLE SODIUM 20 MG/1
1 TABLET, DELAYED RELEASE ORAL
Qty: 0 | Refills: 0 | COMMUNITY
Start: 2017-10-31

## 2017-10-31 RX ORDER — POLYETHYLENE GLYCOL 3350 17 G/17G
17 POWDER, FOR SOLUTION ORAL
Qty: 0 | Refills: 0 | COMMUNITY
Start: 2017-10-31

## 2017-10-31 RX ORDER — DOCUSATE SODIUM 100 MG
1 CAPSULE ORAL
Qty: 0 | Refills: 0 | COMMUNITY
Start: 2017-10-31

## 2017-10-31 RX ORDER — POLYETHYLENE GLYCOL 3350 17 G/17G
17 POWDER, FOR SOLUTION ORAL
Qty: 0 | Refills: 0 | Status: DISCONTINUED | OUTPATIENT
Start: 2017-10-31 | End: 2017-11-03

## 2017-10-31 RX ORDER — ATORVASTATIN CALCIUM 80 MG/1
1 TABLET, FILM COATED ORAL
Qty: 0 | Refills: 0 | COMMUNITY

## 2017-10-31 RX ADMIN — SENNA PLUS 2 TABLET(S): 8.6 TABLET ORAL at 22:04

## 2017-10-31 RX ADMIN — Medication 40 MILLIGRAM(S): at 18:01

## 2017-10-31 RX ADMIN — Medication 40 MILLIEQUIVALENT(S): at 11:54

## 2017-10-31 RX ADMIN — LACTULOSE 20 GRAM(S): 10 SOLUTION ORAL at 18:00

## 2017-10-31 RX ADMIN — CEFEPIME 100 MILLIGRAM(S): 1 INJECTION, POWDER, FOR SOLUTION INTRAMUSCULAR; INTRAVENOUS at 19:07

## 2017-10-31 RX ADMIN — FINASTERIDE 5 MILLIGRAM(S): 5 TABLET, FILM COATED ORAL at 11:54

## 2017-10-31 RX ADMIN — Medication 40 MILLIGRAM(S): at 07:01

## 2017-10-31 RX ADMIN — Medication 3 MILLIGRAM(S): at 22:04

## 2017-10-31 RX ADMIN — NYSTATIN CREAM 1 APPLICATION(S): 100000 CREAM TOPICAL at 18:01

## 2017-10-31 RX ADMIN — CHLORHEXIDINE GLUCONATE 15 MILLILITER(S): 213 SOLUTION TOPICAL at 07:01

## 2017-10-31 RX ADMIN — Medication 100 MILLIGRAM(S): at 11:54

## 2017-10-31 RX ADMIN — Medication 3 MILLILITER(S): at 07:00

## 2017-10-31 RX ADMIN — CEFEPIME 100 MILLIGRAM(S): 1 INJECTION, POWDER, FOR SOLUTION INTRAMUSCULAR; INTRAVENOUS at 07:01

## 2017-10-31 RX ADMIN — PANTOPRAZOLE SODIUM 40 MILLIGRAM(S): 20 TABLET, DELAYED RELEASE ORAL at 07:03

## 2017-10-31 RX ADMIN — ATORVASTATIN CALCIUM 80 MILLIGRAM(S): 80 TABLET, FILM COATED ORAL at 22:05

## 2017-10-31 RX ADMIN — Medication 3 MILLILITER(S): at 19:43

## 2017-10-31 RX ADMIN — PANTOPRAZOLE SODIUM 40 MILLIGRAM(S): 20 TABLET, DELAYED RELEASE ORAL at 18:01

## 2017-10-31 RX ADMIN — POLYETHYLENE GLYCOL 3350 17 GRAM(S): 17 POWDER, FOR SOLUTION ORAL at 11:54

## 2017-10-31 RX ADMIN — POLYETHYLENE GLYCOL 3350 17 GRAM(S): 17 POWDER, FOR SOLUTION ORAL at 18:01

## 2017-10-31 RX ADMIN — NYSTATIN CREAM 1 APPLICATION(S): 100000 CREAM TOPICAL at 07:03

## 2017-10-31 RX ADMIN — TAMSULOSIN HYDROCHLORIDE 0.8 MILLIGRAM(S): 0.4 CAPSULE ORAL at 22:05

## 2017-10-31 NOTE — PROGRESS NOTE ADULT - PROBLEM SELECTOR PLAN 6
Pigmented spot seen on EGD, bleed likely due to ulcer now healed.  - protonix 40 mg PO daily  - hemoglobin goal 8 hgb given recent NSTEMI   - daily cbc while pt admitted  - to follow up with Dr. Mayo outpatient on discharge

## 2017-10-31 NOTE — PROGRESS NOTE ADULT - PROBLEM SELECTOR PLAN 5
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  - Saldana removed 10/30, passed TOV. Continue to monitor for urinary retention.   - c/w tamsulosin .8 mg and finasteride 5 mg PO daily

## 2017-10-31 NOTE — PROGRESS NOTE ADULT - ASSESSMENT
73 yo M with  1. Acute blood loss anemia 2/2 hematuria  - s/p cystoscopy with fulguration of prostate for bleeding vessel  - passed TOV with no hematuria  - no longer transfusion dependent  2. Sepsis 2/2 UTI  - c/w cefepime  3. Constipation  - low susp for bowel obstruction- will start aggressive bowel regimen with miralax BID and lactulose daily   4. Newly dx systolic CHF with rEF  - currently on lasix 40 mg BID  5. NSTEMI  - stopped ACEi in setting of sepsis, off ASA for now  6. COPD on O2  - c/w nebs and oxygen via NC and BiPAP prn  7. MSSA bacteremia  - c/w cefepime  8. Metastatic prostate cancer  - tx per    Dispo: awaiting auth for [facility]

## 2017-10-31 NOTE — PROGRESS NOTE ADULT - ASSESSMENT
71 y/o M w/PMH of HTN, COPD, Prostate CA (s/p radiation and seed 2013 on leuprolide outpatient) who presented with worsening of SOB and an episode of unwitnessed syncope found to have severe sepsis with gram + cocci in clusters identified as S. aureus and ATN.  Hospital course complicated by hypotension and transfer to MICU for septic shock.  Septic shock has resolved but pt has had persistent MSSA bacteremia with resolving respiratory failure and ATN. Patient stepped back down to floors however Saldana repeatedly obstructed from blood clots and hgb dropping, now s/p cauterization of prostate by urology. Post-op patient required stay in MICU for extubation, now stable for RMF, awaiting placement to [facility].

## 2017-10-31 NOTE — PROGRESS NOTE ADULT - PROBLEM SELECTOR PLAN 3
Acute blood loss anemia 2/2 hematuria and bleeding/ clots from Saldana catheter on 10/23. Patient with UGIB on admission, however no recent melena or bloody stools, stool guiac negative. S/p multiple 1 U PRBCs with good response on 10/23. Iron studies c/w ACD. Patient again dropped hgb to 6.7 on 10/24, now s/p 2U PRBCs. Hgb stable s/p prostate cauterization, resolved.   -Continue to trend CBC daily  -Transfuse for Hb <8 given recent NSTEMI

## 2017-10-31 NOTE — PROGRESS NOTE ADULT - ADDITIONAL PE
Pt well appearing, sitting up in chair with NC 2L on, NAD, pleasant, coarse BS with no wheezing, minimal crackles in bases, decreased BS throughout, abd large distended but soft on palp, nontender.

## 2017-10-31 NOTE — PROGRESS NOTE ADULT - SUBJECTIVE AND OBJECTIVE BOX
O/N Events: NILDA    Subjective: Patient seen and examined at bedside. He reports feeling well, denies any CP, SOB, and abdominal pain. His last BM was saturday night but reports passing gas. He is urinating well, urine clear yellow.     VITALS  Vital Signs Last 24 Hrs  T(C): 36.6 (31 Oct 2017 09:32), Max: 36.8 (30 Oct 2017 21:49)  T(F): 97.9 (31 Oct 2017 09:32), Max: 98.3 (31 Oct 2017 05:12)  HR: 73 (31 Oct 2017 09:40) (61 - 77)  BP: 125/73 (31 Oct 2017 09:32) (115/69 - 147/77)  BP(mean): --  RR: 20 (31 Oct 2017 09:40) (18 - 21)  SpO2: 96% (31 Oct 2017 09:40) (96% - 99%)    I&O's Summary    30 Oct 2017 07:01  -  31 Oct 2017 07:00  --------------------------------------------------------  IN: 450 mL / OUT: 2105 mL / NET: -1655 mL      PHYSICAL EXAM  General: Awake and alert; NAD  Head: NC/AT; MMM  Eyes: anicteric sclera  Neck: Supple; no JVD  Respiratory: Bi-basilar crackles, poor air movement at bases. NO wheezing or rhonchi.   Cardiovascular: Regular rhythm/rate; S1/S2; no gallops or murmurs auscultated.  Gastrointestinal: Soft; NTND w/out guarding.   Extremities: WWP; 2+ LE edema b/l, no cyanosis.     MEDICATIONS  (STANDING):  atorvastatin 80 milliGRAM(s) Oral at bedtime  cefepime  IVPB 2000 milliGRAM(s) IV Intermittent once  cefepime  IVPB 2000 milliGRAM(s) IV Intermittent every 12 hours  cefepime  IVPB      dextrose 5%. 1000 milliLiter(s) (50 mL/Hr) IV Continuous <Continuous>  docusate sodium 100 milliGRAM(s) Oral daily  finasteride 5 milliGRAM(s) Oral daily  furosemide    Tablet 40 milliGRAM(s) Oral every 12 hours  heparin  flush 100 Units/mL Injectable 100 Unit(s) IV Push every other day  lactulose Syrup 20 Gram(s) Oral daily  melatonin 3 milliGRAM(s) Oral at bedtime  nystatin Powder 1 Application(s) Topical two times a day  pantoprazole    Tablet 40 milliGRAM(s) Oral before breakfast  polyethylene glycol 3350 17 Gram(s) Oral two times a day  senna 2 Tablet(s) Oral at bedtime  tamsulosin 0.8 milliGRAM(s) Oral at bedtime    MEDICATIONS  (PRN):  guaiFENesin    Syrup 200 milliGRAM(s) Oral every 6 hours PRN Cough  lidocaine 2% Gel 1 Application(s) Topical every 3 hours PRN catheter related discomfort at tip of penis  simethicone 80 milliGRAM(s) Chew three times a day PRN Gas      LABS                        8.8    6.2   )-----------( 129      ( 31 Oct 2017 06:22 )             28.3     10-31    143  |  97  |  18  ----------------------------<  117<H>  3.5   |  33<H>  |  0.96    Ca    9.0      31 Oct 2017 06:22  Mg     2.0     10-31                  IMAGING/EKG/ETC- No new imaging.

## 2017-10-31 NOTE — PROGRESS NOTE ADULT - SUBJECTIVE AND OBJECTIVE BOX
On interval followup, the right arm PICC site is clean, dry, non-inflamed and non-tender. Afebrile. Notes, cultures and progress are followed.

## 2017-10-31 NOTE — PROGRESS NOTE ADULT - ASSESSMENT
A/recs:  1) acute hypoxic respiratory failure with elevated tn and abnormal septal wall motion on tte in addition to rv dilatation and abnormal ecg earlier this admission; left heart failure (ef=45% by tte) -  a - nstemi earlier this admission  b - rec asa when safe per gu and int med  c - repeat ecg and troponin still not seen - recommend check ecg and troponin - call if abnormal  d - rec pulm eval re beta-blcoker use  e - per Seferino, rec cardiac cta prior to discharge if no contraindications    2) paroxysmal atrial fibrillation  a - please check ecg    3) htn - lisinopril held by primary team    4) sepsis due to pna per ccm with bacteremia - rec id follow-up    5) possible copd exacerbation - rec pulmonary consult    6) acute renal failure - resolved - monitor cr and uop    7) normocytic anemia and thrombocytopenia - monitor    8) shingles    9) prostate ca     10)  K>4, Mg>2    11) palliative care previously following  plan for Cardiology follow-up after discharge with Dr. Glass - patient aware

## 2017-10-31 NOTE — PROGRESS NOTE ADULT - SUBJECTIVE AND OBJECTIVE BOX
Pt seen and examined at bedside.  Feeling well, was OOB with PT.  No SOB, wants to wean off O2.  No BM since this weekend.      INTERVAL HPI/OVERNIGHT EVENTS:    Review of Systems: 12 point review of systems otherwise negative    MEDICATIONS  (STANDING):  atorvastatin 80 milliGRAM(s) Oral at bedtime  cefepime  IVPB 2000 milliGRAM(s) IV Intermittent once  cefepime  IVPB 2000 milliGRAM(s) IV Intermittent every 12 hours  cefepime  IVPB      dextrose 5%. 1000 milliLiter(s) (50 mL/Hr) IV Continuous <Continuous>  docusate sodium 100 milliGRAM(s) Oral daily  finasteride 5 milliGRAM(s) Oral daily  furosemide    Tablet 40 milliGRAM(s) Oral every 12 hours  heparin  flush 100 Units/mL Injectable 100 Unit(s) IV Push every other day  lactulose Syrup 20 Gram(s) Oral daily  melatonin 3 milliGRAM(s) Oral at bedtime  nystatin Powder 1 Application(s) Topical two times a day  pantoprazole    Tablet 40 milliGRAM(s) Oral before breakfast  polyethylene glycol 3350 17 Gram(s) Oral two times a day  senna 2 Tablet(s) Oral at bedtime  tamsulosin 0.8 milliGRAM(s) Oral at bedtime    MEDICATIONS  (PRN):  guaiFENesin    Syrup 200 milliGRAM(s) Oral every 6 hours PRN Cough  lidocaine 2% Gel 1 Application(s) Topical every 3 hours PRN catheter related discomfort at tip of penis  simethicone 80 milliGRAM(s) Chew three times a day PRN Gas      Allergies    No Known Allergies    Intolerances        Vital Signs Last 24 Hrs  T(C): 36.6 (31 Oct 2017 09:32), Max: 36.8 (30 Oct 2017 21:49)  T(F): 97.9 (31 Oct 2017 09:32), Max: 98.3 (31 Oct 2017 05:12)  HR: 73 (31 Oct 2017 09:40) (61 - 77)  BP: 125/73 (31 Oct 2017 09:32) (115/69 - 147/77)  BP(mean): --  RR: 20 (31 Oct 2017 09:40) (18 - 21)  SpO2: 96% (31 Oct 2017 09:40) (96% - 99%)  CAPILLARY BLOOD GLUCOSE          10-30 @ 07:01  -  10-31 @ 07:00  --------------------------------------------------------  IN: 450 mL / OUT: 2105 mL / NET: -1655 mL        LABS:                        8.8    6.2   )-----------( 129      ( 31 Oct 2017 06:22 )             28.3     10-31    143  |  97  |  18  ----------------------------<  117<H>  3.5   |  33<H>  |  0.96    Ca    9.0      31 Oct 2017 06:22  Mg     2.0     10-31                RADIOLOGY & ADDITIONAL TESTS:    ---------------------------------------------------------------------------  I personally reviewed: [  ]EKG   [  ]CXR    [  ] CT    [  ]Other  ---------------------------------------------------------------------------

## 2017-10-31 NOTE — CHART NOTE - NSCHARTNOTEFT_GEN_A_CORE
Admitting Diagnosis:   73 y/o M w/PMH of HTN, COPD, Prostate CA (s/p radiation and seed 2013 on leuprolide outpatient) who presented with worsening of SOB and an episode of unwitnessed syncope found to have severe sepsis with gram + cocci in clusters identified as S. aureus and ATN.  Hospital course complicated by hypotension and transfer to MICU for septic shock.  Septic shock has resolved but pt has had persistent MSSA bacteremia with resolving respiratory failure and ATN. Patient stepped back down to floors however Saldana repeatedly obstructed from blood clots and hgb dropping, now s/p cauterization of prostate by urology. Post-op patient required stay in MICU for extubation (10/27), now stable for RMF, awaiting placement to [facility].      PAST MEDICAL & SURGICAL HISTORY:  COPD (chronic obstructive pulmonary disease)  Hypertension  Obstructive sleep apnea syndrome  Prostate cancer metastatic to bone  PC (prostate cancer): with seed placement      Current Nutrition Order:  DASH/TLC diet      PO Intake: Good (%) [  X ]  Fair (50-75%) [   ] Poor (<25%) [   ]    GI Issues: No N/V/D; constipation reported- recommended addition of prunes to diet and patient agreeable.     Pain: No pain at this time.     Skin Integrity: Incontinence associated dermatitis noted; skin intact pressure-wise.     Labs:   10-31    143  |  97  |  18  ----------------------------<  117<H>  3.5   |  33<H>  |  0.96    Ca    9.0      31 Oct 2017 06:22  Mg     2.0     10-31      CAPILLARY BLOOD GLUCOSE    117mg/dL      Medications:  MEDICATIONS  (STANDING):  atorvastatin 80 milliGRAM(s) Oral at bedtime  cefepime  IVPB 2000 milliGRAM(s) IV Intermittent once  cefepime  IVPB 2000 milliGRAM(s) IV Intermittent every 12 hours  cefepime  IVPB      dextrose 5%. 1000 milliLiter(s) (50 mL/Hr) IV Continuous <Continuous>  docusate sodium 100 milliGRAM(s) Oral daily  finasteride 5 milliGRAM(s) Oral daily  furosemide    Tablet 40 milliGRAM(s) Oral every 12 hours  heparin  flush 100 Units/mL Injectable 100 Unit(s) IV Push every other day  lactulose Syrup 20 Gram(s) Oral daily  melatonin 3 milliGRAM(s) Oral at bedtime  nystatin Powder 1 Application(s) Topical two times a day  pantoprazole    Tablet 40 milliGRAM(s) Oral before breakfast  polyethylene glycol 3350 17 Gram(s) Oral two times a day  senna 2 Tablet(s) Oral at bedtime  tamsulosin 0.8 milliGRAM(s) Oral at bedtime    MEDICATIONS  (PRN):  guaiFENesin    Syrup 200 milliGRAM(s) Oral every 6 hours PRN Cough  lidocaine 2% Gel 1 Application(s) Topical every 3 hours PRN catheter related discomfort at tip of penis  simethicone 80 milliGRAM(s) Chew three times a day PRN Gas      Weight: 103.4kg (227#)- 10/24; no new weight since   Height: 68"    Weight Change: 13kg weight loss since admit     Estimated energy needs:   Ideal body weight used for calculations as pt >120% of IBW. Needs estimated 2/2 prolonged catabolic illness related to prostate cancer with bone mets and PNA/COPD    Calories: 25-30kcal/kg IBW = 1750kcal to 2100 kcal/day  Protein: 1.3-1.5g protein/kg IBW = 91g to 105g/day  Fluids per MD (previously on 1.5L fluid restriction)    Subjective: 72 y.o./male admitted with septic shock seen in room for f/u. Pt continues to endorse good PO intake- 100% breakfast consumed per pt report. No N/V; but no BM recently- will add prunes to cbord. +flatus. No pain. Skin remains intact pressure-wise. H/H slightly low this AM. BG 117mg/dL- on SSI. Diet reinforcement provided.     Previous Nutrition Diagnosis: Increased nutrient needs RT catabolic state AEB need for 1.3-1.5 g/kg protein.     Active [x ]  Resolved [   ]    If resolved, new PES:     Goal: Pt to continue to meet 75% or greater of EER through PO intake with +tolerance    Recommendations:   1. Continue to take daily weights   2. Reinforce diet education again at f/u     Education:  Reviewed w/patient; h/o provided at previous visit. Pt asked appropriate questions.    Risk Level: High [   ] Moderate [ x  ] Low [   ].

## 2017-10-31 NOTE — PROGRESS NOTE ADULT - SUBJECTIVE AND OBJECTIVE BOX
Cardiology for Preister    cc: follow-up cardiology evaluation and management of left heart failure    interval - patient looking forward to discharge    PAST MEDICAL & SURGICAL HISTORY:  COPD (chronic obstructive pulmonary disease)  Hypertension  Obstructive sleep apnea syndrome  Prostate cancer metastatic to bone  PC (prostate cancer): with seed placement    MEDICATIONS  (STANDING):  atorvastatin 80 milliGRAM(s) Oral at bedtime  cefepime  IVPB 2000 milliGRAM(s) IV Intermittent once  cefepime  IVPB 2000 milliGRAM(s) IV Intermittent every 12 hours  cefepime  IVPB      dextrose 5%. 1000 milliLiter(s) (50 mL/Hr) IV Continuous <Continuous>  docusate sodium 100 milliGRAM(s) Oral daily  finasteride 5 milliGRAM(s) Oral daily  furosemide    Tablet 40 milliGRAM(s) Oral every 12 hours  heparin  flush 100 Units/mL Injectable 100 Unit(s) IV Push every other day  lactulose Syrup 20 Gram(s) Oral daily  melatonin 3 milliGRAM(s) Oral at bedtime  nystatin Powder 1 Application(s) Topical two times a day  pantoprazole    Tablet 40 milliGRAM(s) Oral two times a day before meals  polyethylene glycol 3350 17 Gram(s) Oral two times a day  senna 2 Tablet(s) Oral at bedtime  tamsulosin 0.8 milliGRAM(s) Oral at bedtime    MEDICATIONS  (PRN):  guaiFENesin    Syrup 200 milliGRAM(s) Oral every 6 hours PRN Cough  lidocaine 2% Gel 1 Application(s) Topical every 3 hours PRN catheter related discomfort at tip of penis  simethicone 80 milliGRAM(s) Chew three times a day PRN Gas    Vital Signs Last 24 Hrs  T(C): 36.6 (31 Oct 2017 16:25), Max: 36.8 (30 Oct 2017 21:49)  T(F): 97.8 (31 Oct 2017 16:25), Max: 98.3 (31 Oct 2017 05:12)  HR: 66 (31 Oct 2017 16:25) (61 - 77)  BP: 144/82 (31 Oct 2017 16:25) (115/69 - 144/82)  BP(mean): --  RR: 20 (31 Oct 2017 16:25) (18 - 21)  SpO2: 96% (31 Oct 2017 16:25) (96% - 99%)    physical exam  nad; conversant  mmm  bilat breath sounds present  rr; s1/s2 present; no rub  soft abd; distended  le edema continues to improve  le pulses present    I&O's Detail    30 Oct 2017 07:01  -  31 Oct 2017 07:00  --------------------------------------------------------  IN:    IV PiggyBack: 50 mL    Solution: 300 mL    Solution: 100 mL  Total IN: 450 mL    OUT:    Ureteral Catheter: 1000 mL    Voided: 1105 mL  Total OUT: 2105 mL    Total NET: -1655 mL      31 Oct 2017 07:01  -  31 Oct 2017 20:53  --------------------------------------------------------  IN:    IV PiggyBack: 50 mL  Total IN: 50 mL    OUT:    Voided: 1000 mL  Total OUT: 1000 mL    Total NET: -950 mL      labs                                    8.8    6.2   )-----------( 129      ( 31 Oct 2017 06:22 )             28.3   10-31    143  |  97  |  18  ----------------------------<  117<H>  3.5   |  33<H>  |  0.96    Ca    9.0      31 Oct 2017 06:22  Mg     2.0     10-31        I&O's Summary    30 Oct 2017 07:01  -  31 Oct 2017 07:00  --------------------------------------------------------  IN: 450 mL / OUT: 2105 mL / NET: -1655 mL    31 Oct 2017 07:01  -  31 Oct 2017 20:54  --------------------------------------------------------  IN: 50 mL / OUT: 1000 mL / NET: -950 mL      radiology    < from: Xray Chest 1 View AP -PORTABLE-Routine (10.28.17 @ 08:05) >  EXAM:  XR CHEST 1 VIEW PORT ROUTINE                          PROCEDURE DATE:  10/28/2017                     INTERPRETATION:  PORTABLE CHEST X-RAY     HISTORY: SOB    PRIOR STUDIES: 10/27/2017.    FINDINGS: Size of cardiac silhouette unchanged. Right-sided PICC line   with tip overlying the right innominate vein/SVC junction. The lungs are   unchanged. Right lung grossly clear. Linear scarring versus linear   atelectasis left base. No pleural effusion or pneumothorax.    IMPRESSION:  No change.            "Thank you for the opportunity to participate in the care of this   patient."        SD COATS M.D., ATTENDING RADIOLOGIST  This document has been electronically signed. Oct 30 2017 11:30AM    < end of copied text >        < from: Echocardiogram (10.16.17 @ 10:31) >    EXAM:  ECHOCARDIOGRAM (CARDIOL)                          PROCEDURE DATE:  10/16/2017                        INTERPRETATION:  Patient Height: 172.0 cm  Patient Weight: 103.0 kg  Heart Rate: 74 bpm  Systolic Pressure: 140 mmHg  Diastolic Pressure: 70mmHg  BSA: 2.2 m^2  Interpretation Summary  The left ventricle is mildly dilated.Hypokinesis of the basal inferior and   septal region.The left ventricular ejection fraction is estimated to be   45-50%The mitral inflow pattern is consistent with impaired left   ventricular   relaxation with mildly elevated left atrial pressure (8-14mmHg).  The   left   atrial size is normal. The right atrium is dilated.The right ventricle is   moderately dilated. The right ventricular systolic function is mildly   reduced.    There is mild aortic valve thickening.There is mild mitral valve   thickening.   There is mild mitral regurgitation.There is trace tricuspid   regurgitation.There was insufficient TR detected from which to calculate   pulmonary artery systolic pressure.  No aortic root dilatation.The   inferior   vena cava is normal in size (<2.1 cm) with normal inspiratory collapse   (>50%)   consistent with normal right atrial pressure.  There is no pericardial   effusion.    < end of copied text >  IN: 650 mL / OUT: 2875 mL / NET: -2225 mL          < from: Xray Chest 1 View AP -PORTABLE-Routine (10.09.17 @ 05:37) >  EXAM:  XR CHEST 1 VIEW PORT ROUTINE                          PROCEDURE DATE:  10/09/2017                     INTERPRETATION:    Portable AP Radiograph dated 10/9/2017 5:37 AM    CLINICAL INFORMATION: 72 years, Male, Intubated, PNA    PRIOR STUDIES:October 8, 2017    FINDINGS: Support tubes and lines are unchanged. Left pleural effusion is   stable. Right pleural effusion has increased. Pulmonary vascular   congestion has increased.    IMPRESSION:  Interval increase of right pleural effusion and pulmonary vascular   congestion.            "Thank you for the opportunity to participate in the care of this   patient."        KATHY PABLO M.D., RADIOLOGY ATTENDING  This document has been electronically signed. Oct  9 2017  8:33AM    < end of copied text >

## 2017-10-31 NOTE — PROGRESS NOTE ADULT - SUBJECTIVE AND OBJECTIVE BOX
Patient seen and examined at bedside. Continues to urinate clear, yellow urine. PVRs remain around 240-250cc and patient is comfortable. This is an acceptable post-void residual for this patient. He is clear for discharge from urologist point of view. He should continue his Flomax as given here in hospital and continue finasteride 5mg nightly. Follow up with Dr. Chappell in approximately 2 weeks or sooner if urinary symptoms change/worsen.

## 2017-11-01 DIAGNOSIS — I21.4 NON-ST ELEVATION (NSTEMI) MYOCARDIAL INFARCTION: ICD-10-CM

## 2017-11-01 DIAGNOSIS — N39.0 URINARY TRACT INFECTION, SITE NOT SPECIFIED: ICD-10-CM

## 2017-11-01 LAB
ANION GAP SERPL CALC-SCNC: 13 MMOL/L — SIGNIFICANT CHANGE UP (ref 5–17)
BLD GP AB SCN SERPL QL: NEGATIVE — SIGNIFICANT CHANGE UP
BUN SERPL-MCNC: 16 MG/DL — SIGNIFICANT CHANGE UP (ref 7–23)
CALCIUM SERPL-MCNC: 9 MG/DL — SIGNIFICANT CHANGE UP (ref 8.4–10.5)
CHLORIDE SERPL-SCNC: 94 MMOL/L — LOW (ref 96–108)
CO2 SERPL-SCNC: 34 MMOL/L — HIGH (ref 22–31)
CREAT SERPL-MCNC: 1.01 MG/DL — SIGNIFICANT CHANGE UP (ref 0.5–1.3)
GLUCOSE SERPL-MCNC: 127 MG/DL — HIGH (ref 70–99)
HCT VFR BLD CALC: 29 % — LOW (ref 39–50)
HGB BLD-MCNC: 8.9 G/DL — LOW (ref 13–17)
MAGNESIUM SERPL-MCNC: 1.7 MG/DL — SIGNIFICANT CHANGE UP (ref 1.6–2.6)
MCHC RBC-ENTMCNC: 30.2 PG — SIGNIFICANT CHANGE UP (ref 27–34)
MCHC RBC-ENTMCNC: 30.7 G/DL — LOW (ref 32–36)
MCV RBC AUTO: 98.3 FL — SIGNIFICANT CHANGE UP (ref 80–100)
PLATELET # BLD AUTO: 136 K/UL — LOW (ref 150–400)
POTASSIUM SERPL-MCNC: 3.1 MMOL/L — LOW (ref 3.5–5.3)
POTASSIUM SERPL-SCNC: 3.1 MMOL/L — LOW (ref 3.5–5.3)
RBC # BLD: 2.95 M/UL — LOW (ref 4.2–5.8)
RBC # FLD: 15.4 % — SIGNIFICANT CHANGE UP (ref 10.3–16.9)
RH IG SCN BLD-IMP: POSITIVE — SIGNIFICANT CHANGE UP
SODIUM SERPL-SCNC: 141 MMOL/L — SIGNIFICANT CHANGE UP (ref 135–145)
WBC # BLD: 7.6 K/UL — SIGNIFICANT CHANGE UP (ref 3.8–10.5)
WBC # FLD AUTO: 7.6 K/UL — SIGNIFICANT CHANGE UP (ref 3.8–10.5)

## 2017-11-01 PROCEDURE — 99232 SBSQ HOSP IP/OBS MODERATE 35: CPT | Mod: GC

## 2017-11-01 PROCEDURE — 99233 SBSQ HOSP IP/OBS HIGH 50: CPT

## 2017-11-01 RX ORDER — IPRATROPIUM/ALBUTEROL SULFATE 18-103MCG
3 AEROSOL WITH ADAPTER (GRAM) INHALATION ONCE
Qty: 0 | Refills: 0 | Status: COMPLETED | OUTPATIENT
Start: 2017-11-01 | End: 2017-11-01

## 2017-11-01 RX ORDER — IPRATROPIUM/ALBUTEROL SULFATE 18-103MCG
3 AEROSOL WITH ADAPTER (GRAM) INHALATION EVERY 6 HOURS
Qty: 0 | Refills: 0 | Status: DISCONTINUED | OUTPATIENT
Start: 2017-11-01 | End: 2017-11-03

## 2017-11-01 RX ORDER — ASPIRIN/CALCIUM CARB/MAGNESIUM 324 MG
81 TABLET ORAL DAILY
Qty: 0 | Refills: 0 | Status: DISCONTINUED | OUTPATIENT
Start: 2017-11-01 | End: 2017-11-02

## 2017-11-01 RX ORDER — POTASSIUM CHLORIDE 20 MEQ
40 PACKET (EA) ORAL ONCE
Qty: 0 | Refills: 0 | Status: COMPLETED | OUTPATIENT
Start: 2017-11-01 | End: 2017-11-01

## 2017-11-01 RX ORDER — MAGNESIUM SULFATE 500 MG/ML
1 VIAL (ML) INJECTION ONCE
Qty: 0 | Refills: 0 | Status: COMPLETED | OUTPATIENT
Start: 2017-11-01 | End: 2017-11-01

## 2017-11-01 RX ADMIN — Medication 3 MILLILITER(S): at 22:52

## 2017-11-01 RX ADMIN — PANTOPRAZOLE SODIUM 40 MILLIGRAM(S): 20 TABLET, DELAYED RELEASE ORAL at 06:37

## 2017-11-01 RX ADMIN — Medication 100 MILLIGRAM(S): at 11:54

## 2017-11-01 RX ADMIN — ATORVASTATIN CALCIUM 80 MILLIGRAM(S): 80 TABLET, FILM COATED ORAL at 22:26

## 2017-11-01 RX ADMIN — TAMSULOSIN HYDROCHLORIDE 0.8 MILLIGRAM(S): 0.4 CAPSULE ORAL at 22:26

## 2017-11-01 RX ADMIN — Medication 100 GRAM(S): at 15:05

## 2017-11-01 RX ADMIN — NYSTATIN CREAM 1 APPLICATION(S): 100000 CREAM TOPICAL at 17:50

## 2017-11-01 RX ADMIN — PANTOPRAZOLE SODIUM 40 MILLIGRAM(S): 20 TABLET, DELAYED RELEASE ORAL at 17:50

## 2017-11-01 RX ADMIN — POLYETHYLENE GLYCOL 3350 17 GRAM(S): 17 POWDER, FOR SOLUTION ORAL at 17:50

## 2017-11-01 RX ADMIN — Medication 40 MILLIGRAM(S): at 17:50

## 2017-11-01 RX ADMIN — NYSTATIN CREAM 1 APPLICATION(S): 100000 CREAM TOPICAL at 06:36

## 2017-11-01 RX ADMIN — Medication 40 MILLIGRAM(S): at 06:37

## 2017-11-01 RX ADMIN — Medication 3 MILLILITER(S): at 06:10

## 2017-11-01 RX ADMIN — POLYETHYLENE GLYCOL 3350 17 GRAM(S): 17 POWDER, FOR SOLUTION ORAL at 06:38

## 2017-11-01 RX ADMIN — SENNA PLUS 2 TABLET(S): 8.6 TABLET ORAL at 22:26

## 2017-11-01 RX ADMIN — Medication 100 UNIT(S): at 11:53

## 2017-11-01 RX ADMIN — CEFEPIME 100 MILLIGRAM(S): 1 INJECTION, POWDER, FOR SOLUTION INTRAMUSCULAR; INTRAVENOUS at 18:29

## 2017-11-01 RX ADMIN — FINASTERIDE 5 MILLIGRAM(S): 5 TABLET, FILM COATED ORAL at 11:54

## 2017-11-01 RX ADMIN — Medication 81 MILLIGRAM(S): at 15:07

## 2017-11-01 RX ADMIN — Medication 40 MILLIEQUIVALENT(S): at 15:05

## 2017-11-01 RX ADMIN — CEFEPIME 100 MILLIGRAM(S): 1 INJECTION, POWDER, FOR SOLUTION INTRAMUSCULAR; INTRAVENOUS at 06:35

## 2017-11-01 RX ADMIN — LACTULOSE 20 GRAM(S): 10 SOLUTION ORAL at 11:54

## 2017-11-01 RX ADMIN — Medication 3 MILLIGRAM(S): at 22:26

## 2017-11-01 NOTE — PROGRESS NOTE ADULT - ASSESSMENT
71 y/o M w/PMH of HTN, COPD, Prostate CA (s/p radiation and seed 2013 on leuprolide outpatient) who presented with worsening of SOB and an episode of unwitnessed syncope found to have severe sepsis with gram + cocci in clusters identified as S. aureus and ATN.  Hospital course complicated by hypotension and transfer to MICU for septic shock.  Septic shock has resolved but pt has had persistent MSSA bacteremia with resolving respiratory failure and ATN. Patient stepped back down to floors however Saldana repeatedly obstructed from blood clots and hgb dropping, now s/p cauterization of prostate by urology. Post-op patient required stay in MICU for extubation.  Patient now stable, on RMF, awaiting placement to [facility].

## 2017-11-01 NOTE — PROGRESS NOTE ADULT - SUBJECTIVE AND OBJECTIVE BOX
SUBJECTIVE: Patient was resting comfortably in bed. He reports small amounts of flatus this morning but denies bowel movement.    cefepime  IVPB 2000 milliGRAM(s) IV Intermittent once  cefepime  IVPB 2000 milliGRAM(s) IV Intermittent every 12 hours  cefepime  IVPB      furosemide    Tablet 40 milliGRAM(s) Oral every 12 hours  heparin  flush 100 Units/mL Injectable 100 Unit(s) IV Push every other day  tamsulosin 0.8 milliGRAM(s) Oral at bedtime      Vital Signs Last 24 Hrs  T(C): 36.6 (01 Nov 2017 09:06), Max: 36.8 (31 Oct 2017 21:59)  T(F): 97.9 (01 Nov 2017 09:06), Max: 98.2 (31 Oct 2017 21:59)  HR: 91 (01 Nov 2017 09:06) (66 - 91)  BP: 90/61 (01 Nov 2017 09:06) (90/61 - 145/82)  BP(mean): --  RR: 18 (01 Nov 2017 09:06) (18 - 20)  SpO2: 95% (01 Nov 2017 09:06) (94% - 96%)  I&O's Detail    31 Oct 2017 07:01  -  01 Nov 2017 07:00  --------------------------------------------------------  IN:    IV PiggyBack: 100 mL  Total IN: 100 mL    OUT:    Voided: 1400 mL  Total OUT: 1400 mL    Total NET: -1300 mL      01 Nov 2017 07:01  -  01 Nov 2017 13:46  --------------------------------------------------------  IN:  Total IN: 0 mL    OUT:    Voided: 200 mL  Total OUT: 200 mL    Total NET: -200 mL          General: NAD, resting comfortably in bed  C/V: NSR  Pulm: Nonlabored breathing, no respiratory distress  Abd: soft, mildly distended, but non-tender  Extrem: WWP, no edema, SCDs in place        LABS:                        8.9    7.6   )-----------( 136      ( 01 Nov 2017 08:04 )             29.0     11-01    141  |  94<L>  |  16  ----------------------------<  127<H>  3.1<L>   |  34<H>  |  1.01    Ca    9.0      01 Nov 2017 08:04  Mg     1.7     11-01            RADIOLOGY & ADDITIONAL STUDIES:

## 2017-11-01 NOTE — PROGRESS NOTE ADULT - PROBLEM SELECTOR PLAN 7
#CHF HFrEF  TTE shows HFrEF with EF 45%, right atrial dilatation, septal wall motion abnormalities and mild hypokinesis of left ventricle. Unable to visualize any vegetations or R heart structures. Unable to calculate pulmonary a pressures.   - c/w lipitor,   - Restart Aspirin?, (call urology-for recs)  - Lasix 40mg PO BID, give IV if needed.   -Continue lisinopril 2.5 daily  -Consider starting metoprolol ACEI/ARBs as tolerated once BP allows, patient BP today 142/77  - strict I/Os, daily weights, fluid restriction 1.5L  - acute onset of HFrEF; discussed case with Dr. Glass who agrees that patient should have ischemia workup outpatient for acute HFrEF and repeat echo to be done to assess for improvement in LV function.     #New onset paroxysmal A.fib Vs MAT:  - pt has remained in NSR since admission to [facility]. To be followed up with outpatient cardiologist for further monitoring

## 2017-11-01 NOTE — PROGRESS NOTE ADULT - ATTENDING COMMENTS
Pt seen and examined, VSS, sitting up in chair with KEITH RODRIGUEZ, says he had BM today and tolerating diet well.  Labs reviewed  73 yo M with  1. Acute blood loss anemia 2/2 hematuria  - s/p cystoscopy with fulguration of prostate for bleeding vessel  - passed TOV with no hematuria  - no longer transfusion dependent  2. Sepsis 2/2 UTI  - c/w cefepime  3. Constipation  - on miralax BID and lactulose daily with senna/colace  4. Newly dx systolic CHF with rEF  - currently on lasix 40 mg BID  5. NSTEMI  - stopped ACEi in setting of sepsis, had been off ASA up to now and will resume today  6. COPD on O2  - c/w nebs and oxygen via NC and BiPAP prn  7. MSSA bacteremia  - c/w cefepime  8. Metastatic prostate cancer  - tx per    Dispo: awaiting auth for RJ

## 2017-11-01 NOTE — PROGRESS NOTE ADULT - SUBJECTIVE AND OBJECTIVE BOX
The patient continues to have abdominal distension. He continues to have constipation. He ambulated in the xiong yesterday and was happy to do so.    CHEMOTHERAPY REGIMEN:        Day:                          Diet:  Protocol:                                    IVF:      MEDICATIONS  (STANDING):  atorvastatin 80 milliGRAM(s) Oral at bedtime  cefepime  IVPB 2000 milliGRAM(s) IV Intermittent once  cefepime  IVPB 2000 milliGRAM(s) IV Intermittent every 12 hours  cefepime  IVPB      dextrose 5%. 1000 milliLiter(s) (50 mL/Hr) IV Continuous <Continuous>  docusate sodium 100 milliGRAM(s) Oral daily  finasteride 5 milliGRAM(s) Oral daily  furosemide    Tablet 40 milliGRAM(s) Oral every 12 hours  heparin  flush 100 Units/mL Injectable 100 Unit(s) IV Push every other day  lactulose Syrup 20 Gram(s) Oral daily  melatonin 3 milliGRAM(s) Oral at bedtime  nystatin Powder 1 Application(s) Topical two times a day  pantoprazole    Tablet 40 milliGRAM(s) Oral two times a day before meals  polyethylene glycol 3350 17 Gram(s) Oral two times a day  senna 2 Tablet(s) Oral at bedtime  tamsulosin 0.8 milliGRAM(s) Oral at bedtime    MEDICATIONS  (PRN):  guaiFENesin    Syrup 200 milliGRAM(s) Oral every 6 hours PRN Cough  lidocaine 2% Gel 1 Application(s) Topical every 3 hours PRN catheter related discomfort at tip of penis  simethicone 80 milliGRAM(s) Chew three times a day PRN Gas      Allergies    No Known Allergies    Intolerances        DVT Prophylaxis: [ ] YES [x ] NO      Antibiotics: [x ] YES [ ] NO    Pain Scale (1-10):       Location:    Vital Signs Last 24 Hrs  T(C): 36.6 (01 Nov 2017 05:16), Max: 36.8 (31 Oct 2017 21:59)  T(F): 97.9 (01 Nov 2017 05:16), Max: 98.2 (31 Oct 2017 21:59)  HR: 77 (01 Nov 2017 05:16) (66 - 82)  BP: 142/77 (01 Nov 2017 05:16) (125/73 - 145/82)  BP(mean): --  RR: 20 (01 Nov 2017 05:16) (18 - 20)  SpO2: 96% (01 Nov 2017 05:16) (94% - 96%)    Drug Dosing Weight  Height (cm): 172.72 (24 Oct 2017 03:30)  Weight (kg): 102.6 (27 Oct 2017 05:47)  BMI (kg/m2): 34.4 (27 Oct 2017 05:47)  BSA (m2): 2.15 (27 Oct 2017 05:47)    PHYSICAL EXAM:      Constitutional: constipation.  Eyes: conjunctiva pink.  ENMT: NC in place. Buccal mucosa moist.  Neck: no masses.  Respiratory: chest clear.  Cardiovascular: S1>S2 at apex. RSR.  Gastrointestinal: distended, soft, nontender, active bowel sounds.  Genitourinary: voiding without difficulty. No hematuria.  Extremities: leg edema is less.  Neurological: states his left foot felt a bit weak when ambulating yesterday.  Skin: warm and dry.  Lymph Nodes: none palp.  Musculoskeletal: full ROM.  Psychiatric: affect normal.        URINARY CATHETER: [ ] YES [x ] NO     LABS:  CBC Full  -  ( 31 Oct 2017 06:22 )  WBC Count : 6.2 K/uL  Hemoglobin : 8.8 g/dL  Hematocrit : 28.3 %  Platelet Count - Automated : 129 K/uL  Mean Cell Volume : 97.3 fL  Mean Cell Hemoglobin : 30.2 pg  Mean Cell Hemoglobin Concentration : 31.1 g/dL  Auto Neutrophil # : x  Auto Lymphocyte # : x  Auto Monocyte # : x  Auto Eosinophil # : x  Auto Basophil # : x  Auto Neutrophil % : x  Auto Lymphocyte % : x  Auto Monocyte % : x  Auto Eosinophil % : x  Auto Basophil % : x    10-31    143  |  97  |  18  ----------------------------<  117<H>  3.5   |  33<H>  |  0.96    Ca    9.0      31 Oct 2017 06:22  Mg     2.0     10-31            CULTURES:    RADIOLOGY & ADDITIONAL STUDIES:

## 2017-11-01 NOTE — PROGRESS NOTE ADULT - PROBLEM SELECTOR PLAN 2
- 10/25 blood cultures NGTD, 10/9 blood cultures growing MSSA  - C/w treatment w/ cefepime day 7, tentative end date Nov 7th

## 2017-11-01 NOTE — PROGRESS NOTE ADULT - PROBLEM SELECTOR PLAN 5
Acute blood loss anemia 2/2 hematuria and bleeding/ clots from Saldana catheter on 10/23. Patient with UGIB on admission, however no recent melena or bloody stools, stool guiac negative. S/p multiple 1 U PRBCs with good response on 10/23.   - Hgb stable s/p prostate cauterization, resolved.   -Continue to trend CBC daily  -Transfuse for Hb <8 given recent NSTEMI

## 2017-11-01 NOTE — PROGRESS NOTE ADULT - PROBLEM SELECTOR PLAN 6
- EKG positive for ST depression in anteroseptal and lateral leads (II, III, aVF, V3-V6), Echo positive for septal hypokinesis, troponin + (peaked at 0.51).   - Ischemia likely demand ischemia due to severe sepsis  - c/w lipitor 80 mg, holding ASA in the setting of bleeding (consider restarting now bleeding has stopped), f/u with urology for when is appropriate to restart   - Additional ischemic work-up as outpatient after discharge.

## 2017-11-01 NOTE — PROGRESS NOTE ADULT - ATTENDING COMMENTS
Patient seen and examined at bedside.  Feeling better.  Abdomen slightly softer, remains distended, NT.  No surgical intervention indicated, as no source of abdominal distention has been determined and patient is having bowel function.  Please reconsult surgery as needed.

## 2017-11-01 NOTE — PROGRESS NOTE ADULT - SUBJECTIVE AND OBJECTIVE BOX
INTERVAL HPI/OVERNIGHT EVENTS:  Patient was seen and examined at bedside.  No complaints denies, fevers, chills, dizziness, weakness, CP, SOB, dysuria, changes in bowel movements.       VITAL SIGNS:  T(F): 97.9 (11-01-17 @ 05:16)  HR: 77 (11-01-17 @ 05:16)  BP: 142/77 (11-01-17 @ 05:16)  RR: 20 (11-01-17 @ 05:16)  SpO2: 96% (11-01-17 @ 05:16)      PHYSICAL EXAM:    General: Obese, Awake and alert; NAD.  Head: NC/AT; MMM  Eyes: anicteric sclera, MEHUL, extraocular movements intact.  Neck: Supple; no JVD  Respiratory: CTAB. No wheezes rales.  Cardiovascular: Regular rhythm/rate; S1/S2; no gallops or murmurs auscultated.  Gastrointestinal: Soft; NTND w/out guarding.   Extremities: WWP; no rashes, no lesions.     MEDICATIONS  (STANDING):  atorvastatin 80 milliGRAM(s) Oral at bedtime  cefepime  IVPB 2000 milliGRAM(s) IV Intermittent once  cefepime  IVPB 2000 milliGRAM(s) IV Intermittent every 12 hours  cefepime  IVPB      dextrose 5%. 1000 milliLiter(s) (50 mL/Hr) IV Continuous <Continuous>  docusate sodium 100 milliGRAM(s) Oral daily  finasteride 5 milliGRAM(s) Oral daily  furosemide    Tablet 40 milliGRAM(s) Oral every 12 hours  heparin  flush 100 Units/mL Injectable 100 Unit(s) IV Push every other day  lactulose Syrup 20 Gram(s) Oral daily  melatonin 3 milliGRAM(s) Oral at bedtime  nystatin Powder 1 Application(s) Topical two times a day  pantoprazole    Tablet 40 milliGRAM(s) Oral two times a day before meals  polyethylene glycol 3350 17 Gram(s) Oral two times a day  senna 2 Tablet(s) Oral at bedtime  tamsulosin 0.8 milliGRAM(s) Oral at bedtime    MEDICATIONS  (PRN):  guaiFENesin    Syrup 200 milliGRAM(s) Oral every 6 hours PRN Cough  lidocaine 2% Gel 1 Application(s) Topical every 3 hours PRN catheter related discomfort at tip of penis  simethicone 80 milliGRAM(s) Chew three times a day PRN Gas      Allergies    No Known Allergies    Intolerances        LABS:                        8.8    6.2   )-----------( 129      ( 31 Oct 2017 06:22 )             28.3     10-31    143  |  97  |  18  ----------------------------<  117<H>  3.5   |  33<H>  |  0.96    Ca    9.0      31 Oct 2017 06:22  Mg     2.0     10-31            RADIOLOGY & ADDITIONAL TESTS:  Reviewed

## 2017-11-01 NOTE — PROGRESS NOTE ADULT - PROBLEM SELECTOR PLAN 4
- Resolved  - Patient saturating 96%   - Last Cxr: : Size of cardiac silhouette unchanged. Right-sided PICC line   with tip overlying the right innominate vein/SVC junction. The lungs are   unchanged. Right lung grossly clear. Linear scarring versus linear   atelectasis left base. No pleural effusion or pneumothorax.  - Pt extubated 10/28 (intubated for prostate cauterization), s/p cystoscopy, now satting well.  - c/w PO lasix and consider 40 IV lasix PRN for signs of volume overload and increased work of breathing, patient breathing comfortably this AM  -robitussin PRN for cough, patient w/ hx of COPD  - CPAP at night patient w/ MARIA ESTHER

## 2017-11-01 NOTE — PROGRESS NOTE ADULT - PROBLEM SELECTOR PLAN 1
- Sepsis 2/2 to bacteremia MSSA + Pseudomonas urine (cx + 10/24), resolved  - Patient afebrile 97.9, leukocytosis now resolved  - Repeat blood cxs 10/25 NGTD. Also with MSSA bacteremia earlier in hospitalization with + blood cxs 10/9, now resolved.  - (today day 7): c/w Cefepime 2g q 12hrs (started 10/26, tenative end date Nov. 7th) as it covers both MSSA and pseudomonas.   -s/p cefazolin 2 g q8 (10/19--10/26), initially planed for 4 weeks duration but switched to cefepime for remainder of course to cover pseudomonas.   -- Gallium scan negative for focal areas of infection, KELLY 10/9 negative for endocarditis. Repeat TTE 10/16 w/o evidence of new vegetations or worsening valve thickening.   - Surveillance blood cx w/ no growth to date  - Patient will follow up with Dr. Clemente outpatient for further monitoring of his bacteremia.

## 2017-11-01 NOTE — PROGRESS NOTE ADULT - SUBJECTIVE AND OBJECTIVE BOX
Cardiology for Preister    cc: follow-up cardiology evaluation and management of left heart failure    interval - no cp    PAST MEDICAL & SURGICAL HISTORY:  COPD (chronic obstructive pulmonary disease)  Hypertension  Obstructive sleep apnea syndrome  Prostate cancer metastatic to bone  PC (prostate cancer): with seed placement    MEDICATIONS  (STANDING):  aspirin  chewable 81 milliGRAM(s) Oral daily  atorvastatin 80 milliGRAM(s) Oral at bedtime  cefepime  IVPB 2000 milliGRAM(s) IV Intermittent once  cefepime  IVPB 2000 milliGRAM(s) IV Intermittent every 12 hours  cefepime  IVPB      dextrose 5%. 1000 milliLiter(s) (50 mL/Hr) IV Continuous <Continuous>  docusate sodium 100 milliGRAM(s) Oral daily  finasteride 5 milliGRAM(s) Oral daily  furosemide    Tablet 40 milliGRAM(s) Oral every 12 hours  heparin  flush 100 Units/mL Injectable 100 Unit(s) IV Push every other day  lactulose Syrup 20 Gram(s) Oral daily  melatonin 3 milliGRAM(s) Oral at bedtime  nystatin Powder 1 Application(s) Topical two times a day  pantoprazole    Tablet 40 milliGRAM(s) Oral two times a day before meals  polyethylene glycol 3350 17 Gram(s) Oral two times a day  senna 2 Tablet(s) Oral at bedtime  tamsulosin 0.8 milliGRAM(s) Oral at bedtime    MEDICATIONS  (PRN):  ALBUTerol/ipratropium for Nebulization 3 milliLiter(s) Nebulizer every 6 hours PRN Shortness of Breath and/or Wheezing  guaiFENesin    Syrup 200 milliGRAM(s) Oral every 6 hours PRN Cough  lidocaine 2% Gel 1 Application(s) Topical every 3 hours PRN catheter related discomfort at tip of penis  simethicone 80 milliGRAM(s) Chew three times a day PRN Gas      Vital Signs Last 24 Hrs  T(C): 36.6 (01 Nov 2017 20:53), Max: 36.8 (01 Nov 2017 16:15)  T(F): 97.9 (01 Nov 2017 20:53), Max: 98.2 (01 Nov 2017 16:15)  HR: 80 (01 Nov 2017 20:53) (77 - 91)  BP: 123/72 (01 Nov 2017 20:53) (90/61 - 142/77)  BP(mean): --  RR: 19 (01 Nov 2017 20:53) (18 - 20)  SpO2: 96% (01 Nov 2017 20:56) (95% - 98%)    physical exam  nad  alert  bilat breath sounds improving  rr; s1/s2 present  soft abd; distended; nt  trace edema  warm ue and le bilat    I&O's Detail    31 Oct 2017 07:01  -  01 Nov 2017 07:00  --------------------------------------------------------  IN:    IV PiggyBack: 100 mL  Total IN: 100 mL    OUT:    Voided: 1400 mL  Total OUT: 1400 mL    Total NET: -1300 mL      01 Nov 2017 07:01  -  01 Nov 2017 23:24  --------------------------------------------------------  IN:    Solution: 100 mL  Total IN: 100 mL    OUT:    Voided: 200 mL  Total OUT: 200 mL    Total NET: -100 mL        labs                                            8.9    7.6   )-----------( 136      ( 01 Nov 2017 08:04 )             29.0   11-01    141  |  94<L>  |  16  ----------------------------<  127<H>  3.1<L>   |  34<H>  |  1.01    Ca    9.0      01 Nov 2017 08:04  Mg     1.7     11-01      I&O's Summary    31 Oct 2017 07:01  -  01 Nov 2017 07:00  --------------------------------------------------------  IN: 100 mL / OUT: 1400 mL / NET: -1300 mL    01 Nov 2017 07:01  -  01 Nov 2017 23:24  --------------------------------------------------------  IN: 100 mL / OUT: 200 mL / NET: -100 mL        radiology    < from: Xray Chest 1 View AP -PORTABLE-Routine (10.28.17 @ 08:05) >  EXAM:  XR CHEST 1 VIEW PORT ROUTINE                          PROCEDURE DATE:  10/28/2017                     INTERPRETATION:  PORTABLE CHEST X-RAY     HISTORY: SOB    PRIOR STUDIES: 10/27/2017.    FINDINGS: Size of cardiac silhouette unchanged. Right-sided PICC line   with tip overlying the right innominate vein/SVC junction. The lungs are   unchanged. Right lung grossly clear. Linear scarring versus linear   atelectasis left base. No pleural effusion or pneumothorax.    IMPRESSION:  No change.            "Thank you for the opportunity to participate in the care of this   patient."        SD COATS M.D., ATTENDING RADIOLOGIST  This document has been electronically signed. Oct 30 2017 11:30AM    < end of copied text >        < from: Echocardiogram (10.16.17 @ 10:31) >    EXAM:  ECHOCARDIOGRAM (CARDIOL)                          PROCEDURE DATE:  10/16/2017                        INTERPRETATION:  Patient Height: 172.0 cm  Patient Weight: 103.0 kg  Heart Rate: 74 bpm  Systolic Pressure: 140 mmHg  Diastolic Pressure: 70mmHg  BSA: 2.2 m^2  Interpretation Summary  The left ventricle is mildly dilated.Hypokinesis of the basal inferior and   septal region.The left ventricular ejection fraction is estimated to be   45-50%The mitral inflow pattern is consistent with impaired left   ventricular   relaxation with mildly elevated left atrial pressure (8-14mmHg).  The   left   atrial size is normal. The right atrium is dilated.The right ventricle is   moderately dilated. The right ventricular systolic function is mildly   reduced.    There is mild aortic valve thickening.There is mild mitral valve   thickening.   There is mild mitral regurgitation.There is trace tricuspid   regurgitation.There was insufficient TR detected from which to calculate   pulmonary artery systolic pressure.  No aortic root dilatation.The   inferior   vena cava is normal in size (<2.1 cm) with normal inspiratory collapse   (>50%)   consistent with normal right atrial pressure.  There is no pericardial   effusion.    < end of copied text >  IN: 650 mL / OUT: 2875 mL / NET: -2225 mL          < from: Xray Chest 1 View AP -PORTABLE-Routine (10.09.17 @ 05:37) >  EXAM:  XR CHEST 1 VIEW PORT ROUTINE                          PROCEDURE DATE:  10/09/2017                     INTERPRETATION:    Portable AP Radiograph dated 10/9/2017 5:37 AM    CLINICAL INFORMATION: 72 years, Male, Intubated, PNA    PRIOR STUDIES:October 8, 2017    FINDINGS: Support tubes and lines are unchanged. Left pleural effusion is   stable. Right pleural effusion has increased. Pulmonary vascular   congestion has increased.    IMPRESSION:  Interval increase of right pleural effusion and pulmonary vascular   congestion.            "Thank you for the opportunity to participate in the care of this   patient."        KATHY PABLO M.D., RADIOLOGY ATTENDING  This document has been electronically signed. Oct  9 2017  8:33AM    < end of copied text >

## 2017-11-01 NOTE — PROGRESS NOTE ADULT - ASSESSMENT
72M PMHx of HTN, COPD, Prostate CA here with a prolonged hospital stay now with continued distention.  Surgery consulted to r/o colonic obstruction.  - Recommend CT A/P  - No surgical intervention at this time

## 2017-11-01 NOTE — PROGRESS NOTE ADULT - PROBLEM SELECTOR PLAN 3
- Ucx growing pseudomonas  -Cefepime 2g q 12hrs (10/26--), per urology only needs 1 week course for UTI, however will be on Cefepime until Nov 7th for MSSA bacteremia.  -S/p zosyn (10/25-10/26)

## 2017-11-01 NOTE — PROGRESS NOTE ADULT - ASSESSMENT
A/recs:  1) acute hypoxic respiratory failure with elevated tn and abnormal septal wall motion on tte in addition to rv dilatation and abnormal ecg earlier this admission; left heart failure (ef=45% by tte) -  a - nstemi earlier this admission  b - asa restarted  c - rec repeat ecg  d - not on bb  e - per Seferino, rec cardiac cta prior to discharge if no contraindications    2) paroxysmal atrial fibrillation  a - rec ecg    3) htn - lisinopril remains held    4) sepsis due to pna per [facility] with bacteremia - rec id re-eval    5) possible copd exacerbation - rec pulmonary eval    6) acute renal failure - resolved - monitoring    7) normocytic anemia and thrombocytopenia    8) shingles    9) prostate ca     10)  K>4, Mg>2    11) palliative care previously following

## 2017-11-01 NOTE — PROGRESS NOTE ADULT - PROBLEM SELECTOR PLAN 1
Etiology is unclear. A previously ordered CT scan of the A/P was not done. Would consider doing the scan. The patient last had a colonoscopy in 2012. This should be repeated. Patient advised.

## 2017-11-02 ENCOUNTER — APPOINTMENT (OUTPATIENT)
Dept: INFECTIOUS DISEASE | Facility: CLINIC | Age: 72
End: 2017-11-02

## 2017-11-02 LAB
ANION GAP SERPL CALC-SCNC: 15 MMOL/L — SIGNIFICANT CHANGE UP (ref 5–17)
BUN SERPL-MCNC: 15 MG/DL — SIGNIFICANT CHANGE UP (ref 7–23)
CALCIUM SERPL-MCNC: 9.2 MG/DL — SIGNIFICANT CHANGE UP (ref 8.4–10.5)
CHLORIDE SERPL-SCNC: 94 MMOL/L — LOW (ref 96–108)
CO2 SERPL-SCNC: 32 MMOL/L — HIGH (ref 22–31)
CREAT SERPL-MCNC: 1.07 MG/DL — SIGNIFICANT CHANGE UP (ref 0.5–1.3)
GLUCOSE SERPL-MCNC: 93 MG/DL — SIGNIFICANT CHANGE UP (ref 70–99)
HCT VFR BLD CALC: 27.3 % — LOW (ref 39–50)
HGB BLD-MCNC: 8.4 G/DL — LOW (ref 13–17)
MAGNESIUM SERPL-MCNC: 1.9 MG/DL — SIGNIFICANT CHANGE UP (ref 1.6–2.6)
MCHC RBC-ENTMCNC: 30.2 PG — SIGNIFICANT CHANGE UP (ref 27–34)
MCHC RBC-ENTMCNC: 30.8 G/DL — LOW (ref 32–36)
MCV RBC AUTO: 98.2 FL — SIGNIFICANT CHANGE UP (ref 80–100)
PLATELET # BLD AUTO: 133 K/UL — LOW (ref 150–400)
POTASSIUM SERPL-MCNC: 3.2 MMOL/L — LOW (ref 3.5–5.3)
POTASSIUM SERPL-SCNC: 3.2 MMOL/L — LOW (ref 3.5–5.3)
RBC # BLD: 2.78 M/UL — LOW (ref 4.2–5.8)
RBC # FLD: 15.3 % — SIGNIFICANT CHANGE UP (ref 10.3–16.9)
SODIUM SERPL-SCNC: 141 MMOL/L — SIGNIFICANT CHANGE UP (ref 135–145)
WBC # BLD: 8.1 K/UL — SIGNIFICANT CHANGE UP (ref 3.8–10.5)
WBC # FLD AUTO: 8.1 K/UL — SIGNIFICANT CHANGE UP (ref 3.8–10.5)

## 2017-11-02 PROCEDURE — 99232 SBSQ HOSP IP/OBS MODERATE 35: CPT

## 2017-11-02 RX ORDER — MAGNESIUM SULFATE 500 MG/ML
1 VIAL (ML) INJECTION ONCE
Qty: 0 | Refills: 0 | Status: COMPLETED | OUTPATIENT
Start: 2017-11-02 | End: 2017-11-02

## 2017-11-02 RX ORDER — POTASSIUM CHLORIDE 20 MEQ
40 PACKET (EA) ORAL ONCE
Qty: 0 | Refills: 0 | Status: COMPLETED | OUTPATIENT
Start: 2017-11-02 | End: 2017-11-02

## 2017-11-02 RX ORDER — ASPIRIN/CALCIUM CARB/MAGNESIUM 324 MG
81 TABLET ORAL DAILY
Qty: 0 | Refills: 0 | Status: DISCONTINUED | OUTPATIENT
Start: 2017-11-02 | End: 2017-11-03

## 2017-11-02 RX ADMIN — PANTOPRAZOLE SODIUM 40 MILLIGRAM(S): 20 TABLET, DELAYED RELEASE ORAL at 06:32

## 2017-11-02 RX ADMIN — Medication 200 MILLIGRAM(S): at 22:45

## 2017-11-02 RX ADMIN — FINASTERIDE 5 MILLIGRAM(S): 5 TABLET, FILM COATED ORAL at 12:27

## 2017-11-02 RX ADMIN — Medication 40 MILLIEQUIVALENT(S): at 19:07

## 2017-11-02 RX ADMIN — Medication 40 MILLIEQUIVALENT(S): at 12:28

## 2017-11-02 RX ADMIN — TAMSULOSIN HYDROCHLORIDE 0.8 MILLIGRAM(S): 0.4 CAPSULE ORAL at 22:19

## 2017-11-02 RX ADMIN — PANTOPRAZOLE SODIUM 40 MILLIGRAM(S): 20 TABLET, DELAYED RELEASE ORAL at 16:04

## 2017-11-02 RX ADMIN — POLYETHYLENE GLYCOL 3350 17 GRAM(S): 17 POWDER, FOR SOLUTION ORAL at 17:50

## 2017-11-02 RX ADMIN — POLYETHYLENE GLYCOL 3350 17 GRAM(S): 17 POWDER, FOR SOLUTION ORAL at 09:35

## 2017-11-02 RX ADMIN — ATORVASTATIN CALCIUM 80 MILLIGRAM(S): 80 TABLET, FILM COATED ORAL at 22:17

## 2017-11-02 RX ADMIN — SENNA PLUS 2 TABLET(S): 8.6 TABLET ORAL at 22:17

## 2017-11-02 RX ADMIN — Medication 100 GRAM(S): at 12:28

## 2017-11-02 RX ADMIN — LACTULOSE 20 GRAM(S): 10 SOLUTION ORAL at 12:29

## 2017-11-02 RX ADMIN — Medication 81 MILLIGRAM(S): at 17:50

## 2017-11-02 RX ADMIN — CEFEPIME 100 MILLIGRAM(S): 1 INJECTION, POWDER, FOR SOLUTION INTRAMUSCULAR; INTRAVENOUS at 06:32

## 2017-11-02 RX ADMIN — CEFEPIME 100 MILLIGRAM(S): 1 INJECTION, POWDER, FOR SOLUTION INTRAMUSCULAR; INTRAVENOUS at 18:00

## 2017-11-02 RX ADMIN — Medication 3 MILLIGRAM(S): at 22:17

## 2017-11-02 RX ADMIN — Medication 3 MILLILITER(S): at 06:34

## 2017-11-02 RX ADMIN — NYSTATIN CREAM 1 APPLICATION(S): 100000 CREAM TOPICAL at 06:32

## 2017-11-02 RX ADMIN — Medication 3 MILLILITER(S): at 22:17

## 2017-11-02 RX ADMIN — Medication 40 MILLIGRAM(S): at 06:32

## 2017-11-02 RX ADMIN — NYSTATIN CREAM 1 APPLICATION(S): 100000 CREAM TOPICAL at 17:51

## 2017-11-02 RX ADMIN — Medication 40 MILLIGRAM(S): at 17:50

## 2017-11-02 RX ADMIN — Medication 40 MILLIGRAM(S): at 12:28

## 2017-11-02 RX ADMIN — Medication 100 MILLIGRAM(S): at 12:28

## 2017-11-02 NOTE — PROGRESS NOTE ADULT - ASSESSMENT
71 yo M with  1. Acute blood loss anemia 2/2 hematuria  - s/p cystoscopy with fulguration of prostate for bleeding vessel  - passed TOV with no hematuria until 1 blood clot passed today- will f/u if any further episodes, will c/w ASA   2. Sepsis 2/2 UTI  - c/w cefepime  3. Constipation  - on miralax BID and lactulose daily with senna/colace  4. Newly dx systolic CHF with rEF  - currently on lasix 40 mg BID  5. NSTEMI  - resumed ASA  6. COPD on O2  - c/w nebs and oxygen via NC and BiPAP prn  7. MSSA bacteremia  - c/w cefepime  8. Metastatic prostate cancer  - tx per    Dispo: awaiting auth for RJ

## 2017-11-02 NOTE — PROGRESS NOTE ADULT - SUBJECTIVE AND OBJECTIVE BOX
Cardiology for Preister    cc: follow-up cardiology evaluation and management of left heart failure    interval - no new cardiac complaints now    PAST MEDICAL & SURGICAL HISTORY:  COPD (chronic obstructive pulmonary disease)  Hypertension  Obstructive sleep apnea syndrome  Prostate cancer metastatic to bone  PC (prostate cancer): with seed placement    MEDICATIONS  (STANDING):  aspirin  chewable 81 milliGRAM(s) Oral daily  atorvastatin 80 milliGRAM(s) Oral at bedtime  cefepime  IVPB 2000 milliGRAM(s) IV Intermittent once  cefepime  IVPB 2000 milliGRAM(s) IV Intermittent every 12 hours  cefepime  IVPB      dextrose 5%. 1000 milliLiter(s) (50 mL/Hr) IV Continuous <Continuous>  docusate sodium 100 milliGRAM(s) Oral daily  finasteride 5 milliGRAM(s) Oral daily  furosemide    Tablet 40 milliGRAM(s) Oral every 12 hours  heparin  flush 100 Units/mL Injectable 100 Unit(s) IV Push every other day  lactulose Syrup 20 Gram(s) Oral daily  melatonin 3 milliGRAM(s) Oral at bedtime  nystatin Powder 1 Application(s) Topical two times a day  pantoprazole    Tablet 40 milliGRAM(s) Oral two times a day before meals  polyethylene glycol 3350 17 Gram(s) Oral two times a day  senna 2 Tablet(s) Oral at bedtime  tamsulosin 0.8 milliGRAM(s) Oral at bedtime    MEDICATIONS  (PRN):  ALBUTerol/ipratropium for Nebulization 3 milliLiter(s) Nebulizer every 6 hours PRN Shortness of Breath and/or Wheezing  guaiFENesin    Syrup 200 milliGRAM(s) Oral every 6 hours PRN Cough  lidocaine 2% Gel 1 Application(s) Topical every 3 hours PRN catheter related discomfort at tip of penis  simethicone 80 milliGRAM(s) Chew three times a day PRN Gas    Vital Signs Last 24 Hrs  T(C): 37.1 (02 Nov 2017 15:52), Max: 37.1 (02 Nov 2017 15:52)  T(F): 98.7 (02 Nov 2017 15:52), Max: 98.7 (02 Nov 2017 15:52)  HR: 78 (02 Nov 2017 21:59) (64 - 84)  BP: 129/79 (02 Nov 2017 15:52) (100/66 - 129/79)  BP(mean): --  RR: 16 (02 Nov 2017 21:59) (16 - 23)  SpO2: 96% (02 Nov 2017 21:59) (95% - 100%)    physical exam  nad  anicteric  breath sounds present   rr; s1/s2 present; no rub  soft abd; bowel sounds present  trace edema  le pulses present bilat    I&O's Detail    01 Nov 2017 07:01  -  02 Nov 2017 07:00  --------------------------------------------------------  IN:    Solution: 100 mL  Total IN: 100 mL    OUT:    Voided: 740 mL  Total OUT: 740 mL    Total NET: -640 mL      02 Nov 2017 07:01  -  02 Nov 2017 22:13  --------------------------------------------------------  IN:  Total IN: 0 mL    OUT:    Voided: 300 mL  Total OUT: 300 mL    Total NET: -300 mL            labs                                         8.4    8.1   )-----------( 133      ( 02 Nov 2017 10:15 )             27.3   11-02    141  |  94<L>  |  15  ----------------------------<  93  3.2<L>   |  32<H>  |  1.07    Ca    9.2      02 Nov 2017 10:15  Mg     1.9     11-02      I&O's Summary    01 Nov 2017 07:01  -  02 Nov 2017 07:00  --------------------------------------------------------  IN: 100 mL / OUT: 740 mL / NET: -640 mL    02 Nov 2017 07:01  -  02 Nov 2017 22:13  --------------------------------------------------------  IN: 0 mL / OUT: 300 mL / NET: -300 mL          radiology    < from: Xray Chest 1 View AP -PORTABLE-Routine (10.28.17 @ 08:05) >  EXAM:  XR CHEST 1 VIEW PORT ROUTINE                          PROCEDURE DATE:  10/28/2017                     INTERPRETATION:  PORTABLE CHEST X-RAY     HISTORY: SOB    PRIOR STUDIES: 10/27/2017.    FINDINGS: Size of cardiac silhouette unchanged. Right-sided PICC line   with tip overlying the right innominate vein/SVC junction. The lungs are   unchanged. Right lung grossly clear. Linear scarring versus linear   atelectasis left base. No pleural effusion or pneumothorax.    IMPRESSION:  No change.            "Thank you for the opportunity to participate in the care of this   patient."        SD COATS M.D., ATTENDING RADIOLOGIST  This document has been electronically signed. Oct 30 2017 11:30AM    < end of copied text >        < from: Echocardiogram (10.16.17 @ 10:31) >    EXAM:  ECHOCARDIOGRAM (CARDIOL)                          PROCEDURE DATE:  10/16/2017                        INTERPRETATION:  Patient Height: 172.0 cm  Patient Weight: 103.0 kg  Heart Rate: 74 bpm  Systolic Pressure: 140 mmHg  Diastolic Pressure: 70mmHg  BSA: 2.2 m^2  Interpretation Summary  The left ventricle is mildly dilated.Hypokinesis of the basal inferior and   septal region.The left ventricular ejection fraction is estimated to be   45-50%The mitral inflow pattern is consistent with impaired left   ventricular   relaxation with mildly elevated left atrial pressure (8-14mmHg).  The   left   atrial size is normal. The right atrium is dilated.The right ventricle is   moderately dilated. The right ventricular systolic function is mildly   reduced.    There is mild aortic valve thickening.There is mild mitral valve   thickening.   There is mild mitral regurgitation.There is trace tricuspid   regurgitation.There was insufficient TR detected from which to calculate   pulmonary artery systolic pressure.  No aortic root dilatation.The   inferior   vena cava is normal in size (<2.1 cm) with normal inspiratory collapse   (>50%)   consistent with normal right atrial pressure.  There is no pericardial   effusion.    < end of copied text >  IN: 650 mL / OUT: 2875 mL / NET: -2225 mL          < from: Xray Chest 1 View AP -PORTABLE-Routine (10.09.17 @ 05:37) >  EXAM:  XR CHEST 1 VIEW PORT ROUTINE                          PROCEDURE DATE:  10/09/2017                     INTERPRETATION:    Portable AP Radiograph dated 10/9/2017 5:37 AM    CLINICAL INFORMATION: 72 years, Male, Intubated, PNA    PRIOR STUDIES:October 8, 2017    FINDINGS: Support tubes and lines are unchanged. Left pleural effusion is   stable. Right pleural effusion has increased. Pulmonary vascular   congestion has increased.    IMPRESSION:  Interval increase of right pleural effusion and pulmonary vascular   congestion.            "Thank you for the opportunity to participate in the care of this   patient."        KATHY PABLO M.D., RADIOLOGY ATTENDING  This document has been electronically signed. Oct  9 2017  8:33AM    < end of copied text >

## 2017-11-02 NOTE — PROGRESS NOTE ADULT - SUBJECTIVE AND OBJECTIVE BOX
INTERVAL HPI/OVERNIGHT EVENTS:  Patient was seen and examined at bedside. Patient with blood clot in urine this PM, urine clear after episode remained on aspirin as uro recs.    VITAL SIGNS:  T(F): 98.7 (11-02-17 @ 15:52)  HR: 84 (11-02-17 @ 09:29)  BP: 129/79 (11-02-17 @ 15:52)  RR: 19 (11-02-17 @ 15:52)  SpO2: 96% (11-02-17 @ 15:52)      PHYSICAL EXAM:    General: Obese, Awake and alert; NAD.  Head: NC/AT; MMM  Eyes: anicteric sclera, MEHUL, extraocular movements intact.  Neck: Supple; no JVD  Respiratory: CTAB. No wheezes rales.  Cardiovascular: Regular rhythm/rate; S1/S2; no gallops or murmurs auscultated.  Gastrointestinal: Soft; NTND w/out guarding.   Extremities: WWP; no rashes, no lesions.     MEDICATIONS  (STANDING):  aspirin  chewable 81 milliGRAM(s) Oral daily  atorvastatin 80 milliGRAM(s) Oral at bedtime  cefepime  IVPB 2000 milliGRAM(s) IV Intermittent once  cefepime  IVPB 2000 milliGRAM(s) IV Intermittent every 12 hours  cefepime  IVPB      dextrose 5%. 1000 milliLiter(s) (50 mL/Hr) IV Continuous <Continuous>  docusate sodium 100 milliGRAM(s) Oral daily  finasteride 5 milliGRAM(s) Oral daily  furosemide    Tablet 40 milliGRAM(s) Oral every 12 hours  heparin  flush 100 Units/mL Injectable 100 Unit(s) IV Push every other day  lactulose Syrup 20 Gram(s) Oral daily  melatonin 3 milliGRAM(s) Oral at bedtime  nystatin Powder 1 Application(s) Topical two times a day  pantoprazole    Tablet 40 milliGRAM(s) Oral two times a day before meals  polyethylene glycol 3350 17 Gram(s) Oral two times a day  senna 2 Tablet(s) Oral at bedtime  tamsulosin 0.8 milliGRAM(s) Oral at bedtime    MEDICATIONS  (PRN):  ALBUTerol/ipratropium for Nebulization 3 milliLiter(s) Nebulizer every 6 hours PRN Shortness of Breath and/or Wheezing  guaiFENesin    Syrup 200 milliGRAM(s) Oral every 6 hours PRN Cough  lidocaine 2% Gel 1 Application(s) Topical every 3 hours PRN catheter related discomfort at tip of penis  simethicone 80 milliGRAM(s) Chew three times a day PRN Gas      Allergies    No Known Allergies    Intolerances        LABS:                        8.4    8.1   )-----------( 133      ( 02 Nov 2017 10:15 )             27.3     11-02    141  |  94<L>  |  15  ----------------------------<  93  3.2<L>   |  32<H>  |  1.07    Ca    9.2      02 Nov 2017 10:15  Mg     1.9     11-02            RADIOLOGY & ADDITIONAL TESTS:  Reviewed

## 2017-11-02 NOTE — PROGRESS NOTE ADULT - PROBLEM SELECTOR PLAN 7
#CHF HFrEF  TTE shows HFrEF with EF 45%, right atrial dilatation, septal wall motion abnormalities and mild hypokinesis of left ventricle. Unable to visualize any vegetations or R heart structures. Unable to calculate pulmonary a pressures.   - c/w lipitor,   - Restart Aspirin?, (call urology-for recs)  - Lasix 40mg PO BID, give IV if needed.   -Continue lisinopril 2.5 daily  -Consider starting metoprolol ACEI/ARBs as tolerated once BP allows, patient BP today 142/77  - strict I/Os, daily weights, fluid restriction 1.5L  - acute onset of HFrEF; discussed case with Dr. Glass who agrees that patient should have ischemia workup outpatient for acute HFrEF and repeat echo to be done to assess for improvement in LV function.     #New onset paroxysmal A.fib Vs MAT:  - pt has remained in NSR since admission to [facility]. To be followed up with outpatient cardiologist for further monitoring

## 2017-11-02 NOTE — PROGRESS NOTE ADULT - ADDITIONAL PE
Pt well appearing and sitting up in chair with 2L NC on, RRR, lungs are clearer than previously with no wheezing and minimal crackles, abd large and distended but soft, no LE edema.

## 2017-11-02 NOTE — PROGRESS NOTE ADULT - ASSESSMENT
A/recs:  1) acute hypoxic respiratory failure with elevated tn and abnormal septal wall motion on tte in addition to rv dilatation and abnormal ecg earlier this admission; left heart failure (ef=45% by tte) -  a - nstemi earlier this admission  b - asa if safe per gu and int med with clot in urine earlier today per patient - risk/benefit per int med and gu  c - repeat 12-lead ecg  d - per teresita Gentile cardiac cta prior to discharge if no contraindications    2) paroxysmal atrial fibrillation  a - 12-lead ecg recommended    3) htn - monitor    4) sepsis due to pna per ccm with bacteremia - known to id    5) possible copd exacerbation - rec pulm consult    6) acute renal failure - resolved - monitoring    7) normocytic anemia and thrombocytopenia - monitor    8) shingles    9) prostate ca - per gu and hem-onc    10) maintain  K>4, Mg>2    11) palliative care previously following

## 2017-11-02 NOTE — PROGRESS NOTE ADULT - ASSESSMENT
71 y/o M w/PMH of HTN, COPD, Prostate CA (s/p radiation and seed 2013 on leuprolide outpatient) who presented with worsening of SOB and an episode of unwitnessed syncope found to have severe sepsis with gram + cocci in clusters identified as S. aureus and ATN.  Hospital course complicated by hypotension and transfer to MICU for septic shock.  Septic shock has resolved but pt has had persistent MSSA bacteremia with resolving respiratory failure and ATN. Patient stepped back down to floors however Saldana repeatedly obstructed from blood clots and hgb dropping, now s/p cauterization of prostate by urology. Post-op patient required stay in MICU for extubation.  Patient now stable, on RMF, awaiting placement to [facility].

## 2017-11-02 NOTE — PROGRESS NOTE ADULT - PROBLEM SELECTOR PROBLEM 10
Need for prophylactic measure
Need for prophylactic measure
Nutrition, metabolism, and development symptoms

## 2017-11-02 NOTE — PROGRESS NOTE ADULT - PROBLEM SELECTOR PLAN 10
F no IVF  E- replete lytes prn with goal K>4 and Mg >2  N- DASH/TLC, bowel regiment   PPx - SCD, no Hep SQ given recent bleeding     CODE - DNR not DNI  Dispo: [facility]
F no IVF  E- replete lytes prn with goal K>4 and Mg >2  N- DASH/TLC, bowel regiment   PPx - SCD, no Hep SQ given recent bleeding     CODE - DNR not DNI  Dispo: [facility]
F no IVF, 250cc bolus PRN  E- replete lytes prn  N- dash diet w/ 1.5 L fluid restriction     PPx - On ASA 81mg, SCD     CODE - DNR not DNI    Dispo: [facility] for RJ placement

## 2017-11-02 NOTE — PROGRESS NOTE ADULT - PROBLEM SELECTOR PLAN 5
Acute blood loss anemia 2/2 hematuria and bleeding/ clots from Saldana catheter on 10/23. Patient with UGIB on admission, however no recent melena or bloody stools, stool guiac negative. S/p multiple 1 U PRBCs with good response on 10/23.   - Hgb stable s/p prostate cauterization, resolved.   -Continue to trend CBC daily  -Transfuse for Hb <8 given recent NSTEMI Acute blood loss anemia 2/2 hematuria and bleeding/ clots from Saldana catheter on 10/23. Patient with UGIB on admission, however no recent melena or bloody stools, stool guiac negative. S/p multiple 1 U PRBCs with good response on 10/23.   - Hgb stable s/p prostate cauterization, resolved.   -Continue to trend CBC daily  -Transfuse for Hb <8 given recent NSTEMI  - patient passed blood clot today however urine now clear will trend cbc tomorrow AM continue with ASA as per uro recs

## 2017-11-03 VITALS — HEART RATE: 72 BPM | OXYGEN SATURATION: 96 % | RESPIRATION RATE: 19 BRPM

## 2017-11-03 LAB
ANION GAP SERPL CALC-SCNC: 11 MMOL/L — SIGNIFICANT CHANGE UP (ref 5–17)
BUN SERPL-MCNC: 21 MG/DL — SIGNIFICANT CHANGE UP (ref 7–23)
CALCIUM SERPL-MCNC: 9.2 MG/DL — SIGNIFICANT CHANGE UP (ref 8.4–10.5)
CHLORIDE SERPL-SCNC: 92 MMOL/L — LOW (ref 96–108)
CO2 SERPL-SCNC: 33 MMOL/L — HIGH (ref 22–31)
CREAT SERPL-MCNC: 1.19 MG/DL — SIGNIFICANT CHANGE UP (ref 0.5–1.3)
GLUCOSE SERPL-MCNC: 118 MG/DL — HIGH (ref 70–99)
HCT VFR BLD CALC: 29.1 % — LOW (ref 39–50)
HGB BLD-MCNC: 8.8 G/DL — LOW (ref 13–17)
MAGNESIUM SERPL-MCNC: 2.1 MG/DL — SIGNIFICANT CHANGE UP (ref 1.6–2.6)
MCHC RBC-ENTMCNC: 30.2 G/DL — LOW (ref 32–36)
MCHC RBC-ENTMCNC: 30.2 PG — SIGNIFICANT CHANGE UP (ref 27–34)
MCV RBC AUTO: 100 FL — SIGNIFICANT CHANGE UP (ref 80–100)
PLATELET # BLD AUTO: 121 K/UL — LOW (ref 150–400)
POTASSIUM SERPL-MCNC: 3.7 MMOL/L — SIGNIFICANT CHANGE UP (ref 3.5–5.3)
POTASSIUM SERPL-SCNC: 3.7 MMOL/L — SIGNIFICANT CHANGE UP (ref 3.5–5.3)
RBC # BLD: 2.91 M/UL — LOW (ref 4.2–5.8)
RBC # FLD: 14.9 % — SIGNIFICANT CHANGE UP (ref 10.3–16.9)
SODIUM SERPL-SCNC: 136 MMOL/L — SIGNIFICANT CHANGE UP (ref 135–145)
WBC # BLD: 8.7 K/UL — SIGNIFICANT CHANGE UP (ref 3.8–10.5)
WBC # FLD AUTO: 8.7 K/UL — SIGNIFICANT CHANGE UP (ref 3.8–10.5)

## 2017-11-03 PROCEDURE — 82550 ASSAY OF CK (CPK): CPT

## 2017-11-03 PROCEDURE — 82962 GLUCOSE BLOOD TEST: CPT

## 2017-11-03 PROCEDURE — 97530 THERAPEUTIC ACTIVITIES: CPT

## 2017-11-03 PROCEDURE — 85027 COMPLETE CBC AUTOMATED: CPT

## 2017-11-03 PROCEDURE — 85025 COMPLETE CBC W/AUTO DIFF WBC: CPT

## 2017-11-03 PROCEDURE — 82803 BLOOD GASES ANY COMBINATION: CPT

## 2017-11-03 PROCEDURE — 78802 RP LOCLZJ TUM WHBDY 1 D IMG: CPT

## 2017-11-03 PROCEDURE — 85045 AUTOMATED RETICULOCYTE COUNT: CPT

## 2017-11-03 PROCEDURE — 97162 PT EVAL MOD COMPLEX 30 MIN: CPT

## 2017-11-03 PROCEDURE — 82553 CREATINE MB FRACTION: CPT

## 2017-11-03 PROCEDURE — 93306 TTE W/DOPPLER COMPLETE: CPT

## 2017-11-03 PROCEDURE — 36430 TRANSFUSION BLD/BLD COMPNT: CPT

## 2017-11-03 PROCEDURE — 87150 DNA/RNA AMPLIFIED PROBE: CPT

## 2017-11-03 PROCEDURE — 85730 THROMBOPLASTIN TIME PARTIAL: CPT

## 2017-11-03 PROCEDURE — 85652 RBC SED RATE AUTOMATED: CPT

## 2017-11-03 PROCEDURE — 96375 TX/PRO/DX INJ NEW DRUG ADDON: CPT

## 2017-11-03 PROCEDURE — 84300 ASSAY OF URINE SODIUM: CPT

## 2017-11-03 PROCEDURE — P9035: CPT

## 2017-11-03 PROCEDURE — 80053 COMPREHEN METABOLIC PANEL: CPT

## 2017-11-03 PROCEDURE — 84100 ASSAY OF PHOSPHORUS: CPT

## 2017-11-03 PROCEDURE — 86900 BLOOD TYPING SEROLOGIC ABO: CPT

## 2017-11-03 PROCEDURE — 83735 ASSAY OF MAGNESIUM: CPT

## 2017-11-03 PROCEDURE — 83935 ASSAY OF URINE OSMOLALITY: CPT

## 2017-11-03 PROCEDURE — 85610 PROTHROMBIN TIME: CPT

## 2017-11-03 PROCEDURE — 87581 M.PNEUMON DNA AMP PROBE: CPT

## 2017-11-03 PROCEDURE — 81001 URINALYSIS AUTO W/SCOPE: CPT

## 2017-11-03 PROCEDURE — 87186 SC STD MICRODIL/AGAR DIL: CPT

## 2017-11-03 PROCEDURE — 76770 US EXAM ABDO BACK WALL COMP: CPT

## 2017-11-03 PROCEDURE — 80074 ACUTE HEPATITIS PANEL: CPT

## 2017-11-03 PROCEDURE — 87070 CULTURE OTHR SPECIMN AEROBIC: CPT

## 2017-11-03 PROCEDURE — 36415 COLL VENOUS BLD VENIPUNCTURE: CPT

## 2017-11-03 PROCEDURE — 87086 URINE CULTURE/COLONY COUNT: CPT

## 2017-11-03 PROCEDURE — 96374 THER/PROPH/DIAG INJ IV PUSH: CPT

## 2017-11-03 PROCEDURE — 87633 RESP VIRUS 12-25 TARGETS: CPT

## 2017-11-03 PROCEDURE — 82728 ASSAY OF FERRITIN: CPT

## 2017-11-03 PROCEDURE — P9016: CPT

## 2017-11-03 PROCEDURE — 74018 RADEX ABDOMEN 1 VIEW: CPT

## 2017-11-03 PROCEDURE — 87040 BLOOD CULTURE FOR BACTERIA: CPT

## 2017-11-03 PROCEDURE — 93970 EXTREMITY STUDY: CPT

## 2017-11-03 PROCEDURE — 84443 ASSAY THYROID STIM HORMONE: CPT

## 2017-11-03 PROCEDURE — 83615 LACTATE (LD) (LDH) ENZYME: CPT

## 2017-11-03 PROCEDURE — 71250 CT THORAX DX C-: CPT

## 2017-11-03 PROCEDURE — 99239 HOSP IP/OBS DSCHRG MGMT >30: CPT

## 2017-11-03 PROCEDURE — C8929: CPT

## 2017-11-03 PROCEDURE — 84540 ASSAY OF URINE/UREA-N: CPT

## 2017-11-03 PROCEDURE — 83550 IRON BINDING TEST: CPT

## 2017-11-03 PROCEDURE — 86140 C-REACTIVE PROTEIN: CPT

## 2017-11-03 PROCEDURE — 83605 ASSAY OF LACTIC ACID: CPT

## 2017-11-03 PROCEDURE — 84132 ASSAY OF SERUM POTASSIUM: CPT

## 2017-11-03 PROCEDURE — 87389 HIV-1 AG W/HIV-1&-2 AB AG IA: CPT

## 2017-11-03 PROCEDURE — 84295 ASSAY OF SERUM SODIUM: CPT

## 2017-11-03 PROCEDURE — 86850 RBC ANTIBODY SCREEN: CPT

## 2017-11-03 PROCEDURE — 93312 ECHO TRANSESOPHAGEAL: CPT

## 2017-11-03 PROCEDURE — 83880 ASSAY OF NATRIURETIC PEPTIDE: CPT

## 2017-11-03 PROCEDURE — 87486 CHLMYD PNEUM DNA AMP PROBE: CPT

## 2017-11-03 PROCEDURE — A9556: CPT

## 2017-11-03 PROCEDURE — 82570 ASSAY OF URINE CREATININE: CPT

## 2017-11-03 PROCEDURE — 82330 ASSAY OF CALCIUM: CPT

## 2017-11-03 PROCEDURE — 94660 CPAP INITIATION&MGMT: CPT

## 2017-11-03 PROCEDURE — 83010 ASSAY OF HAPTOGLOBIN QUANT: CPT

## 2017-11-03 PROCEDURE — 93005 ELECTROCARDIOGRAM TRACING: CPT

## 2017-11-03 PROCEDURE — 70450 CT HEAD/BRAIN W/O DYE: CPT

## 2017-11-03 PROCEDURE — 94003 VENT MGMT INPAT SUBQ DAY: CPT

## 2017-11-03 PROCEDURE — 97116 GAIT TRAINING THERAPY: CPT

## 2017-11-03 PROCEDURE — 99291 CRITICAL CARE FIRST HOUR: CPT | Mod: 25

## 2017-11-03 PROCEDURE — 94640 AIRWAY INHALATION TREATMENT: CPT

## 2017-11-03 PROCEDURE — 85018 HEMOGLOBIN: CPT

## 2017-11-03 PROCEDURE — 80202 ASSAY OF VANCOMYCIN: CPT

## 2017-11-03 PROCEDURE — 82607 VITAMIN B-12: CPT

## 2017-11-03 PROCEDURE — 80048 BASIC METABOLIC PNL TOTAL CA: CPT

## 2017-11-03 PROCEDURE — 94002 VENT MGMT INPAT INIT DAY: CPT

## 2017-11-03 PROCEDURE — 71045 X-RAY EXAM CHEST 1 VIEW: CPT

## 2017-11-03 PROCEDURE — 88305 TISSUE EXAM BY PATHOLOGIST: CPT

## 2017-11-03 PROCEDURE — 84484 ASSAY OF TROPONIN QUANT: CPT

## 2017-11-03 PROCEDURE — 74019 RADEX ABDOMEN 2 VIEWS: CPT

## 2017-11-03 PROCEDURE — 86923 COMPATIBILITY TEST ELECTRIC: CPT

## 2017-11-03 PROCEDURE — 86901 BLOOD TYPING SEROLOGIC RH(D): CPT

## 2017-11-03 PROCEDURE — 87798 DETECT AGENT NOS DNA AMP: CPT

## 2017-11-03 RX ORDER — LACTULOSE 10 G/15ML
30 SOLUTION ORAL
Qty: 0 | Refills: 0 | COMMUNITY
Start: 2017-11-03

## 2017-11-03 RX ORDER — CEFEPIME 1 G/1
2000 INJECTION, POWDER, FOR SOLUTION INTRAMUSCULAR; INTRAVENOUS
Qty: 0 | Refills: 0 | COMMUNITY
Start: 2017-11-03 | End: 2017-11-07

## 2017-11-03 RX ORDER — POTASSIUM CHLORIDE 20 MEQ
40 PACKET (EA) ORAL ONCE
Qty: 0 | Refills: 0 | Status: COMPLETED | OUTPATIENT
Start: 2017-11-03 | End: 2017-11-03

## 2017-11-03 RX ORDER — IPRATROPIUM/ALBUTEROL SULFATE 18-103MCG
3 AEROSOL WITH ADAPTER (GRAM) INHALATION
Qty: 0 | Refills: 0 | COMMUNITY
Start: 2017-11-03

## 2017-11-03 RX ADMIN — Medication 40 MILLIEQUIVALENT(S): at 09:46

## 2017-11-03 RX ADMIN — Medication 100 UNIT(S): at 11:28

## 2017-11-03 RX ADMIN — LACTULOSE 20 GRAM(S): 10 SOLUTION ORAL at 11:26

## 2017-11-03 RX ADMIN — Medication 3 MILLILITER(S): at 06:32

## 2017-11-03 RX ADMIN — PANTOPRAZOLE SODIUM 40 MILLIGRAM(S): 20 TABLET, DELAYED RELEASE ORAL at 06:32

## 2017-11-03 RX ADMIN — Medication 100 MILLIGRAM(S): at 11:27

## 2017-11-03 RX ADMIN — Medication 81 MILLIGRAM(S): at 11:26

## 2017-11-03 RX ADMIN — NYSTATIN CREAM 1 APPLICATION(S): 100000 CREAM TOPICAL at 06:32

## 2017-11-03 RX ADMIN — POLYETHYLENE GLYCOL 3350 17 GRAM(S): 17 POWDER, FOR SOLUTION ORAL at 09:46

## 2017-11-03 RX ADMIN — CEFEPIME 100 MILLIGRAM(S): 1 INJECTION, POWDER, FOR SOLUTION INTRAMUSCULAR; INTRAVENOUS at 06:32

## 2017-11-03 RX ADMIN — Medication 40 MILLIGRAM(S): at 06:31

## 2017-11-03 RX ADMIN — FINASTERIDE 5 MILLIGRAM(S): 5 TABLET, FILM COATED ORAL at 11:27

## 2017-11-03 RX ADMIN — Medication 200 MILLIGRAM(S): at 10:08

## 2017-11-03 NOTE — PROGRESS NOTE ADULT - SUBJECTIVE AND OBJECTIVE BOX
Cardiology for Preister    cc: follow-up cardiology evaluation and management of left heart failure    interval - no acute new cardiac symptoms now    PAST MEDICAL & SURGICAL HISTORY:  COPD (chronic obstructive pulmonary disease)  Hypertension  Obstructive sleep apnea syndrome  Prostate cancer metastatic to bone  PC (prostate cancer): with seed placement    MEDICATIONS  (STANDING):  aspirin  chewable 81 milliGRAM(s) Oral daily  atorvastatin 80 milliGRAM(s) Oral at bedtime  cefepime  IVPB 2000 milliGRAM(s) IV Intermittent once  cefepime  IVPB 2000 milliGRAM(s) IV Intermittent every 12 hours  cefepime  IVPB      dextrose 5%. 1000 milliLiter(s) (50 mL/Hr) IV Continuous <Continuous>  docusate sodium 100 milliGRAM(s) Oral daily  finasteride 5 milliGRAM(s) Oral daily  furosemide    Tablet 40 milliGRAM(s) Oral every 12 hours  heparin  flush 100 Units/mL Injectable 100 Unit(s) IV Push every other day  lactulose Syrup 20 Gram(s) Oral daily  melatonin 3 milliGRAM(s) Oral at bedtime  nystatin Powder 1 Application(s) Topical two times a day  pantoprazole    Tablet 40 milliGRAM(s) Oral two times a day before meals  polyethylene glycol 3350 17 Gram(s) Oral two times a day  senna 2 Tablet(s) Oral at bedtime  tamsulosin 0.8 milliGRAM(s) Oral at bedtime    MEDICATIONS  (PRN):  ALBUTerol/ipratropium for Nebulization 3 milliLiter(s) Nebulizer every 6 hours PRN Shortness of Breath and/or Wheezing  guaiFENesin    Syrup 200 milliGRAM(s) Oral every 6 hours PRN Cough  lidocaine 2% Gel 1 Application(s) Topical every 3 hours PRN catheter related discomfort at tip of penis  simethicone 80 milliGRAM(s) Chew three times a day PRN Gas      Vital Signs Last 24 Hrs  T(C): 36.7 (03 Nov 2017 08:21), Max: 37.1 (02 Nov 2017 15:52)  T(F): 98 (03 Nov 2017 08:21), Max: 98.7 (02 Nov 2017 15:52)  HR: 66 (03 Nov 2017 08:21) (65 - 78)  BP: 118/78 (03 Nov 2017 08:21) (118/78 - 129/79)  BP(mean): --  RR: 18 (03 Nov 2017 08:21) (16 - 19)  SpO2: 96% (03 Nov 2017 08:21) (96% - 98%)    physical exam  nad  oob to chair  mmm  breath sounds improving  rr; s1/s2 present  soft abd; no guarding  trace edema le  warm ue and le    I&O's Detail    02 Nov 2017 07:01  -  03 Nov 2017 07:00  --------------------------------------------------------  IN:    IV PiggyBack: 50 mL    Oral Fluid: 50 mL  Total IN: 100 mL    OUT:    Voided: 800 mL  Total OUT: 800 mL    Total NET: -700 mL      03 Nov 2017 07:01  -  03 Nov 2017 10:27  --------------------------------------------------------  IN:  Total IN: 0 mL    OUT:    Voided: 200 mL  Total OUT: 200 mL    Total NET: -200 mL            labs                                         8.8    8.7   )-----------( 121      ( 03 Nov 2017 08:06 )             29.1   11-03    136  |  92<L>  |  21  ----------------------------<  118<H>  3.7   |  33<H>  |  1.19    Ca    9.2      03 Nov 2017 08:06  Mg     2.1     11-03        I&O's Summary    02 Nov 2017 07:01  -  03 Nov 2017 07:00  --------------------------------------------------------  IN: 100 mL / OUT: 800 mL / NET: -700 mL    03 Nov 2017 07:01  -  03 Nov 2017 10:28  --------------------------------------------------------  IN: 0 mL / OUT: 200 mL / NET: -200 mL        radiology    < from: Xray Chest 1 View AP -PORTABLE-Routine (10.28.17 @ 08:05) >  EXAM:  XR CHEST 1 VIEW PORT ROUTINE                          PROCEDURE DATE:  10/28/2017                     INTERPRETATION:  PORTABLE CHEST X-RAY     HISTORY: SOB    PRIOR STUDIES: 10/27/2017.    FINDINGS: Size of cardiac silhouette unchanged. Right-sided PICC line   with tip overlying the right innominate vein/SVC junction. The lungs are   unchanged. Right lung grossly clear. Linear scarring versus linear   atelectasis left base. No pleural effusion or pneumothorax.    IMPRESSION:  No change.            "Thank you for the opportunity to participate in the care of this   patient."        SD COATS M.D., ATTENDING RADIOLOGIST  This document has been electronically signed. Oct 30 2017 11:30AM    < end of copied text >        < from: Echocardiogram (10.16.17 @ 10:31) >    EXAM:  ECHOCARDIOGRAM (CARDIOL)                          PROCEDURE DATE:  10/16/2017                        INTERPRETATION:  Patient Height: 172.0 cm  Patient Weight: 103.0 kg  Heart Rate: 74 bpm  Systolic Pressure: 140 mmHg  Diastolic Pressure: 70mmHg  BSA: 2.2 m^2  Interpretation Summary  The left ventricle is mildly dilated.Hypokinesis of the basal inferior and   septal region.The left ventricular ejection fraction is estimated to be   45-50%The mitral inflow pattern is consistent with impaired left   ventricular   relaxation with mildly elevated left atrial pressure (8-14mmHg).  The   left   atrial size is normal. The right atrium is dilated.The right ventricle is   moderately dilated. The right ventricular systolic function is mildly   reduced.    There is mild aortic valve thickening.There is mild mitral valve   thickening.   There is mild mitral regurgitation.There is trace tricuspid   regurgitation.There was insufficient TR detected from which to calculate   pulmonary artery systolic pressure.  No aortic root dilatation.The   inferior   vena cava is normal in size (<2.1 cm) with normal inspiratory collapse   (>50%)   consistent with normal right atrial pressure.  There is no pericardial   effusion.    < end of copied text >  IN: 650 mL / OUT: 2875 mL / NET: -2225 mL          < from: Xray Chest 1 View AP -PORTABLE-Routine (10.09.17 @ 05:37) >  EXAM:  XR CHEST 1 VIEW PORT ROUTINE                          PROCEDURE DATE:  10/09/2017                     INTERPRETATION:    Portable AP Radiograph dated 10/9/2017 5:37 AM    CLINICAL INFORMATION: 72 years, Male, Intubated, PNA    PRIOR STUDIES:October 8, 2017    FINDINGS: Support tubes and lines are unchanged. Left pleural effusion is   stable. Right pleural effusion has increased. Pulmonary vascular   congestion has increased.    IMPRESSION:  Interval increase of right pleural effusion and pulmonary vascular   congestion.            "Thank you for the opportunity to participate in the care of this   patient."        KATHY PABLO M.D., RADIOLOGY ATTENDING  This document has been electronically signed. Oct  9 2017  8:33AM    < end of copied text >

## 2017-11-03 NOTE — PROGRESS NOTE ADULT - I WAS PHYSICALLY PRESENT FOR THE KEY PORTIONS OF THE EVALUATION AND MANAGEMENT (E/M) SERVICE PROVIDED.  I AGREE WITH THE ABOVE HISTORY, PHYSICAL, AND PLAN WHICH I HAVE REVIEWED AND EDITED WHERE APPROPRIATE

## 2017-11-03 NOTE — PROGRESS NOTE ADULT - PROBLEM SELECTOR PROBLEM 3
Abdominal distention
CHF (congestive heart failure)
Prostate cancer metastatic to bone
Anemia due to blood loss
COPD (chronic obstructive pulmonary disease)
Palliative care by specialist
Prostate cancer metastatic to bone
Abdominal distention
Acute kidney injury
Anemia
Anemia due to blood loss
CHF (congestive heart failure)
COPD (chronic obstructive pulmonary disease)
COPD (chronic obstructive pulmonary disease)
Hematuria
Leukocytosis
Prostate cancer metastatic to bone
Sepsis
Sepsis due to pneumonia
Thrombocytopenia
Urinary tract infection
Anemia due to blood loss
COPD (chronic obstructive pulmonary disease)
COPD (chronic obstructive pulmonary disease)
Sepsis due to pneumonia
Urinary tract infection
Thrombocytopenia
Prostate cancer metastatic to bone
Anemia due to blood loss
Prostate cancer metastatic to bone

## 2017-11-03 NOTE — PROGRESS NOTE ADULT - PROBLEM SELECTOR PLAN 2
For lupron injection later this month. PSA will be repeated at that time to assess the response to bicalutamide withdrawal.

## 2017-11-03 NOTE — PROGRESS NOTE ADULT - ASSESSMENT
A/recs:  1) acute hypoxic respiratory failure with elevated tn and abnormal septal wall motion on tte in addition to rv dilatation and abnormal ecg earlier this admission; left heart failure (ef=45% by tte) -  a - nstemi earlier this admission (within 4 weeks - peak tn 10-)  b - remains on asa - monitor for hematuria  c - recommend 12-lead ecg  d - per teresita Gentile cardiac cta prior to discharge if no contraindications    2) paroxysmal atrial fibrillation  a - recommend 12-lead ecg  b - rec consider outpatient ambulatory telemetry after discharge    3) htn - avoid labile bp    4) sepsis due to pna per ccm with bacteremia     5) possible copd exacerbation - rec pulm eval    6) acute renal failure - resolved    7) normocytic anemia and thrombocytopenia - per primary team    8) shingles    9) prostate ca - per gu and hem-onc - monitor for additional hematuria    10) K>4, Mg>2    11) palliative care previously following    12) discharge planning per primary team

## 2017-11-03 NOTE — PROGRESS NOTE ADULT - PROVIDER SPECIALTY LIST ADULT
Cardiology
Gastroenterology
Gastroenterology
Heme/Onc
Hospitalist
Infectious Disease
Internal Medicine
MICU
Palliative Care
Surgery
Urology
Cardiology
Heme/Onc
Infectious Disease
Internal Medicine
MICU
Surgery
Cardiology
Heme/Onc
Infectious Disease
Infectious Disease
Internal Medicine
Internal Medicine
Heme/Onc
Hospitalist
Hospitalist
Heme/Onc
Palliative Care
Heme/Onc
Internal Medicine

## 2017-11-03 NOTE — PROGRESS NOTE ADULT - SUBJECTIVE AND OBJECTIVE BOX
The patient reports that he is feeling well. Appetite is good. Breathing okay. Ambulating in the halls without difficulty. IV antibiotic therapy continues.    CHEMOTHERAPY REGIMEN:        Day:                          Diet:  Protocol:                                    IVF:      MEDICATIONS  (STANDING):  aspirin  chewable 81 milliGRAM(s) Oral daily  atorvastatin 80 milliGRAM(s) Oral at bedtime  cefepime  IVPB 2000 milliGRAM(s) IV Intermittent once  cefepime  IVPB 2000 milliGRAM(s) IV Intermittent every 12 hours  cefepime  IVPB      dextrose 5%. 1000 milliLiter(s) (50 mL/Hr) IV Continuous <Continuous>  docusate sodium 100 milliGRAM(s) Oral daily  finasteride 5 milliGRAM(s) Oral daily  furosemide    Tablet 40 milliGRAM(s) Oral every 12 hours  heparin  flush 100 Units/mL Injectable 100 Unit(s) IV Push every other day  lactulose Syrup 20 Gram(s) Oral daily  melatonin 3 milliGRAM(s) Oral at bedtime  nystatin Powder 1 Application(s) Topical two times a day  pantoprazole    Tablet 40 milliGRAM(s) Oral two times a day before meals  polyethylene glycol 3350 17 Gram(s) Oral two times a day  senna 2 Tablet(s) Oral at bedtime  tamsulosin 0.8 milliGRAM(s) Oral at bedtime    MEDICATIONS  (PRN):  ALBUTerol/ipratropium for Nebulization 3 milliLiter(s) Nebulizer every 6 hours PRN Shortness of Breath and/or Wheezing  guaiFENesin    Syrup 200 milliGRAM(s) Oral every 6 hours PRN Cough  lidocaine 2% Gel 1 Application(s) Topical every 3 hours PRN catheter related discomfort at tip of penis  simethicone 80 milliGRAM(s) Chew three times a day PRN Gas      Allergies    No Known Allergies    Intolerances        DVT Prophylaxis: [ ] YES [x ] NO      Antibiotics: [x ] YES [ ] NO    Pain Scale (1-10):       Location:    Vital Signs Last 24 Hrs  T(C): 36.4 (03 Nov 2017 06:00), Max: 37.1 (02 Nov 2017 15:52)  T(F): 97.6 (03 Nov 2017 06:00), Max: 98.7 (02 Nov 2017 15:52)  HR: 70 (03 Nov 2017 06:00) (70 - 84)  BP: 124/74 (03 Nov 2017 06:00) (100/66 - 129/79)  BP(mean): --  RR: 18 (03 Nov 2017 06:00) (16 - 20)  SpO2: 97% (03 Nov 2017 06:00) (96% - 98%)    Drug Dosing Weight  Height (cm): 172.72 (24 Oct 2017 03:30)  Weight (kg): 102.6 (27 Oct 2017 05:47)  BMI (kg/m2): 34.4 (27 Oct 2017 05:47)  BSA (m2): 2.15 (27 Oct 2017 05:47)    PHYSICAL EXAM:      Constitutional: in good spirits.  Eyes: conjunctival pallor.   ENMT: NC remains in place. Buccal mucosa moist.  Neck: no masses.  Respiratory: chest clear.  Cardiovascular: S1>S2 at apex. RSR.  Gastrointestinal: distended, soft, nontender, active bowel sounds.  Genitourinary: passed a clot yesterday. Voiding without difficulty. No hematuria.  Extremities: leg edema bilaterally.  Neurological: no gross focal deficits.  Skin: warm and dry.  Lymph Nodes: none palp.  Musculoskeletal: full ROM.  Psychiatric: affect normal.        URINARY CATHETER: [ ] YES [x ] NO     LABS:  CBC Full  -  ( 02 Nov 2017 10:15 )  WBC Count : 8.1 K/uL  Hemoglobin : 8.4 g/dL  Hematocrit : 27.3 %  Platelet Count - Automated : 133 K/uL  Mean Cell Volume : 98.2 fL  Mean Cell Hemoglobin : 30.2 pg  Mean Cell Hemoglobin Concentration : 30.8 g/dL  Auto Neutrophil # : x  Auto Lymphocyte # : x  Auto Monocyte # : x  Auto Eosinophil # : x  Auto Basophil # : x  Auto Neutrophil % : x  Auto Lymphocyte % : x  Auto Monocyte % : x  Auto Eosinophil % : x  Auto Basophil % : x    11-02    141  |  94<L>  |  15  ----------------------------<  93  3.2<L>   |  32<H>  |  1.07    Ca    9.2      02 Nov 2017 10:15  Mg     1.9     11-02            CULTURES:    RADIOLOGY & ADDITIONAL STUDIES:

## 2017-11-06 ENCOUNTER — APPOINTMENT (OUTPATIENT)
Dept: INFECTIOUS DISEASE | Facility: CLINIC | Age: 72
End: 2017-11-06

## 2017-11-08 DIAGNOSIS — E87.6 HYPOKALEMIA: ICD-10-CM

## 2017-11-08 DIAGNOSIS — G47.33 OBSTRUCTIVE SLEEP APNEA (ADULT) (PEDIATRIC): ICD-10-CM

## 2017-11-08 DIAGNOSIS — D62 ACUTE POSTHEMORRHAGIC ANEMIA: ICD-10-CM

## 2017-11-08 DIAGNOSIS — J15.211 PNEUMONIA DUE TO METHICILLIN SUSCEPTIBLE STAPHYLOCOCCUS AUREUS: ICD-10-CM

## 2017-11-08 DIAGNOSIS — C61 MALIGNANT NEOPLASM OF PROSTATE: ICD-10-CM

## 2017-11-08 DIAGNOSIS — K25.4 CHRONIC OR UNSPECIFIED GASTRIC ULCER WITH HEMORRHAGE: ICD-10-CM

## 2017-11-08 DIAGNOSIS — I50.21 ACUTE SYSTOLIC (CONGESTIVE) HEART FAILURE: ICD-10-CM

## 2017-11-08 DIAGNOSIS — I21.4 NON-ST ELEVATION (NSTEMI) MYOCARDIAL INFARCTION: ICD-10-CM

## 2017-11-08 DIAGNOSIS — A41.01 SEPSIS DUE TO METHICILLIN SUSCEPTIBLE STAPHYLOCOCCUS AUREUS: ICD-10-CM

## 2017-11-08 DIAGNOSIS — I11.0 HYPERTENSIVE HEART DISEASE WITH HEART FAILURE: ICD-10-CM

## 2017-11-08 DIAGNOSIS — Z51.5 ENCOUNTER FOR PALLIATIVE CARE: ICD-10-CM

## 2017-11-08 DIAGNOSIS — J96.01 ACUTE RESPIRATORY FAILURE WITH HYPOXIA: ICD-10-CM

## 2017-11-08 DIAGNOSIS — D64.9 ANEMIA, UNSPECIFIED: ICD-10-CM

## 2017-11-08 DIAGNOSIS — R31.0 GROSS HEMATURIA: ICD-10-CM

## 2017-11-08 DIAGNOSIS — Z66 DO NOT RESUSCITATE: ICD-10-CM

## 2017-11-08 DIAGNOSIS — J96.02 ACUTE RESPIRATORY FAILURE WITH HYPERCAPNIA: ICD-10-CM

## 2017-11-08 DIAGNOSIS — D69.6 THROMBOCYTOPENIA, UNSPECIFIED: ICD-10-CM

## 2017-11-08 DIAGNOSIS — R33.9 RETENTION OF URINE, UNSPECIFIED: ICD-10-CM

## 2017-11-08 DIAGNOSIS — C79.51 SECONDARY MALIGNANT NEOPLASM OF BONE: ICD-10-CM

## 2017-11-08 DIAGNOSIS — N17.0 ACUTE KIDNEY FAILURE WITH TUBULAR NECROSIS: ICD-10-CM

## 2017-11-08 DIAGNOSIS — R65.21 SEVERE SEPSIS WITH SEPTIC SHOCK: ICD-10-CM

## 2017-11-08 DIAGNOSIS — K56.7 ILEUS, UNSPECIFIED: ICD-10-CM

## 2017-11-14 ENCOUNTER — APPOINTMENT (OUTPATIENT)
Dept: GASTROENTEROLOGY | Facility: CLINIC | Age: 72
End: 2017-11-14

## 2017-11-18 ENCOUNTER — EMERGENCY (EMERGENCY)
Facility: HOSPITAL | Age: 72
LOS: 1 days | Discharge: HOME | End: 2017-11-18
Attending: EMERGENCY MEDICINE
Payer: COMMERCIAL

## 2017-11-18 VITALS
SYSTOLIC BLOOD PRESSURE: 149 MMHG | TEMPERATURE: 98 F | OXYGEN SATURATION: 96 % | RESPIRATION RATE: 18 BRPM | DIASTOLIC BLOOD PRESSURE: 69 MMHG | HEART RATE: 82 BPM

## 2017-11-18 VITALS
HEIGHT: 68 IN | SYSTOLIC BLOOD PRESSURE: 114 MMHG | WEIGHT: 205.03 LBS | DIASTOLIC BLOOD PRESSURE: 72 MMHG | TEMPERATURE: 98 F | HEART RATE: 81 BPM | OXYGEN SATURATION: 95 % | RESPIRATION RATE: 16 BRPM

## 2017-11-18 DIAGNOSIS — C61 MALIGNANT NEOPLASM OF PROSTATE: Chronic | ICD-10-CM

## 2017-11-18 DIAGNOSIS — J44.9 CHRONIC OBSTRUCTIVE PULMONARY DISEASE, UNSPECIFIED: ICD-10-CM
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
## 2017-11-18 DIAGNOSIS — Z79.82 LONG TERM (CURRENT) USE OF ASPIRIN: ICD-10-CM

## 2017-11-18 DIAGNOSIS — I10 ESSENTIAL (PRIMARY) HYPERTENSION: ICD-10-CM

## 2017-11-18 DIAGNOSIS — T83.098A OTHER MECHANICAL COMPLICATION OF OTHER URINARY CATHETER, INITIAL ENCOUNTER: ICD-10-CM

## 2017-11-18 DIAGNOSIS — Z98.890 OTHER SPECIFIED POSTPROCEDURAL STATES: Chronic | ICD-10-CM

## 2017-11-18 PROCEDURE — 99283 EMERGENCY DEPT VISIT LOW MDM: CPT

## 2017-11-18 PROCEDURE — 99284 EMERGENCY DEPT VISIT MOD MDM: CPT | Mod: 25

## 2017-11-18 NOTE — ED PROVIDER NOTE - OBJECTIVE STATEMENT
73 y/o M w/ PMHx of COPD, HTN, Obstructive sleep apnea, and Prostate CA, presents to ED c/o clogged Saldana catheter x today. Pt had a cystectomy 1 month ago, has had a Saldana catheter ever since. Pt was d/c from rehab facility yesterday. Pt came to ED because catheter stopped draining today. Pt denies nausea, vomiting, diarrhea, fever, chills, or any other complaints. Pt is on home oxygen for COPD. NKDA.

## 2018-01-03 ENCOUNTER — EMERGENCY (EMERGENCY)
Facility: HOSPITAL | Age: 73
LOS: 1 days | Discharge: HOME | End: 2018-01-03
Attending: EMERGENCY MEDICINE
Payer: COMMERCIAL

## 2018-01-03 VITALS
DIASTOLIC BLOOD PRESSURE: 80 MMHG | RESPIRATION RATE: 20 BRPM | HEIGHT: 67 IN | WEIGHT: 205.03 LBS | HEART RATE: 80 BPM | OXYGEN SATURATION: 98 % | TEMPERATURE: 98 F | SYSTOLIC BLOOD PRESSURE: 127 MMHG

## 2018-01-03 DIAGNOSIS — Z98.890 OTHER SPECIFIED POSTPROCEDURAL STATES: Chronic | ICD-10-CM

## 2018-01-03 DIAGNOSIS — C61 MALIGNANT NEOPLASM OF PROSTATE: Chronic | ICD-10-CM

## 2018-01-03 PROCEDURE — 99282 EMERGENCY DEPT VISIT SF MDM: CPT | Mod: 25

## 2018-01-03 PROCEDURE — 99284 EMERGENCY DEPT VISIT MOD MDM: CPT | Mod: 25

## 2018-01-03 PROCEDURE — 12001 RPR S/N/AX/GEN/TRNK 2.5CM/<: CPT

## 2018-01-03 PROCEDURE — 12001 RPR S/N/AX/GEN/TRNK 2.5CM/<: CPT | Mod: RT

## 2018-01-03 NOTE — ED ADULT NURSE NOTE - OBJECTIVE STATEMENT
Pt states that he is having  rt lower leg laceration + bilateral lower leg edema. pt denies fever and chills, chest pain and sob . co difficuty ambulating,

## 2018-01-03 NOTE — ED PROVIDER NOTE - OBJECTIVE STATEMENT
71 y/o M pt with PMHx of COPD, chronic leg edema, presents to ED c/o injury over the RLE. Pt reports bumping his leg into a piece of furniture at home today when he started bleeding. Pt states he is on Aspirin but not on any other blood thinners. Pt denies fever, chills, numbness, tingling, weakness, or any other complaints.

## 2018-01-03 NOTE — ED PROVIDER NOTE - MEDICAL DECISION MAKING DETAILS
72 M with chronic leg edema presents with wound to right leg. small lac/punture repaired. Tdap UTD per patient. Patient stable for discharge.

## 2018-01-04 VITALS
SYSTOLIC BLOOD PRESSURE: 124 MMHG | DIASTOLIC BLOOD PRESSURE: 74 MMHG | RESPIRATION RATE: 18 BRPM | OXYGEN SATURATION: 99 % | HEART RATE: 84 BPM

## 2018-01-04 NOTE — ED ADULT NURSE REASSESSMENT NOTE - NS ED NURSE REASSESS COMMENT FT1
Pt. verbalized some improvement. No signs of distress noted. No complaints voiced. Pt. VS is stable. Pt. is stable for discharge.

## 2018-04-23 NOTE — ED ADULT NURSE NOTE - NSSISCREENINGQ3_ED_A_ED
Chief Complaint   Patient presents with   • Follow-up     Compliancy visit Check right index finger post trigger finger release from beginning of April (Kevin YAN)       Deep Gorman male 61 year old presents with compliance visit from [facility]. He says that he has some bruises on his legs. They are not painful. They don't come and go. He says they have been there for a long time.     He had surgery on his right trigger finger. It is still cracking. He is going to do the other one in May. He has an appointment for the left hand in May first.       Current Medications    ACETAMINOPHEN (TYLENOL) 325 MG TABLET    Take 650 mg by mouth every 4 hours as needed. PRN    ALBUTEROL-IPRATROPIUM 2.5 MG/0.5 MG (DUONEB) 0.5-2.5 (3) MG/3ML NEBULIZER SOLUTION    Take 3 mLs by nebulization every 6 hours as needed for Wheezing.    ALPRAZOLAM (XANAX) 0.5 MG TABLET    Take 0.5 mg by mouth every 8 hours as needed for Sleep. Indications: Panic Disorder    AMITRIPTYLINE (ELAVIL) 25 MG TABLET    Take 100 mg by mouth nightly.     ARTIFICIAL SALIVA (BIOTENE ORALBALANCE DRY MOUTH) LIQUID    PER NURSING HOME MED LIST    ASPIRIN 81 MG TABLET    Take 1 tablet by mouth daily.    ATORVASTATIN (LIPITOR) 80 MG TABLET    Take 1 tablet by mouth daily.    BISACODYL (DULCOLAX) 10 MG SUPPOSITORY    Place 10 mg rectally daily as needed for Constipation.    CALCIUM POLYCARBOPHIL (FIBERCON) 625 MG TABLET    Take 625 mg by mouth 2 times daily.    CARBAMIDE PEROXIDE (DEBROX) 6.5 % OTIC SOLUTION    5 drops as needed.    CHOLECALCIFEROL (VITAMIN D3) 2000 UNITS TABS    Take 2 tablets by mouth daily.    FENTANYL (DURAGESIC) 75 MCG/HR PATCH    Place 1 patch onto the skin every 72 hours.    FUROSEMIDE (LASIX) 20 MG TABLET    Take 20 mg by mouth daily.    GABAPENTIN (NEURONTIN) 400 MG CAPSULE    Take 600 mg by mouth 3 times daily.     GUAIFENESIN 1200 MG 12 HR TABLET    Take 1 tablet by mouth every 12 hours.    LORATADINE (CLARITIN) 10 MG TABLET    Take 10 mg  by mouth daily.    MIDODRINE (PROAMATINE) 10 MG TABLET    Take 7.5 mg by mouth 3 times daily.     MULTIPLE VITAMINS-MINERALS (I-DOMINGUEZ PO)    Take  by mouth.    NEOMYCIN-POLYMYXIN-HYDROCORTISONE (CORTISPORIN) 3.5-95286-0 OTIC SUSPENSION    4 drops in the affected ear twice per day for 10 days    NYSTATIN (MYCOSTATIN) 677802 UNIT/GM CREAM    Apply topically 2 times daily.    OMEPRAZOLE (PRILOSEC) 20 MG CAPSULE    Take 20 mg by mouth daily.    ONDANSETRON (ZOFRAN ODT) 4 MG DISINTEGRATING TABLET    Take 4 mg by mouth every 8 hours as needed for Nausea. PER NURSING HOME MED LIST    OXYCODONE, IMM REL, (ROXICODONE) 5 MG IMMEDIATE RELEASE TABLET    Take 1 tablet by mouth 2 times daily. Scheduled.    POLYETHYLENE GLYCOL (MIRALAX) POWDER    Take 17 g by mouth 2 times daily.    POLYVINYL ALCOHOL (LIQUIFILM TEARS) 1.4 % OPHTHALMIC SOLUTION    Place 2 drops into both eyes 3 times daily.    PROBIOTIC PRODUCT (PROBIOTIC & ACIDOPHILUS EX ST) CAP    Take 1 capsule by mouth 2 times daily.    PSEUDOEPHEDRINE-GUAIFENESIN (GUAIFEN-PSE PO)    Take 20 mLs by mouth. prn    SENNA TABS    Take 2 tablets by mouth nightly.     SODIUM BIPHOSPHATE (FLEET) 7-19 GM/118ML ENEM    Place  rectally.    SORBITOL 70 % SOLUTION    Take 30 mLs by mouth every 3 days. If no bowel movement    SUCRALFATE (CARAFATE) 1 G TABLET    Take 1 g by mouth 3 times daily.     TEMAZEPAM (RESTORIL) 7.5 MG CAPSULE    Take 1 capsule by mouth nightly as needed for Sleep.    TIOTROPIUM (SPIRIVA) 18 MCG INHALATION CAPSULE    Place 18 mcg into inhaler and inhale daily.    TRAZODONE (DESYREL) 100 MG TABLET    Take 200 mg by mouth nightly.    TRIAMCINOLONE (NASACORT ALLERGY 24HR) 55 MCG/ACT NASAL INHALER    Spray 1 spray in each nostril 2 times daily.    ZIPRASIDONE (GEODON) 80 MG CAPSULE    Take 80 mg by mouth daily.     ALLERGIES:  No Known Allergies  Visit Vitals  /67 (BP Location: [facility] [place], Patient Position: Sitting, Cuff Size: Large Adult)   Pulse 79   Resp 18   Ht 5' 8\"  (1.727 m)   Wt 93 kg   BMI 31.17 kg/m²       GENERAL: Patient is awake, alert, and in no apparent distress. Appears stated age.  PSYCHIATRIC: Does not appear anxious or depressed.    Health Maintenance Summary     Topic Due On Due Status Completed On    Colorectal Cancer Screening - Colonoscopy Jan 4, 2028 Not Due Jan 4, 2018    IMMUNIZATION - DTaP/Tdap/Td Nov 6, 2007 Overdue Nov 5, 2007    Immunization-Influenza Sep 1, 2018 Not Due Oct 15, 2016    Hepatitis C Screening Dec 26, 2005 Overdue           ASSESSMENT AND PLAN:  Trigger finger of both hands  Patient has had trigger finger release in the right hand. He has improved and would like to get the other one done. He will be seeing Dr. Hood for that.     He otherwise has no concerns. He is here for a compliance visit. I will see him in 3 mos for his next compliance visit. He should get lab work prior.     No orders of the defined types were placed in this encounter.       No

## 2019-10-16 PROBLEM — J44.9 CHRONIC OBSTRUCTIVE PULMONARY DISEASE, UNSPECIFIED: Chronic | Status: ACTIVE | Noted: 2017-10-05

## 2019-10-16 PROBLEM — I10 ESSENTIAL (PRIMARY) HYPERTENSION: Chronic | Status: ACTIVE | Noted: 2017-10-05

## 2019-10-16 PROBLEM — G47.33 OBSTRUCTIVE SLEEP APNEA (ADULT) (PEDIATRIC): Chronic | Status: ACTIVE | Noted: 2017-10-05

## 2019-10-16 NOTE — PROGRESS NOTE ADULT - PROBLEM SELECTOR PLAN 4
Detail Level: Detailed The patient is having no bony pain. He is being observed off bicalutamide for a possible withdrawal effect.

## 2019-10-22 ENCOUNTER — RECORD ABSTRACTING (OUTPATIENT)
Age: 74
End: 2019-10-22

## 2019-10-22 DIAGNOSIS — C61 MALIGNANT NEOPLASM OF PROSTATE: ICD-10-CM

## 2019-10-22 DIAGNOSIS — Z82.3 FAMILY HISTORY OF STROKE: ICD-10-CM

## 2019-10-22 DIAGNOSIS — Z87.891 PERSONAL HISTORY OF NICOTINE DEPENDENCE: ICD-10-CM

## 2019-10-22 DIAGNOSIS — R97.20 ELEVATED PROSTATE, SPECIFIC ANTIGEN [PSA]: ICD-10-CM

## 2019-10-22 DIAGNOSIS — I10 ESSENTIAL (PRIMARY) HYPERTENSION: ICD-10-CM

## 2019-10-22 DIAGNOSIS — J44.9 CHRONIC OBSTRUCTIVE PULMONARY DISEASE, UNSPECIFIED: ICD-10-CM

## 2019-10-22 DIAGNOSIS — Z78.9 OTHER SPECIFIED HEALTH STATUS: ICD-10-CM

## 2019-10-22 DIAGNOSIS — Z92.3 PERSONAL HISTORY OF IRRADIATION: ICD-10-CM

## 2019-10-22 DIAGNOSIS — Z81.8 FAMILY HISTORY OF OTHER MENTAL AND BEHAVIORAL DISORDERS: ICD-10-CM

## 2019-10-22 LAB
PSA SERPL-MCNC: 0.5
PSA SERPL-MCNC: 2.2

## 2019-10-22 RX ORDER — LOSARTAN POTASSIUM AND HYDROCHLOROTHIAZIDE 12.5; 1 MG/1; MG/1
TABLET ORAL
Refills: 0 | Status: ACTIVE | COMMUNITY

## 2019-10-22 RX ORDER — AMLODIPINE BESYLATE 5 MG/1
TABLET ORAL
Refills: 0 | Status: ACTIVE | COMMUNITY

## 2019-10-22 RX ORDER — TAMSULOSIN HCL 0.4 MG
CAPSULE ORAL
Refills: 0 | Status: ACTIVE | COMMUNITY

## 2019-10-22 RX ORDER — ASPIRIN 325 MG/1
TABLET, FILM COATED ORAL
Refills: 0 | Status: ACTIVE | COMMUNITY

## 2019-10-22 RX ORDER — ATORVASTATIN CALCIUM 80 MG/1
TABLET, FILM COATED ORAL
Refills: 0 | Status: ACTIVE | COMMUNITY

## 2019-11-12 ENCOUNTER — APPOINTMENT (OUTPATIENT)
Dept: UROLOGY | Facility: CLINIC | Age: 74
End: 2019-11-12

## 2020-09-14 NOTE — DISCHARGE NOTE ADULT - DISCHARGE DATE
19-Oct-2017 31-Oct-2017 [] : results reviewed [de-identified] : Stable/normal [de-identified] : Sinus rhythm, left atrial enlargement, no change from prior studies. [de-identified] : Normal

## 2020-10-18 NOTE — PROGRESS NOTE ADULT - SUBJECTIVE AND OBJECTIVE BOX
Cardiology for Preister    cc: follow-up cardiology evaluation and management of left heart failure    interval - no cp or acute dyspnea    PAST MEDICAL & SURGICAL HISTORY:  COPD (chronic obstructive pulmonary disease)  Hypertension  Obstructive sleep apnea syndrome  Prostate cancer metastatic to bone  PC (prostate cancer): with seed placement    MEDICATIONS  (STANDING):  acetylcysteine 20% Inhalation 3 milliLiter(s) Inhalation three times a day  ALBUTerol/ipratropium for Nebulization 3 milliLiter(s) Nebulizer every 4 hours  aspirin  chewable 81 milliGRAM(s) Oral daily  atorvastatin 20 milliGRAM(s) Oral at bedtime  dextrose 5%. 1000 milliLiter(s) (50 mL/Hr) IV Continuous <Continuous>  dextrose 50% Injectable 12.5 Gram(s) IV Push once  dextrose 50% Injectable 25 Gram(s) IV Push once  dextrose 50% Injectable 25 Gram(s) IV Push once  heparin  Injectable 7500 Unit(s) SubCutaneous every 8 hours  insulin lispro (HumaLOG) corrective regimen sliding scale   SubCutaneous Before meals and at bedtime  melatonin 3 milliGRAM(s) Oral at bedtime  methylPREDNISolone sodium succinate Injectable 40 milliGRAM(s) IV Push daily  nafcillin  IVPB 2 Gram(s) IV Intermittent every 4 hours  nafcillin  IVPB      pantoprazole    Tablet 40 milliGRAM(s) Oral before breakfast  polyethylene glycol 3350 17 Gram(s) Oral two times a day  potassium chloride    Tablet ER 40 milliEquivalent(s) Oral every 4 hours  valACYclovir 1000 milliGRAM(s) Oral every 8 hours    MEDICATIONS  (PRN):  acetaminophen  Suppository 650 milliGRAM(s) Rectal every 6 hours PRN For Temp greater than 38 C (100.4 F)  bisacodyl Suppository 10 milliGRAM(s) Rectal daily PRN Constipation  dextrose Gel 1 Dose(s) Oral once PRN Blood Glucose LESS THAN 70 milliGRAM(s)/deciliter  glucagon  Injectable 1 milliGRAM(s) IntraMuscular once PRN Glucose LESS THAN 70 milligrams/deciliter  metoclopramide Injectable 10 milliGRAM(s) IV Push every 6 hours PRN nausea  simethicone 80 milliGRAM(s) Chew two times a day PRN Gas    ICU Vital Signs Last 24 Hrs  T(C): 36.4 (10 Oct 2017 22:00), Max: 36.8 (10 Oct 2017 11:11)  T(F): 97.6 (10 Oct 2017 22:00), Max: 98.2 (10 Oct 2017 11:11)  HR: 62 (10 Oct 2017 22:00) (52 - 82)  BP: 120/61 (10 Oct 2017 22:00) (93/47 - 143/76)  BP(mean): 83 (10 Oct 2017 22:00) (62 - 586)  ABP: --  ABP(mean): --  RR: 24 (10 Oct 2017 22:00) (18 - 30)  SpO2: 96% (10 Oct 2017 22:00) (90% - 99%)    physical exam  alert  nad  oob to chair  clear sclerae  no carotid bruit  coarse breath sounds  rr; s1/s2 present  soft abd; bowel sounds present  trace edema  le and ue  pulses present bilat     I&O's Detail    09 Oct 2017 07:01  -  10 Oct 2017 07:00  --------------------------------------------------------  IN:    IV PiggyBack: 500 mL    Oral Fluid: 100 mL    sodium chloride 0.9%: 1005 mL  Total IN: 1605 mL    OUT:    Indwelling Catheter - Urethral: 1300 mL    Nasoenteral Tube: 200 mL  Total OUT: 1500 mL    Total NET: 105 mL      10 Oct 2017 07:01  -  10 Oct 2017 23:26  --------------------------------------------------------  IN:    IV PiggyBack: 400 mL    Oral Fluid: 1200 mL  Total IN: 1600 mL    OUT:    Indwelling Catheter - Urethral: 2465 mL  Total OUT: 2465 mL    Total NET: -865 mL                        7.9    13.5  )-----------( 167      ( 10 Oct 2017 05:02 )             25.1     10-10    144  |  103  |  25<H>  ----------------------------<  100<H>  3.7   |  28  |  0.99    Ca    8.9      10 Oct 2017 05:07  Phos  3.3     10-10  Mg     2.1     10-10    Troponin T, Serum (10.10.17 @ 21:51)    Troponin T, Serum: 0.54: Reference interval for troponin T is </= 0.01 ng/mL which includes the  99th percentile of a healthy population. Troponin T results are not  interchangeable with troponin I results. ng/mL    I&O's Summary    09 Oct 2017 07:01  -  10 Oct 2017 07:00  --------------------------------------------------------  IN: 1605 mL / OUT: 1500 mL / NET: 105 mL    10 Oct 2017 07:01  -  10 Oct 2017 23:27  --------------------------------------------------------  IN: 1600 mL / OUT: 2465 mL / NET: -865 mL    10- ecg reviewed 10-        < from: KELLY w/Doppler (10.09.17 @ 13:35) >  EXAM:  ESOPHAGEAL ECHO (CARDIOL)                          *** ADDENDUM 10/09/2017  ***    Patient Height: 172.0 cm  Patient Weight: 109.0 kg  BSA: 2.2 m^2  Interpretation Summary  Normal left ventricular size and wall thickness.The left ventricular  ejection   fraction is estimated to be 40-45%The right ventricle is dilated.The   right   ventricular systolic function is mildly reduced.The left atrium is   dilated.The   right atrium is dilated.Thickening and calcification of the aortic   valveNo   aortic regurgitation noted.Mitral valve thickening noted.There is mild to   moderate mitral regurgitation.There is trace tricuspid   regurgitation.There was   insufficient TR detected from which to calculate pulmonary artery   systolic   pressure. No pulmonic regurgitation noted.There is aortic root   sclerosis/calcification.Mild atherosclerotic plaque(s) in the aortic   arch.Mild   atherosclerotic plaque(s) in the descending aorta.There is no pericardial   effusion.No discrete vegetations noted.    < end of copied text >    < from: Xray Chest 1 View AP -PORTABLE-Routine (10.09.17 @ 05:37) >  EXAM:  XR CHEST 1 VIEW PORT ROUTINE                          PROCEDURE DATE:  10/09/2017                     INTERPRETATION:    Portable AP Radiograph dated 10/9/2017 5:37 AM    CLINICAL INFORMATION: 72 years, Male, Intubated, PNA    PRIOR STUDIES:October 8, 2017    FINDINGS: Support tubes and lines are unchanged. Left pleural effusion is   stable. Right pleural effusion has increased. Pulmonary vascular   congestion has increased.    IMPRESSION:  Interval increase of right pleural effusion and pulmonary vascular   congestion.            "Thank you for the opportunity to participate in the care of this   patient."        KATHY PABLO M.D., RADIOLOGY ATTENDING  This document has been electronically signed. Oct  9 2017  8:33AM    < end of copied text > Neuro

## 2021-02-23 NOTE — PROCEDURE NOTE - NSTRACHPOSTINTU_RESP_A_CORE
Omnicef 300 twice daily for 7 days and get a chest x-ray   Breath sounds bilateral/Breath sounds equal/Positive end tidal Co2 noted/Chest excursion noted

## 2021-03-14 NOTE — PROGRESS NOTE ADULT - SUBJECTIVE AND OBJECTIVE BOX
Called by nurse to examine dressings from R AVF revision procedure on 3/11/21. Minimal ss drainage on gauze.  New gauze and tegaderm applied. R AVF with +thrill.  Hand warm. Palpable right radial.  Strength and sensory intact   INTERVAL HPI/OVERNIGHT EVENTS: Hgb stable o/n. NILDA    SUBJECTIVE: Patient seen and examined at bedside. No acute complaints, resting comfortably in chair. No chest pain/abd pain/dysuria/frequency/urgency.     OBJECTIVE:    VITAL SIGNS:  ICU Vital Signs Last 24 Hrs  T(C): 36.9 (16 Oct 2017 09:16), Max: 37 (15 Oct 2017 22:30)  T(F): 98.5 (16 Oct 2017 09:16), Max: 98.6 (15 Oct 2017 22:30)  HR: 70 (16 Oct 2017 09:15) (68 - 78)  BP: 102/55 (16 Oct 2017 08:41) (94/51 - 114/55)  BP(mean): 77 (16 Oct 2017 08:41) (72 - 80)  ABP: --  ABP(mean): --  RR: 19 (16 Oct 2017 09:15) (19 - 25)  SpO2: 97% (16 Oct 2017 09:15) (94% - 98%)        10-15 @ 07:01  -  10-16 @ 07:00  --------------------------------------------------------  IN: 250 mL / OUT: 2875 mL / NET: -2625 mL    10-16 @ 07:01  -  10-16 @ 10:16  --------------------------------------------------------  IN: 250 mL / OUT: 0 mL / NET: 250 mL      CAPILLARY BLOOD GLUCOSE          PHYSICAL EXAM:    General: NAD, resting comfortably in chair, speaking full sentences, on NC  HEENT: NC/AT; PERRL, clear conjunctiva, MMM  Neck: supple, no JVD  Respiratory: crackles b/l at bases, poor inspiratory effort  Cardiovascular: +S1/S2; RRR, no m/r/g  Abdomen: soft, NT/ND, BS+, no organomegaly or palpable masses. Obese.   Extremities: WWP, 2+ pitting edema to knee. PICC in place w/ dressing c/d/i  Vascular: 2+ peripheral pulses radial/dp/pt  Skin: normal color and turgor; no rash  Neurological: A&O x3, no gross neuro deficits.     MEDICATIONS:  MEDICATIONS  (STANDING):  ALBUTerol/ipratropium for Nebulization 3 milliLiter(s) Nebulizer every 4 hours  aspirin  chewable 81 milliGRAM(s) Oral daily  atorvastatin 20 milliGRAM(s) Oral at bedtime  dextrose 5%. 1000 milliLiter(s) (50 mL/Hr) IV Continuous <Continuous>  furosemide   Injectable 20 milliGRAM(s) IV Push two times a day  heparin  flush 100 Units/mL Injectable 100 Unit(s) IV Push every other day  melatonin 3 milliGRAM(s) Oral at bedtime  nafcillin  IVPB 2 Gram(s) IV Intermittent every 4 hours  nafcillin  IVPB      pantoprazole  Injectable 40 milliGRAM(s) IV Push every 12 hours  polyethylene glycol 3350 17 Gram(s) Oral two times a day  potassium chloride  10 mEq/100 mL IVPB 10 milliEquivalent(s) IV Intermittent every 1 hour  tamsulosin 0.4 milliGRAM(s) Oral at bedtime    MEDICATIONS  (PRN):  acetaminophen  Suppository 650 milliGRAM(s) Rectal every 6 hours PRN For Temp greater than 38 C (100.4 F)  bisacodyl Suppository 10 milliGRAM(s) Rectal daily PRN Constipation  guaiFENesin    Syrup 100 milliGRAM(s) Oral every 6 hours PRN Cough  simethicone 80 milliGRAM(s) Chew two times a day PRN Gas      ALLERGIES:  Allergies    No Known Allergies    Intolerances        LABS:                        7.6    6.6   )-----------( 166      ( 16 Oct 2017 06:24 )             23.9     10-16    143  |  98  |  32<H>  ----------------------------<  113<H>  3.4<L>   |  31  |  1.27    Ca    8.5      16 Oct 2017 06:24  Mg     1.8     10-16            RADIOLOGY & ADDITIONAL TESTS: Reviewed.

## 2021-04-16 NOTE — DISCHARGE NOTE ADULT - NS MD DC FALL RISK RISK
[Patient] : patient For information on Fall & Injury Prevention, visit [email or web address]

## 2022-04-26 NOTE — PROGRESS NOTE ADULT - ASSESSMENT
71 yo male with hx of HTN, COPD, Prostate CA (s/p radiation and seed 2013 on leuprolide outpatient) who presented with worsening of SOB and an episode of unwitnessed syncope found to have septic shock from MSSA bacteremia from PNA. Bacteremia cleared but now with sepsis secondary to UTI.     RECOMMEND:  #. DISCONTINUE Cefazolin  #. DISCONTINUE Zosyn.  #. START Cefepime 2g IVPB q12hrs -- will cover MSSA and GNR.   #. F/u final Ucx s/s.  #. F/u Bcx 10/25 -- NGTD.   #. ID to follow.    Above discussed with attending. Please follow up with attending attestation as well. done done

## 2023-07-31 NOTE — PROGRESS NOTE ADULT - PROBLEM SELECTOR PLAN 8
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  - Saldana removed 10/30, passed TOV. Continue to monitor for urinary retention.   - c/w tamsulosin .8 mg and finasteride 5 mg PO daily Isotretinoin Pregnancy And Lactation Text: This medication is Pregnancy Category X and is considered extremely dangerous during pregnancy. It is unknown if it is excreted in breast milk.

## 2024-05-09 NOTE — CHART NOTE - NSCHARTNOTESELECT_GEN_ALL_CORE
Event Note
Nutrition Follow Up/Nutrition Services
Nutrition Services
None
